# Patient Record
Sex: FEMALE | Race: WHITE | URBAN - METROPOLITAN AREA
[De-identification: names, ages, dates, MRNs, and addresses within clinical notes are randomized per-mention and may not be internally consistent; named-entity substitution may affect disease eponyms.]

---

## 2017-01-01 ENCOUNTER — MEDICAL CORRESPONDENCE (OUTPATIENT)
Dept: HEALTH INFORMATION MANAGEMENT | Facility: CLINIC | Age: 77
End: 2017-01-01

## 2017-01-01 ENCOUNTER — TRANSFERRED RECORDS (OUTPATIENT)
Dept: HEALTH INFORMATION MANAGEMENT | Facility: CLINIC | Age: 77
End: 2017-01-01

## 2017-03-08 ENCOUNTER — TRANSFERRED RECORDS (OUTPATIENT)
Dept: HEALTH INFORMATION MANAGEMENT | Facility: CLINIC | Age: 77
End: 2017-03-08

## 2017-03-09 ENCOUNTER — TRANSFERRED RECORDS (OUTPATIENT)
Dept: HEALTH INFORMATION MANAGEMENT | Facility: CLINIC | Age: 77
End: 2017-03-09

## 2017-03-10 ENCOUNTER — TRANSFERRED RECORDS (OUTPATIENT)
Dept: HEALTH INFORMATION MANAGEMENT | Facility: CLINIC | Age: 77
End: 2017-03-10

## 2017-03-16 ENCOUNTER — TRANSFERRED RECORDS (OUTPATIENT)
Dept: HEALTH INFORMATION MANAGEMENT | Facility: CLINIC | Age: 77
End: 2017-03-16

## 2017-03-17 ENCOUNTER — TRANSFERRED RECORDS (OUTPATIENT)
Dept: HEALTH INFORMATION MANAGEMENT | Facility: CLINIC | Age: 77
End: 2017-03-17

## 2017-03-20 ENCOUNTER — HOSPITAL LABORATORY (OUTPATIENT)
Facility: OTHER | Age: 77
End: 2017-03-20

## 2017-03-20 LAB — INR PPP: 2.34 (ref 0.86–1.14)

## 2017-03-21 ENCOUNTER — NURSING HOME VISIT (OUTPATIENT)
Dept: GERIATRICS | Facility: CLINIC | Age: 77
End: 2017-03-21
Payer: MEDICARE

## 2017-03-21 VITALS
DIASTOLIC BLOOD PRESSURE: 92 MMHG | HEART RATE: 58 BPM | RESPIRATION RATE: 20 BRPM | SYSTOLIC BLOOD PRESSURE: 174 MMHG | TEMPERATURE: 98.2 F | WEIGHT: 250 LBS

## 2017-03-21 DIAGNOSIS — I63.9 CEREBROVASCULAR ACCIDENT (CVA), UNSPECIFIED MECHANISM (H): Primary | ICD-10-CM

## 2017-03-21 DIAGNOSIS — I10 BENIGN ESSENTIAL HYPERTENSION: ICD-10-CM

## 2017-03-21 DIAGNOSIS — N39.0 URINARY TRACT INFECTION WITHOUT HEMATURIA, SITE UNSPECIFIED: ICD-10-CM

## 2017-03-21 DIAGNOSIS — I50.33 ACUTE ON CHRONIC DIASTOLIC CONGESTIVE HEART FAILURE (H): ICD-10-CM

## 2017-03-21 DIAGNOSIS — E11.69 TYPE 2 DIABETES MELLITUS WITH OTHER SPECIFIED COMPLICATION (H): ICD-10-CM

## 2017-03-21 DIAGNOSIS — J15.211 PNEUMONIA DUE TO METHICILLIN SUSCEPTIBLE STAPHYLOCOCCUS AUREUS, UNSPECIFIED LATERALITY, UNSPECIFIED PART OF LUNG: ICD-10-CM

## 2017-03-21 PROBLEM — I50.9 CHF (CONGESTIVE HEART FAILURE) (H): Status: ACTIVE | Noted: 2017-03-21

## 2017-03-21 PROCEDURE — 99207 ZZC CDG-CORRECTLY CODED, REVIEWED AND AGREE: CPT | Performed by: FAMILY MEDICINE

## 2017-03-21 PROCEDURE — 99306 1ST NF CARE HIGH MDM 50: CPT | Performed by: FAMILY MEDICINE

## 2017-03-21 RX ORDER — POTASSIUM CHLORIDE 1.5 G/1.58G
20 POWDER, FOR SOLUTION ORAL 2 TIMES DAILY
COMMUNITY
End: 2017-05-08

## 2017-03-21 RX ORDER — CETIRIZINE HYDROCHLORIDE 10 MG/1
10 TABLET ORAL EVERY EVENING
COMMUNITY

## 2017-03-21 RX ORDER — MAGNESIUM CARB/ALUMINUM HYDROX 105-160MG
296 TABLET,CHEWABLE ORAL ONCE
COMMUNITY
End: 2018-01-01

## 2017-03-21 RX ORDER — AMOXICILLIN 250 MG
2 CAPSULE ORAL DAILY
COMMUNITY
End: 2017-04-07

## 2017-03-21 RX ORDER — CEFTRIAXONE 2 G/1
2 INJECTION, POWDER, FOR SOLUTION INTRAMUSCULAR; INTRAVENOUS DAILY
COMMUNITY
Start: 2017-03-18 | End: 2017-04-07

## 2017-03-21 RX ORDER — L. ACIDOPHILUS/PECTIN, CITRUS 25MM-100MG
1 TABLET ORAL 2 TIMES DAILY
COMMUNITY
Start: 2017-03-21 | End: 2017-04-04

## 2017-03-21 RX ORDER — OMEGA-3 FATTY ACIDS/FISH OIL 300-1000MG
1000 CAPSULE ORAL 2 TIMES DAILY
COMMUNITY
End: 2017-04-13

## 2017-03-21 RX ORDER — MULTIPLE VITAMINS W/ MINERALS TAB 9MG-400MCG
1 TAB ORAL DAILY
COMMUNITY

## 2017-03-21 RX ORDER — FERROUS SULFATE 325(65) MG
TABLET ORAL
COMMUNITY
End: 2017-05-08

## 2017-03-21 RX ORDER — MUPIROCIN 20 MG/G
OINTMENT TOPICAL 2 TIMES DAILY
COMMUNITY
End: 2017-04-07

## 2017-03-21 NOTE — PROGRESS NOTES
[facility]  PRIMARY CARE PROVIDER AND CLINIC:  Pradeep Zuniga None  Chief Complaint   Patient presents with     Hospital F/U     Nursing Home Acute       HPI:    Sunitha Jacques is a 76 year old  (1940),admitted to the [facility]  from [facility] in [place], [place].  Hospital stay 3/8/17 through 3/17/17.  Admitted to this facility for  rehab, medical management and nursing care. Current issues are:      1. Cerebrovascular accident (CVA), unspecified mechanism (H)    2. Acute on chronic diastolic congestive heart failure (H)    3. Urinary tract infection without hematuria, site unspecified    4. Pneumonia due to methicillin susceptible Staphylococcus aureus, unspecified laterality, unspecified part of lung (H)    5. Type 2 diabetes mellitus with other specified complication (H)    6. Benign essential hypertension -- BP have been quite high 180-220/80-90s in the  TCU.         Patient is a 75 yo female with a PMH of Afib on xarelto, DM2, HTN, hypothyroidism. Patient was found down with altered mental status.  Patient has amnesia of days before hospitalization.  She was in her usual state of health on Monday 3/6/17 although family states she had been to the ED because she was not feeling well.  Her recent brain imaging showed a subacute right parietal infarct.  It is unclear if her stroke led to her condition or her condition led to xarelto noncompliance and stroke.  It is reasonable to consider this is a xarelto faliure  And transition to another anticoagulant. Eliquis was ordered.    Patient was aos found to have MSSA bacteremia. followed by ID. Was seen by cardiology.  Recommend may need a SALLY but orders not clear in the chart if indicated or not. Will have her see ID to determine that continue IV antibiotic as indicated to treat bacteremia.    CODE STATUS/ADVANCE DIRECTIVES DISCUSSION:   CPR/Full code   Patient's living condition: lives  alone    ALLERGIES:Metaproterenol; Penicillins; Prednisone; and Sulfa drugs  PAST MEDICAL HISTORY:  has no past medical history on file.  PAST SURGICAL HISTORY:  has no past surgical history on file.  FAMILY HISTORY: family history is not on file.  SOCIAL HISTORY:      Post Discharge Medication Reconciliation Status: discharge medications reconciled and changed, per note/orders (see AVS).  Current Outpatient Prescriptions   Medication Sig Dispense Refill     Lactobacillus Acid-Pectin (LACTOBACILLUS ACIDOPHILUS) TABS Take 1 tablet by mouth 2 times daily       ASPIRIN PO Take 81 mg by mouth       BISACODYL RE Place 10 mg rectally daily       cefTRIAXone sodium 2 G SOLR Inject 2 g into the vein daily       Warfarin Sodium (COUMADIN PO) Take 2.5 mg by mouth daily       Docusate Sodium (COLACE PO) Take 100 mg by mouth as needed for constipation       ferrous sulfate (IRON) 325 (65 FE) MG tablet Take by mouth daily (with breakfast)       omega 3 1000 MG CAPS Take 1,000 mg by mouth 2 times daily       LEVOTHYROXINE SODIUM PO Take 150 mcg by mouth daily       Escitalopram Oxalate (LEXAPRO PO) Take 10 mg by mouth daily       Atorvastatin Calcium (LIPITOR PO) Take 80 mg by mouth daily       magnesium citrate 1.745 GM/30ML solution Take 296 mLs by mouth once       MAGNESIUM OXIDE PO Take 400 mg by mouth daily       METOPROLOL TARTRATE PO Take 50 mg by mouth 2 times daily       magnesium hydroxide (MILK OF MAGNESIA) 400 MG/5ML suspension Take 30 mLs by mouth daily as needed for constipation or heartburn       multivitamin, therapeutic with minerals (MULTI-VITAMIN) TABS tablet Take 1 tablet by mouth daily       mupirocin (BACTROBAN) 2 % ointment Apply topically 2 times daily       Omeprazole (PRILOSEC PO) Take 20 mg by mouth 2 times daily (before meals)       senna-docusate (SENOKOT-S;PERICOLACE) 8.6-50 MG per tablet Take 2 tablets by mouth daily       Montelukast Sodium (SINGULAIR PO) Take 10 mg by mouth At Bedtime        Acetaminophen (TYLENOL PO) Take 1,000 mg by mouth 4 times daily       VITAMIN D, CHOLECALCIFEROL, PO Take 1,000 Units by mouth daily       cetirizine (ZYRTEC) 10 MG tablet Take 10 mg by mouth daily         ROS: inc swelling in both legs- new edema.   10 point ROS of systems including Constitutional, Eyes, Respiratory, Cardiovascular, Gastroenterology, Genitourinary, Integumentary, Muscularskeletal, Psychiatric were all negative except for pertinent positives noted in my HPI.    Exam:  BP (!) 174/92  Pulse 58  Temp 98.2  F (36.8  C)  Resp 20  Wt 250 lb (113.4 kg)  Gen: sitting in chair, alert, cooperative and in no acute distress  HEENT: normocephalic; oropharynx clear  Card: RRR, S1, S2, no murmurs  Resp: lungs clear to auscultation bilaterally, no wheezes or crackles  GI: abdomen soft, not-tender  MSK: normal muscle tone, 3 plus edema b/l   Neuro: CX II-XII grossly in tact; ROM in all four extremities grossly in tact  Psych: alert and oriented x3; normal affect  Skin: warm, no suspicious rashes or lesions noted    Lab/Diagnostic data:     Results for orders placed or performed in visit on 03/20/17   INR   Result Value Ref Range    INR 2.34 (H) 0.86 - 1.14         ASSESSMENT/PLAN:  1. Cerebrovascular accident (CVA), unspecified mechanism (H)    2. Acute on chronic diastolic congestive heart failure (H)    3. Urinary tract infection without hematuria, site unspecified    4. Pneumonia due to methicillin susceptible Staphylococcus aureus, unspecified laterality, unspecified part of lung (H)    5. Type 2 diabetes mellitus with other specified complication (H)    6. Benign essential hypertension          Orders:  1. Restart quinapril 20 mg po qam Dx HTN-- patient had been takne off many of her blood pressure meds.  Will slowly add back in to get her SBP <140/90.  2. Update MD if SBP > 180 x 2 readings  3. MSSA  On rocepin 2 g IV x 21 days  4. Check CBC, BMP, ESR, Cr q fridays and fax labs to ID and f/u with ID in 3  weeks  5. F/U with neurology Dr Lane or [facility].  6. F/U with Cardiology unclear if SALLY is still indicated Dx MSSA bacteremia  7. BS QID  8. Diabetic diet  9. Glipizide 5 mg po qd Dx DM2- was on at home.  Will restart again.  10 Lasix 20 mg po qam Dx edema  11. KCL 20 meq po qam Dx edema  12. Tenso shape on am and off qhs Dx edema  13. Make appt with cardiology Dx Afib with RVR  14. Cdiff toxin and cx Dx diarrhea  15 Hold all laxatives Dx diahhrea    Information reviewed:  Medications, vital signs, orders, nursing notes, problem list, hospital information. Total time spent with patient visit was 45 min including patient visit and review of past records. Greater than 50% of total time spent with counseling and coordinating care.    Electronically signed by:  Patricia Loaiza MD, MD

## 2017-03-21 NOTE — MR AVS SNAPSHOT
"              After Visit Summary   3/21/2017    Sunitha Jacques    MRN: [id number]           Patient Information     Date Of Birth          1940        Visit Information        Provider Department      3/21/2017 2:00 PM Patricia Loaiza MD [facility] Trans[facility]        Today's Diagnoses     Cerebrovascular accident (CVA), unspecified mechanism (H)    -  1    Acute on chronic diastolic congestive heart failure (H)        Urinary tract infection without hematuria, site unspecified        Pneumonia due to methicillin susceptible Staphylococcus aureus, unspecified laterality, unspecified part of lung (H)        Type 2 diabetes mellitus with other specified complication (H)        Benign essential hypertension           Follow-ups after your visit        Who to contact     If you have questions or need follow up information about today's clinic visit or your schedule please contact GERIATR[facility] TR[facility]ITIONAL [facility] directly at [phone].  Normal or non-critical lab and imaging results will be communicated to you by Metabolihart, letter or phone within 4 business days after the clinic has received the results. If you do not hear from us within 7 days, please contact the clinic through Metabolihart or phone. If you have a critical or abnormal lab result, we will notify you by phone as soon as possible.  Submit refill requests through [employer] or call your pharmacy and they will forward the refill request to us. Please allow 3 business days for your refill to be completed.          Additional Information About Your Visit        MyChart Information     [employer]Film lets you send messages to your doctor, view your test results, renew your prescriptions, schedule appointments and more. To sign up, go to [email or web address] . Click on \"Log in\" on the left side of the screen, which will take you to the Welcome page. Then click on \"Sign up Now\" on the right side of the page.     You will be asked to enter the access code " listed below, as well as some personal information. Please follow the directions to create your username and password.     Your access code is: [email or web address]  Expires: 2017  5:23 PM     Your access code will  in 90 days. If you need help or a new code, please call your [facility] or [phone].        Care EveryWhere ID     This is your Care EveryWhere ID. This could be used by other organizations to access your [facility] medical records  [id number]        Your Vitals Were     Pulse Temperature Respirations             58 98.2  F (36.8  C) 20          Blood Pressure from Last 3 Encounters:   17 (!) 174/92    Weight from Last 3 Encounters:   17 250 lb (113.4 kg)              Today, you had the following     No orders found for display       Primary Care Provider    Pradeep Zuniga       No address on file        Thank you!     Thank you for choosing [facility]  for your care. Our goal is always to provide you with excellent care. Hearing back from our patients is one way we can continue to improve our services. Please take a few minutes to complete the written survey that you may receive in the mail after your visit with us. Thank you!             Your Updated Medication List - Protect others around you: Learn how to safely use, store and throw away your medicines at www.disposemymeds.org.          This list is accurate as of: 3/21/17 11:59 PM.  Always use your most recent med list.                   Brand Name Dispense Instructions for use    ASPIRIN PO      Take 81 mg by mouth       BISACODYL RE      Place 10 mg rectally daily       cefTRIAXone sodium 2 G Solr      Inject 2 g into the vein daily       cetirizine 10 MG tablet    zyrTEC     Take 10 mg by mouth daily       COLACE PO      Take 100 mg by mouth as needed for constipation       COUMADIN PO      Take 2.5 mg by mouth daily       ferrous sulfate 325 (65 FE) MG tablet    IRON     Take by mouth daily (with  breakfast)       GLIPIZIDE PO      Take 5 mg by mouth every morning (before breakfast)       lactobacillus acidophilus Tabs      Take 1 tablet by mouth 2 times daily       LASIX PO      Take 40 mg by mouth daily       LEVOTHYROXINE SODIUM PO      Take 150 mcg by mouth daily       LEXAPRO PO      Take 10 mg by mouth daily       LIPITOR PO      Take 80 mg by mouth daily       magnesium citrate 1.745 GM/30ML solution      Take 296 mLs by mouth once       magnesium hydroxide 400 MG/5ML suspension    MILK OF MAGNESIA     Take 30 mLs by mouth daily as needed for constipation or heartburn       MAGNESIUM OXIDE PO      Take 400 mg by mouth daily       METOPROLOL TARTRATE PO      Take 50 mg by mouth 2 times daily       Multi-vitamin Tabs tablet      Take 1 tablet by mouth daily       mupirocin 2 % ointment    BACTROBAN     Apply topically 2 times daily       omega 3 1000 MG Caps      Take 1,000 mg by mouth 2 times daily       potassium chloride 20 MEQ Packet    KLOR-CON     Take 20 mEq by mouth daily       PRILOSEC PO      Take 20 mg by mouth 2 times daily (before meals)       senna-docusate 8.6-50 MG per tablet    SENOKOT-S;PERICOLACE     Take 2 tablets by mouth daily       SINGULAIR PO      Take 10 mg by mouth At Bedtime       TYLENOL PO      Take 1,000 mg by mouth 4 times daily       VITAMIN D (CHOLECALCIFEROL) PO      Take 1,000 Units by mouth daily

## 2017-03-23 ENCOUNTER — HOSPITAL LABORATORY (OUTPATIENT)
Facility: OTHER | Age: 77
End: 2017-03-23

## 2017-03-23 ENCOUNTER — NURSING HOME VISIT (OUTPATIENT)
Dept: GERIATRICS | Facility: CLINIC | Age: 77
End: 2017-03-23

## 2017-03-23 DIAGNOSIS — Z79.01 LONG TERM (CURRENT) USE OF ANTICOAGULANTS: ICD-10-CM

## 2017-03-23 DIAGNOSIS — Z51.81 ENCOUNTER FOR THERAPEUTIC DRUG MONITORING: ICD-10-CM

## 2017-03-23 DIAGNOSIS — I63.9 CEREBROVASCULAR ACCIDENT (CVA), UNSPECIFIED MECHANISM (H): Primary | ICD-10-CM

## 2017-03-23 DIAGNOSIS — I50.23 ACUTE ON CHRONIC SYSTOLIC CONGESTIVE HEART FAILURE (H): ICD-10-CM

## 2017-03-23 DIAGNOSIS — E46 PROTEIN-CALORIE MALNUTRITION (H): ICD-10-CM

## 2017-03-23 LAB
C DIFF STL QL CULT: ABNORMAL
SPECIMEN SOURCE: ABNORMAL

## 2017-03-23 NOTE — MR AVS SNAPSHOT
"              After Visit Summary   3/23/2017    Sunitha Jacques    MRN: [id number]           Patient Information     Date Of Birth          1940        Visit Information        Provider Department      3/23/2017 10:00 AM Patricia Loaiza MD Geriatrics Transitional Care        Today's Diagnoses     Cerebrovascular accident (CVA), unspecified mechanism (H)    -  1    Long term (current) use of anticoagulants        Encounter for therapeutic drug monitoring        Acute on chronic systolic congestive heart failure (H)        Protein-calorie malnutrition (H)           Follow-ups after your visit        Who to contact     If you have questions or need follow up information about today's clinic visit or your schedule please contact GERIAT[facility]ICS TRANSITIONAL [facility] directly at [phone].  Normal or non-critical lab and imaging results will be communicated to you by Actionsofthart, letter or phone within 4 business days after the clinic has received the results. If you do not hear from us within 7 days, please contact the clinic through Actionsofthart or phone. If you have a critical or abnormal lab result, we will notify you by phone as soon as possible.  Submit refill requests through [employer] or call your pharmacy and they will forward the refill request to us. Please allow 3 business days for your refill to be completed.          Additional Information About Your Visit        Actionsofthart Information     [employer] lets you send messages to your doctor, view your test results, renew your prescriptions, schedule appointments and more. To sign up, go to [email or web address] . Click on \"Log in\" on the left side of the screen, which will take you to the Welcome page. Then click on \"Sign up Now\" on the right side of the page.     You will be asked to enter the access code listed below, as well as some personal information. Please follow the directions to create your username and password.     Your access code is: " [email or web address]  Expires: 2017  5:23 PM     Your access code will  in 90 days. If you need help or a new code, please call your [place] [facility] clinic or [phone].        Care EveryWhere ID     This is your Care EveryWhere ID. This could be used by other organizations to access your [facility] medical records  [id number]         Blood Pressure from Last 3 Encounters:   17 (!) 174/92    Weight from Last 3 Encounters:   17 250 lb (113.4 kg)              Today, you had the following     No orders found for display       Primary Care Provider    Pradeep Zuniga       No address on file        Thank you!     Thank you for choosing [facility]  for your care. Our goal is always to provide you with excellent care. Hearing back from our patients is one way we can continue to improve our services. Please take a few minutes to complete the written survey that you may receive in the mail after your visit with us. Thank you!             Your Updated Medication List - Protect others around you: Learn how to safely use, store and throw away your medicines at www.disposemymeds.org.          This list is accurate as of: 3/23/17  5:23 PM.  Always use your most recent med list.                   Brand Name Dispense Instructions for use    ASPIRIN PO      Take 81 mg by mouth       BISACODYL RE      Place 10 mg rectally daily       cefTRIAXone sodium 2 G Solr      Inject 2 g into the vein daily       cetirizine 10 MG tablet    zyrTEC     Take 10 mg by mouth daily       COLACE PO      Take 100 mg by mouth as needed for constipation       COUMADIN PO      Take 2.5 mg by mouth daily       ferrous sulfate 325 (65 FE) MG tablet    IRON     Take by mouth daily (with breakfast)       GLIPIZIDE PO      Take 5 mg by mouth every morning (before breakfast)       lactobacillus acidophilus Tabs      Take 1 tablet by mouth 2 times daily       LASIX PO      Take 40 mg by mouth daily       LEVOTHYROXINE SODIUM PO       Take 150 mcg by mouth daily       LEXAPRO PO      Take 10 mg by mouth daily       LIPITOR PO      Take 80 mg by mouth daily       magnesium citrate 1.745 GM/30ML solution      Take 296 mLs by mouth once       magnesium hydroxide 400 MG/5ML suspension    MILK OF MAGNESIA     Take 30 mLs by mouth daily as needed for constipation or heartburn       MAGNESIUM OXIDE PO      Take 400 mg by mouth daily       METOPROLOL TARTRATE PO      Take 50 mg by mouth 2 times daily       Multi-vitamin Tabs tablet      Take 1 tablet by mouth daily       mupirocin 2 % ointment    BACTROBAN     Apply topically 2 times daily       omega 3 1000 MG Caps      Take 1,000 mg by mouth 2 times daily       potassium chloride 20 MEQ Packet    KLOR-CON     Take 20 mEq by mouth daily       PRILOSEC PO      Take 20 mg by mouth 2 times daily (before meals)       senna-docusate 8.6-50 MG per tablet    SENOKOT-S;PERICOLACE     Take 2 tablets by mouth daily       SINGULAIR PO      Take 10 mg by mouth At Bedtime       TYLENOL PO      Take 1,000 mg by mouth 4 times daily       VITAMIN D (CHOLECALCIFEROL) PO      Take 1,000 Units by mouth daily

## 2017-03-23 NOTE — PROGRESS NOTES
[facility]    HPI:    Sunitha Jacques is a 76 year old  (1940), who is being seen today for an episodic care visit at [facility]. Today's concern is INR/Coumadin management for CVA    Bleeding Signs/Symptoms:  None  Thromboembolic Signs/Symptoms:  None    Medication Changes:  No  Dietary Changes:  No  Activity Changes: No  Bacterial/Viral Infection:  No    Missed Coumadin Doses:  None    On ASA: Yes - 81 mg po q day    Other Concerns:  No    OBJECTIVE:    INR Today:  1.9  Current Dose:  2.5mg    ASSESSMENT:    Subtherapeutic INR for goal of 2-3    PLAN:    New Dose: 3 mg daily      Next INR: 3/27/17    1. Cerebrovascular accident (CVA), unspecified mechanism (H)    2. Long term (current) use of anticoagulants    3. Encounter for therapeutic drug monitoring    4. Acute on chronic systolic congestive heart failure (H)    5. Protein-calorie malnutrition (H)          Patricia Loaiza MD, MD

## 2017-03-24 ENCOUNTER — HOSPITAL LABORATORY (OUTPATIENT)
Facility: OTHER | Age: 77
End: 2017-03-24

## 2017-03-24 LAB
ANION GAP SERPL CALCULATED.3IONS-SCNC: 4 MMOL/L (ref 3–14)
BUN SERPL-MCNC: 13 MG/DL (ref 7–30)
C DIFF TOX B STL QL: NORMAL
CALCIUM SERPL-MCNC: 8.3 MG/DL (ref 8.5–10.1)
CHLORIDE SERPL-SCNC: 106 MMOL/L (ref 94–109)
CO2 SERPL-SCNC: 35 MMOL/L (ref 20–32)
CREAT SERPL-MCNC: 0.79 MG/DL (ref 0.52–1.04)
CRP SERPL-MCNC: 6 MG/L (ref 0–8)
ERYTHROCYTE [DISTWIDTH] IN BLOOD BY AUTOMATED COUNT: 13.9 % (ref 10–15)
ERYTHROCYTE [SEDIMENTATION RATE] IN BLOOD BY WESTERGREN METHOD: 15 MM/H (ref 0–30)
GFR SERPL CREATININE-BSD FRML MDRD: 71 ML/MIN/1.7M2
GLUCOSE SERPL-MCNC: 96 MG/DL (ref 70–99)
HCT VFR BLD AUTO: 35 % (ref 35–47)
HGB BLD-MCNC: 10.8 G/DL (ref 11.7–15.7)
MCH RBC QN AUTO: 28 PG (ref 26.5–33)
MCHC RBC AUTO-ENTMCNC: 30.9 G/DL (ref 31.5–36.5)
MCV RBC AUTO: 91 FL (ref 78–100)
PLATELET # BLD AUTO: 235 10E9/L (ref 150–450)
POTASSIUM SERPL-SCNC: 4.2 MMOL/L (ref 3.4–5.3)
RBC # BLD AUTO: 3.86 10E12/L (ref 3.8–5.2)
SODIUM SERPL-SCNC: 145 MMOL/L (ref 133–144)
SPECIMEN SOURCE: NORMAL
WBC # BLD AUTO: 6.3 10E9/L (ref 4–11)

## 2017-03-26 ENCOUNTER — TELEPHONE (OUTPATIENT)
Dept: GERIATRICS | Facility: CLINIC | Age: 77
End: 2017-03-26

## 2017-03-26 NOTE — TELEPHONE ENCOUNTER
Pt with hx of CVA and recent elevated B/P started by on call MD amlodipine 2.5 mg daily. Received 2.5 mg yesterday evening and AM dose this AM. B/P still 200/67 pulse 62. Order given to increase amlodipine to 5 mg daily. Nursing instructed if pt's symptoms change or worsen they are to notify NP or MD.  Note signed electronically by Emanuel Gan CNP

## 2017-03-27 ENCOUNTER — HOSPITAL LABORATORY (OUTPATIENT)
Facility: OTHER | Age: 77
End: 2017-03-27

## 2017-03-27 LAB — INR PPP: 1.89 (ref 0.86–1.14)

## 2017-03-28 ENCOUNTER — NURSING HOME VISIT (OUTPATIENT)
Dept: GERIATRICS | Facility: CLINIC | Age: 77
End: 2017-03-28
Payer: MEDICARE

## 2017-03-28 VITALS
DIASTOLIC BLOOD PRESSURE: 80 MMHG | SYSTOLIC BLOOD PRESSURE: 200 MMHG | WEIGHT: 254 LBS | TEMPERATURE: 97.8 F | HEART RATE: 60 BPM | RESPIRATION RATE: 18 BRPM

## 2017-03-28 DIAGNOSIS — I63.9 CEREBROVASCULAR ACCIDENT (CVA), UNSPECIFIED MECHANISM (H): Primary | ICD-10-CM

## 2017-03-28 DIAGNOSIS — I10 BENIGN ESSENTIAL HYPERTENSION: ICD-10-CM

## 2017-03-28 DIAGNOSIS — I50.23 ACUTE ON CHRONIC SYSTOLIC CONGESTIVE HEART FAILURE (H): ICD-10-CM

## 2017-03-28 DIAGNOSIS — E46 PROTEIN-CALORIE MALNUTRITION (H): ICD-10-CM

## 2017-03-28 DIAGNOSIS — Z79.01 LONG TERM (CURRENT) USE OF ANTICOAGULANTS: ICD-10-CM

## 2017-03-28 PROCEDURE — 99207 ZZC CDG-CORRECTLY CODED, REVIEWED AND AGREE: CPT | Performed by: FAMILY MEDICINE

## 2017-03-28 PROCEDURE — 99309 SBSQ NF CARE MODERATE MDM 30: CPT | Performed by: FAMILY MEDICINE

## 2017-03-28 NOTE — MR AVS SNAPSHOT
After Visit Summary   3/28/2017    Sunitha Jacques    MRN: [id number]           Patient Information     Date Of Birth          1940        Visit Information        Provider Department      3/28/2017 1:00 PM Patricia Loaiza MD Geriatrics Transitional Care        Today's Diagnoses     Cerebrovascular accident (CVA), unspecified mechanism (H)    -  1    Acute on chronic systolic congestive heart failure (H)        Long term (current) use of anticoagulants        Protein-calorie malnutrition (H)        Benign essential hypertension           Follow-ups after your visit        Your next 10 appointments already scheduled     Apr 06, 2017 10:00 AM CDT   Lymphedema Evaluation with Donna Vogel PT   [facility] Lymphedema ([facility])    [address] [address] [phone]            Apr 19, 2017  2:30 PM CDT   (Arrive by 2:15 PM)   New Patient Visit with Chadwick Carpenter MD   [facility] and Infectious Diseases ([facility] [place])    [address] [place] [address] [phone]              Who to contact     If you have questions or need follow up information about today's clinic visit or your schedule please contact GERIATRICS [facility]ITIONAL [facility] directly at [phone].  Normal or non-critical lab and imaging results will be communicated to you by MyChart, letter or phone within 4 business days after the clinic has received the results. If you do not hear from us within 7 days, please contact the clinic through MyChart or phone. If you have a critical or abnormal lab result, we will notify you by phone as soon as possible.  Submit refill requests through [employer] or call your pharmacy and they will forward the refill request to us. Please allow 3 business days for your refill to be completed.          Additional Information About Your Visit        MyChart Information     [employer] lets you  "send messages to your doctor, view your test results, renew your prescriptions, schedule appointments and more. To sign up, go to [email or web address] . Click on \"Log in\" on the left side of the screen, which will take you to the Welcome page. Then click on \"Sign up Now\" on the right side of the page.     You will be asked to enter the access code listed below, as well as some personal information. Please follow the directions to create your username and password.     Your access code is: [email or web address]  Expires: 2017  5:23 PM     Your access code will  in 90 days. If you need help or a new code, please call your [facility] clinic or [phone].        Care EveryWhere ID     This is your Care EveryWhere ID. This could be used by other organizations to access your [facility] medical records  [id number]        Your Vitals Were     Pulse Temperature Respirations             60 97.8  F (36.6  C) 18          Blood Pressure from Last 3 Encounters:   17 200/80   17 (!) 174/92    Weight from Last 3 Encounters:   17 254 lb (115.2 kg)   17 250 lb (113.4 kg)              Today, you had the following     No orders found for display       Primary Care Provider    Pradeep Zuniga       No address on file        Thank you!     Thank you for choosing G[facility]IATR[facility]ITIONAL [facility]ARE  for your care. Our goal is always to provide you with excellent care. Hearing back from our patients is one way we can continue to improve our services. Please take a few minutes to complete the written survey that you may receive in the mail after your visit with us. Thank you!             Your Updated Medication List - Protect others around you: Learn how to safely use, store and throw away your medicines at www.disposemymeds.org.          This list is accurate as of: 3/28/17 11:59 PM.  Always use your most recent med list.                   Brand Name Dispense Instructions for use    ASPIRIN PO      Take 81 mg by mouth "       BISACODYL RE      Place 10 mg rectally daily       cefTRIAXone sodium 2 G Solr      Inject 2 g into the vein daily       cetirizine 10 MG tablet    zyrTEC     Take 10 mg by mouth daily       COLACE PO      Take 100 mg by mouth as needed for constipation       COUMADIN PO      Take 2.5 mg by mouth daily       ferrous sulfate 325 (65 FE) MG tablet    IRON     Take by mouth daily (with breakfast)       GLIPIZIDE PO      Take 5 mg by mouth every morning (before breakfast)       lactobacillus acidophilus Tabs      Take 1 tablet by mouth 2 times daily       LASIX PO      Take 40 mg by mouth 2 times daily       LEVOTHYROXINE SODIUM PO      Take 150 mcg by mouth daily       LEXAPRO PO      Take 10 mg by mouth daily       LIPITOR PO      Take 80 mg by mouth daily       magnesium citrate 1.745 GM/30ML solution      Take 296 mLs by mouth once       magnesium hydroxide 400 MG/5ML suspension    MILK OF MAGNESIA     Take 30 mLs by mouth daily as needed for constipation or heartburn       MAGNESIUM OXIDE PO      Take 400 mg by mouth daily       METOPROLOL TARTRATE PO      Take 50 mg by mouth 2 times daily       Multi-vitamin Tabs tablet      Take 1 tablet by mouth daily       mupirocin 2 % ointment    BACTROBAN     Apply topically 2 times daily       NORVASC PO      Take 5 mg by mouth daily       omega 3 1000 MG Caps      Take 1,000 mg by mouth 2 times daily       potassium chloride 20 MEQ Packet    KLOR-CON     Take 20 mEq by mouth 2 times daily       PRILOSEC PO      Take 20 mg by mouth 2 times daily (before meals)       senna-docusate 8.6-50 MG per tablet    SENOKOT-S;PERICOLACE     Take 2 tablets by mouth daily       SINGULAIR PO      Take 10 mg by mouth At Bedtime       TYLENOL PO      Take 1,000 mg by mouth 4 times daily       VITAMIN D (CHOLECALCIFEROL) PO      Take 1,000 Units by mouth daily

## 2017-03-28 NOTE — PROGRESS NOTES
[facility]    Chief Complaint   Patient presents with     Nursing Home Acute       HPI:    Sunitha Jacques is a 76 year old  (1940), who is being seen today for an episodic care visit at [facility] . Today's concern is:     Cerebrovascular accident (CVA), unspecified mechanism (H)- [place]  Acute on chronic systolic congestive heart failure (H)  Long term (current) use of anticoagulants- on coumadin  Protein-calorie malnutrition (H)  Benign essential hypertension---blood pressure have been running high even with the addition of 40mg ACEI and norvasc 5 mg . The patient denies any symptoms of neurological impairment or TIA's; no amaurosis, diplopia, dysphasia, or unilateral disturbance of motor or sensory function. No loss of balance or vertigo.      ALLERGIES: Metaproterenol; Penicillins; Prednisone; and Sulfa drugs  Past Medical, Surgical, Family and Social History reviewed and updated in [facility].    Current Outpatient Prescriptions   Medication Sig Dispense Refill     AmLODIPine Besylate (NORVASC PO) Take 5 mg by mouth daily       Lactobacillus Acid-Pectin (LACTOBACILLUS ACIDOPHILUS) TABS Take 1 tablet by mouth 2 times daily       ASPIRIN PO Take 81 mg by mouth       BISACODYL RE Place 10 mg rectally daily       cefTRIAXone sodium 2 G SOLR Inject 2 g into the vein daily       Warfarin Sodium (COUMADIN PO) Take 2.5 mg by mouth daily       Docusate Sodium (COLACE PO) Take 100 mg by mouth as needed for constipation       ferrous sulfate (IRON) 325 (65 FE) MG tablet Take by mouth daily (with breakfast)       omega 3 1000 MG CAPS Take 1,000 mg by mouth 2 times daily       LEVOTHYROXINE SODIUM PO Take 150 mcg by mouth daily       Escitalopram Oxalate (LEXAPRO PO) Take 10 mg by mouth daily       Atorvastatin Calcium (LIPITOR PO) Take 80 mg by mouth daily       magnesium citrate 1.745 GM/30ML solution Take 296 mLs by mouth once       MAGNESIUM OXIDE PO Take 400 mg by mouth  daily       METOPROLOL TARTRATE PO Take 50 mg by mouth 2 times daily       magnesium hydroxide (MILK OF MAGNESIA) 400 MG/5ML suspension Take 30 mLs by mouth daily as needed for constipation or heartburn       multivitamin, therapeutic with minerals (MULTI-VITAMIN) TABS tablet Take 1 tablet by mouth daily       mupirocin (BACTROBAN) 2 % ointment Apply topically 2 times daily       Omeprazole (PRILOSEC PO) Take 20 mg by mouth 2 times daily (before meals)       senna-docusate (SENOKOT-S;PERICOLACE) 8.6-50 MG per tablet Take 2 tablets by mouth daily       Montelukast Sodium (SINGULAIR PO) Take 10 mg by mouth At Bedtime       Acetaminophen (TYLENOL PO) Take 1,000 mg by mouth 4 times daily       VITAMIN D, CHOLECALCIFEROL, PO Take 1,000 Units by mouth daily       cetirizine (ZYRTEC) 10 MG tablet Take 10 mg by mouth daily       GLIPIZIDE PO Take 5 mg by mouth every morning (before breakfast)       Furosemide (LASIX PO) Take 40 mg by mouth 2 times daily        potassium chloride (KLOR-CON) 20 MEQ Packet Take 20 mEq by mouth 2 times daily        Medications reviewed:  Medications reconciled to facility chart and changes were made to reflect current medications as identified as above med list. Below are the changes that were made:   Medications stopped since last EPIC medication reconciliation:   There are no discontinued medications.    Medications started since last [facility] medication reconciliation:  No orders of the defined types were placed in this encounter.        REVIEW OF SYSTEMS:  10 point ROS of systems including Constitutional, Eyes, Respiratory, Cardiovascular, Gastroenterology, Genitourinary, Integumentary, Muscularskeletal, Psychiatric were all negative except for pertinent positives noted in my HPI.    Physical Exam:  /80  Pulse 60  Temp 97.8  F (36.6  C)  Resp 18  Wt 254 lb (115.2 kg)  Gen: sitting in chair, alert, cooperative and in no acute distress  HEENT: normocephalic; oropharynx clear  Card: RRR,  S1, S2, no murmurs  Resp: lungs clear to auscultation bilaterally, no wheezes or crackles  GI: abdomen soft, not-tender  MSK: normal muscle tone, 1 plus  edema  Neuro: CX II-XII grossly in tact; ROM in all four extremities grossly in tact  Psych: alert and oriented x3; normal affect  Skin: warm, no suspicious rashes or lesions noted      Recent Labs:    Results for orders placed or performed in visit on 17   INR   Result Value Ref Range    INR 1.89 (H) 0.86 - 1.14         Assessment/Plan:     Cerebrovascular accident (CVA), unspecified mechanism (H)  Acute on chronic systolic congestive heart failure (H)  Long term (current) use of anticoagulants  Protein-calorie malnutrition (H)  Benign essential hypertension    Orders:  1. Norvasc 5 mg po qam Dx HTN  2. Lymphedema eval and treat Dx edema  3. DC ace wraps  4. Increase lasix to 40 mg po BID Dx edema  5. Increase potassium to 20 meq po bid edema  6. Hydralizine 10 mg po QID mod-severe HTN, CVA  7. F/U with Josse on 3/31/17  8. BMP on 3/31/17 Dx HTN      Electronically signed by  Patricia Loaiza MD, MD      MEDICAL NECESSITY STATEMENT FOR DME    Demographic Information on Sunitha Jacques:    Sunitha Jacques  Gender: female  : 1940  [address] [place] [id number]  [phone] (home)     Medical Record: [id number]  Social Security Number: [id number]  Primary Care Provider: Pradeep Zuniga  Insurance: Payor: MEDICARE / Plan: MEDICARE / Product Type: Medicare /       Cerebrovascular accident (CVA), unspecified mechanism (H) [I63.9]    DME:  WHEELCHAIR: no (TYPE, CUSHION)   Standard foot rest    Elevating leg rest    Dose patient use oxygen    Able to propel w/c  (if no why? And is there someone who can?)   Mobility related ADL that are affected in the home    The wheelchair is suitable and necessary for use in the patient's home and the  Home/rooms can accommodate the wheelchair.     Reason why a cane or walker will not meet the  patient's needs. (ie: balance,   Tolerance, level of assistance)    The patient has expressed willingness to use the wheelchair in the home and    Does have the physical and cognitive ability to maneuver the equipment or has a    Caregiver who is available, willing, and able to provide assistance in the home    With the wheelchair.   HOSPITAL BED:  no   With or without railings  WALKER: $ wheeled walker due to diagnosis of I63.9 for community distances  OXYGEN: no  NEBULIZER: no (MEDICATION TO BE ADMINISTERED)    VITAL SIGNS:  Vitals: /80  Pulse 60  Temp 97.8  F (36.6  C)  Resp 18  Wt 254 lb (115.2 kg)  BMI= There is no height or weight on file to calculate BMI.       (for Oxygen need Liter flow, Portable tank, O2 sats at rest on room air)     MEDICAL NECESSITY STATEMENT (describe reason to support DME here including length of need)  Cerebrovascular accident (CVA), unspecified mechanism (H) [I63.9]    ELECTRONICALLY SIGNED BY NUBIA CERTIFIED PROVIDER:  Patricia Loaiza MD, MD   NPI:NPI:[id number]    [facility]  [address] [place] [address], [address]  [place] [address]

## 2017-03-29 ENCOUNTER — HOSPITAL LABORATORY (OUTPATIENT)
Facility: OTHER | Age: 77
End: 2017-03-29

## 2017-03-29 LAB — INR PPP: 2.27 (ref 0.86–1.14)

## 2017-03-31 ENCOUNTER — HOSPITAL LABORATORY (OUTPATIENT)
Facility: OTHER | Age: 77
End: 2017-03-31

## 2017-03-31 ENCOUNTER — NURSING HOME VISIT (OUTPATIENT)
Dept: GERIATRICS | Facility: CLINIC | Age: 77
End: 2017-03-31
Payer: MEDICARE

## 2017-03-31 VITALS
TEMPERATURE: 98 F | SYSTOLIC BLOOD PRESSURE: 118 MMHG | DIASTOLIC BLOOD PRESSURE: 62 MMHG | WEIGHT: 210 LBS | RESPIRATION RATE: 14 BRPM | OXYGEN SATURATION: 96 % | HEART RATE: 78 BPM

## 2017-03-31 DIAGNOSIS — I50.23 ACUTE ON CHRONIC SYSTOLIC CONGESTIVE HEART FAILURE (H): ICD-10-CM

## 2017-03-31 DIAGNOSIS — J15.211 PNEUMONIA DUE TO METHICILLIN SUSCEPTIBLE STAPHYLOCOCCUS AUREUS, UNSPECIFIED LATERALITY, UNSPECIFIED PART OF LUNG: ICD-10-CM

## 2017-03-31 DIAGNOSIS — I63.9 CEREBROVASCULAR ACCIDENT (CVA), UNSPECIFIED MECHANISM (H): Primary | ICD-10-CM

## 2017-03-31 DIAGNOSIS — M79.672 LEFT FOOT PAIN: ICD-10-CM

## 2017-03-31 DIAGNOSIS — E46 PROTEIN-CALORIE MALNUTRITION (H): ICD-10-CM

## 2017-03-31 DIAGNOSIS — I10 BENIGN ESSENTIAL HYPERTENSION: ICD-10-CM

## 2017-03-31 LAB
ANION GAP SERPL CALCULATED.3IONS-SCNC: 4 MMOL/L (ref 3–14)
BUN SERPL-MCNC: 16 MG/DL (ref 7–30)
CALCIUM SERPL-MCNC: 9.1 MG/DL (ref 8.5–10.1)
CHLORIDE SERPL-SCNC: 102 MMOL/L (ref 94–109)
CO2 SERPL-SCNC: 37 MMOL/L (ref 20–32)
CREAT SERPL-MCNC: 0.86 MG/DL (ref 0.52–1.04)
CRP SERPL-MCNC: 5.2 MG/L (ref 0–8)
ERYTHROCYTE [DISTWIDTH] IN BLOOD BY AUTOMATED COUNT: 14 % (ref 10–15)
ERYTHROCYTE [SEDIMENTATION RATE] IN BLOOD BY WESTERGREN METHOD: 15 MM/H (ref 0–30)
GFR SERPL CREATININE-BSD FRML MDRD: 64 ML/MIN/1.7M2
GLUCOSE SERPL-MCNC: 101 MG/DL (ref 70–99)
HCT VFR BLD AUTO: 38.5 % (ref 35–47)
HGB BLD-MCNC: 11.8 G/DL (ref 11.7–15.7)
INR PPP: 1.8 (ref 0.86–1.14)
MCH RBC QN AUTO: 27.8 PG (ref 26.5–33)
MCHC RBC AUTO-ENTMCNC: 30.6 G/DL (ref 31.5–36.5)
MCV RBC AUTO: 91 FL (ref 78–100)
PLATELET # BLD AUTO: 211 10E9/L (ref 150–450)
POTASSIUM SERPL-SCNC: 4.7 MMOL/L (ref 3.4–5.3)
RBC # BLD AUTO: 4.24 10E12/L (ref 3.8–5.2)
SODIUM SERPL-SCNC: 143 MMOL/L (ref 133–144)
WBC # BLD AUTO: 5.5 10E9/L (ref 4–11)

## 2017-03-31 PROCEDURE — 99207 ZZC CDG-CORRECTLY CODED, REVIEWED AND AGREE: CPT | Performed by: FAMILY MEDICINE

## 2017-03-31 PROCEDURE — 99309 SBSQ NF CARE MODERATE MDM 30: CPT | Performed by: FAMILY MEDICINE

## 2017-03-31 NOTE — MR AVS SNAPSHOT
After Visit Summary   3/31/2017    Sunitha Jacques    MRN: [id number]           Patient Information     Date Of Birth          1940        Visit Information        Provider Department      3/31/2017 9:00 AM Patricia Loaiza MD Geriatrics Transitional Care        Today's Diagnoses     Cerebrovascular accident (CVA), unspecified mechanism (H)    -  1    Acute on chronic systolic congestive heart failure (H)        Protein-calorie malnutrition (H)        Benign essential hypertension        Pneumonia due to methicillin susceptible Staphylococcus aureus, unspecified laterality, unspecified part of lung (H)        Left foot pain           Follow-ups after your visit        Your next 10 appointments already scheduled     Apr 06, 2017 10:00 AM CDT   Lymphedema Evaluation with Donna Vogel, PT   [facility] Lymphedema ([facility])    [address] [facility] [address] [phone]            Apr 19, 2017  2:30 PM CDT   (Arrive by 2:15 PM)   New Patient Visit with Chadwick Carpenter MD   [facility] and Infectious Diseases ([facility] [place])    [address] [place] [address] [phone]              Who to contact     If you have questions or need follow up information about today's clinic visit or your schedule please contact GERIATRICS TRANSITIONAL CARE directly at [phone].  Normal or non-critical lab and imaging results will be communicated to you by MyChart, letter or phone within 4 business days after the clinic has received the results. If you do not hear from us within 7 days, please contact the clinic through MyChart or phone. If you have a critical or abnormal lab result, we will notify you by phone as soon as possible.  Submit refill requests through [employer] or call your pharmacy and they will forward the refill request to us. Please allow 3 business days for your refill to be completed.  "         Additional Information About Your Visit        MyChart Information     [employer] lets you send messages to your doctor, view your test results, renew your prescriptions, schedule appointments and more. To sign up, go to [email or web address] . Click on \"Log in\" on the left side of the screen, which will take you to the Welcome page. Then click on \"Sign up Now\" on the right side of the page.     You will be asked to enter the access code listed below, as well as some personal information. Please follow the directions to create your username and password.     Your access code is: [email or web address]  Expires: 2017  5:23 PM     Your access code will  in 90 days. If you need help or a new code, please call your [facility] clinic or [phone].        Care EveryWhere ID     This is your Care EveryWhere ID. This could be used by other organizations to access your [facility] medical records  [id number]        Your Vitals Were     Pulse Temperature Respirations Pulse Oximetry          78 98  F (36.7  C) 14 96%         Blood Pressure from Last 3 Encounters:   17 118/62   17 200/80   17 (!) 174/92    Weight from Last 3 Encounters:   17 210 lb (95.3 kg)   17 254 lb (115.2 kg)   17 250 lb (113.4 kg)              Today, you had the following     No orders found for display       Primary Care Provider    Pradeep Zuniga       No address on file        Thank you!     Thank you for choosing G[facility]  for your care. Our goal is always to provide you with excellent care. Hearing back from our patients is one way we can continue to improve our services. Please take a few minutes to complete the written survey that you may receive in the mail after your visit with us. Thank you!             Your Updated Medication List - Protect others around you: Learn how to safely use, store and throw away your medicines at www.disposemymeds.org.          This list is accurate as of: " 3/31/17 11:59 PM.  Always use your most recent med list.                   Brand Name Dispense Instructions for use    ASPIRIN PO      Take 81 mg by mouth       BISACODYL RE      Place 10 mg rectally daily       cefTRIAXone sodium 2 G Solr      Inject 2 g into the vein daily       cetirizine 10 MG tablet    zyrTEC     Take 10 mg by mouth daily       COLACE PO      Take 100 mg by mouth as needed for constipation       COUMADIN PO      Take 3.5 mg by mouth daily       ferrous sulfate 325 (65 FE) MG tablet    IRON     Take by mouth daily (with breakfast)       GLIPIZIDE PO      Take 5 mg by mouth every morning (before breakfast)       lactobacillus acidophilus Tabs      Take 1 tablet by mouth 2 times daily       LASIX PO      Take 40 mg by mouth 2 times daily       LEVOTHYROXINE SODIUM PO      Take 150 mcg by mouth daily       LEXAPRO PO      Take 10 mg by mouth daily       LIPITOR PO      Take 80 mg by mouth daily       magnesium citrate 1.745 GM/30ML solution      Take 296 mLs by mouth once       magnesium hydroxide 400 MG/5ML suspension    MILK OF MAGNESIA     Take 30 mLs by mouth daily as needed for constipation or heartburn       MAGNESIUM OXIDE PO      Take 400 mg by mouth daily       METOPROLOL TARTRATE PO      Take 50 mg by mouth 2 times daily       Multi-vitamin Tabs tablet      Take 1 tablet by mouth daily       mupirocin 2 % ointment    BACTROBAN     Apply topically 2 times daily       NORVASC PO      Take 10 mg by mouth daily       omega 3 1000 MG Caps      Take 1,000 mg by mouth 2 times daily       potassium chloride 20 MEQ Packet    KLOR-CON     Take 20 mEq by mouth 2 times daily       PRILOSEC PO      Take 20 mg by mouth 2 times daily (before meals)       senna-docusate 8.6-50 MG per tablet    SENOKOT-S;PERICOLACE     Take 2 tablets by mouth daily       SINGULAIR PO      Take 10 mg by mouth At Bedtime       TYLENOL PO      Take 1,000 mg by mouth 4 times daily       VITAMIN D (CHOLECALCIFEROL) PO       Take 1,000 Units by mouth daily

## 2017-03-31 NOTE — PROGRESS NOTES
[place] [facility]    Chief Complaint   Patient presents with     Nursing Home Acute       HPI:    Sunitha Jacques is a 76 year old  (1940), who is being seen today for an episodic care visit at [facility] . Today's concern is:  1. Cerebrovascular accident (CVA), unspecified mechanism (H) -- therapy going well - looking at d/c sometime e next week. contineu om coumadin   2. Acute on chronic systolic congestive heart failure (H) -compensated. Lasix was added with improvement of edema anf fluid overlaoad   3. Protein-calorie malnutrition (H) - on supplements   4. Benign essential hypertension -- with rhe addition of norvasc 5 mg po qd ans hydralzine 10 mg po qid blood pressure coming down nicley but would tlike to see BPs< 150/90.   5. MSSA (methicillin susceptible Staphylococcus aureus) pneumonia (H) -- on IV antibiotic-no clinical symptoms. Labs supportive of treatment. Has f/u with ID on 4/19        Weights-  3/11 - 221  3/22 - 213  3/29 - 210    Blood pressures-  3/29 - 162/82  3/30 - 152/82  3/31 - 184/90        ALLERGIES: Metaproterenol; Penicillins; Prednisone; and Sulfa drugs  Past Medical, Surgical, Family and Social History reviewed and updated in [facility].    Current Outpatient Prescriptions   Medication Sig Dispense Refill     AmLODIPine Besylate (NORVASC PO) Take 5 mg by mouth daily       Lactobacillus Acid-Pectin (LACTOBACILLUS ACIDOPHILUS) TABS Take 1 tablet by mouth 2 times daily       ASPIRIN PO Take 81 mg by mouth       BISACODYL RE Place 10 mg rectally daily       cefTRIAXone sodium 2 G SOLR Inject 2 g into the vein daily       Warfarin Sodium (COUMADIN PO) Take 2.5 mg by mouth daily       Docusate Sodium (COLACE PO) Take 100 mg by mouth as needed for constipation       ferrous sulfate (IRON) 325 (65 FE) MG tablet Take by mouth daily (with breakfast)       omega 3 1000 MG CAPS Take 1,000 mg by mouth 2 times daily       LEVOTHYROXINE SODIUM PO Take 150 mcg by mouth daily        Escitalopram Oxalate (LEXAPRO PO) Take 10 mg by mouth daily       Atorvastatin Calcium (LIPITOR PO) Take 80 mg by mouth daily       magnesium citrate 1.745 GM/30ML solution Take 296 mLs by mouth once       MAGNESIUM OXIDE PO Take 400 mg by mouth daily       METOPROLOL TARTRATE PO Take 50 mg by mouth 2 times daily       magnesium hydroxide (MILK OF MAGNESIA) 400 MG/5ML suspension Take 30 mLs by mouth daily as needed for constipation or heartburn       multivitamin, therapeutic with minerals (MULTI-VITAMIN) TABS tablet Take 1 tablet by mouth daily       mupirocin (BACTROBAN) 2 % ointment Apply topically 2 times daily       Omeprazole (PRILOSEC PO) Take 20 mg by mouth 2 times daily (before meals)       senna-docusate (SENOKOT-S;PERICOLACE) 8.6-50 MG per tablet Take 2 tablets by mouth daily       Montelukast Sodium (SINGULAIR PO) Take 10 mg by mouth At Bedtime       Acetaminophen (TYLENOL PO) Take 1,000 mg by mouth 4 times daily       VITAMIN D, CHOLECALCIFEROL, PO Take 1,000 Units by mouth daily       cetirizine (ZYRTEC) 10 MG tablet Take 10 mg by mouth daily       GLIPIZIDE PO Take 5 mg by mouth every morning (before breakfast)       Furosemide (LASIX PO) Take 40 mg by mouth 2 times daily        potassium chloride (KLOR-CON) 20 MEQ Packet Take 20 mEq by mouth 2 times daily        Medications reviewed:  Medications reconciled to facility chart and changes were made to reflect current medications as identified as above med list. Below are the changes that were made:   Medications stopped since last EPIC medication reconciliation:   There are no discontinued medications.    Medications started since last [facility] medication reconciliation:  No orders of the defined types were placed in this encounter.        REVIEW OF SYSTEMS:  10 point ROS of systems including Constitutional, Eyes, Respiratory, Cardiovascular, Gastroenterology, Genitourinary, Integumentary, Muscularskeletal, Psychiatric were all negative except for  pertinent positives noted in my HPI.    Physical Exam:  /62  Pulse 78  Temp 98  F (36.7  C)  Resp 14  Wt 210 lb (95.3 kg)  SpO2 96%    Gen: sitting in chair, alert, cooperative and in no acute distress  HEENT: normocephalic; oropharynx clear  Card: RRR, S1, S2, no murmurs  Resp: lungs clear to auscultation bilaterally, no wheezes or crackles  GI: abdomen soft, not-tender  MSK: normal muscle tone, lymphedema wraps. Left foot 2nd metatarsal pain . Hard for he to wiggle 2nd toe. No known injury  Neuro: CX II-XII grossly in tact; ROM in all four extremities grossly in tact  Psych: alert and oriented x3; normal affect  Skin: warm, no suspicious rashes or lesions noted    Recent Labs:    Results for orders placed or performed in visit on 03/29/17   INR   Result Value Ref Range    INR 2.27 (H) 0.86 - 1.14         Assessment/Plan:     Cerebrovascular accident (CVA), unspecified mechanism (H)  Acute on chronic systolic congestive heart failure (H)  Protein-calorie malnutrition (H)  Benign essential hypertension  MSSA (methicillin susceptible Staphylococcus aureus) pneumonia (H)  Left foot pain    Orders:  1. Increase Norvasc to 10 mg po qd Dx HTN  2. F/U with Dr Raman next week for BP check  3. DC norvasc 5 mg po qd  4. Left foot xray- 2nd mt pain and unable to flex left 1st toe  5. Lymphedema- improved with wraps  6. Make copy of labs for ID appt on 4/19/17 Dx MSSA staph bacteremia  7. Increase coumadin to 3.5 mg po qd and check INR on 4/3/17 Dx CVA    Electronically signed by  Patricia Loaiza MD, MD

## 2017-04-03 ENCOUNTER — HOSPITAL LABORATORY (OUTPATIENT)
Facility: OTHER | Age: 77
End: 2017-04-03

## 2017-04-03 LAB — INR PPP: 1.81 (ref 0.86–1.14)

## 2017-04-04 ENCOUNTER — NURSING HOME VISIT (OUTPATIENT)
Dept: GERIATRICS | Facility: CLINIC | Age: 77
End: 2017-04-04
Payer: MEDICARE

## 2017-04-04 VITALS
SYSTOLIC BLOOD PRESSURE: 170 MMHG | TEMPERATURE: 97.6 F | DIASTOLIC BLOOD PRESSURE: 78 MMHG | RESPIRATION RATE: 17 BRPM | HEART RATE: 66 BPM | WEIGHT: 237 LBS

## 2017-04-04 DIAGNOSIS — I10 BENIGN ESSENTIAL HYPERTENSION: Primary | ICD-10-CM

## 2017-04-04 DIAGNOSIS — I48.91 ATRIAL FIBRILLATION WITH RAPID VENTRICULAR RESPONSE (H): ICD-10-CM

## 2017-04-04 DIAGNOSIS — I50.32 CHRONIC DIASTOLIC CONGESTIVE HEART FAILURE (H): ICD-10-CM

## 2017-04-04 DIAGNOSIS — I63.9 CEREBROVASCULAR ACCIDENT (CVA), UNSPECIFIED MECHANISM (H): ICD-10-CM

## 2017-04-04 PROCEDURE — 99309 SBSQ NF CARE MODERATE MDM 30: CPT | Performed by: FAMILY MEDICINE

## 2017-04-04 PROCEDURE — 99207 ZZC CDG-CORRECTLY CODED, REVIEWED AND AGREE: CPT | Performed by: FAMILY MEDICINE

## 2017-04-04 NOTE — Clinical Note
Will you check med list again with [facility], I didn't take [facility] med list with me and I know patient had a couple of BP meds added not on list so I added Quinapril and hydralazine today to med list from memory but should be checked

## 2017-04-04 NOTE — PROGRESS NOTES
[place] [facility]    Chief Complaint   Patient presents with     Nursing Home Acute       HPI:    Sunitha Jacques is a 76 year old  (1940), who is being seen today for an episodic care visit at [facility] . Today's concern is:  1. Cerebrovascular accident (CVA), unspecified mechanism (H)- progressing in therapy well, no new symptoms    2. Benign essential hypertension - difficult to control, slight improvement with increase in Norvasc, will continue all 5 Bp meds and review again Friday, discussed with patient   3. Atrial fibrillation with rapid ventricular response (H) - rate controlled, on coumadin,    4. Chronic diastolic congestive heart failure (H) - compensated, monitor       ALLERGIES: Metaproterenol; Penicillins; Prednisone; and Sulfa drugs  Past Medical, Surgical, Family and Social History reviewed and updated in [facility].    Current Outpatient Prescriptions   Medication Sig Dispense Refill     quinapril (ACCUPRIL) 40 MG Tab Take 1 tablet (40 mg) by mouth daily 90 tablet 3     hydrALAZINE (APRESOLINE) 10 MG tablet Take 1 tablet (10 mg) by mouth 4 times daily 90 tablet 1     AmLODIPine Besylate (NORVASC PO) Take 10 mg by mouth daily        ASPIRIN PO Take 81 mg by mouth       BISACODYL RE Place 10 mg rectally daily       cefTRIAXone sodium 2 G SOLR Inject 2 g into the vein daily       Warfarin Sodium (COUMADIN PO) Take 3.5 mg by mouth daily        Docusate Sodium (COLACE PO) Take 100 mg by mouth as needed for constipation       ferrous sulfate (IRON) 325 (65 FE) MG tablet Take by mouth daily (with breakfast)       omega 3 1000 MG CAPS Take 1,000 mg by mouth 2 times daily       LEVOTHYROXINE SODIUM PO Take 150 mcg by mouth daily       Escitalopram Oxalate (LEXAPRO PO) Take 10 mg by mouth daily       Atorvastatin Calcium (LIPITOR PO) Take 80 mg by mouth daily       magnesium citrate 1.745 GM/30ML solution Take 296 mLs by mouth once       MAGNESIUM OXIDE PO Take 400 mg by mouth  daily       METOPROLOL TARTRATE PO Take 50 mg by mouth 2 times daily       magnesium hydroxide (MILK OF MAGNESIA) 400 MG/5ML suspension Take 30 mLs by mouth daily as needed for constipation or heartburn       multivitamin, therapeutic with minerals (MULTI-VITAMIN) TABS tablet Take 1 tablet by mouth daily       mupirocin (BACTROBAN) 2 % ointment Apply topically 2 times daily       Omeprazole (PRILOSEC PO) Take 20 mg by mouth 2 times daily (before meals)       senna-docusate (SENOKOT-S;PERICOLACE) 8.6-50 MG per tablet Take 2 tablets by mouth daily       Montelukast Sodium (SINGULAIR PO) Take 10 mg by mouth At Bedtime       Acetaminophen (TYLENOL PO) Take 1,000 mg by mouth 4 times daily       VITAMIN D, CHOLECALCIFEROL, PO Take 1,000 Units by mouth daily       cetirizine (ZYRTEC) 10 MG tablet Take 10 mg by mouth daily       GLIPIZIDE PO Take 5 mg by mouth every morning (before breakfast)       Furosemide (LASIX PO) Take 40 mg by mouth 2 times daily        potassium chloride (KLOR-CON) 20 MEQ Packet Take 20 mEq by mouth 2 times daily        Medications reviewed:  Medications reconciled to facility chart and changes were made to reflect current medications as identified as above med list. Below are the changes that were made:   Medications stopped since last EPIC medication reconciliation:   There are no discontinued medications.    Medications started since last [facility] medication reconciliation:  No orders of the defined types were placed in this encounter.        REVIEW OF SYSTEMS:  4 point ROS including Respiratory, CV, GI and , other than that noted in the HPI,  is negative    Physical Exam:  /78  Pulse 66  Temp 97.6  F (36.4  C)  Resp 17  Wt 237 lb (107.5 kg)  GENERAL APPEARANCE:  Alert, in no distress, oriented, cooperative  RESP:  respiratory effort and palpation of chest normal, lungs clear to auscultation , no respiratory distress  CV:  Palpation and auscultation of heart done , no edema, irregular  rhythm afib, rate-normal  NEURO:   no new focal neuro deficits    Recent Labs:    Results for orders placed or performed in visit on 04/03/17   INR   Result Value Ref Range    INR 1.81 (H) 0.86 - 1.14     Date 3/31/17  Left foot xray - unremarkable      Assessment/Plan:  1. Cerebrovascular accident (CVA), unspecified mechanism (H)- progressing in therapy well, no new symptoms , cont rehab   2. Benign essential hypertension - difficult to control, slight improvement with increase in Norvasc, will continue all 5 Bp meds and review again Friday, discussed with patient   3. Atrial fibrillation with rapid ventricular response (H) - rate controlled, on coumadin,    4. Chronic diastolic congestive heart failure (H) - compensated, monitor       Orders:  1. Continue all current BP meds will review again Friday  2. Pull PICC when antibiotics done.    Time: 25 minutes    Electronically signed by  Van Raman MD

## 2017-04-05 PROBLEM — Z79.01 LONG TERM CURRENT USE OF ANTICOAGULANT: Status: ACTIVE | Noted: 2017-02-12

## 2017-04-05 PROBLEM — I48.91 ATRIAL FIBRILLATION WITH RAPID VENTRICULAR RESPONSE (H): Status: ACTIVE | Noted: 2017-02-11

## 2017-04-05 RX ORDER — HYDRALAZINE HYDROCHLORIDE 10 MG/1
20 TABLET, FILM COATED ORAL 2 TIMES DAILY
Qty: 90 TABLET | Refills: 1 | COMMUNITY
Start: 2017-04-05

## 2017-04-05 RX ORDER — QUINAPRIL 40 MG/1
40 TABLET ORAL DAILY
Qty: 90 TABLET | Refills: 3 | COMMUNITY
Start: 2017-04-05 | End: 2017-05-08

## 2017-04-07 ENCOUNTER — HOSPITAL LABORATORY (OUTPATIENT)
Facility: OTHER | Age: 77
End: 2017-04-07

## 2017-04-07 ENCOUNTER — NURSING HOME VISIT (OUTPATIENT)
Dept: GERIATRICS | Facility: CLINIC | Age: 77
End: 2017-04-07
Payer: MEDICARE

## 2017-04-07 VITALS
DIASTOLIC BLOOD PRESSURE: 70 MMHG | HEART RATE: 60 BPM | SYSTOLIC BLOOD PRESSURE: 150 MMHG | TEMPERATURE: 97.6 F | WEIGHT: 235 LBS | RESPIRATION RATE: 20 BRPM

## 2017-04-07 DIAGNOSIS — I63.9 CEREBROVASCULAR ACCIDENT (CVA), UNSPECIFIED MECHANISM (H): Primary | ICD-10-CM

## 2017-04-07 DIAGNOSIS — I50.32 CHRONIC DIASTOLIC CONGESTIVE HEART FAILURE (H): ICD-10-CM

## 2017-04-07 DIAGNOSIS — Z79.01 LONG TERM CURRENT USE OF ANTICOAGULANT: ICD-10-CM

## 2017-04-07 DIAGNOSIS — A41.01 SEPSIS DUE TO METHICILLIN SUSCEPTIBLE STAPHYLOCOCCUS AUREUS (H): ICD-10-CM

## 2017-04-07 DIAGNOSIS — I48.91 ATRIAL FIBRILLATION WITH RAPID VENTRICULAR RESPONSE (H): ICD-10-CM

## 2017-04-07 DIAGNOSIS — I10 ESSENTIAL HYPERTENSION: ICD-10-CM

## 2017-04-07 DIAGNOSIS — E11.49 TYPE 2 DIABETES MELLITUS WITH OTHER NEUROLOGIC COMPLICATION: ICD-10-CM

## 2017-04-07 DIAGNOSIS — Z71.89 ADVANCED DIRECTIVES, COUNSELING/DISCUSSION: ICD-10-CM

## 2017-04-07 LAB
ANION GAP SERPL CALCULATED.3IONS-SCNC: 6 MMOL/L (ref 3–14)
BUN SERPL-MCNC: 21 MG/DL (ref 7–30)
CALCIUM SERPL-MCNC: 8.8 MG/DL (ref 8.5–10.1)
CHLORIDE SERPL-SCNC: 104 MMOL/L (ref 94–109)
CO2 SERPL-SCNC: 34 MMOL/L (ref 20–32)
CREAT SERPL-MCNC: 0.85 MG/DL (ref 0.52–1.04)
CRP SERPL-MCNC: <2.9 MG/L (ref 0–8)
ERYTHROCYTE [DISTWIDTH] IN BLOOD BY AUTOMATED COUNT: 14 % (ref 10–15)
ERYTHROCYTE [SEDIMENTATION RATE] IN BLOOD BY WESTERGREN METHOD: 10 MM/H (ref 0–30)
GFR SERPL CREATININE-BSD FRML MDRD: 66 ML/MIN/1.7M2
GLUCOSE SERPL-MCNC: 93 MG/DL (ref 70–99)
HCT VFR BLD AUTO: 40.2 % (ref 35–47)
HGB BLD-MCNC: 12.3 G/DL (ref 11.7–15.7)
MCH RBC QN AUTO: 27.5 PG (ref 26.5–33)
MCHC RBC AUTO-ENTMCNC: 30.6 G/DL (ref 31.5–36.5)
MCV RBC AUTO: 90 FL (ref 78–100)
PLATELET # BLD AUTO: 262 10E9/L (ref 150–450)
POTASSIUM SERPL-SCNC: 4.2 MMOL/L (ref 3.4–5.3)
RBC # BLD AUTO: 4.47 10E12/L (ref 3.8–5.2)
SODIUM SERPL-SCNC: 144 MMOL/L (ref 133–144)
WBC # BLD AUTO: 5.2 10E9/L (ref 4–11)

## 2017-04-07 PROCEDURE — 99316 NF DSCHRG MGMT 30 MIN+: CPT | Performed by: FAMILY MEDICINE

## 2017-04-07 PROCEDURE — 99207 ZZC CDG-CORRECTLY CODED, REVIEWED AND AGREE: CPT | Performed by: FAMILY MEDICINE

## 2017-04-07 RX ORDER — L. ACIDOPHILUS/PECTIN, CITRUS 25MM-100MG
1 TABLET ORAL 2 TIMES DAILY
COMMUNITY
Start: 2017-03-30 | End: 2017-04-22

## 2017-04-07 NOTE — PROGRESS NOTES
[place] [facility] DISCHARGE SUMMARY    PATIENT'S NAME: Sunitha Jacques  YOB: 1940  MEDICAL RECORD NUMBER:  [id number]    PRIMARY CARE PROVIDER AND CLINIC RESPONSIBLE AFTER TRANSFER: Pradeep Zuniga None     CODE STATUS/ADVANCE DIRECTIVES DISCUSSION:   CPR/Full code        Allergies   Allergen Reactions     Metaproterenol      Penicillins      Prednisone      Sulfa Drugs        TRANSFERRING PROVIDERS: Van Raman MD,  MD  DATE OF SNF ADMISSION:  March / 17 / 2017  DATE OF SNF (anticipated) DISCHARGE: April / 09 / 2017  DISCHARGE DISPOSITION: Non-FM Provider   Nursing Facility: [facility] in [place], [place] stay 3/8/17 to 3/17/17.     Condition on Discharge:  Stable.  Function:  good  Cognitive Scores: CPT 5.0- per OT patient has met all goals    Equipment: walker    DISCHARGE DIAGNOSIS:   1. Cerebrovascular accident (CVA), unspecified mechanism (H)    2. Chronic diastolic congestive heart failure (H)    3. 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 Essential hypertension    5. Atrial fibrillation with rapid ventricular response (H)    6. Long term current use of anticoagulant    7. Sepsis due to Methicillin susceptible Staphylococcus aureus (H) - finished 4 week tx IV,    8. Advanced directives, counseling/discussion        [facility] Course:    1. Cerebrovascular accident (CVA), unspecified mechanism (H) - had 2 small embolic strokes Rt parietal region- did well in therapy, minimal residual effects. -   2. Chronic diastolic congestive heart failure (H) - stable during NH stay, BP control and lasix. No current symptoms SOB or CP   3. 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 Essential hypertension - difficult to control. bP meds added with improvement. ? NH accuracy however. Large arm, usuallt BP taken  lower arm with machine. Suggest large cuff if available   5. Atrial fibrillation with rapid ventricular response (H)- rate controlled and stable on coumadin    6. Long term current use of anticoagulant - INR due Monday 10th - clinic appoint scheduled   7. Sepsis due to Methicillin susceptible Staphylococcus aureus (H) - finished 4 weeks tx Friday, 7th. CRP and WBC nl now.    8. Advanced directives, counseling/discussion - full code at present, POLST in chart. SonGalen who she will be staying with after d/c is preferred proxy       PAST MEDICAL HISTORY:  has no past medical history on file.    DISCHARGE MEDICATIONS:  Current Outpatient Prescriptions   Medication Sig Dispense Refill     Lactobacillus Acid-Pectin (LACTOBACILLUS ACIDOPHILUS) TABS Take 1 tablet by mouth 2 times daily       Oseltamivir Phosphate (TAMIFLU PO) Take 30 mg by mouth daily       quinapril (ACCUPRIL) 40 MG Tab Take 1 tablet (40 mg) by mouth daily 90 tablet 3     hydrALAZINE (APRESOLINE) 10 MG tablet Take 1 tablet (10 mg) by mouth 4 times daily 90 tablet 1     AmLODIPine Besylate (NORVASC PO) Take 10 mg by mouth daily        ASPIRIN PO Take 81 mg by mouth       Warfarin Sodium (COUMADIN PO) Take 3.5 mg by mouth daily        ferrous sulfate (IRON) 325 (65 FE) MG tablet Take by mouth daily (with breakfast)       omega 3 1000 MG CAPS Take 1,000 mg by mouth 2 times daily       LEVOTHYROXINE SODIUM PO Take 150 mcg by mouth daily       Escitalopram Oxalate (LEXAPRO PO) Take 10 mg by mouth daily       Atorvastatin Calcium (LIPITOR PO) Take 80 mg by mouth daily       magnesium citrate 1.745 GM/30ML solution Take 296 mLs by mouth once       MAGNESIUM OXIDE PO Take 400 mg by mouth daily       METOPROLOL TARTRATE PO Take 50 mg by mouth 2 times daily       magnesium hydroxide (MILK OF MAGNESIA) 400 MG/5ML suspension Take 30 mLs by mouth daily as needed for constipation or heartburn       multivitamin, therapeutic with minerals (MULTI-VITAMIN) TABS tablet  Take 1 tablet by mouth daily       Omeprazole (PRILOSEC PO) Take 20 mg by mouth 2 times daily (before meals)       Montelukast Sodium (SINGULAIR PO) Take 10 mg by mouth At Bedtime       Acetaminophen (TYLENOL PO) Take 1,000 mg by mouth 4 times daily       VITAMIN D, CHOLECALCIFEROL, PO Take 1,000 Units by mouth daily       cetirizine (ZYRTEC) 10 MG tablet Take 10 mg by mouth daily       GLIPIZIDE PO Take 5 mg by mouth every morning (before breakfast)       Furosemide (LASIX PO) Take 40 mg by mouth 2 times daily        potassium chloride (KLOR-CON) 20 MEQ Packet Take 20 mEq by mouth 2 times daily          MEDICATION CHANGES/RATIONALE:   Hydralazine and Norvasc added during TCU stay due to high BP  Controlled medications sent with patient: not applicable/none     ROS:    10 point ROS of systems including Constitutional, Eyes, Respiratory, Cardiovascular, Gastroenterology, Genitourinary, Integumentary, Muscularskeletal, Psychiatric were all negative except for pertinent positives noted in my HPI.    Physical Exam:   Vitals: /70  Pulse 60  Temp 97.6  F (36.4  C)  Resp 20  Wt 235 lb (106.6 kg)  BMI= There is no height or weight on file to calculate BMI.    GENERAL APPEARANCE:  Alert, oriented, cooperative  RESP:  respiratory effort and palpation of chest normal, lungs clear to auscultation , no respiratory distress  CV:  Palpation and auscultation of heart done , peripheral edema 2+ in le, irregular rhythm afib, rate-normal, grade 2/6 murmur  M/S:   Gait and station normal  NEURO:   patient has no sig motor deficits from recent CVA  PSYCH:  oriented X 3, normal insight, judgement and memory    DISCHARGE PLAN:  No services at this time  Patient instructed to follow-up with: [facility] Monday April 10th to establish care and have f/u labs and BP check    Current [place] scheduled appointments:  Future Appointments  Date Time Provider Department Center   4/7/2017 12:30 PM Van Raman MD [facility]   4/11/2017 8:00 AM Donna Vogel, PT [facility]   4/19/2017 2:30 PM Chadwick Carpenter MD [facility]       MT referral needed and placed by this provider: No    Pending labs: none  SNF labs   Results for orders placed or performed in visit on 04/07/17   Basic metabolic panel   Result Value Ref Range    Sodium 144 133 - 144 mmol/L    Potassium 4.2 3.4 - 5.3 mmol/L    Chloride 104 94 - 109 mmol/L    Carbon Dioxide 34 (H) 20 - 32 mmol/L    Anion Gap 6 3 - 14 mmol/L    Glucose 93 70 - 99 mg/dL    Urea Nitrogen 21 7 - 30 mg/dL    Creatinine 0.85 0.52 - 1.04 mg/dL    GFR Estimate 66 >60 mL/min/1.7m2    GFR Estimate If Black 79 >60 mL/min/1.7m2    Calcium 8.8 8.5 - 10.1 mg/dL   CBC with platelets   Result Value Ref Range    WBC 5.2 4.0 - 11.0 10e9/L    RBC Count 4.47 3.8 - 5.2 10e12/L    Hemoglobin 12.3 11.7 - 15.7 g/dL    Hematocrit 40.2 35.0 - 47.0 %    MCV 90 78 - 100 fl    MCH 27.5 26.5 - 33.0 pg    MCHC 30.6 (L) 31.5 - 36.5 g/dL    RDW 14.0 10.0 - 15.0 %    Platelet Count 262 150 - 450 10e9/L   CRP inflammation   Result Value Ref Range    CRP Inflammation <2.9 0.0 - 8.0 mg/L   Erythrocyte sedimentation rate auto   Result Value Ref Range    Sed Rate 10 0 - 30 mm/h       Discharge Treatments:    1. Okay for discharge home 4/9/17  2. Patient going to son's home and [place] no meds[place]) will need 30 day supply of all meds called in- will be using [facility].  3. Needs f/u appt with clinician ans labs on 4/10/17 at [facility]- will need INR, BMP and BP check and establish care.  4. Okay for referral to outpatient lymphedema clinic.      TOTAL DISCHARGE TIME:   Greater than 30 minutes  Electronically signed by:  Van Raman MD

## 2017-04-09 PROBLEM — Z71.89 ADVANCED DIRECTIVES, COUNSELING/DISCUSSION: Status: ACTIVE | Noted: 2017-04-09

## 2017-04-09 PROBLEM — A41.01 SEPSIS DUE TO METHICILLIN SUSCEPTIBLE STAPHYLOCOCCUS AUREUS (H): Status: ACTIVE | Noted: 2017-04-09

## 2017-04-09 PROBLEM — I10 ESSENTIAL HYPERTENSION: Status: ACTIVE | Noted: 2017-04-09

## 2017-04-10 ENCOUNTER — ANTICOAGULATION THERAPY VISIT (OUTPATIENT)
Dept: ANTICOAGULATION | Facility: CLINIC | Age: 77
End: 2017-04-10
Payer: MEDICARE

## 2017-04-10 DIAGNOSIS — I63.9 CEREBROVASCULAR ACCIDENT (CVA), UNSPECIFIED MECHANISM (H): Primary | ICD-10-CM

## 2017-04-10 DIAGNOSIS — I48.91 ATRIAL FIBRILLATION WITH RAPID VENTRICULAR RESPONSE (H): ICD-10-CM

## 2017-04-10 DIAGNOSIS — Z79.01 LONG-TERM (CURRENT) USE OF ANTICOAGULANTS: ICD-10-CM

## 2017-04-10 DIAGNOSIS — Z51.81 ANTICOAGULATION GOAL OF INR 2 TO 3: Primary | ICD-10-CM

## 2017-04-10 DIAGNOSIS — Z79.01 ANTICOAGULATION GOAL OF INR 2 TO 3: Primary | ICD-10-CM

## 2017-04-10 DIAGNOSIS — I63.9 CEREBROVASCULAR ACCIDENT (CVA), UNSPECIFIED MECHANISM (H): ICD-10-CM

## 2017-04-10 DIAGNOSIS — Z79.01 LONG TERM CURRENT USE OF ANTICOAGULANT: ICD-10-CM

## 2017-04-10 LAB — INR POINT OF CARE: 1.7 (ref 0.86–1.14)

## 2017-04-10 PROCEDURE — 36416 COLLJ CAPILLARY BLOOD SPEC: CPT

## 2017-04-10 PROCEDURE — 85610 PROTHROMBIN TIME: CPT | Mod: QW

## 2017-04-10 PROCEDURE — 99207 ZZC NO CHARGE NURSE ONLY: CPT

## 2017-04-10 NOTE — PROGRESS NOTES
ANTICOAGULATION FOLLOW-UP CLINIC VISIT    Patient Name:  Sunitha Jacques  Date:  4/10/2017  Contact Type:  Face to Face    SUBJECTIVE:     Patient Findings     Positives Other complaints (Pt  D/C from [facility] yesterday and family told she needed an INR check today and to call [facility].)    Comments Pt is from [place], staying here with her family while she recovers from an A Fib CVA.  Pt had her CVA while on xeralto. Basic beginning teaching done with pt, daughter and son in law.  They took the printed information to read ahead of the New consult appt on Thurs.  Scheduling did not allow for a new consult today.           OBJECTIVE    INR Protime   Date Value Ref Range Status   04/10/2017 1.7 (A) 0.86 - 1.14 Final       ASSESSMENT / PLAN  INR assessment SUB    Recheck INR In: 3 DAYS    INR Location Clinic      Anticoagulation Summary as of 4/10/2017     INR goal 2.0-3.0   Today's INR 1.7!   Maintenance plan No maintenance plan   Full instructions 4/10: 5 mg; 4/11: 2.5 mg; 4/12: 5 mg   Plan last modified Mattie Persaud RN (4/10/2017)   Next INR check 4/13/2017   Priority INR   Target end date     Indications   Atrial fibrillation with rapid ventricular response (H) [I48.91]  Cerebrovascular accident (H) [I63.9]  Long-term (current) use of anticoagulants [Z79.01] [Z79.01]         Anticoagulation Episode Summary     INR check location     Preferred lab     Send INR reminders to [facility]    Comments  +       Anticoagulation Care Providers     Provider Role Specialty Phone number    Bhargavi Marcum APRN CNP Responsible Nurse Practitioner - Gerontology [phone]            See the Encounter Report to view Anticoagulation Flowsheet and Dosing Calendar (Go to Encounters tab in chart review, and find the Anticoagulation Therapy Visit)        Mattie Persaud, RN

## 2017-04-10 NOTE — MR AVS SNAPSHOT
Sunitha Jacques   4/10/2017 3:45 PM   Anticoagulation Therapy Visit    Description:  76 year old female   Provider:  [facility]   Department:  [facility]           INR as of 4/10/2017     Today's INR 1.7!      Anticoagulation Summary as of 4/10/2017     INR goal 2.0-3.0   Today's INR 1.7!   Full instructions 4/10: 5 mg; 4/11: 2.5 mg; 4/12: 5 mg   Next INR check 4/13/2017    Indications   Atrial fibrillation with rapid ventricular response (H) [I48.91]  Cerebrovascular accident (H) [I63.9]  Long-term (current) use of anticoagulants [Z79.01] [Z79.01]         Description     Recheck INR Thurs, 4/13/17  Take warfarin 5 mg Mon, Wed, 2.5 mg Tues      Contact Numbers     Please call [phone] to cancel and/or reschedule your appointment.  Please call [phone] with any problems or questions regarding your therapy          April 2017 Details    Sun Mon Tue Wed Thu Fri Sat           1                 2               3               4               5               6               7               8                 9               10      5 mg   See details      11      2.5 mg         12      5 mg         13            14               15                 16               17               18               19               20               21               22                 23               24               25               26               27               28               29                 30                      Date Details   04/10 This INR check       Date of next INR:  4/13/2017         How to take your warfarin dose     To take:  2.5 mg Take 1 of the 2.5 mg tablets.    To take:  5 mg Take 2 of the 2.5 mg tablets.

## 2017-04-11 ENCOUNTER — HOSPITAL ENCOUNTER (OUTPATIENT)
Dept: PHYSICAL THERAPY | Facility: CLINIC | Age: 77
Setting detail: THERAPIES SERIES
End: 2017-04-11
Attending: FAMILY MEDICINE
Payer: MEDICARE

## 2017-04-11 PROCEDURE — 40000099 ZZH STATISTIC LYMPHEDEMA VISIT: Performed by: PHYSICAL THERAPIST

## 2017-04-11 PROCEDURE — 97161 PT EVAL LOW COMPLEX 20 MIN: CPT | Mod: GP | Performed by: PHYSICAL THERAPIST

## 2017-04-11 PROCEDURE — G8988 SELF CARE GOAL STATUS: HCPCS | Mod: GP,CI | Performed by: PHYSICAL THERAPIST

## 2017-04-11 PROCEDURE — 97140 MANUAL THERAPY 1/> REGIONS: CPT | Mod: GP | Performed by: PHYSICAL THERAPIST

## 2017-04-11 PROCEDURE — G8987 SELF CARE CURRENT STATUS: HCPCS | Mod: GP,CJ | Performed by: PHYSICAL THERAPIST

## 2017-04-11 NOTE — PROGRESS NOTES
[facility]        OUTPATIENT PHYSICAL THERAPY EDEMA EVALUATION  PLAN OF TREATMENT FOR OUTPATIENT REHABILITATION  (COMPLETE FOR INITIAL CLAIMS ONLY)  Patient's Last Name, First Name, RAPHAELByronTIFFANIEByron  SawyerSunitha                           Provider s Name:   [facility] Medical Record No.  [id number]     Start of Care Date:  04/11/17   Onset Date:  March 8th 2017   Type:  PT   Medical Diagnosis:      Therapy Diagnosis:  BLE secondary lymphedema Visits from SOC:  1                                     __________________________________________________________________________________   Plan of Treatment/Functional Goals:    Gradient compression bandaging, Fit for compression garment, Precautions to prevent infection / exacerbation, Exercises, Education, Manual therapy, Skin care / precautions, Home management program development        GOALS  1. Goal description: pt to be independent in donning, doffing and care of compression garments for longterm LE edema management       Target date: 05/26/17  2. Goal description: pt to be independent with longterm edema management of BLE edema via HEP, elevation and comprsesion garment use/wear       Target date: 05/26/17  3. Goal description: pt to have at least 3-5 point improvement on LLIS due to edema reductions in BLEs       Target date: 05/26/17    Treatment frequency: 1 time / week   Treatment duration: 6 weeks (4/11/17 - 5/26/17)    Donna Vogel, PT, DPT, CLT                                    I CERTIFY THE NEED FOR THESE SERVICES FURNISHED UNDER        THIS PLAN OF TREATMENT AND WHILE UNDER MY CARE .      Physician Signature               Date    X_____________________________________________________                    Referring physician: Dr. Patricia Loaiza MD   Initial Assessment  See Epic Evaluation- Start of care:  04/11/17

## 2017-04-11 NOTE — PROGRESS NOTES
LYMPHEDEMA / EDEMA REHAB EVALUATION  [facility]     Patient: Sunitha Jacques  : 1940  Referring Provider: Dr. Patricia Loaiza MD    Visit Type  Type of visit: Initial Edema Evaluation    Discipline  Discipline: PT       present: No     General Information   Start of care: 17   Orders: Evaluate and treat as indicated    Order date: 17   Medical diagnosis: BLE secondary lymphedema       Edema onset:  (2017)   Affected body parts: RLE, LLE (L>R per pt report)         Edema etiology comments: PMH: CVA, CHF, afib; recently discharged from NH on 17, also history of B-TKA (LLE 10-15years ago and RLE 2 years ago in August), pt also reports CKD   Pertinent history of current problem (PT: include personal factors and/or comorbidities that impact the POC; OT: include additional occupational profile info): pt comes into clinic with GCB on her legs which she has been doing for ~1.5 weeks at NH and then daughter-in-law has been wrapping at home, pt reports wraps have helped significantly; pt does lives alone back in [place] and will need to be fit for something she can don/doff independently    Surgical / medical history reviewed: Yes   Edema special tests:  (denies history of DVTs)   Prior level of functional mobility: 4WW that is new since hospitalization and NH stay   Prior treatment: Elevation, Diuretics, Gradient compression bandaging       Patient role / employment history: Retired       [facility]   Living environment comments: lives alone in [place], but currently lives with son and daughter-in-law   Current assistive devices: 4 wheeled walker      Fall Screening  Fall screen completed by: PT  Per patient, fall 2 or more times in past year?: No  Per patient, fall with injury in past year?: No   Is patient a fall risk?: No    Precautions  Precautions: Cardiac Edema/CHF  Precautions comments: no MLD    Patient /  "Family Goals  Patient / family goals statement: \"to lessen the swelling\"     Pain   3-4/10 in toes    Vital Signs  Vital signs: Pulse, SPO2  Pulse: 61bpm   SPO2: 97%     Cognitive Status  Orientation: Orientation to person, place and time  Level of consciousness: Alert  Follows commands and answers questions: 100% of the time  Personal safety and judgement: Intact  Memory: Intact       Edema Exam / Assessment  Skin condition: Non-pitting, Intact  Skin condition comments: skin all intact, little to no dryness, mild hemosiderian staining present no B shins, trace edema at B dorsum of feet, and 1+ to trace edema ankle to knee     Scar: No       Ulceration: No      Capillary refill: Symmetrical      Dorsal pedal pulse: Symmetrical     Stemmer sign: Negative       Girth Measurements  Girth Measurements: Refer to separate girth measurement flowsheet    Volume LE  Right LE (mL): 2766.1  Left LE (mL): 2693.0  LE volume comparison: RLE volume greater than LLE volume  % difference: 2.6%      Range of Motion  ROM: No deficits were identified  ROM comments: B hip flex, knee flex/ext, DF/PF WFL    Strength  Strength: No deficits were identified   Strength comments: B hip flex, knee flex/ext, DF/PF WFL    Posture  Posture: Normal       Palpation  Palpation: slightly sensitivity on L-dorsum    Activities of Daily Living       Bed Mobility  Bed mobility: Independent    Transfers  Transfers: Independent    Gait / Locomotion  Gait / Locomotion: Mod-I ambulation into clinic using 4WW    Sensory  Sensory perception: Light touch  Light touch: Intact     Coordination  Coordination: Gross motor coordination appropriate       Muscle Tone  Muscle tone: No deficits were identified       Planned Edema Interventions  Planned edema interventions: Gradient compression bandaging, Fit for compression garment, Precautions to prevent infection / exacerbation, Exercises, Education, Manual therapy, Skin care / precautions, Home management program " development       Clinical Impression  Criteria for skilled therapeutic intervention met: Yes  Therapy diagnosis: BLE secondary lymphedema     Influenced by the following impairments / conditions: Stage 2  Functional limitations due to impairments / conditions: currently having to wear a shoe that's 1.5-2sizes bigger than normal        Treatment frequency: 1 time / week  Treatment duration: 6 weeks  Patient / family and/or staff in agreement with plan of care: Yes  Risks and benefits of therapy have been explained: Yes       Education Assessment  Preferred learning style: Listening  Barriers to learning: No barriers    Edema Goals  Goal 1  Goal identifier: stg  Goal description: pt to be independent in donning, doffing and care of compression garments for longterm LE edema management  Target date: 05/26/17       Goal 2  Goal identifier: ltg  Goal description: pt to be independent with longterm edema management of BLE edema via HEP, elevation and comprsesion garment use/wear  Target date: 05/26/17       Goal 3  Goal identifier: ltg  Goal description: pt to have at least 3-5 point improvement on LLIS due to edema reductions in BLEs  Target date: 05/26/17     Total Evaluation Time  Total evaluation time: 10

## 2017-04-13 ENCOUNTER — OFFICE VISIT (OUTPATIENT)
Dept: FAMILY MEDICINE | Facility: CLINIC | Age: 77
End: 2017-04-13
Payer: MEDICARE

## 2017-04-13 ENCOUNTER — ANTICOAGULATION THERAPY VISIT (OUTPATIENT)
Dept: ANTICOAGULATION | Facility: CLINIC | Age: 77
End: 2017-04-13
Payer: MEDICARE

## 2017-04-13 VITALS
BODY MASS INDEX: 41.66 KG/M2 | HEIGHT: 62 IN | DIASTOLIC BLOOD PRESSURE: 62 MMHG | HEART RATE: 59 BPM | TEMPERATURE: 98 F | SYSTOLIC BLOOD PRESSURE: 178 MMHG | WEIGHT: 226.4 LBS

## 2017-04-13 DIAGNOSIS — Z79.01 LONG-TERM (CURRENT) USE OF ANTICOAGULANTS: ICD-10-CM

## 2017-04-13 DIAGNOSIS — Z86.19 HISTORY OF SEPSIS: Primary | ICD-10-CM

## 2017-04-13 DIAGNOSIS — E11.42 DIABETIC POLYNEUROPATHY ASSOCIATED WITH TYPE 2 DIABETES MELLITUS (H): ICD-10-CM

## 2017-04-13 DIAGNOSIS — I48.91 ATRIAL FIBRILLATION WITH RAPID VENTRICULAR RESPONSE (H): ICD-10-CM

## 2017-04-13 DIAGNOSIS — I63.9 CEREBROVASCULAR ACCIDENT (CVA), UNSPECIFIED MECHANISM (H): ICD-10-CM

## 2017-04-13 DIAGNOSIS — I10 ESSENTIAL HYPERTENSION: ICD-10-CM

## 2017-04-13 DIAGNOSIS — E03.9 HYPOTHYROIDISM, UNSPECIFIED TYPE: ICD-10-CM

## 2017-04-13 DIAGNOSIS — E11.49 TYPE 2 DIABETES MELLITUS WITH OTHER NEUROLOGIC COMPLICATION: ICD-10-CM

## 2017-04-13 PROBLEM — E66.01 MORBID OBESITY (H): Status: ACTIVE | Noted: 2017-04-13

## 2017-04-13 LAB
BASOPHILS # BLD AUTO: 0.1 10E9/L (ref 0–0.2)
BASOPHILS NFR BLD AUTO: 1 %
CREAT SERPL-MCNC: 0.99 MG/DL (ref 0.52–1.04)
CRP SERPL-MCNC: 3.7 MG/L (ref 0–8)
DIFFERENTIAL METHOD BLD: NORMAL
EOSINOPHIL # BLD AUTO: 0.2 10E9/L (ref 0–0.7)
EOSINOPHIL NFR BLD AUTO: 3.1 %
ERYTHROCYTE [DISTWIDTH] IN BLOOD BY AUTOMATED COUNT: 13.8 % (ref 10–15)
GFR SERPL CREATININE-BSD FRML MDRD: 55 ML/MIN/1.7M2
HBA1C MFR BLD: 5.8 % (ref 4.3–6)
HCT VFR BLD AUTO: 44.7 % (ref 35–47)
HGB BLD-MCNC: 14.7 G/DL (ref 11.7–15.7)
INR POINT OF CARE: 1.8 (ref 0.86–1.14)
LYMPHOCYTES # BLD AUTO: 1.7 10E9/L (ref 0.8–5.3)
LYMPHOCYTES NFR BLD AUTO: 24.5 %
MCH RBC QN AUTO: 29.6 PG (ref 26.5–33)
MCHC RBC AUTO-ENTMCNC: 32.9 G/DL (ref 31.5–36.5)
MCV RBC AUTO: 90 FL (ref 78–100)
MONOCYTES # BLD AUTO: 0.7 10E9/L (ref 0–1.3)
MONOCYTES NFR BLD AUTO: 9.9 %
NEUTROPHILS # BLD AUTO: 4.1 10E9/L (ref 1.6–8.3)
NEUTROPHILS NFR BLD AUTO: 61.5 %
PLATELET # BLD AUTO: 292 10E9/L (ref 150–450)
RBC # BLD AUTO: 4.96 10E12/L (ref 3.8–5.2)
TSH SERPL DL<=0.005 MIU/L-ACNC: 1.88 MU/L (ref 0.4–4)
WBC # BLD AUTO: 6.7 10E9/L (ref 4–11)

## 2017-04-13 PROCEDURE — 85025 COMPLETE CBC W/AUTO DIFF WBC: CPT | Performed by: NURSE PRACTITIONER

## 2017-04-13 PROCEDURE — 83036 HEMOGLOBIN GLYCOSYLATED A1C: CPT | Performed by: NURSE PRACTITIONER

## 2017-04-13 PROCEDURE — 36416 COLLJ CAPILLARY BLOOD SPEC: CPT

## 2017-04-13 PROCEDURE — 99207 ZZC NO CHARGE NURSE ONLY: CPT

## 2017-04-13 PROCEDURE — 86140 C-REACTIVE PROTEIN: CPT | Performed by: NURSE PRACTITIONER

## 2017-04-13 PROCEDURE — 87040 BLOOD CULTURE FOR BACTERIA: CPT | Performed by: NURSE PRACTITIONER

## 2017-04-13 PROCEDURE — 84443 ASSAY THYROID STIM HORMONE: CPT | Performed by: NURSE PRACTITIONER

## 2017-04-13 PROCEDURE — 82565 ASSAY OF CREATININE: CPT | Performed by: NURSE PRACTITIONER

## 2017-04-13 PROCEDURE — 85610 PROTHROMBIN TIME: CPT | Mod: QW

## 2017-04-13 PROCEDURE — 99203 OFFICE O/P NEW LOW 30 MIN: CPT | Performed by: NURSE PRACTITIONER

## 2017-04-13 NOTE — PATIENT INSTRUCTIONS
Norvasc 10 mg daily  Metoprolol 25 mg twice daily (reduce from 50 mg twice daily to 25 mg twice daily), continue to monitor BP and Heart rate  Hydralazine 10 mg 3 times daily, hold if systolic BP less than 140 (or top number)    Labs today: Creatinine Thyroid, blood culture and CBC, CRP    Will check renal function and based on renal function results will order Gabapentin which can help with neuropathy, pain

## 2017-04-13 NOTE — NURSING NOTE
"Chief Complaint   Patient presents with     Clinic Care Coordination - Follow-up     Needs staff culture per the DR Ayala she saw in [place]. Establish care.        Initial /62  Pulse 59  Temp 98  F (36.7  C) (Tympanic)  Ht 5' 1.75\" (1.568 m)  Wt 226 lb 6.4 oz (102.7 kg)  BMI 41.75 kg/m2 Estimated body mass index is 41.75 kg/(m^2) as calculated from the following:    Height as of this encounter: 5' 1.75\" (1.568 m).    Weight as of this encounter: 226 lb 6.4 oz (102.7 kg).  Medication Reconciliation: complete  "

## 2017-04-13 NOTE — MR AVS SNAPSHOT
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)        Essential hypertension          Care Instructions    Norvasc 10 mg daily  Metoprolol 25 mg twice daily (reduce from 50 mg twice daily to 25 mg twice daily), continue to monitor BP and Heart rate  Hydralazine 10 mg 3 times daily, hold if systolic BP less than 140 (or top number)    Labs today: Creatinine Thyroid, blood culture and CBC, CRP    Will check renal function and based on renal function results will order Gabapentin which can help with neuropathy, pain             Follow-ups after your visit        Your next 10 appointments already scheduled     Apr 17, 2017  3:45 PM CDT   Anticoagulation Visit with [facility]   [facility]    [address] [address] [phone]              Who to contact     If you have questions or need follow up information about today's clinic visit or your schedule please contact [facility] directly at [phone].  Normal or non-critical lab and imaging results will be communicated to you by MyChart, letter or phone within 4 business days after the clinic has received the results. If you do not hear from us within 7 days, please contact the clinic through MyChart or phone. If you have a critical or abnormal lab result, we will notify you by phone as soon as possible.  Submit refill requests through [employer] or call your pharmacy and they will forward the refill request to us. Please allow 3 business days for your refill to be completed.          Additional  "Information About Your Visit        MyChart Information     [employer] lets you send messages to your doctor, view your test results, renew your prescriptions, schedule appointments and more. To sign up, go to [email or web address] . Click on \"Log in\" on the left side of the screen, which will take you to the Welcome page. Then click on \"Sign up Now\" on the right side of the page.     You will be asked to enter the access code listed below, as well as some personal information. Please follow the directions to create your username and password.     Your access code is: [email or web address]  Expires: 2017  5:23 PM     Your access code will  in 90 days. If you need help or a new code, please call your [place] clinic or [phone].        Care EveryWhere ID     This is your Care EveryWhere ID. This could be used by other organizations to access your [place] medical records  [id number]        Your Vitals Were     Pulse Temperature Height BMI (Body Mass Index)          59 98  F (36.7  C) (Tympanic) 5' 1.75\" (1.568 m) 41.75 kg/m2         Blood Pressure from Last 3 Encounters:   17 178/62   17 150/70   17 170/78    Weight from Last 3 Encounters:   17 226 lb 6.4 oz (102.7 kg)   17 235 lb (106.6 kg)   17 237 lb (107.5 kg)              We Performed the Following     Blood culture     CBC with platelets differential     Creatinine     CRP inflammation     Hemoglobin A1c     TSH with free T4 reflex        Primary Care Provider    Pradeep Zuniga       No address on file        Thank you!     Thank you for choosing [facility]  for your care. Our goal is always to provide you with excellent care. Hearing back from our patients is one way we can continue to improve our services. Please take a few minutes to complete the written survey that you may receive in the mail after your visit with us. Thank you!             Your Updated Medication List - Protect others around you: " Learn how to safely use, store and throw away your medicines at www.disposemymeds.org.          This list is accurate as of: 4/13/17  3:40 PM.  Always use your most recent med list.                   Brand Name Dispense Instructions for use    ASPIRIN PO      Take 81 mg by mouth       cetirizine 10 MG tablet    zyrTEC     Take 10 mg by mouth daily       COUMADIN PO      Take 3.5 mg by mouth daily       ferrous sulfate 325 (65 FE) MG tablet    IRON     Take by mouth daily (with breakfast)       GLIPIZIDE PO      Take 5 mg by mouth every morning (before breakfast)       hydrALAZINE 10 MG tablet    APRESOLINE    90 tablet    Take 1 tablet (10 mg) by mouth 4 times daily       lactobacillus acidophilus Tabs      Take 1 tablet by mouth 2 times daily       LASIX PO      Take 40 mg by mouth 2 times daily       LEVOTHYROXINE SODIUM PO      Take 150 mcg by mouth daily       LEXAPRO PO      Take 10 mg by mouth daily       LIPITOR PO      Take 80 mg by mouth daily       magnesium citrate 1.745 GM/30ML solution      Take 296 mLs by mouth once       magnesium hydroxide 400 MG/5ML suspension    MILK OF MAGNESIA     Take 30 mLs by mouth daily as needed for constipation or heartburn       MAGNESIUM OXIDE PO      Take 400 mg by mouth daily       METOPROLOL TARTRATE PO      Take 50 mg by mouth 2 times daily       Multi-vitamin Tabs tablet      Take 1 tablet by mouth daily       NORVASC PO      Take 10 mg by mouth daily       omega 3 1000 MG Caps      Take 1,000 mg by mouth 2 times daily       potassium chloride 20 MEQ Packet    KLOR-CON     Take 20 mEq by mouth 2 times daily       PRILOSEC PO      Take 20 mg by mouth 2 times daily (before meals)       quinapril 40 MG Tab    ACCUPRIL    90 tablet    Take 1 tablet (40 mg) by mouth daily       SINGULAIR PO      Take 10 mg by mouth At Bedtime       TYLENOL PO      Take 1,000 mg by mouth 4 times daily       VITAMIN D (CHOLECALCIFEROL) PO      Take 1,000 Units by mouth daily

## 2017-04-13 NOTE — LETTER
[facility]  [address] [address]  Phone: [phone]    April 18, 2017    Sunithaorlin Jacques  [address] [place] [id number]          Dear Ms. Jacques,    The results of your recent lab tests were :Blood cultures so far no bacterial growth (normal)  .Renal function is stable, for diabetic neuropathy recommend to start Gabapentin 100 mg 3 times daily (this was discussed during visit).    1. Thyroid function is normal, continue Synthroid 150 mcg daily   2. CRP (inflamatory marker) and WBC count are normal.   3. CBC normal   4.  A1C improved, 5.8% continue Glipizide 5 mg daily and follow diabetic diet, recheck diabetes in 3 months     Enclosed is a copy of these results.  If you have any further questions or problems, please contact our office.    Sincerely,      Bhargavi Marcum NP  / cb

## 2017-04-13 NOTE — MR AVS SNAPSHOT
Sunitha Jacques   4/13/2017 2:15 PM   Anticoagulation Therapy Visit    Description:  76 year old female   Provider:  [facility]   Department:  [facility]           INR as of 4/13/2017     Today's INR 1.8!      Anticoagulation Summary as of 4/13/2017     INR goal 2.0-3.0   Today's INR 1.8!   Full instructions 4/13: 5 mg; 4/14: 5 mg; 4/15: 2.5 mg; 4/16: 5 mg   Next INR check 4/17/2017    Indications   Atrial fibrillation with rapid ventricular response (H) [I48.91]  Cerebrovascular accident (H) [I63.9]  Long-term (current) use of anticoagulants [Z79.01] [Z79.01]         Your next Anticoagulation Clinic appointment(s)     Apr 17, 2017  3:45 PM CDT   Anticoagulation Visit with [facility] [facility]   [facility]    [address] [id number] [phone]              Contact Numbers     Please call [phone] to cancel and/or reschedule your appointment.  Please call [phone] with any problems or questions regarding your therapy          April 2017 Details    Sun Mon Tue Wed Thu Fri Sat           1                 2               3               4               5               6               7               8                 9               10               11               12               13      5 mg   See details      14      5 mg         15      2.5 mg           16      5 mg         17            18               19               20               21               22                 23               24               25               26               27               28               29                 30                      Date Details   04/13 This INR check       Date of next INR:  4/17/2017         How to take your warfarin dose     To take:  2.5 mg Take 1 of the 2.5 mg tablets.    To take:  5 mg Take 2 of the 2.5 mg tablets.

## 2017-04-13 NOTE — PROGRESS NOTES
SUBJECTIVE:                                                    Sunitha Jacques is a 76 year old female who presents to clinic today for the following health issues:here to establish care, the patient recently discharged from nursing home and will be temporarily living with her children. The patient has history of paroxysmal afib and recent CVA . She was in her usual state of health on 3/6/17 although family states she had been to the ED because she was not feeling well. Her recent brain imaging showed a subacute right parietal infarct. After hospital stay she was admitted to nursing home for rehabilitation and was discharged on 4/07.   The patient has history of sepsis due to MRSA, she finished 4 weeks of abx and her discharge CRP and WBC were normal. She was following infectious disease, she needs follow up blood cultures that will be ordered today.  Her afib is stabe, rate controlled and she is on Coumadin managed by our INR clinic.   Has hypertension, not controlled likely because her daughter did not understand interactions on Hydralazine, was advised to hold if BP less than 140/90, she was holding Hydralazine even when her DBP was low, but SBP high, she thought that she needs to hold it when both numbers are low. The patient also stopped Metoprolol abruptly, she was taking 50 mg twice daily, but was advised to hold it if HR less than 55. I think abrupt discontinuation of beta-blocker, plus non taking Hydralazine can explain why pts BP is still elevated.   The patient denies shortness of breath , chest pain, dizziness, palpitations.  She reports bilateral leg swelling and following lymphedema clinic which is helpful.    The patient complains of left side leg pain, numbness and tingling, it started after stroke, she also reports diabetic neuropathy and pain in her legs.   She has history of CKD stage 3, follows nephrologist.     Problem list and histories reviewed & adjusted, as indicated.  Additional  "history: as documented    Labs reviewed in EPIC    Reviewed and updated as needed this visit by clinical staff  Allergies       Reviewed and updated as needed this visit by Provider         ROS:  Constitutional, HEENT, cardiovascular, pulmonary, gi and gu systems are negative, except as otherwise noted.    OBJECTIVE:                                                    /62  Pulse 59  Temp 98  F (36.7  C) (Tympanic)  Ht 5' 1.75\" (1.568 m)  Wt 226 lb 6.4 oz (102.7 kg)  BMI 41.75 kg/m2  Body mass index is 41.75 kg/(m^2).  GENERAL: healthy, alert and no distress  RESP: lungs clear to auscultation - no rales, rhonchi or wheezes  CV: regular rates and rhythm, grade 3/6 systolic murmur, peripheral pulses strong, mild non-pitting bilateral LE edema   ABDOMEN: soft, nontender, no hepatosplenomegaly, no masses and bowel sounds normal  NEURO: Normal strength and tone, mentation intact and speech normal  PSYCH: mentation appears normal, affect normal/bright    Diagnostic Test Results:  Labs pending     ASSESSMENT/PLAN:                                                      BMI:   Estimated body mass index is 41.75 kg/(m^2) as calculated from the following:    Height as of this encounter: 5' 1.75\" (1.568 m).    Weight as of this encounter: 226 lb 6.4 oz (102.7 kg).   Weight management plan: Discussed healthy diet and exercise guidelines and patient will follow up in 3 months in clinic to re-evaluate.      1. History of sepsis  - Blood culture  - CBC with platelets differential  - CRP inflammation    2. Hypothyroidism, unspecified type  -clinically euthyroid, due for thyroid labs, will adjust Synthroid if needed, she does not need refill today   - TSH with free T4 reflex    3. 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 Essential hypertension  -not controlled, she stopped Metoprolol, also not taking Hydralazine daily  -reduce Metoprolol to 25 mg twice daily due to bradycardia  -continue Norvasc 10 mg daily  -continue Hydralazine 10 mg 3 times daily, hold Hydralazine if systolic BP less than 140  -follow up if BP still high   - Creatinine    5. Diabetic polyneuropathy associated with type 2 diabetes mellitus (H)  -if renal function stable will consider starting Gabapentin 100 mg 3 times daily and than gradually increase if pain still not controlled     6. History Atrial fibrillation with RVR    -continue Coumadin, will need life long treatment    -Coumadin dosing per INR clinic   -stop fish oil and all NSAID's    See Patient Instructions    MIKE Garduno [facility]

## 2017-04-13 NOTE — PROGRESS NOTES
ANTICOAGULATION INITIAL CLINIC VISIT    Patient Name:  Sunitha Jacques  Date:  4/13/2017  Referred by: Dr Marcum  Contact Type:  Face to Face    SUBJECTIVE:  Coumadin education was completed today.  Topics covered include:  -Introduction to coumadin  -Proper Administration  -INR Testing  -Sign/Symptoms of Bleeding  -Signs/Symptoms of Clot Formation or Stroke  -Dietary Intake of Vitamin K  -Drug Interactions  -Anticoagulation Identification (bracelet, necklace or wallet card)  -Future Surgery  -Effects of Alcohol, Tobacco, and Exercise on Coumadin    Coumadin Education Booklet and Coumadin Identification Wallet Card were given to the patient.       Patient Findings     Positives Initiation of therapy, No Problem Findings    Comments Pt here with son. New to warfarin. Last visit no time for teaching so this was done today. Pt will be living here with son while she recuperates from stroke but then back to [place]. She is seeing Dr Marcum today to establish new PCP.   She is on fish oil and I have asked her to mention to PCP if she should cont on this while on warfarin.   She is also on baby asa and was discharged on this from [facility].             OBJECTIVE    INR Protime   Date Value Ref Range Status   04/13/2017 1.8 (A) 0.86 - 1.14 Final       ASSESSMENT / PLAN  INR assessment SUB    Recheck INR In: 4 DAYS    INR Location Clinic      Anticoagulation Summary as of 4/13/2017     INR goal 2.0-3.0   Today's INR 1.8!   Maintenance plan No maintenance plan   Full instructions 4/13: 5 mg; 4/14: 5 mg; 4/15: 2.5 mg; 4/16: 5 mg   Plan last modified Mattie Persaud RN (4/10/2017)   Next INR check 4/17/2017   Priority INR   Target end date     Indications   Atrial fibrillation with rapid ventricular response (H) [I48.91]  Cerebrovascular accident (H) [I63.9]  Long-term (current) use of anticoagulants [Z79.01] [Z79.01]         Anticoagulation Episode Summary     INR check location     Preferred lab     Send INR  reminders to [facility] POOL    Comments  +       Anticoagulation Care Providers     Provider Role Specialty Phone number    Bhargavi Marcum APRN CNP Responsible Nurse Practitioner - Gerontology [phone]            See the Encounter Report to view Anticoagulation Flowsheet and Dosing Calendar (Go to Encounters tab in chart review, and find the Anticoagulation Therapy Visit)    Dosage adjustment made based on physician directed care plan.    Dorita Da Silva RN

## 2017-04-14 RX ORDER — GABAPENTIN 100 MG/1
100 CAPSULE ORAL 3 TIMES DAILY
Qty: 90 CAPSULE | Refills: 1 | Status: SHIPPED | OUTPATIENT
Start: 2017-04-14 | End: 2017-04-26 | Stop reason: SINTOL

## 2017-04-17 ENCOUNTER — ANTICOAGULATION THERAPY VISIT (OUTPATIENT)
Dept: ANTICOAGULATION | Facility: CLINIC | Age: 77
End: 2017-04-17
Payer: MEDICARE

## 2017-04-17 DIAGNOSIS — I63.9 CEREBROVASCULAR ACCIDENT (CVA), UNSPECIFIED MECHANISM (H): ICD-10-CM

## 2017-04-17 DIAGNOSIS — I48.91 ATRIAL FIBRILLATION WITH RAPID VENTRICULAR RESPONSE (H): ICD-10-CM

## 2017-04-17 DIAGNOSIS — Z79.01 LONG-TERM (CURRENT) USE OF ANTICOAGULANTS: Primary | ICD-10-CM

## 2017-04-17 DIAGNOSIS — Z79.01 LONG-TERM (CURRENT) USE OF ANTICOAGULANTS: ICD-10-CM

## 2017-04-17 LAB — INR POINT OF CARE: 3.1 (ref 0.86–1.14)

## 2017-04-17 PROCEDURE — 36416 COLLJ CAPILLARY BLOOD SPEC: CPT

## 2017-04-17 PROCEDURE — 99207 ZZC NO CHARGE NURSE ONLY: CPT

## 2017-04-17 PROCEDURE — 85610 PROTHROMBIN TIME: CPT | Mod: QW

## 2017-04-17 RX ORDER — WARFARIN SODIUM 2.5 MG/1
TABLET ORAL
Qty: 144 TABLET | Refills: 0 | Status: SHIPPED | OUTPATIENT
Start: 2017-04-17 | End: 2017-05-08

## 2017-04-17 NOTE — PROGRESS NOTES
ANTICOAGULATION FOLLOW-UP CLINIC VISIT    Patient Name:  Sunitha Jacques  Date:  4/17/2017  Contact Type:  Face to Face    SUBJECTIVE:     Patient Findings     Positives Change in medications (stopped ASA and Fish oil), Change in diet/appetite (encouraged Vit K foods 1-2 times a week )    Comments Pt would like to have a home meter after 3 months.  She would like to keep her medical care here in [place].  But wants to move back to [place].           OBJECTIVE    INR Protime   Date Value Ref Range Status   04/17/2017 3.1 (A) 0.86 - 1.14 Final       ASSESSMENT / PLAN  INR assessment THER    Recheck INR In: 3 DAYS    INR Location Clinic      Anticoagulation Summary as of 4/17/2017     INR goal 2.0-3.0   Today's INR 3.1!   Maintenance plan No maintenance plan   Full instructions 4/17: 2.5 mg; 4/18: 5 mg; 4/19: 5 mg; 4/20: 5 mg   Plan last modified Mattie Persaud RN (4/10/2017)   Next INR check 4/21/2017   Priority INR   Target end date     Indications   Atrial fibrillation with rapid ventricular response (H) [I48.91]  Cerebrovascular accident (H) [I63.9]  Long-term (current) use of anticoagulants [Z79.01] [Z79.01]         Anticoagulation Episode Summary     INR check location     Preferred lab     Send INR reminders to [facility]    Comments  +       Anticoagulation Care Providers     Provider Role Specialty Phone number    Bhargavi Marcum APRN CNP Responsible Nurse Practitioner - Gerontology [phone]            See the Encounter Report to view Anticoagulation Flowsheet and Dosing Calendar (Go to Encounters tab in chart review, and find the Anticoagulation Therapy Visit)        Mattie Persaud RN

## 2017-04-17 NOTE — MR AVS SNAPSHOT
Sunitha Jacques   4/17/2017 3:45 PM   Anticoagulation Therapy Visit    Description:  76 year old female   Provider:  [facility]   Department:  [facility]           INR as of 4/17/2017     Today's INR 3.1!      Anticoagulation Summary as of 4/17/2017     INR goal 2.0-3.0   Today's INR 3.1!   Full instructions 4/17: 2.5 mg; 4/18: 5 mg; 4/19: 5 mg; 4/20: 5 mg   Next INR check 4/21/2017    Indications   Atrial fibrillation with rapid ventricular response (H) [I48.91]  Cerebrovascular accident (H) [I63.9]  Long-term (current) use of anticoagulants [Z79.01] [Z79.01]         Description     Recheck INR Friday, 4/21/17  Take warfarin 2.5 mg today, 5 mg Tues,Wed, Thurs      Your next Anticoagulation Clinic appointment(s)     Apr 21, 2017  2:15 PM CDT   Anticoagulation Visit with [facility]   [facility]    [address] [place] [address] [phone]              Contact Numbers     Please call [phone] to cancel and/or reschedule your appointment.  Please call [phone] with any problems or questions regarding your therapy          April 2017 Details    Sun Mon Tue Wed Thu Fri Sat           1                 2               3               4               5               6               7               8                 9               10               11               12               13               14               15                 16               17      2.5 mg   See details      18      5 mg         19      5 mg         20      5 mg         21            22                 23               24               25               26               27               28               29                 30                      Date Details   04/17 This INR check       Date of next INR:  4/21/2017         How to take your warfarin dose     To take:  2.5 mg Take 1 of the 2.5 mg tablets.    To take:  5 mg Take 2 of the 2.5 mg tablets.

## 2017-04-19 LAB
BACTERIA SPEC CULT: NORMAL
MICRO REPORT STATUS: NORMAL
SPECIMEN SOURCE: NORMAL

## 2017-04-21 ENCOUNTER — HOSPITAL ENCOUNTER (OUTPATIENT)
Dept: PHYSICAL THERAPY | Facility: CLINIC | Age: 77
Setting detail: THERAPIES SERIES
End: 2017-04-21
Attending: FAMILY MEDICINE
Payer: MEDICARE

## 2017-04-21 ENCOUNTER — ANTICOAGULATION THERAPY VISIT (OUTPATIENT)
Dept: ANTICOAGULATION | Facility: CLINIC | Age: 77
End: 2017-04-21
Payer: MEDICARE

## 2017-04-21 DIAGNOSIS — I48.91 ATRIAL FIBRILLATION WITH RAPID VENTRICULAR RESPONSE (H): ICD-10-CM

## 2017-04-21 DIAGNOSIS — Z79.01 LONG-TERM (CURRENT) USE OF ANTICOAGULANTS: ICD-10-CM

## 2017-04-21 DIAGNOSIS — I63.9 CEREBROVASCULAR ACCIDENT (H): ICD-10-CM

## 2017-04-21 LAB — INR POINT OF CARE: 2.6 (ref 0.86–1.14)

## 2017-04-21 PROCEDURE — 40000099 ZZH STATISTIC LYMPHEDEMA VISIT: Performed by: REHABILITATION PRACTITIONER

## 2017-04-21 PROCEDURE — 99207 ZZC NO CHARGE NURSE ONLY: CPT

## 2017-04-21 PROCEDURE — 97140 MANUAL THERAPY 1/> REGIONS: CPT | Mod: GP | Performed by: REHABILITATION PRACTITIONER

## 2017-04-21 PROCEDURE — 36416 COLLJ CAPILLARY BLOOD SPEC: CPT

## 2017-04-21 PROCEDURE — 85610 PROTHROMBIN TIME: CPT | Mod: QW

## 2017-04-21 NOTE — PROGRESS NOTES
ANTICOAGULATION FOLLOW-UP CLINIC VISIT    Patient Name:  Sunitha Jacques  Date:  4/21/2017  Contact Type:  Face to Face    SUBJECTIVE:     Patient Findings     Positives Change in medications (stopped gabapentin d/t diarrhea), Other complaints (pt reports diarrhea for the past few days), Initiation of therapy           OBJECTIVE    INR Protime   Date Value Ref Range Status   04/21/2017 2.6 (A) 0.86 - 1.14 Final       ASSESSMENT / PLAN  INR assessment THER    Recheck INR In: 5 DAYS    INR Location Clinic      Anticoagulation Summary as of 4/21/2017     INR goal 2.0-3.0   Today's INR 2.6   Maintenance plan 2.5 mg (2.5 mg x 1) on Tue, Fri; 5 mg (2.5 mg x 2) all other days   Full instructions 2.5 mg on Tue, Fri; 5 mg all other days   Weekly total 30 mg   Plan last modified Zhanna Alicea RN (4/21/2017)   Next INR check 4/26/2017   Priority INR   Target end date     Indications   Atrial fibrillation with rapid ventricular response (H) [I48.91]  Cerebrovascular accident (H) [I63.9]  Long-term (current) use of anticoagulants [Z79.01] [Z79.01]         Anticoagulation Episode Summary     INR check location     Preferred lab     Send INR reminders to [facility]    Comments  +       Anticoagulation Care Providers     Provider Role Specialty Phone number    Bhargavi Marcum APRN CNP Responsible Nurse Practitioner - Gerontology [phone]            See the Encounter Report to view Anticoagulation Flowsheet and Dosing Calendar (Go to Encounters tab in chart review, and find the Anticoagulation Therapy Visit)    Portillo Hall, student pharmacist    Zhanna Alicea RN

## 2017-04-21 NOTE — MR AVS SNAPSHOT
Sunitha Jacques   4/21/2017 11:00 AM   Anticoagulation Therapy Visit    Description:  76 year old female   Provider:  [facility]   Department:  [facility]           INR as of 4/21/2017     Today's INR 2.6      Anticoagulation Summary as of 4/21/2017     INR goal 2.0-3.0   Today's INR 2.6   Full instructions 2.5 mg on Tue, Fri; 5 mg all other days   Next INR check 4/26/2017    Indications   Atrial fibrillation with rapid ventricular response (H) [I48.91]  Cerebrovascular accident (H) [I63.9]  Long-term (current) use of anticoagulants [Z79.01] [Z79.01]         Description     Take warfarin 2.5mg tue,fri; 5mg all other days.      Contact Numbers     Please call [phone] to cancel and/or reschedule your appointment.  Please call [phone] with any problems or questions regarding your therapy          April 2017 Details    Sun Mon Tue Wed Thu Fri Sat           1                 2               3               4               5               6               7               8                 9               10               11               12               13               14               15                 16               17               18               19               20               21      2.5 mg   See details      22      5 mg           23      5 mg         24      5 mg         25      2.5 mg         26            27               28               29                 30                      Date Details   04/21 This INR check       Date of next INR:  4/26/2017         How to take your warfarin dose     To take:  2.5 mg Take 1 of the 2.5 mg tablets.    To take:  5 mg Take 2 of the 2.5 mg tablets.

## 2017-04-26 ENCOUNTER — HOSPITAL ENCOUNTER (OUTPATIENT)
Dept: PHYSICAL THERAPY | Facility: CLINIC | Age: 77
Setting detail: THERAPIES SERIES
End: 2017-04-26
Attending: FAMILY MEDICINE
Payer: MEDICARE

## 2017-04-26 ENCOUNTER — OFFICE VISIT (OUTPATIENT)
Dept: FAMILY MEDICINE | Facility: CLINIC | Age: 77
End: 2017-04-26
Payer: MEDICARE

## 2017-04-26 ENCOUNTER — ANTICOAGULATION THERAPY VISIT (OUTPATIENT)
Dept: ANTICOAGULATION | Facility: CLINIC | Age: 77
End: 2017-04-26
Payer: MEDICARE

## 2017-04-26 VITALS
DIASTOLIC BLOOD PRESSURE: 66 MMHG | WEIGHT: 224.5 LBS | SYSTOLIC BLOOD PRESSURE: 149 MMHG | TEMPERATURE: 96.9 F | BODY MASS INDEX: 41.31 KG/M2 | HEIGHT: 62 IN | HEART RATE: 59 BPM

## 2017-04-26 DIAGNOSIS — Z79.01 LONG-TERM (CURRENT) USE OF ANTICOAGULANTS: ICD-10-CM

## 2017-04-26 DIAGNOSIS — I48.91 ATRIAL FIBRILLATION WITH RAPID VENTRICULAR RESPONSE (H): ICD-10-CM

## 2017-04-26 DIAGNOSIS — Z12.11 SPECIAL SCREENING FOR MALIGNANT NEOPLASMS, COLON: ICD-10-CM

## 2017-04-26 DIAGNOSIS — R19.7 DIARRHEA, UNSPECIFIED TYPE: Primary | ICD-10-CM

## 2017-04-26 DIAGNOSIS — I63.9 CEREBROVASCULAR ACCIDENT (H): ICD-10-CM

## 2017-04-26 DIAGNOSIS — A04.72 COLITIS DUE TO CLOSTRIDIUM DIFFICILE: ICD-10-CM

## 2017-04-26 DIAGNOSIS — E11.42 DIABETIC POLYNEUROPATHY ASSOCIATED WITH TYPE 2 DIABETES MELLITUS (H): ICD-10-CM

## 2017-04-26 LAB — INR POINT OF CARE: 5.7 (ref 0.86–1.14)

## 2017-04-26 PROCEDURE — 85610 PROTHROMBIN TIME: CPT | Mod: QW

## 2017-04-26 PROCEDURE — 36416 COLLJ CAPILLARY BLOOD SPEC: CPT

## 2017-04-26 PROCEDURE — 40000099 ZZH STATISTIC LYMPHEDEMA VISIT: Performed by: PHYSICAL THERAPIST

## 2017-04-26 PROCEDURE — 99207 ZZC NO CHARGE NURSE ONLY: CPT

## 2017-04-26 PROCEDURE — 99214 OFFICE O/P EST MOD 30 MIN: CPT | Performed by: NURSE PRACTITIONER

## 2017-04-26 PROCEDURE — 97140 MANUAL THERAPY 1/> REGIONS: CPT | Mod: GP | Performed by: PHYSICAL THERAPIST

## 2017-04-26 RX ORDER — PREGABALIN 50 MG/1
50 CAPSULE ORAL 2 TIMES DAILY
Qty: 60 CAPSULE | Refills: 0 | Status: SHIPPED | OUTPATIENT
Start: 2017-04-26

## 2017-04-26 NOTE — PROGRESS NOTES
"  SUBJECTIVE:                                                    Sunitha Jacques is a 76 year old female who presents to clinic today for the following health issues: diarrhea since 4/18. Started Gabapentin on 4/18 and after develop diarrhea, she stopped taking Gabapentin, but still having 1-2 loose stools per day. Reports that Gabapentin did not help pain in her legs.   States that sometimes she does not feel she is going to have BM and sometimes gets incontinence. Denies abdominal pain, states that her stools looks dark, describes it as \"coffee grounds\". Denies bloody stools. Denies epigastric pain, nausea, vomiting. Denies fever, chills. Took Imodium, which did not help.    Reports that she had colonoscopy about 10 years ago, states it was normal.     Problem list and histories reviewed & adjusted, as indicated.  Additional history: as documented    Labs reviewed in EPIC    Reviewed and updated as needed this visit by clinical staff  Allergies       Reviewed and updated as needed this visit by Provider         ROS:  Constitutional, HEENT, cardiovascular, pulmonary, gi and gu systems are negative, except as otherwise noted.    OBJECTIVE:                                                    /66  Pulse 59  Temp 96.9  F (36.1  C) (Tympanic)  Ht 5' 1.75\" (1.568 m)  Wt 224 lb 8 oz (101.8 kg)  BMI 41.39 kg/m2  Body mass index is 41.39 kg/(m^2).  GENERAL: healthy, alert and no distress  ABDOMEN: soft, nontender, without hepatosplenomegaly or masses, hyperactive bowel sounds   PSYCH: mentation appears normal, affect normal/bright    Diagnostic Test Results:  Stool panel to rule out infectious causes of diarrhea   She was recently treated with antibiotics for sepsis, will check for c-diff  IFOBT screening   All labs are pending      ASSESSMENT/PLAN:                                                      1. Diarrhea, unspecified type  -will rule out infectious causes, Because she was recently treated with " multiple antibiotics recommended screening for clostridium difficile infection.    - Clostridium difficile toxin B PCR; Future  - Enteric Bacteria and Virus Panel by [facility] Stool; Future  -if all labs normal can be functional diarrhea, recommended to start Metamucil 1 tea spoon 3 times daily before meals to help to regulate stools, may also try probiotics     Addendum:   Positive for cdiff, cannot take Flagyl because of interactions with Coumadin, will start Vancomycin 125 mg 4 times daily for 10 days     2. Special screening for malignant neoplasms, colon  - Immunos occult blood; Future    3. Diabetic polyneuropathy   -Gabapentin was not effective for pain, but she started very low dose  -told patient that there is about 6 % of patients who develop diarrhea from Gabapentin   -it is unclear if her diarrhea due to Gabapentin, but the fact that she still continue to have diarrhea after she stopped taking Gabapentin makes it unlikely. The patient is concern that diarrhea started right after the first dose of Gabapentin and reactant to try increased dose of Gabapentin. Recommended to start Lyrica. Discussed possible side effects from Lyrica, will start at lower dose 50 mg twice daily, will consider changing to 75 mg twice daily if not effective.   -d/c Gabapentin due to possible side effects   - start pregabalin (LYRICA) 50 MG capsule; Take 1 capsule (50 mg) by mouth 2 times daily  Dispense: 60 capsule; Refill: 0    See Patient Instructions    MIKE Garduno [facility]

## 2017-04-26 NOTE — NURSING NOTE
"Chief Complaint   Patient presents with     other     Took Gabapentin for 2 days on 4/18 and 4/19 and had diarrhea. Is still having diarrhea.        Initial /66  Pulse 59  Temp 96.9  F (36.1  C) (Tympanic)  Ht 5' 1.75\" (1.568 m)  Wt 224 lb 8 oz (101.8 kg)  BMI 41.39 kg/m2 Estimated body mass index is 41.39 kg/(m^2) as calculated from the following:    Height as of this encounter: 5' 1.75\" (1.568 m).    Weight as of this encounter: 224 lb 8 oz (101.8 kg).  Medication Reconciliation: complete  "

## 2017-04-26 NOTE — PATIENT INSTRUCTIONS
Start Lyrica 50 mg twice daily     Stool panel, cdiff to rule out infectious causes of diarrhea  IFOBT test to check for blood in stools    If all tests are negative for infection  Start Metamucil 1 tea spoon 3 times daily before meals, it will help to regulate stools

## 2017-04-26 NOTE — MR AVS SNAPSHOT
After Visit Summary   4/26/2017    Sunitha Jacques    MRN: [id number]           Patient Information     Date Of Birth          1940        Visit Information        Provider Department      4/26/2017 3:00 PM Bhargavi Marcum APRN CNP [facility]        Today's Diagnoses     Diarrhea, unspecified type    -  1    Melena        Special screening for malignant neoplasms, colon        Neuropathy (H)          Care Instructions    Start Lyrica 50 mg twice daily     Stool panel, cdiff to rule out infectious cause of diarrhea  IFOBT test to check for blood in stools    If all tests are negative for infection  Start Metamucil 1 tea spoon 3 times daily before meals, it will help to regulate stools             Follow-ups after your visit        Your next 10 appointments already scheduled     Apr 26, 2017  3:45 PM CDT   Anticoagulation Visit with [facility] [facility]   [facility]    [address] [address]   [phone]            May 08, 2017  8:30 AM CDT   Treatment with Donna Vogel PT   [facility] ([facility])    [address] [address]   [phone]              Future tests that were ordered for you today     Open Future Orders        Priority Expected Expires Ordered    Immunos occult blood Routine  4/26/2018 4/26/2017    Clostridium difficile toxin B PCR Routine 4/26/2017 5/3/2017 4/26/2017    Enteric Bacteria and Virus Panel by [facility] Stool Routine  4/26/2018 4/26/2017            Who to contact     If you have questions or need follow up information about today's clinic visit or your schedule please contact [facility] directly at [phone].  Normal or non-critical lab and imaging results will be communicated to you by MyChart, letter or phone within 4 business days after the clinic has received the results. If you do not hear from us within 7 days,  "please contact the clinic through [employer] or phone. If you have a critical or abnormal lab result, we will notify you by phone as soon as possible.  Submit refill requests through [employer] or call your pharmacy and they will forward the refill request to us. Please allow 3 business days for your refill to be completed.          Additional Information About Your Visit        [employer] Assist Information     [employer] lets you send messages to your doctor, view your test results, renew your prescriptions, schedule appointments and more. To sign up, go to [email or web address] . Click on \"Log in\" on the left side of the screen, which will take you to the Welcome page. Then click on \"Sign up Now\" on the right side of the page.     You will be asked to enter the access code listed below, as well as some personal information. Please follow the directions to create your username and password.     Your access code is: [email or web address]  Expires: 2017  5:23 PM     Your access code will  in 90 days. If you need help or a new code, please call your [place] clinic or [phone].        Care EveryWhere ID     This is your Care EveryWhere ID. This could be used by other organizations to access your [place] medical records  [id number]        Your Vitals Were     Pulse Temperature Height BMI (Body Mass Index)          59 96.9  F (36.1  C) (Tympanic) 5' 1.75\" (1.568 m) 41.39 kg/m2         Blood Pressure from Last 3 Encounters:   17 149/66   17 178/62   17 150/70    Weight from Last 3 Encounters:   17 224 lb 8 oz (101.8 kg)   17 226 lb 6.4 oz (102.7 kg)   17 235 lb (106.6 kg)                 Today's Medication Changes          These changes are accurate as of: 17  3:25 PM.  If you have any questions, ask your nurse or doctor.               Start taking these medicines.        Dose/Directions    pregabalin 50 MG capsule   Commonly known as:  LYRICA   Used for:  Neuropathy (H)   Started by:  " Bhargavi Marcum APRN CNP        Dose:  50 mg   Take 1 capsule (50 mg) by mouth 2 times daily   Quantity:  60 capsule   Refills:  0         Stop taking these medicines if you haven't already. Please contact your care team if you have questions.     gabapentin 100 MG capsule   Commonly known as:  NEURONTIN   Stopped by:  Bhargavi Marcum APRN CNP                Where to get your medicines      Some of these will need a paper prescription and others can be bought over the counter.  Ask your nurse if you have questions.     Bring a paper prescription for each of these medications     pregabalin 50 MG capsule                Primary Care Provider    Pradeep Zuniga       No address on file        Thank you!     Thank you for choosing [facility]  for your care. Our goal is always to provide you with excellent care. Hearing back from our patients is one way we can continue to improve our services. Please take a few minutes to complete the written survey that you may receive in the mail after your visit with us. Thank you!             Your Updated Medication List - Protect others around you: Learn how to safely use, store and throw away your medicines at www.disposemymeds.org.          This list is accurate as of: 4/26/17  3:25 PM.  Always use your most recent med list.                   Brand Name Dispense Instructions for use    cetirizine 10 MG tablet    zyrTEC     Take 10 mg by mouth daily       ferrous sulfate 325 (65 FE) MG tablet    IRON     Take by mouth daily (with breakfast)       GLIPIZIDE PO      Take 5 mg by mouth every morning (before breakfast)       hydrALAZINE 10 MG tablet    APRESOLINE    90 tablet    Take 1 tablet (10 mg) by mouth 4 times daily       LASIX PO      Take 40 mg by mouth 2 times daily       LEVOTHYROXINE SODIUM PO      Take 150 mcg by mouth daily       LEXAPRO PO      Take 10 mg by mouth daily       LIPITOR PO      Take 80 mg by mouth daily       magnesium citrate  1.745 GM/30ML solution      Take 296 mLs by mouth once       magnesium hydroxide 400 MG/5ML suspension    MILK OF MAGNESIA     Take 30 mLs by mouth daily as needed for constipation or heartburn       MAGNESIUM OXIDE PO      Take 400 mg by mouth daily       METOPROLOL TARTRATE PO      Take 25 mg by mouth 2 times daily       Multi-vitamin Tabs tablet      Take 1 tablet by mouth daily       NORVASC PO      Take 10 mg by mouth daily       potassium chloride 20 MEQ Packet    KLOR-CON     Take 20 mEq by mouth 2 times daily       pregabalin 50 MG capsule    LYRICA    60 capsule    Take 1 capsule (50 mg) by mouth 2 times daily       PRILOSEC PO      Take 20 mg by mouth 2 times daily (before meals)       quinapril 40 MG Tab    ACCUPRIL    90 tablet    Take 1 tablet (40 mg) by mouth daily       SINGULAIR PO      Take 10 mg by mouth At Bedtime       TYLENOL PO      Take 1,000 mg by mouth 4 times daily Reported on 4/26/2017       VITAMIN D (CHOLECALCIFEROL) PO      Take 1,000 Units by mouth daily       warfarin 2.5 MG tablet    COUMADIN    144 tablet    As directed by Anticoagulation Clinic, take 2.5 mg Mon, 5 mg Tues, Wed, Thurs, recheck INR 4/21/17

## 2017-04-26 NOTE — PROGRESS NOTES
"  ANTICOAGULATION FOLLOW-UP CLINIC VISIT    Patient Name:  Sunitha Jacques  Date:  4/26/2017  Contact Type:  Face to Face    SUBJECTIVE:     Patient Findings     Positives Change in medications, Other complaints, Missed doses    Comments  INR high today at 5.7 despite forgetting a dose 3 days ago    About 3 diarrhea stools daily since started gabapentin after 2 days of dosing. She has since stopped gabapentin and now on Lyrica. Has had diarrhea for about 10 days. No obvious blood in the stool and no signs of bleeding. INR high today at 5.7 despite forgetting a dose 3 days ago.    Stools were \"dark\" , mucousy and  \"coffee grounds mixed in with diarrhea.\" Her PCP is aware of this as she just came from appt with her.   She will be doing stool cultures and occult blood studies   Also she has a rash under a fold of her skin in abd and uses a antifungal powder she has at home - states she has had this in the past this has cleared it up. She is not concerned about this.     Rash may be contributing to high INR but more likely diarrhea. Pt also relatively new on warfarin so her baseline dosing not yet established.    She will hold warfarin 2 days and eat some extra greens. Recheck in 2 days.                OBJECTIVE    INR Protime   Date Value Ref Range Status   04/26/2017 5.7 (A) 0.86 - 1.14 Final       ASSESSMENT / PLAN  INR assessment SUPRA    Recheck INR In: 2 DAYS    INR Location Clinic      Anticoagulation Summary as of 4/26/2017     INR goal 2.0-3.0   Today's INR 5.7!   Maintenance plan 2.5 mg (2.5 mg x 1) on Tue, Fri; 5 mg (2.5 mg x 2) all other days   Full instructions 4/26: Hold; 4/27: Hold; Otherwise 2.5 mg on Tue, Fri; 5 mg all other days   Weekly total 30 mg   Plan last modified Zhanna Alicea RN (4/21/2017)   Next INR check 4/28/2017   Priority INR   Target end date     Indications   Atrial fibrillation with rapid ventricular response (H) [I48.91]  Cerebrovascular accident (H) [I63.9]  Long-term " (current) use of anticoagulants [Z79.01] [Z79.01]         Anticoagulation Episode Summary     INR check location     Preferred lab     Send INR reminders to [facility]    Comments  +       Anticoagulation Care Providers     Provider Role Specialty Phone number    Bhargavi Marcum APRN CNP Responsible Nurse Practitioner - Gerontology [phone]            See the Encounter Report to view Anticoagulation Flowsheet and Dosing Calendar (Go to Encounters tab in chart review, and find the Anticoagulation Therapy Visit)        Dorita Da Silva RN

## 2017-04-27 ASSESSMENT — PATIENT HEALTH QUESTIONNAIRE - PHQ9: SUM OF ALL RESPONSES TO PHQ QUESTIONS 1-9: 3

## 2017-04-28 ENCOUNTER — ANTICOAGULATION THERAPY VISIT (OUTPATIENT)
Dept: ANTICOAGULATION | Facility: CLINIC | Age: 77
End: 2017-04-28
Payer: MEDICARE

## 2017-04-28 DIAGNOSIS — R19.7 DIARRHEA, UNSPECIFIED TYPE: ICD-10-CM

## 2017-04-28 DIAGNOSIS — Z79.01 LONG TERM CURRENT USE OF ANTICOAGULANT: Primary | ICD-10-CM

## 2017-04-28 DIAGNOSIS — I63.9 CEREBROVASCULAR ACCIDENT (H): ICD-10-CM

## 2017-04-28 DIAGNOSIS — I48.91 ATRIAL FIBRILLATION WITH RAPID VENTRICULAR RESPONSE (H): ICD-10-CM

## 2017-04-28 DIAGNOSIS — Z79.01 LONG-TERM (CURRENT) USE OF ANTICOAGULANTS: ICD-10-CM

## 2017-04-28 LAB
C DIFF TOX B STL QL: ABNORMAL
INR POINT OF CARE: 4.6 (ref 0.86–1.14)
SPECIMEN SOURCE: ABNORMAL

## 2017-04-28 PROCEDURE — 85610 PROTHROMBIN TIME: CPT | Mod: QW

## 2017-04-28 PROCEDURE — 36416 COLLJ CAPILLARY BLOOD SPEC: CPT

## 2017-04-28 PROCEDURE — 87493 C DIFF AMPLIFIED PROBE: CPT | Performed by: NURSE PRACTITIONER

## 2017-04-28 PROCEDURE — 87506 IADNA-DNA/RNA PROBE TQ 6-11: CPT | Performed by: NURSE PRACTITIONER

## 2017-04-28 PROCEDURE — 99207 ZZC NO CHARGE NURSE ONLY: CPT

## 2017-04-28 NOTE — MR AVS SNAPSHOT
Sunitha Jacques   4/28/2017 2:45 PM   Anticoagulation Therapy Visit    Description:  76 year old female   Provider:  [facility]   Department:  [facility]           INR as of 4/28/2017     Today's INR 4.6!      Anticoagulation Summary as of 4/28/2017     INR goal 2.0-3.0   Today's INR 4.6!   Full instructions 4/28: Hold; 4/29: 2.5 mg; 4/30: 2.5 mg; 5/1: 2.5 mg; Otherwise 2.5 mg on Tue, Fri; 5 mg all other days   Next INR check 5/3/2017    Indications   Atrial fibrillation with rapid ventricular response (H) [I48.91]  Cerebrovascular accident (H) [I63.9]  Long-term (current) use of anticoagulants [Z79.01] [Z79.01]         Your next Anticoagulation Clinic appointment(s)     May 03, 2017  3:45 PM CDT   Anticoagulation Visit with [facility] [facility]   [facility]    [address] [address] [phone]              Contact Numbers     Please call [phone] to cancel and/or reschedule your appointment.  Please call [phone] with any problems or questions regarding your therapy          April 2017 Details    Sun Mon Tue Wed Thu Fri Sat           1                 2               3               4               5               6               7               8                 9               10               11               12               13               14               15                 16               17               18               19               20               21               22                 23               24               25               26               27               28      Hold   See details      29      2.5 mg           30      2.5 mg                Date Details   04/28 This INR check               How to take your warfarin dose     To take:  2.5 mg Take 1 of the 2.5 mg tablets.    Hold Do not take your warfarin dose. See the Details table to the right for additional instructions.                May 2017 Details     Sun Mon Tue Wed Thu Fri Sat      1      2.5 mg         2      2.5 mg         3            4               5               6                 7               8               9               10               11               12               13                 14               15               16               17               18               19               20                 21               22               23               24               25               26               27                 28               29               30               31                   Date Details   No additional details    Date of next INR:  5/3/2017         How to take your warfarin dose     To take:  2.5 mg Take 1 of the 2.5 mg tablets.    To take:  5 mg Take 2 of the 2.5 mg tablets.

## 2017-04-29 LAB
CAMPYLOBACTER GROUP BY NAT: NOT DETECTED
ENTERIC PATHOGEN COMMENT: NORMAL
NOROVIRUS I AND II BY NAT: NOT DETECTED
ROTAVIRUS A BY NAT: NOT DETECTED
SALMONELLA SPECIES BY NAT: NOT DETECTED
SHIGA TOXIN 1 GENE BY NAT: NOT DETECTED
SHIGA TOXIN 2 GENE BY NAT: NOT DETECTED
SHIGELLA SP+EIEC IPAH STL QL NAA+PROBE: NOT DETECTED
VIBRIO GROUP BY NAT: NOT DETECTED
YERSINIA ENTEROCOLITICA BY NAT: NOT DETECTED

## 2017-04-29 RX ORDER — VANCOMYCIN HYDROCHLORIDE 125 MG/1
125 CAPSULE ORAL 4 TIMES DAILY
Qty: 40 CAPSULE | Refills: 0 | Status: SHIPPED | OUTPATIENT
Start: 2017-04-29 | End: 2017-05-09

## 2017-05-02 ENCOUNTER — TELEPHONE (OUTPATIENT)
Dept: FAMILY MEDICINE | Facility: CLINIC | Age: 77
End: 2017-05-02

## 2017-05-02 NOTE — TELEPHONE ENCOUNTER
Reason for Call:  Other looses stools     Detailed comments: pt daughter is calling because the pt is going thru a hole pack of depends a day   With large black stools, It this part of the pt C-diff   Taking vancomycin (VANCOCIN) 125 MG capsule since Monday     Phone Number Patient can be reached at: Other phone number:  [phone] [place]     Best Time: any     Can we leave a detailed message on this number? YES    Call taken on 5/2/2017 at 4:09 PM by Narda Ignacio

## 2017-05-02 NOTE — TELEPHONE ENCOUNTER
"S-(situation): diarrhea    B-(background): ongoing care for diarrhea - started yesterday on antibiotics    A-(assessment): went through a whole pack of depends because she has no control on the diarrhea. No visible blood. No pain in abdomen. No visible blood when wiping. No epigastric pain. Stools have been dark in color but these are really dark and she has no control. This is a little new but they want to make sure this is \"par for the course of treatment.\"     R-(recommendations): advised this sounds like it is related to the c-diff and antibiotic treatment. If develop any blood in the stools, abdominal pain, epigastric pain, go to the ED. She states she understands and agrees with plan. Will route message to provider to make sure provider doesn't have other recommendation     "

## 2017-05-03 ENCOUNTER — ANTICOAGULATION THERAPY VISIT (OUTPATIENT)
Dept: ANTICOAGULATION | Facility: CLINIC | Age: 77
End: 2017-05-03
Payer: MEDICARE

## 2017-05-03 DIAGNOSIS — I48.91 ATRIAL FIBRILLATION WITH RAPID VENTRICULAR RESPONSE (H): ICD-10-CM

## 2017-05-03 DIAGNOSIS — I63.9 CEREBROVASCULAR ACCIDENT (H): ICD-10-CM

## 2017-05-03 DIAGNOSIS — Z79.01 LONG-TERM (CURRENT) USE OF ANTICOAGULANTS: ICD-10-CM

## 2017-05-03 LAB — INR POINT OF CARE: 1.8 (ref 0.86–1.14)

## 2017-05-03 PROCEDURE — 99207 ZZC NO CHARGE NURSE ONLY: CPT

## 2017-05-03 PROCEDURE — 36416 COLLJ CAPILLARY BLOOD SPEC: CPT

## 2017-05-03 PROCEDURE — 85610 PROTHROMBIN TIME: CPT | Mod: QW

## 2017-05-03 NOTE — TELEPHONE ENCOUNTER
Spoke to [place].  Patient is hard to limit due to noncompliance.  They will work to have patient limit her diet.    Mireya Ashley RN

## 2017-05-03 NOTE — TELEPHONE ENCOUNTER
The patient has c-diff colitis, this is why she is having diarrhea, Vancomycin is one of the treatments for c-diff colitis. To help with diarrhea I recommend to add probiotics such as Culturelle take 1 capsule twice daily with meals and follow BRAT diet (bananas, rice, toasts), drink more fluids. Dark color stools maybe due to upper GI bleeding, there is order for IFOBT test that was placed earlier, to check for occult blood. If she is feeling weak, dizzy, having more abdominal pain recommend ER evaluation, possible admission for IV fluids.     MKIE Garduno CNP

## 2017-05-03 NOTE — MR AVS SNAPSHOT
Sunitha Jacques   5/3/2017 3:45 PM   Anticoagulation Therapy Visit    Description:  76 year old female   Provider:  [facility]   Department:  [facility]           INR as of 5/3/2017     Today's INR 1.8!      Anticoagulation Summary as of 5/3/2017     INR goal 2.0-3.0   Today's INR 1.8!   Full instructions 5/4: 2.5 mg; 5/7: 2.5 mg; Otherwise 2.5 mg on Tue, Fri; 5 mg all other days   Next INR check 5/8/2017    Indications   Atrial fibrillation with rapid ventricular response (H) [I48.91]  Cerebrovascular accident (H) [I63.9]  Long-term (current) use of anticoagulants [Z79.01] [Z79.01]         Your next Anticoagulation Clinic appointment(s)     May 08, 2017  8:30 AM CDT   Anticoagulation Visit with [facility]   [facility]    [address] [address] [phone]              Contact Numbers     Please call [phone] to cancel and/or reschedule your appointment.  Please call [phone] with any problems or questions regarding your therapy          May 2017 Details    Sun Mon Tue Wed Thu Fri Sat      1               2               3      5 mg   See details      4      2.5 mg         5      2.5 mg         6      5 mg           7      2.5 mg         8            9               10               11               12               13                 14               15               16               17               18               19               20                 21               22               23               24               25               26               27                 28               29               30               31                   Date Details   05/03 This INR check       Date of next INR:  5/8/2017         How to take your warfarin dose     To take:  2.5 mg Take 1 of the 2.5 mg tablets.    To take:  5 mg Take 2 of the 2.5 mg tablets.

## 2017-05-03 NOTE — PROGRESS NOTES
ANTICOAGULATION FOLLOW-UP CLINIC VISIT    Patient Name:  Sunitha Jacques  Date:  5/3/2017  Contact Type:  Face to Face    SUBJECTIVE:     Patient Findings     Positives Antibiotic use or infection (on 10 day course of vancomycin for c-diff that was started on 5-1-17)    Comments Diarrhea is getting better.           OBJECTIVE    INR Protime   Date Value Ref Range Status   05/03/2017 1.8 (A) 0.86 - 1.14 Final       ASSESSMENT / PLAN  INR assessment SUB    Recheck INR In: 5 DAYS    INR Location Clinic      Anticoagulation Summary as of 5/3/2017     INR goal 2.0-3.0   Today's INR 1.8!   Maintenance plan 2.5 mg (2.5 mg x 1) on Tue, Fri; 5 mg (2.5 mg x 2) all other days   Full instructions 5/4: 2.5 mg; 5/7: 2.5 mg; Otherwise 2.5 mg on Tue, Fri; 5 mg all other days   Weekly total 30 mg   Plan last modified Zhanna Alicea RN (4/21/2017)   Next INR check 5/8/2017   Priority INR   Target end date     Indications   Atrial fibrillation with rapid ventricular response (H) [I48.91]  Cerebrovascular accident (H) [I63.9]  Long-term (current) use of anticoagulants [Z79.01] [Z79.01]         Anticoagulation Episode Summary     INR check location     Preferred lab     Send INR reminders to [facility]    Comments  *      Anticoagulation Care Providers     Provider Role Specialty Phone number    ByroncotyBhargavi syed APRN CNP Responsible Nurse Practitioner - Gerontology [phone]            See the Encounter Report to view Anticoagulation Flowsheet and Dosing Calendar (Go to Encounters tab in chart review, and find the Anticoagulation Therapy Visit)        Zhanna Alicea RN

## 2017-05-08 ENCOUNTER — HOSPITAL ENCOUNTER (OUTPATIENT)
Dept: PHYSICAL THERAPY | Facility: CLINIC | Age: 77
Setting detail: THERAPIES SERIES
End: 2017-05-08
Attending: FAMILY MEDICINE
Payer: MEDICARE

## 2017-05-08 ENCOUNTER — ANTICOAGULATION THERAPY VISIT (OUTPATIENT)
Dept: ANTICOAGULATION | Facility: CLINIC | Age: 77
End: 2017-05-08
Payer: MEDICARE

## 2017-05-08 ENCOUNTER — ALLIED HEALTH/NURSE VISIT (OUTPATIENT)
Dept: FAMILY MEDICINE | Facility: CLINIC | Age: 77
End: 2017-05-08
Payer: MEDICARE

## 2017-05-08 ENCOUNTER — TELEPHONE (OUTPATIENT)
Dept: FAMILY MEDICINE | Facility: CLINIC | Age: 77
End: 2017-05-08

## 2017-05-08 DIAGNOSIS — E11.49 TYPE 2 DIABETES MELLITUS WITH OTHER NEUROLOGIC COMPLICATION: ICD-10-CM

## 2017-05-08 DIAGNOSIS — Z79.01 LONG-TERM (CURRENT) USE OF ANTICOAGULANTS: ICD-10-CM

## 2017-05-08 DIAGNOSIS — I10 ESSENTIAL HYPERTENSION: Primary | ICD-10-CM

## 2017-05-08 DIAGNOSIS — I63.9 CEREBROVASCULAR ACCIDENT (CVA), UNSPECIFIED MECHANISM (H): ICD-10-CM

## 2017-05-08 DIAGNOSIS — F43.21 ADJUSTMENT DISORDER WITH DEPRESSED MOOD: ICD-10-CM

## 2017-05-08 DIAGNOSIS — I10 BENIGN ESSENTIAL HYPERTENSION: ICD-10-CM

## 2017-05-08 DIAGNOSIS — E03.9 ACQUIRED HYPOTHYROIDISM: ICD-10-CM

## 2017-05-08 DIAGNOSIS — I48.91 ATRIAL FIBRILLATION WITH RAPID VENTRICULAR RESPONSE (H): ICD-10-CM

## 2017-05-08 DIAGNOSIS — I50.32 CHRONIC DIASTOLIC CONGESTIVE HEART FAILURE (H): ICD-10-CM

## 2017-05-08 DIAGNOSIS — I63.9 CEREBROVASCULAR ACCIDENT (H): ICD-10-CM

## 2017-05-08 DIAGNOSIS — Z76.0 ENCOUNTER FOR MEDICATION REFILL: Primary | ICD-10-CM

## 2017-05-08 LAB — INR POINT OF CARE: 2.4 (ref 0.86–1.14)

## 2017-05-08 PROCEDURE — G8989 SELF CARE D/C STATUS: HCPCS | Mod: GP,CI | Performed by: PHYSICAL THERAPIST

## 2017-05-08 PROCEDURE — 40000099 ZZH STATISTIC LYMPHEDEMA VISIT: Performed by: PHYSICAL THERAPIST

## 2017-05-08 PROCEDURE — 36416 COLLJ CAPILLARY BLOOD SPEC: CPT

## 2017-05-08 PROCEDURE — 82274 ASSAY TEST FOR BLOOD FECAL: CPT | Performed by: NURSE PRACTITIONER

## 2017-05-08 PROCEDURE — G8988 SELF CARE GOAL STATUS: HCPCS | Mod: GP,CI | Performed by: PHYSICAL THERAPIST

## 2017-05-08 PROCEDURE — 99207 ZZC NO CHARGE NURSE ONLY: CPT

## 2017-05-08 PROCEDURE — 85610 PROTHROMBIN TIME: CPT | Mod: QW

## 2017-05-08 PROCEDURE — 97140 MANUAL THERAPY 1/> REGIONS: CPT | Mod: GP | Performed by: PHYSICAL THERAPIST

## 2017-05-08 RX ORDER — FUROSEMIDE 40 MG
40 TABLET ORAL 2 TIMES DAILY
Qty: 180 TABLET | Refills: 0 | Status: SHIPPED | OUTPATIENT
Start: 2017-05-08

## 2017-05-08 RX ORDER — ATORVASTATIN CALCIUM 80 MG/1
80 TABLET, FILM COATED ORAL DAILY
Qty: 90 TABLET | Refills: 1 | Status: ON HOLD | OUTPATIENT
Start: 2017-05-08 | End: 2018-01-01

## 2017-05-08 RX ORDER — POTASSIUM CHLORIDE 1500 MG/1
20 TABLET, EXTENDED RELEASE ORAL DAILY
Qty: 90 TABLET | Refills: 1 | Status: SHIPPED | OUTPATIENT
Start: 2017-05-08 | End: 2018-01-01

## 2017-05-08 RX ORDER — ESCITALOPRAM OXALATE 10 MG/1
10 TABLET ORAL DAILY
Qty: 90 TABLET | Refills: 1 | Status: SHIPPED | OUTPATIENT
Start: 2017-05-08

## 2017-05-08 RX ORDER — QUINAPRIL 40 MG/1
40 TABLET ORAL DAILY
Qty: 90 TABLET | Refills: 1 | Status: SHIPPED | OUTPATIENT
Start: 2017-05-08

## 2017-05-08 RX ORDER — WARFARIN SODIUM 2.5 MG/1
TABLET ORAL
Qty: 144 TABLET | Refills: 0 | COMMUNITY
Start: 2017-05-08 | End: 2018-01-01

## 2017-05-08 RX ORDER — LEVOTHYROXINE SODIUM 150 UG/1
150 TABLET ORAL DAILY
Qty: 90 TABLET | Refills: 1 | Status: SHIPPED | OUTPATIENT
Start: 2017-05-08 | End: 2018-01-01

## 2017-05-08 RX ORDER — AMLODIPINE BESYLATE 10 MG/1
10 TABLET ORAL DAILY
Qty: 90 TABLET | Refills: 1 | Status: SHIPPED | OUTPATIENT
Start: 2017-05-08

## 2017-05-08 RX ORDER — GLIPIZIDE 5 MG/1
5 TABLET ORAL
Qty: 90 TABLET | Refills: 1 | Status: SHIPPED | OUTPATIENT
Start: 2017-05-08 | End: 2018-01-01

## 2017-05-08 NOTE — NURSING NOTE
Patient is a newer patient of [facility]  Previously resided in [place]  TCU at [facility] 5/2/17  Patient is not sure if she is to remain on all medications that were prescribed at [facility], wants providers advice  Please advise  Meds cued  Pharm ready

## 2017-05-08 NOTE — TELEPHONE ENCOUNTER
Patient is a newer patient of [facility]  Previously resided in [place], CVA in 3/2017  TCU at [facility]   LOV 5/2/17  Patient is not sure if she is to remain on all medications that were prescribed at [facility], wants providers advice  Please advise  Amlodipine  Ferrous Sulfate  Glipizide  Montelukast  Multi Vitamin  Furosemide  Levothyroxine  Omeprazole  Lexapro  Atorvastatin  Potassium    Routing refill request to provider for review/approval because:  Medication is reported/historical  Pharm ready    Routing to provider.    [place] SANDOVAL Rn

## 2017-05-08 NOTE — MR AVS SNAPSHOT
"              After Visit Summary   5/8/2017    Sunitha Jacques    MRN: [id number]           Patient Information     Date Of Birth          1940        Visit Information        Provider Department      5/8/2017 9:15 AM TORREY HOGAN/ROSARIO SLATER [facility]        Today's Diagnoses     Encounter for medication refill    -  1       Follow-ups after your visit        Your next 10 appointments already scheduled     May 12, 2017  1:30 PM CDT   Anticoagulation Visit with [facility]   [facility]    [address] [facility] [place] [address] [phone]              Who to contact     If you have questions or need follow up information about today's clinic visit or your schedule please contact [facility] directly at [phone].  Normal or non-critical lab and imaging results will be communicated to you by MyChart, letter or phone within 4 business days after the clinic has received the results. If you do not hear from us within 7 days, please contact the clinic through MyChart or phone. If you have a critical or abnormal lab result, we will notify you by phone as soon as possible.  Submit refill requests through [employer] or call your pharmacy and they will forward the refill request to us. Please allow 3 business days for your refill to be completed.          Additional Information About Your Visit        MyChart Information     [employer] lets you send messages to your doctor, view your test results, renew your prescriptions, schedule appointments and more. To sign up, go to [email or web address] . Click on \"Log in\" on the left side of the screen, which will take you to the Welcome page. Then click on \"Sign up Now\" on the right side of the page.     You will be asked to enter the access code listed below, as well as some personal information. Please follow the directions to create your username and password.     Your access code is: " [email or web address]  Expires: 2017  5:23 PM     Your access code will  in 90 days. If you need help or a new code, please call your [facility] or [phone].        Care EveryWhere ID     This is your Care EveryWhere ID. This could be used by other organizations to access your [facility] medical records  [id number]         Blood Pressure from Last 3 Encounters:   17 149/66   17 178/62   17 150/70    Weight from Last 3 Encounters:   17 224 lb 8 oz (101.8 kg)   17 226 lb 6.4 oz (102.7 kg)   17 235 lb (106.6 kg)              Today, you had the following     No orders found for display         Today's Medication Changes          These changes are accurate as of: 17  9:31 AM.  If you have any questions, ask your nurse or doctor.               These medicines have changed or have updated prescriptions.        Dose/Directions    warfarin 2.5 MG tablet   Commonly known as:  COUMADIN   This may have changed:  additional instructions   Used for:  Long-term (current) use of anticoagulants, Cerebrovascular accident (CVA), unspecified mechanism (H), Atrial fibrillation with rapid ventricular response (H), Long-term (current) use of anticoagulants        As directed by Anticoagulation Clinic,current dose 5 mg Mon, Fri, 2.5 mg rest of week   Quantity:  144 tablet   Refills:  0                Primary Care Provider    Pradeep Zuniga       No address on file        Thank you!     Thank you for choosing [facility]  for your care. Our goal is always to provide you with excellent care. Hearing back from our patients is one way we can continue to improve our services. Please take a few minutes to complete the written survey that you may receive in the mail after your visit with us. Thank you!             Your Updated Medication List - Protect others around you: Learn how to safely use, store and throw away your medicines at www.disposemymeds.org.          This list is  accurate as of: 5/8/17  9:31 AM.  Always use your most recent med list.                   Brand Name Dispense Instructions for use    cetirizine 10 MG tablet    zyrTEC     Take 10 mg by mouth daily       ferrous sulfate 325 (65 FE) MG tablet    IRON     Take by mouth daily (with breakfast)       GLIPIZIDE PO      Take 5 mg by mouth every morning (before breakfast)       hydrALAZINE 10 MG tablet    APRESOLINE    90 tablet    Take 1 tablet (10 mg) by mouth 4 times daily       LASIX PO      Take 40 mg by mouth 2 times daily       LEVOTHYROXINE SODIUM PO      Take 150 mcg by mouth daily       LEXAPRO PO      Take 10 mg by mouth daily       LIPITOR PO      Take 80 mg by mouth daily       magnesium citrate 1.745 GM/30ML solution      Take 296 mLs by mouth once       magnesium hydroxide 400 MG/5ML suspension    MILK OF MAGNESIA     Take 30 mLs by mouth daily as needed for constipation or heartburn       MAGNESIUM OXIDE PO      Take 400 mg by mouth daily       METOPROLOL TARTRATE PO      Take 25 mg by mouth 2 times daily       Multi-vitamin Tabs tablet      Take 1 tablet by mouth daily       NORVASC PO      Take 10 mg by mouth daily       potassium chloride 20 MEQ Packet    KLOR-CON     Take 20 mEq by mouth 2 times daily       pregabalin 50 MG capsule    LYRICA    60 capsule    Take 1 capsule (50 mg) by mouth 2 times daily       PRILOSEC PO      Take 20 mg by mouth 2 times daily (before meals)       quinapril 40 MG Tab    ACCUPRIL    90 tablet    Take 1 tablet (40 mg) by mouth daily       SINGULAIR PO      Take 10 mg by mouth At Bedtime       TYLENOL PO      Take 1,000 mg by mouth 4 times daily Reported on 4/26/2017       vancomycin 125 MG capsule    VANCOCIN    40 capsule    Take 1 capsule (125 mg) by mouth 4 times daily for 10 days       VITAMIN D (CHOLECALCIFEROL) PO      Take 1,000 Units by mouth daily       warfarin 2.5 MG tablet    COUMADIN    144 tablet    As directed by Anticoagulation Clinic,current dose 5 mg Mon,  Fri, 2.5 mg rest of week

## 2017-05-08 NOTE — MR AVS SNAPSHOT
Sunitha Jacques   5/8/2017 8:30 AM   Anticoagulation Therapy Visit    Description:  76 year old female   Provider:  [facility]   [facility]  [facility]           INR as of 5/8/2017     Today's INR 2.4      Anticoagulation Summary as of 5/8/2017     INR goal 2.0-3.0   Today's INR 2.4   Full instructions 5 mg on Mon, Fri; 2.5 mg all other days   Next INR check 5/12/2017    Indications   Atrial fibrillation with rapid ventricular response (H) [I48.91]  Cerebrovascular accident (H) [I63.9]  Long-term (current) use of anticoagulants [Z79.01] [Z79.01]         Description     Recheck INR Fri, 5/12/17  Take warfarin 5 mg Mon, 2.5 mg Tues, Wed, Thurs      Your next Anticoagulation Clinic appointment(s)     May 08, 2017  8:30 AM CDT   [facility]agulation [facility] with [facility]   [facility]    [address] [address]   [phone]            May 12, 2017  1:30 PM CDT   Anticoagulation Visit with [facility]   [facility]    [address] [address]   [phone]              Contact Numbers     Please call [phone] to cancel and/or reschedule your appointment.  Please call [phone] with any problems or questions regarding your therapy          May 2017 Details    Sun Mon Tue Wed Thu Fri Sat      1               2               3               4               5               6                 7               8      5 mg   See details      9      2.5 mg         10      2.5 mg         11      2.5 mg         12            13                 14               15               16               17               18               19               20                 21               22               23               24               25               26               27                 28               29               30               31                   Date Details   05/08 This INR check        Date of next INR:  5/12/2017         How to take your warfarin dose     To take:  2.5 mg Take 1 of the 2.5 mg tablets.    To take:  5 mg Take 2 of the 2.5 mg tablets.

## 2017-05-08 NOTE — TELEPHONE ENCOUNTER
Do you want patient to continue taking Quinapril?  Patient's son reports:  Patient is taking Lexapro for anxiety due to death of her son  Patient does not need Singulair  Patient does not need to take Omeprazole, no stomach or reflux problems  Medications pended  Pharm ready    Routing to provider.    [place] SANDOVAL Rn

## 2017-05-08 NOTE — TELEPHONE ENCOUNTER
Continue Glipizide   Can stop ferrous sulfate recent Hb level was normal.   Continue Synthroid, Lasix and Potassium   Continue Amlodipine   Not sure why she is taking Lexapro, anxiety, depression? Not sure why she is on Singulair? Omeprazole can interact with Coumadin, so I have questions why it was prescribed and if the patient can possible avoid it.  Continue Lipitor    Does she needs refills if yes please pend medications that she needs refills on.     Thank you!    MIKE Garduno CNP

## 2017-05-08 NOTE — PROGRESS NOTES
ANTICOAGULATION FOLLOW-UP CLINIC VISIT    Patient Name:  Sunitha Jacques  Date:  5/8/2017  Contact Type:  Face to Face    SUBJECTIVE:     Patient Findings     Positives Inflammation (diarrhea is now gone), Antibiotic use or infection (Vancimycin thru 5/11/17)    Comments Pt states she is back to baseline with her activity and eating.             OBJECTIVE    INR Protime   Date Value Ref Range Status   05/08/2017 2.4 (A) 0.86 - 1.14 Final       ASSESSMENT / PLAN  INR assessment THER    Recheck INR In: 4 DAYS    INR Location Clinic      Anticoagulation Summary as of 5/8/2017     INR goal 2.0-3.0   Today's INR 2.4   Maintenance plan 5 mg (2.5 mg x 2) on Mon, Fri; 2.5 mg (2.5 mg x 1) all other days   Full instructions 5 mg on Mon, Fri; 2.5 mg all other days   Weekly total 22.5 mg   Plan last modified Mattie Persaud RN (5/8/2017)   Next INR check 5/12/2017   Priority INR   Target end date     Indications   Atrial fibrillation with rapid ventricular response (H) [I48.91]  Cerebrovascular accident (H) [I63.9]  Long-term (current) use of anticoagulants [Z79.01] [Z79.01]         Anticoagulation Episode Summary     INR check location     Preferred lab     Send INR reminders to [facility]    Comments  *      Anticoagulation Care Providers     Provider Role Specialty Phone number    Bhargavi Marcum, MIKE CNP Responsible Nurse Practitioner - Gerontology [phone]            See the Encounter Report to view Anticoagulation Flowsheet and Dosing Calendar (Go to Encounters tab in chart review, and find the Anticoagulation Therapy Visit)        Mattie Persaud RN

## 2017-05-08 NOTE — PROGRESS NOTES
Outpatient Physical Therapy Discharge Note     Patient: Sunitha Jacques  : 1940    Beginning/End Dates of Reporting Period:  2017 to 2017    Referring Provider: Dr. Patricia Loaiza MD    Therapy Diagnosis: BLE secondary lymphedema      Client Self Report: I think the legs are doing fine, I know I need to do this longterm    Objective Measurements:  Objective Measure: girth  Details: RLE -2.2% and LLE -2.8%     Outcome Measures (most recent score):  Lymphedema Life Impact Scale (score range 0-72). A higher score indicates greater impairment.: 0    Goals:  Goal Identifier stg   Goal Description pt to be independent in donning, doffing and care of compression garments for longterm LE edema management   Target Date 17   Date Met  17   Progress:     Goal Identifier ltg   Goal Description pt to be independent with longterm edema management of BLE edema via HEP, elevation and comprsesion garment use/wear   Target Date 17   Date Met  17   Progress:     Goal Identifier ltg   Goal Description pt to have at least 3-5 point improvement on LLIS due to edema reductions in BLEs   Target Date 17   Date Met  17   Progress:       Progress Toward Goals:   Goals met      Plan:  Discharge from therapy.    Discharge:    Reason for Discharge: Patient has met all goals.    Equipment Issued: knee hi 30-40mmHg [employer] velcro wraps (Large)    Discharge Plan: Patient to continue home program.

## 2017-05-09 DIAGNOSIS — Z12.11 SPECIAL SCREENING FOR MALIGNANT NEOPLASMS, COLON: ICD-10-CM

## 2017-05-09 LAB — HEMOCCULT STL QL IA: NEGATIVE

## 2017-05-12 ENCOUNTER — ANTICOAGULATION THERAPY VISIT (OUTPATIENT)
Dept: ANTICOAGULATION | Facility: CLINIC | Age: 77
End: 2017-05-12
Payer: MEDICARE

## 2017-05-12 DIAGNOSIS — Z79.01 LONG-TERM (CURRENT) USE OF ANTICOAGULANTS: ICD-10-CM

## 2017-05-12 DIAGNOSIS — I48.91 ATRIAL FIBRILLATION WITH RAPID VENTRICULAR RESPONSE (H): ICD-10-CM

## 2017-05-12 LAB — INR POINT OF CARE: 2.1 (ref 0.86–1.14)

## 2017-05-12 PROCEDURE — 99207 ZZC NO CHARGE NURSE ONLY: CPT

## 2017-05-12 PROCEDURE — 36416 COLLJ CAPILLARY BLOOD SPEC: CPT

## 2017-05-12 PROCEDURE — 85610 PROTHROMBIN TIME: CPT | Mod: QW

## 2017-05-12 NOTE — MR AVS SNAPSHOT
Sunitha Jacques   5/12/2017 1:30 PM   Anticoagulation Therapy Visit    Description:  76 year old female   Provider:  [facility]   Department:  [facility]           INR as of 5/12/2017     Today's INR 2.1      Anticoagulation Summary as of 5/12/2017     INR goal 2.0-3.0   Today's INR 2.1   Full instructions 5 mg on Mon, Fri; 2.5 mg all other days   Next INR check 5/17/2017    Indications   Atrial fibrillation with rapid ventricular response (H) [I48.91]  Cerebrovascular accident (H) [I63.9]  Long-term (current) use of anticoagulants [Z79.01] [Z79.01]         Description     Take 5mg Monday and Friday and 2.5mg all other days.  Recheck INR Wednesday 5/17/17, in when you return to [place] on Wednesday 5/17/17.      Your next Anticoagulation Clinic appointment(s)     May 12, 2017  1:30 PM CDT   Anticoagulation Visit with [facility]   [facility]    [address] [address] [phone]              Contact Numbers     Please call [phone] to cancel and/or reschedule your appointment.  Please call [phone] with any problems or questions regarding your therapy          May 2017 Details    Sun Mon Tue Wed Thu Fri Sat      1               2               3               4               5               6                 7               8               9               10               11               12      5 mg   See details      13      2.5 mg           14      2.5 mg         15      5 mg         16      2.5 mg         17            18               19               20                 21               22               23               24               25               26               27                 28               29               30               31                   Date Details   05/12 This INR check       Date of next INR:  5/17/2017         How to take your warfarin dose     To take:  2.5 mg Take 1 of the 2.5 mg tablets.     To take:  5 mg Take 2 of the 2.5 mg tablets.

## 2017-05-12 NOTE — PROGRESS NOTES
ANTICOAGULATION FOLLOW-UP CLINIC VISIT    Patient Name:  Sunitha Jacques  Date:  5/12/2017  Contact Type:  Face to Face, accompanied by son    SUBJECTIVE:     Patient Findings     Positives No Problem Findings    Comments Diarrhea resolved. Going back to [place] where she is from.            OBJECTIVE    INR Protime   Date Value Ref Range Status   05/12/2017 2.1 (A) 0.86 - 1.14 Final       ASSESSMENT / PLAN  INR assessment THER continue 22.5mg/7 days   Recheck INR In: 5 DAYS in [place]   INR Location Clinic      Anticoagulation Summary as of 5/12/2017     INR goal 2.0-3.0   Today's INR 2.1   Maintenance plan 5 mg (2.5 mg x 2) on Mon, Fri; 2.5 mg (2.5 mg x 1) all other days   Full instructions 5 mg on Mon, Fri; 2.5 mg all other days   Weekly total 22.5 mg   Plan last modified Mattie Persaud RN (5/8/2017)   Next INR check 5/17/2017   Priority INR   Target end date     Indications   Atrial fibrillation with rapid ventricular response (H) [I48.91]  Cerebrovascular accident (H) [I63.9]  Long-term (current) use of anticoagulants [Z79.01] [Z79.01]         Anticoagulation Episode Summary     INR check location     Preferred lab     Send INR reminders to [facility]    Comments  * warfarin 2.5mg tabs      Anticoagulation Care Providers     Provider Role Specialty Phone number    Byroncathy MIKE Montes CNP Responsible Nurse Practitioner - Gerontology [phone]            See the Encounter Report to view Anticoagulation Flowsheet and Dosing Calendar (Go to Encounters tab in chart review, and find the Anticoagulation Therapy Visit)    Angeline Ackerman RN

## 2017-06-26 ENCOUNTER — DOCUMENTATION ONLY (OUTPATIENT)
Dept: OTHER | Facility: CLINIC | Age: 77
End: 2017-06-26

## 2017-06-26 PROBLEM — Z71.89 ACP (ADVANCE CARE PLANNING): Chronic | Status: ACTIVE | Noted: 2017-04-09

## 2017-08-10 ENCOUNTER — TRANSFERRED RECORDS (OUTPATIENT)
Dept: HEALTH INFORMATION MANAGEMENT | Facility: CLINIC | Age: 77
End: 2017-08-10

## 2017-08-11 ENCOUNTER — TRANSFERRED RECORDS (OUTPATIENT)
Dept: HEALTH INFORMATION MANAGEMENT | Facility: CLINIC | Age: 77
End: 2017-08-11

## 2017-08-15 ENCOUNTER — TRANSFERRED RECORDS (OUTPATIENT)
Dept: HEALTH INFORMATION MANAGEMENT | Facility: CLINIC | Age: 77
End: 2017-08-15

## 2017-12-14 PROBLEM — E03.9 HYPOTHYROIDISM, UNSPECIFIED TYPE: Status: ACTIVE | Noted: 2017-01-01

## 2018-01-01 ENCOUNTER — ANESTHESIA EVENT (OUTPATIENT)
Dept: SURGERY | Facility: CLINIC | Age: 78
DRG: 653 | End: 2018-01-01
Payer: MEDICARE

## 2018-01-01 ENCOUNTER — ONCOLOGY VISIT (OUTPATIENT)
Dept: ONCOLOGY | Facility: CLINIC | Age: 78
End: 2018-01-01
Attending: INTERNAL MEDICINE
Payer: MEDICARE

## 2018-01-01 ENCOUNTER — APPOINTMENT (OUTPATIENT)
Dept: CT IMAGING | Facility: CLINIC | Age: 78
DRG: 653 | End: 2018-01-01
Attending: PHYSICIAN ASSISTANT
Payer: MEDICARE

## 2018-01-01 ENCOUNTER — APPOINTMENT (OUTPATIENT)
Dept: PHYSICAL THERAPY | Facility: CLINIC | Age: 78
DRG: 653 | End: 2018-01-01
Attending: UROLOGY
Payer: MEDICARE

## 2018-01-01 ENCOUNTER — APPOINTMENT (OUTPATIENT)
Dept: GENERAL RADIOLOGY | Facility: CLINIC | Age: 78
DRG: 653 | End: 2018-01-01
Attending: PEDIATRICS
Payer: MEDICARE

## 2018-01-01 ENCOUNTER — TRANSFERRED RECORDS (OUTPATIENT)
Dept: HEALTH INFORMATION MANAGEMENT | Facility: CLINIC | Age: 78
End: 2018-01-01

## 2018-01-01 ENCOUNTER — APPOINTMENT (OUTPATIENT)
Dept: GENERAL RADIOLOGY | Facility: CLINIC | Age: 78
DRG: 653 | End: 2018-01-01
Attending: NURSE PRACTITIONER
Payer: MEDICARE

## 2018-01-01 ENCOUNTER — APPOINTMENT (OUTPATIENT)
Dept: GENERAL RADIOLOGY | Facility: CLINIC | Age: 78
DRG: 653 | End: 2018-01-01
Attending: UROLOGY
Payer: MEDICARE

## 2018-01-01 ENCOUNTER — APPOINTMENT (OUTPATIENT)
Dept: CARDIOLOGY | Facility: CLINIC | Age: 78
DRG: 653 | End: 2018-01-01
Attending: INTERNAL MEDICINE
Payer: MEDICARE

## 2018-01-01 ENCOUNTER — HOSPITAL ENCOUNTER (OUTPATIENT)
Dept: NUCLEAR MEDICINE | Facility: CLINIC | Age: 78
Setting detail: NUCLEAR MEDICINE
Discharge: HOME | End: 2018-07-06
Attending: STUDENT IN AN ORGANIZED HEALTH CARE EDUCATION/TRAINING PROGRAM | Admitting: STUDENT IN AN ORGANIZED HEALTH CARE EDUCATION/TRAINING PROGRAM
Payer: MEDICARE

## 2018-01-01 ENCOUNTER — PRE VISIT (OUTPATIENT)
Dept: CARDIOLOGY | Facility: CLINIC | Age: 78
End: 2018-01-01

## 2018-01-01 ENCOUNTER — APPOINTMENT (OUTPATIENT)
Dept: CT IMAGING | Facility: CLINIC | Age: 78
DRG: 653 | End: 2018-01-01
Attending: UROLOGY
Payer: MEDICARE

## 2018-01-01 ENCOUNTER — TELEPHONE (OUTPATIENT)
Dept: UROLOGY | Facility: CLINIC | Age: 78
End: 2018-01-01

## 2018-01-01 ENCOUNTER — ALLIED HEALTH/NURSE VISIT (OUTPATIENT)
Dept: UROLOGY | Facility: CLINIC | Age: 78
End: 2018-01-01
Payer: MEDICARE

## 2018-01-01 ENCOUNTER — APPOINTMENT (OUTPATIENT)
Dept: GENERAL RADIOLOGY | Facility: CLINIC | Age: 78
DRG: 653 | End: 2018-01-01
Attending: ANESTHESIOLOGY
Payer: MEDICARE

## 2018-01-01 ENCOUNTER — ONCOLOGY VISIT (OUTPATIENT)
Dept: ONCOLOGY | Facility: CLINIC | Age: 78
End: 2018-01-01
Attending: UROLOGY
Payer: MEDICARE

## 2018-01-01 ENCOUNTER — APPOINTMENT (OUTPATIENT)
Dept: CARDIOLOGY | Facility: CLINIC | Age: 78
DRG: 653 | End: 2018-01-01
Attending: UROLOGY
Payer: MEDICARE

## 2018-01-01 ENCOUNTER — TELEPHONE (OUTPATIENT)
Dept: ONCOLOGY | Facility: CLINIC | Age: 78
End: 2018-01-01

## 2018-01-01 ENCOUNTER — HOSPITAL ENCOUNTER (OUTPATIENT)
Dept: CARDIOLOGY | Facility: CLINIC | Age: 78
Setting detail: NUCLEAR MEDICINE
End: 2018-07-06
Attending: STUDENT IN AN ORGANIZED HEALTH CARE EDUCATION/TRAINING PROGRAM
Payer: MEDICARE

## 2018-01-01 ENCOUNTER — TELEPHONE (OUTPATIENT)
Dept: SURGERY | Facility: CLINIC | Age: 78
End: 2018-01-01

## 2018-01-01 ENCOUNTER — ANESTHESIA (OUTPATIENT)
Dept: SURGERY | Facility: CLINIC | Age: 78
DRG: 653 | End: 2018-01-01
Payer: MEDICARE

## 2018-01-01 ENCOUNTER — HOSPITAL ENCOUNTER (OUTPATIENT)
Dept: NUCLEAR MEDICINE | Facility: CLINIC | Age: 78
Setting detail: NUCLEAR MEDICINE
End: 2018-07-06
Attending: STUDENT IN AN ORGANIZED HEALTH CARE EDUCATION/TRAINING PROGRAM
Payer: MEDICARE

## 2018-01-01 ENCOUNTER — APPOINTMENT (OUTPATIENT)
Dept: OCCUPATIONAL THERAPY | Facility: CLINIC | Age: 78
DRG: 653 | End: 2018-01-01
Attending: UROLOGY
Payer: MEDICARE

## 2018-01-01 ENCOUNTER — ALLIED HEALTH/NURSE VISIT (OUTPATIENT)
Dept: SURGERY | Facility: CLINIC | Age: 78
End: 2018-01-01
Payer: MEDICARE

## 2018-01-01 ENCOUNTER — HOSPITAL ENCOUNTER (INPATIENT)
Facility: CLINIC | Age: 78
LOS: 21 days | DRG: 653 | End: 2018-08-29
Attending: UROLOGY | Admitting: UROLOGY
Payer: MEDICARE

## 2018-01-01 ENCOUNTER — APPOINTMENT (OUTPATIENT)
Dept: GENERAL RADIOLOGY | Facility: CLINIC | Age: 78
DRG: 653 | End: 2018-01-01
Attending: PHYSICIAN ASSISTANT
Payer: MEDICARE

## 2018-01-01 ENCOUNTER — RADIANT APPOINTMENT (OUTPATIENT)
Dept: ULTRASOUND IMAGING | Facility: CLINIC | Age: 78
End: 2018-01-01
Attending: PLASTIC SURGERY
Payer: MEDICARE

## 2018-01-01 ENCOUNTER — OFFICE VISIT (OUTPATIENT)
Dept: PLASTIC SURGERY | Facility: CLINIC | Age: 78
End: 2018-01-01
Payer: MEDICARE

## 2018-01-01 ENCOUNTER — APPOINTMENT (OUTPATIENT)
Dept: GENERAL RADIOLOGY | Facility: CLINIC | Age: 78
DRG: 653 | End: 2018-01-01
Attending: STUDENT IN AN ORGANIZED HEALTH CARE EDUCATION/TRAINING PROGRAM
Payer: MEDICARE

## 2018-01-01 ENCOUNTER — OFFICE VISIT (OUTPATIENT)
Dept: WOUND CARE | Facility: CLINIC | Age: 78
End: 2018-01-01
Payer: MEDICARE

## 2018-01-01 ENCOUNTER — OFFICE VISIT (OUTPATIENT)
Dept: SURGERY | Facility: CLINIC | Age: 78
End: 2018-01-01
Payer: MEDICARE

## 2018-01-01 ENCOUNTER — CARE COORDINATION (OUTPATIENT)
Dept: PLASTIC SURGERY | Facility: CLINIC | Age: 78
End: 2018-01-01

## 2018-01-01 ENCOUNTER — ANESTHESIA (OUTPATIENT)
Dept: INTENSIVE CARE | Facility: CLINIC | Age: 78
DRG: 653 | End: 2018-01-01
Payer: MEDICARE

## 2018-01-01 ENCOUNTER — APPOINTMENT (OUTPATIENT)
Dept: PHYSICAL THERAPY | Facility: CLINIC | Age: 78
DRG: 653 | End: 2018-01-01
Attending: PHYSICIAN ASSISTANT
Payer: MEDICARE

## 2018-01-01 ENCOUNTER — APPOINTMENT (OUTPATIENT)
Dept: OCCUPATIONAL THERAPY | Facility: CLINIC | Age: 78
DRG: 653 | End: 2018-01-01
Attending: PHYSICIAN ASSISTANT
Payer: MEDICARE

## 2018-01-01 ENCOUNTER — APPOINTMENT (OUTPATIENT)
Dept: ULTRASOUND IMAGING | Facility: CLINIC | Age: 78
DRG: 653 | End: 2018-01-01
Attending: PHYSICIAN ASSISTANT
Payer: MEDICARE

## 2018-01-01 ENCOUNTER — APPOINTMENT (OUTPATIENT)
Dept: SURGERY | Facility: CLINIC | Age: 78
End: 2018-01-01
Payer: MEDICARE

## 2018-01-01 ENCOUNTER — OFFICE VISIT (OUTPATIENT)
Dept: CARDIOLOGY | Facility: CLINIC | Age: 78
End: 2018-01-01
Attending: INTERNAL MEDICINE
Payer: MEDICARE

## 2018-01-01 ENCOUNTER — RADIANT APPOINTMENT (OUTPATIENT)
Dept: PET IMAGING | Facility: CLINIC | Age: 78
End: 2018-01-01
Attending: INTERNAL MEDICINE
Payer: MEDICARE

## 2018-01-01 ENCOUNTER — RADIANT APPOINTMENT (OUTPATIENT)
Dept: ULTRASOUND IMAGING | Facility: CLINIC | Age: 78
End: 2018-01-01
Attending: UROLOGY
Payer: MEDICARE

## 2018-01-01 ENCOUNTER — CARE COORDINATION (OUTPATIENT)
Dept: UROLOGY | Facility: CLINIC | Age: 78
End: 2018-01-01

## 2018-01-01 ENCOUNTER — ANESTHESIA EVENT (OUTPATIENT)
Dept: INTENSIVE CARE | Facility: CLINIC | Age: 78
DRG: 653 | End: 2018-01-01
Payer: MEDICARE

## 2018-01-01 VITALS
BODY MASS INDEX: 42.75 KG/M2 | WEIGHT: 232.3 LBS | RESPIRATION RATE: 18 BRPM | HEIGHT: 62 IN | TEMPERATURE: 98.9 F | HEART RATE: 56 BPM | DIASTOLIC BLOOD PRESSURE: 63 MMHG | OXYGEN SATURATION: 93 % | SYSTOLIC BLOOD PRESSURE: 175 MMHG

## 2018-01-01 VITALS
WEIGHT: 227.96 LBS | RESPIRATION RATE: 16 BRPM | HEIGHT: 62 IN | TEMPERATURE: 98 F | OXYGEN SATURATION: 94 % | BODY MASS INDEX: 41.95 KG/M2 | DIASTOLIC BLOOD PRESSURE: 60 MMHG | SYSTOLIC BLOOD PRESSURE: 145 MMHG | HEART RATE: 83 BPM

## 2018-01-01 VITALS
DIASTOLIC BLOOD PRESSURE: 69 MMHG | SYSTOLIC BLOOD PRESSURE: 151 MMHG | HEIGHT: 62 IN | WEIGHT: 229 LBS | HEART RATE: 69 BPM | BODY MASS INDEX: 42.14 KG/M2 | TEMPERATURE: 98 F | OXYGEN SATURATION: 95 %

## 2018-01-01 VITALS
HEIGHT: 62 IN | SYSTOLIC BLOOD PRESSURE: 145 MMHG | TEMPERATURE: 98 F | DIASTOLIC BLOOD PRESSURE: 60 MMHG | RESPIRATION RATE: 16 BRPM | WEIGHT: 228 LBS | HEART RATE: 83 BPM | BODY MASS INDEX: 41.96 KG/M2 | OXYGEN SATURATION: 94 %

## 2018-01-01 VITALS
HEART RATE: 44 BPM | OXYGEN SATURATION: 95 % | WEIGHT: 231 LBS | DIASTOLIC BLOOD PRESSURE: 78 MMHG | SYSTOLIC BLOOD PRESSURE: 173 MMHG | HEIGHT: 62 IN | BODY MASS INDEX: 42.51 KG/M2

## 2018-01-01 VITALS
DIASTOLIC BLOOD PRESSURE: 67 MMHG | RESPIRATION RATE: 16 BRPM | TEMPERATURE: 98.1 F | WEIGHT: 227 LBS | HEART RATE: 66 BPM | HEIGHT: 62 IN | BODY MASS INDEX: 41.77 KG/M2 | OXYGEN SATURATION: 95 % | SYSTOLIC BLOOD PRESSURE: 187 MMHG

## 2018-01-01 VITALS
DIASTOLIC BLOOD PRESSURE: 51 MMHG | WEIGHT: 248.02 LBS | OXYGEN SATURATION: 94 % | RESPIRATION RATE: 20 BRPM | HEART RATE: 100 BPM | SYSTOLIC BLOOD PRESSURE: 122 MMHG | HEIGHT: 61 IN | TEMPERATURE: 97.2 F | BODY MASS INDEX: 46.83 KG/M2

## 2018-01-01 DIAGNOSIS — C68.9 UROTHELIAL CANCER (H): Primary | ICD-10-CM

## 2018-01-01 DIAGNOSIS — C68.9 UROTHELIAL CANCER (H): ICD-10-CM

## 2018-01-01 DIAGNOSIS — Z01.818 PRE-OP EXAM: Primary | ICD-10-CM

## 2018-01-01 DIAGNOSIS — C67.9 BLADDER CANCER (H): Primary | ICD-10-CM

## 2018-01-01 DIAGNOSIS — C68.9 UROTHELIAL CARCINOMA (H): ICD-10-CM

## 2018-01-01 DIAGNOSIS — I25.10 CORONARY ARTERY DISEASE INVOLVING NATIVE CORONARY ARTERY OF NATIVE HEART WITHOUT ANGINA PECTORIS: ICD-10-CM

## 2018-01-01 DIAGNOSIS — Z01.810 PRE-OPERATIVE CARDIOVASCULAR EXAMINATION: Primary | ICD-10-CM

## 2018-01-01 DIAGNOSIS — C67.3 MALIGNANT NEOPLASM OF ANTERIOR WALL OF BLADDER (H): ICD-10-CM

## 2018-01-01 DIAGNOSIS — I25.10 CORONARY ARTERY DISEASE INVOLVING NATIVE CORONARY ARTERY OF NATIVE HEART WITHOUT ANGINA PECTORIS: Primary | ICD-10-CM

## 2018-01-01 DIAGNOSIS — C67.8 MALIGNANT NEOPLASM OF OVERLAPPING SITES OF BLADDER (H): Primary | ICD-10-CM

## 2018-01-01 DIAGNOSIS — R06.09 DYSPNEA ON EFFORT: ICD-10-CM

## 2018-01-01 DIAGNOSIS — Z01.810 PRE-OPERATIVE CARDIOVASCULAR EXAMINATION: ICD-10-CM

## 2018-01-01 DIAGNOSIS — D22.9 CHANGE IN MOLE: Primary | ICD-10-CM

## 2018-01-01 DIAGNOSIS — C67.8 MALIGNANT NEOPLASM OF OVERLAPPING SITES OF BLADDER (H): ICD-10-CM

## 2018-01-01 DIAGNOSIS — H04.123 DRY EYE SYNDROME, BILATERAL: ICD-10-CM

## 2018-01-01 DIAGNOSIS — I10 ESSENTIAL (PRIMARY) HYPERTENSION: ICD-10-CM

## 2018-01-01 DIAGNOSIS — Z71.89 OSTOMY NURSE CONSULTATION: Primary | ICD-10-CM

## 2018-01-01 DIAGNOSIS — Z08 ENCOUNTER FOR FOLLOW-UP EXAMINATION AFTER COMPLETED TREATMENT FOR MALIGNANT NEOPLASM: ICD-10-CM

## 2018-01-01 DIAGNOSIS — Z01.818 PRE-OP EXAM: ICD-10-CM

## 2018-01-01 DIAGNOSIS — C67.9 BLADDER CANCER (H): ICD-10-CM

## 2018-01-01 DIAGNOSIS — Z01.818 PREOP EXAMINATION: Primary | ICD-10-CM

## 2018-01-01 DIAGNOSIS — N39.0 URINARY TRACT INFECTION: Primary | ICD-10-CM

## 2018-01-01 DIAGNOSIS — Z71.89 ENCOUNTER FOR COUNSELING FOR UROSTOMY MANAGEMENT: ICD-10-CM

## 2018-01-01 LAB
ABO + RH BLD: NORMAL
ALBUMIN SERPL-MCNC: 1.4 G/DL (ref 3.4–5)
ALBUMIN SERPL-MCNC: 1.5 G/DL (ref 3.4–5)
ALBUMIN SERPL-MCNC: 1.6 G/DL (ref 3.4–5)
ALBUMIN SERPL-MCNC: 1.8 G/DL (ref 3.4–5)
ALBUMIN SERPL-MCNC: 1.8 G/DL (ref 3.4–5)
ALBUMIN SERPL-MCNC: 2.1 G/DL (ref 3.4–5)
ALBUMIN SERPL-MCNC: 2.6 G/DL (ref 3.4–5)
ALBUMIN SERPL-MCNC: 3.6 G/DL (ref 3.4–5)
ALBUMIN UR-MCNC: 100 MG/DL
ALBUMIN UR-MCNC: 30 MG/DL
ALBUMIN UR-MCNC: 300 MG/DL
ALBUMIN UR-MCNC: NEGATIVE MG/DL
ALBUMIN UR-MCNC: NEGATIVE MG/DL
ALBUMIN UR-MCNC: NORMAL MG/DL
ALP SERPL-CCNC: 159 U/L (ref 40–150)
ALP SERPL-CCNC: 165 U/L (ref 40–150)
ALP SERPL-CCNC: 56 U/L (ref 40–150)
ALP SERPL-CCNC: 70 U/L (ref 40–150)
ALP SERPL-CCNC: 70 U/L (ref 40–150)
ALP SERPL-CCNC: 72 U/L (ref 40–150)
ALP SERPL-CCNC: 81 U/L (ref 40–150)
ALP SERPL-CCNC: 82 U/L (ref 40–150)
ALT SERPL W P-5'-P-CCNC: 117 U/L (ref 0–50)
ALT SERPL W P-5'-P-CCNC: 167 U/L (ref 0–50)
ALT SERPL W P-5'-P-CCNC: 22 U/L (ref 0–50)
ALT SERPL W P-5'-P-CCNC: 26 U/L (ref 0–50)
ALT SERPL W P-5'-P-CCNC: 37 U/L (ref 0–50)
ALT SERPL W P-5'-P-CCNC: 50 U/L (ref 0–50)
ALT SERPL W P-5'-P-CCNC: 79 U/L (ref 0–50)
ALT SERPL W P-5'-P-CCNC: 87 U/L (ref 0–50)
AMMONIA PLAS-SCNC: 16 UMOL/L (ref 10–50)
ANION GAP SERPL CALCULATED.3IONS-SCNC: 10 MMOL/L (ref 3–14)
ANION GAP SERPL CALCULATED.3IONS-SCNC: 11 MMOL/L (ref 3–14)
ANION GAP SERPL CALCULATED.3IONS-SCNC: 12 MMOL/L (ref 3–14)
ANION GAP SERPL CALCULATED.3IONS-SCNC: 13 MMOL/L (ref 3–14)
ANION GAP SERPL CALCULATED.3IONS-SCNC: 4 MMOL/L (ref 3–14)
ANION GAP SERPL CALCULATED.3IONS-SCNC: 5 MMOL/L (ref 3–14)
ANION GAP SERPL CALCULATED.3IONS-SCNC: 6 MMOL/L (ref 3–14)
ANION GAP SERPL CALCULATED.3IONS-SCNC: 7 MMOL/L (ref 3–14)
ANION GAP SERPL CALCULATED.3IONS-SCNC: 8 MMOL/L (ref 3–14)
ANION GAP SERPL CALCULATED.3IONS-SCNC: 9 MMOL/L (ref 3–14)
APPEARANCE UR: ABNORMAL
APPEARANCE UR: CLEAR
APPEARANCE UR: NORMAL
AST SERPL W P-5'-P-CCNC: 21 U/L (ref 0–45)
AST SERPL W P-5'-P-CCNC: 21 U/L (ref 0–45)
AST SERPL W P-5'-P-CCNC: 31 U/L (ref 0–45)
AST SERPL W P-5'-P-CCNC: 36 U/L (ref 0–45)
AST SERPL W P-5'-P-CCNC: 40 U/L (ref 0–45)
AST SERPL W P-5'-P-CCNC: 52 U/L (ref 0–45)
AST SERPL W P-5'-P-CCNC: 53 U/L (ref 0–45)
AST SERPL W P-5'-P-CCNC: 59 U/L (ref 0–45)
BACTERIA #/AREA URNS HPF: ABNORMAL /HPF
BACTERIA SPEC CULT: ABNORMAL
BACTERIA SPEC CULT: NO GROWTH
BASE DEFICIT BLDA-SCNC: 0.1 MMOL/L
BASE DEFICIT BLDA-SCNC: 5.8 MMOL/L
BASE DEFICIT BLDA-SCNC: 7.5 MMOL/L
BASE DEFICIT BLDA-SCNC: 8.4 MMOL/L
BASE DEFICIT BLDA-SCNC: 8.7 MMOL/L
BASE DEFICIT BLDA-SCNC: 9.2 MMOL/L
BASE DEFICIT BLDV-SCNC: 0.3 MMOL/L
BASE DEFICIT BLDV-SCNC: 1.9 MMOL/L
BASE DEFICIT BLDV-SCNC: 4.1 MMOL/L
BASE DEFICIT BLDV-SCNC: 8.7 MMOL/L
BASE EXCESS BLDA CALC-SCNC: 0.7 MMOL/L
BASE EXCESS BLDA CALC-SCNC: 0.9 MMOL/L
BASE EXCESS BLDA CALC-SCNC: 1.9 MMOL/L
BASE EXCESS BLDA CALC-SCNC: 4 MMOL/L
BASE EXCESS BLDA CALC-SCNC: 4.3 MMOL/L
BASE EXCESS BLDA CALC-SCNC: 4.6 MMOL/L
BASE EXCESS BLDA CALC-SCNC: 4.7 MMOL/L
BASE EXCESS BLDA CALC-SCNC: 5 MMOL/L
BASE EXCESS BLDA CALC-SCNC: 5.4 MMOL/L
BASE EXCESS BLDV CALC-SCNC: 0.2 MMOL/L
BASE EXCESS BLDV CALC-SCNC: 0.5 MMOL/L
BASE EXCESS BLDV CALC-SCNC: 0.7 MMOL/L
BASE EXCESS BLDV CALC-SCNC: 0.9 MMOL/L
BASE EXCESS BLDV CALC-SCNC: 1.3 MMOL/L
BASE EXCESS BLDV CALC-SCNC: 1.4 MMOL/L
BASE EXCESS BLDV CALC-SCNC: 1.7 MMOL/L
BASE EXCESS BLDV CALC-SCNC: 1.8 MMOL/L
BASE EXCESS BLDV CALC-SCNC: 1.9 MMOL/L
BASOPHILS # BLD AUTO: 0.1 10E9/L (ref 0–0.2)
BASOPHILS # BLD AUTO: 0.1 10E9/L (ref 0–0.2)
BASOPHILS NFR BLD AUTO: 0.3 %
BASOPHILS NFR BLD AUTO: 1 %
BILIRUB DIRECT SERPL-MCNC: <0.1 MG/DL (ref 0–0.2)
BILIRUB SERPL-MCNC: 0.2 MG/DL (ref 0.2–1.3)
BILIRUB SERPL-MCNC: 0.2 MG/DL (ref 0.2–1.3)
BILIRUB SERPL-MCNC: 0.3 MG/DL (ref 0.2–1.3)
BILIRUB SERPL-MCNC: 0.3 MG/DL (ref 0.2–1.3)
BILIRUB SERPL-MCNC: 0.4 MG/DL (ref 0.2–1.3)
BILIRUB SERPL-MCNC: 0.4 MG/DL (ref 0.2–1.3)
BILIRUB SERPL-MCNC: 0.5 MG/DL (ref 0.2–1.3)
BILIRUB SERPL-MCNC: 0.5 MG/DL (ref 0.2–1.3)
BILIRUB UR QL STRIP: NEGATIVE
BILIRUB UR QL STRIP: NORMAL
BLD GP AB SCN SERPL QL: NORMAL
BLD PROD TYP BPU: NORMAL
BLD UNIT ID BPU: 0
BLOOD BANK CMNT PATIENT-IMP: NORMAL
BLOOD PRODUCT CODE: NORMAL
BPU ID: NORMAL
BUN SERPL-MCNC: 121 MG/DL (ref 7–30)
BUN SERPL-MCNC: 128 MG/DL (ref 7–30)
BUN SERPL-MCNC: 135 MG/DL (ref 7–30)
BUN SERPL-MCNC: 149 MG/DL (ref 7–30)
BUN SERPL-MCNC: 149 MG/DL (ref 7–30)
BUN SERPL-MCNC: 34 MG/DL (ref 7–30)
BUN SERPL-MCNC: 38 MG/DL (ref 7–30)
BUN SERPL-MCNC: 41 MG/DL (ref 7–30)
BUN SERPL-MCNC: 41 MG/DL (ref 7–30)
BUN SERPL-MCNC: 44 MG/DL (ref 7–30)
BUN SERPL-MCNC: 47 MG/DL (ref 7–30)
BUN SERPL-MCNC: 49 MG/DL (ref 7–30)
BUN SERPL-MCNC: 50 MG/DL (ref 7–30)
BUN SERPL-MCNC: 51 MG/DL (ref 7–30)
BUN SERPL-MCNC: 52 MG/DL (ref 7–30)
BUN SERPL-MCNC: 52 MG/DL (ref 7–30)
BUN SERPL-MCNC: 53 MG/DL (ref 7–30)
BUN SERPL-MCNC: 55 MG/DL (ref 7–30)
BUN SERPL-MCNC: 57 MG/DL (ref 7–30)
BUN SERPL-MCNC: 63 MG/DL (ref 7–30)
BUN SERPL-MCNC: 66 MG/DL (ref 7–30)
BUN SERPL-MCNC: 67 MG/DL (ref 7–30)
BUN SERPL-MCNC: 69 MG/DL (ref 7–30)
BUN SERPL-MCNC: 76 MG/DL (ref 7–30)
BUN SERPL-MCNC: 92 MG/DL (ref 7–30)
C DIFF TOX B STL QL: NEGATIVE
C DIFF TOX B STL QL: NEGATIVE
CA-I BLD-MCNC: 4.5 MG/DL (ref 4.4–5.2)
CA-I BLD-MCNC: 4.7 MG/DL (ref 4.4–5.2)
CA-I BLD-MCNC: 4.8 MG/DL (ref 4.4–5.2)
CALCIUM SERPL-MCNC: 10.3 MG/DL (ref 8.5–10.1)
CALCIUM SERPL-MCNC: 7.8 MG/DL (ref 8.5–10.1)
CALCIUM SERPL-MCNC: 8.1 MG/DL (ref 8.5–10.1)
CALCIUM SERPL-MCNC: 8.1 MG/DL (ref 8.5–10.1)
CALCIUM SERPL-MCNC: 8.2 MG/DL (ref 8.5–10.1)
CALCIUM SERPL-MCNC: 8.3 MG/DL (ref 8.5–10.1)
CALCIUM SERPL-MCNC: 8.3 MG/DL (ref 8.5–10.1)
CALCIUM SERPL-MCNC: 8.4 MG/DL (ref 8.5–10.1)
CALCIUM SERPL-MCNC: 8.6 MG/DL (ref 8.5–10.1)
CALCIUM SERPL-MCNC: 8.7 MG/DL (ref 8.5–10.1)
CALCIUM SERPL-MCNC: 9.1 MG/DL (ref 8.5–10.1)
CALCIUM SERPL-MCNC: 9.6 MG/DL (ref 8.5–10.1)
CHLORIDE SERPL-SCNC: 100 MMOL/L (ref 94–109)
CHLORIDE SERPL-SCNC: 100 MMOL/L (ref 94–109)
CHLORIDE SERPL-SCNC: 101 MMOL/L (ref 94–109)
CHLORIDE SERPL-SCNC: 102 MMOL/L (ref 94–109)
CHLORIDE SERPL-SCNC: 103 MMOL/L (ref 94–109)
CHLORIDE SERPL-SCNC: 104 MMOL/L (ref 94–109)
CHLORIDE SERPL-SCNC: 105 MMOL/L (ref 94–109)
CHLORIDE SERPL-SCNC: 106 MMOL/L (ref 94–109)
CHLORIDE SERPL-SCNC: 108 MMOL/L (ref 94–109)
CHLORIDE SERPL-SCNC: 110 MMOL/L (ref 94–109)
CHLORIDE SERPL-SCNC: 111 MMOL/L (ref 94–109)
CHLORIDE SERPL-SCNC: 112 MMOL/L (ref 94–109)
CHLORIDE SERPL-SCNC: 96 MMOL/L (ref 94–109)
CHLORIDE SERPL-SCNC: 97 MMOL/L (ref 94–109)
CHLORIDE SERPL-SCNC: 97 MMOL/L (ref 94–109)
CO2 SERPL-SCNC: 19 MMOL/L (ref 20–32)
CO2 SERPL-SCNC: 20 MMOL/L (ref 20–32)
CO2 SERPL-SCNC: 21 MMOL/L (ref 20–32)
CO2 SERPL-SCNC: 21 MMOL/L (ref 20–32)
CO2 SERPL-SCNC: 23 MMOL/L (ref 20–32)
CO2 SERPL-SCNC: 24 MMOL/L (ref 20–32)
CO2 SERPL-SCNC: 26 MMOL/L (ref 20–32)
CO2 SERPL-SCNC: 27 MMOL/L (ref 20–32)
CO2 SERPL-SCNC: 28 MMOL/L (ref 20–32)
CO2 SERPL-SCNC: 29 MMOL/L (ref 20–32)
CO2 SERPL-SCNC: 29 MMOL/L (ref 20–32)
CO2 SERPL-SCNC: 30 MMOL/L (ref 20–32)
CO2 SERPL-SCNC: 31 MMOL/L (ref 20–32)
CO2 SERPL-SCNC: 32 MMOL/L (ref 20–32)
COLLECT DURATION TIME UR: NORMAL H
COLOR UR AUTO: ABNORMAL
COLOR UR AUTO: NORMAL
COLOR UR AUTO: YELLOW
COLOR UR AUTO: YELLOW
COPATH REPORT: NORMAL
COPATH REPORT: NORMAL
CORTIS SERPL-MCNC: 31.7 UG/DL (ref 4–22)
CREAT BLD-MCNC: 2.2 MG/DL (ref 0.5–1.2)
CREAT FLD-MCNC: 1.2 MG/DL
CREAT FLD-MCNC: 1.5 MG/DL
CREAT FLD-MCNC: 2.1 MG/DL
CREAT FLD-MCNC: 2.1 MG/DL
CREAT SERPL-MCNC: 1.02 MG/DL (ref 0.52–1.04)
CREAT SERPL-MCNC: 1.03 MG/DL (ref 0.52–1.04)
CREAT SERPL-MCNC: 1.06 MG/DL (ref 0.52–1.04)
CREAT SERPL-MCNC: 1.06 MG/DL (ref 0.52–1.04)
CREAT SERPL-MCNC: 1.15 MG/DL (ref 0.52–1.04)
CREAT SERPL-MCNC: 1.17 MG/DL (ref 0.52–1.04)
CREAT SERPL-MCNC: 1.19 MG/DL (ref 0.52–1.04)
CREAT SERPL-MCNC: 1.22 MG/DL (ref 0.52–1.04)
CREAT SERPL-MCNC: 1.25 MG/DL (ref 0.52–1.04)
CREAT SERPL-MCNC: 1.26 MG/DL (ref 0.52–1.04)
CREAT SERPL-MCNC: 1.3 MG/DL (ref 0.52–1.04)
CREAT SERPL-MCNC: 1.33 MG/DL (ref 0.52–1.04)
CREAT SERPL-MCNC: 1.36 MG/DL (ref 0.52–1.04)
CREAT SERPL-MCNC: 1.42 MG/DL (ref 0.52–1.04)
CREAT SERPL-MCNC: 1.48 MG/DL (ref 0.52–1.04)
CREAT SERPL-MCNC: 1.66 MG/DL (ref 0.52–1.04)
CREAT SERPL-MCNC: 1.72 MG/DL (ref 0.52–1.04)
CREAT SERPL-MCNC: 1.81 MG/DL (ref 0.52–1.04)
CREAT SERPL-MCNC: 1.96 MG/DL (ref 0.52–1.04)
CREAT SERPL-MCNC: 2.04 MG/DL (ref 0.52–1.04)
CREAT SERPL-MCNC: 2.04 MG/DL (ref 0.52–1.04)
CREAT SERPL-MCNC: 2.17 MG/DL (ref 0.52–1.04)
CREAT SERPL-MCNC: 2.33 MG/DL (ref 0.52–1.04)
CREAT SERPL-MCNC: 2.34 MG/DL (ref 0.52–1.04)
CREAT SERPL-MCNC: 2.56 MG/DL (ref 0.52–1.04)
CREAT SERPL-MCNC: 2.63 MG/DL (ref 0.52–1.04)
CREAT SERPL-MCNC: 2.72 MG/DL (ref 0.52–1.04)
CREAT SERPL-MCNC: 2.81 MG/DL (ref 0.52–1.04)
CREAT SERPL-MCNC: 3.11 MG/DL (ref 0.52–1.04)
CREAT SERPL-MCNC: 3.13 MG/DL (ref 0.52–1.04)
CREAT UR-MCNC: 148 MG/DL
CREAT UR-MCNC: 163 MG/DL
CREAT UR-SCNC: NORMAL MMOL/L
CREATINE 24H UR-MRATE: NORMAL G/(24.H)
CREATINE/CREAT UR-SRTO: NORMAL
CRP SERPL-MCNC: 300 MG/L (ref 0–8)
DIFFERENTIAL METHOD BLD: ABNORMAL
DIFFERENTIAL METHOD BLD: ABNORMAL
EOSINOPHIL # BLD AUTO: 0.1 10E9/L (ref 0–0.7)
EOSINOPHIL # BLD AUTO: 0.3 10E9/L (ref 0–0.7)
EOSINOPHIL NFR BLD AUTO: 0.8 %
EOSINOPHIL NFR BLD AUTO: 5.9 %
ERYTHROCYTE [DISTWIDTH] IN BLOOD BY AUTOMATED COUNT: 13.9 % (ref 10–15)
ERYTHROCYTE [DISTWIDTH] IN BLOOD BY AUTOMATED COUNT: 14.3 % (ref 10–15)
ERYTHROCYTE [DISTWIDTH] IN BLOOD BY AUTOMATED COUNT: 14.5 % (ref 10–15)
ERYTHROCYTE [DISTWIDTH] IN BLOOD BY AUTOMATED COUNT: 14.7 % (ref 10–15)
ERYTHROCYTE [DISTWIDTH] IN BLOOD BY AUTOMATED COUNT: 14.7 % (ref 10–15)
ERYTHROCYTE [DISTWIDTH] IN BLOOD BY AUTOMATED COUNT: 15 % (ref 10–15)
ERYTHROCYTE [DISTWIDTH] IN BLOOD BY AUTOMATED COUNT: 15.1 % (ref 10–15)
ERYTHROCYTE [DISTWIDTH] IN BLOOD BY AUTOMATED COUNT: 15.3 % (ref 10–15)
ERYTHROCYTE [DISTWIDTH] IN BLOOD BY AUTOMATED COUNT: 15.4 % (ref 10–15)
ERYTHROCYTE [DISTWIDTH] IN BLOOD BY AUTOMATED COUNT: 15.5 % (ref 10–15)
ERYTHROCYTE [DISTWIDTH] IN BLOOD BY AUTOMATED COUNT: 15.6 % (ref 10–15)
ERYTHROCYTE [DISTWIDTH] IN BLOOD BY AUTOMATED COUNT: 15.7 % (ref 10–15)
ERYTHROCYTE [DISTWIDTH] IN BLOOD BY AUTOMATED COUNT: 15.7 % (ref 10–15)
ERYTHROCYTE [DISTWIDTH] IN BLOOD BY AUTOMATED COUNT: 15.8 % (ref 10–15)
ERYTHROCYTE [DISTWIDTH] IN BLOOD BY AUTOMATED COUNT: 15.8 % (ref 10–15)
ERYTHROCYTE [DISTWIDTH] IN BLOOD BY AUTOMATED COUNT: 15.9 % (ref 10–15)
ERYTHROCYTE [DISTWIDTH] IN BLOOD BY AUTOMATED COUNT: 16 % (ref 10–15)
ERYTHROCYTE [DISTWIDTH] IN BLOOD BY AUTOMATED COUNT: 16 % (ref 10–15)
ERYTHROCYTE [DISTWIDTH] IN BLOOD BY AUTOMATED COUNT: 16.1 % (ref 10–15)
ERYTHROCYTE [DISTWIDTH] IN BLOOD BY AUTOMATED COUNT: 16.6 % (ref 10–15)
ERYTHROCYTE [DISTWIDTH] IN BLOOD BY AUTOMATED COUNT: 17.1 % (ref 10–15)
ERYTHROCYTE [DISTWIDTH] IN BLOOD BY AUTOMATED COUNT: 17.4 % (ref 10–15)
ERYTHROCYTE [DISTWIDTH] IN BLOOD BY AUTOMATED COUNT: 17.5 % (ref 10–15)
FRACT EXCRET NA UR+SERPL-RTO: 0.2 %
FRACT EXCRET NA UR+SERPL-RTO: 1.1 %
GFR SERPL CREATININE-BSD FRML MDRD: 14 ML/MIN/1.7M2
GFR SERPL CREATININE-BSD FRML MDRD: 15 ML/MIN/1.7M2
GFR SERPL CREATININE-BSD FRML MDRD: 16 ML/MIN/1.7M2
GFR SERPL CREATININE-BSD FRML MDRD: 17 ML/MIN/1.7M2
GFR SERPL CREATININE-BSD FRML MDRD: 18 ML/MIN/1.7M2
GFR SERPL CREATININE-BSD FRML MDRD: 18 ML/MIN/1.7M2
GFR SERPL CREATININE-BSD FRML MDRD: 20 ML/MIN/1.7M2
GFR SERPL CREATININE-BSD FRML MDRD: 21 ML/MIN/{1.73_M2}
GFR SERPL CREATININE-BSD FRML MDRD: 22 ML/MIN/1.7M2
GFR SERPL CREATININE-BSD FRML MDRD: 24 ML/MIN/1.7M2
GFR SERPL CREATININE-BSD FRML MDRD: 24 ML/MIN/1.7M2
GFR SERPL CREATININE-BSD FRML MDRD: 25 ML/MIN/1.7M2
GFR SERPL CREATININE-BSD FRML MDRD: 27 ML/MIN/1.7M2
GFR SERPL CREATININE-BSD FRML MDRD: 29 ML/MIN/1.7M2
GFR SERPL CREATININE-BSD FRML MDRD: 30 ML/MIN/1.7M2
GFR SERPL CREATININE-BSD FRML MDRD: 34 ML/MIN/1.7M2
GFR SERPL CREATININE-BSD FRML MDRD: 36 ML/MIN/1.7M2
GFR SERPL CREATININE-BSD FRML MDRD: 38 ML/MIN/1.7M2
GFR SERPL CREATININE-BSD FRML MDRD: 39 ML/MIN/1.7M2
GFR SERPL CREATININE-BSD FRML MDRD: 40 ML/MIN/1.7M2
GFR SERPL CREATININE-BSD FRML MDRD: 41 ML/MIN/1.7M2
GFR SERPL CREATININE-BSD FRML MDRD: 42 ML/MIN/1.7M2
GFR SERPL CREATININE-BSD FRML MDRD: 43 ML/MIN/1.7M2
GFR SERPL CREATININE-BSD FRML MDRD: 44 ML/MIN/1.7M2
GFR SERPL CREATININE-BSD FRML MDRD: 45 ML/MIN/1.7M2
GFR SERPL CREATININE-BSD FRML MDRD: 46 ML/MIN/1.7M2
GFR SERPL CREATININE-BSD FRML MDRD: 50 ML/MIN/1.7M2
GFR SERPL CREATININE-BSD FRML MDRD: 50 ML/MIN/1.7M2
GFR SERPL CREATININE-BSD FRML MDRD: 52 ML/MIN/1.7M2
GFR SERPL CREATININE-BSD FRML MDRD: 53 ML/MIN/1.7M2
GFRB: ABNORMAL
GLUCOSE BLD-MCNC: 114 MG/DL (ref 70–99)
GLUCOSE BLD-MCNC: 124 MG/DL (ref 70–99)
GLUCOSE BLD-MCNC: 131 MG/DL (ref 70–99)
GLUCOSE BLD-MCNC: 145 MG/DL (ref 70–99)
GLUCOSE BLD-MCNC: 161 MG/DL (ref 70–99)
GLUCOSE BLD-MCNC: 194 MG/DL (ref 70–99)
GLUCOSE BLDC GLUCOMTR-MCNC: 100 MG/DL (ref 70–99)
GLUCOSE BLDC GLUCOMTR-MCNC: 107 MG/DL (ref 70–99)
GLUCOSE BLDC GLUCOMTR-MCNC: 111 MG/DL (ref 70–99)
GLUCOSE BLDC GLUCOMTR-MCNC: 112 MG/DL (ref 70–99)
GLUCOSE BLDC GLUCOMTR-MCNC: 115 MG/DL (ref 70–99)
GLUCOSE BLDC GLUCOMTR-MCNC: 115 MG/DL (ref 70–99)
GLUCOSE BLDC GLUCOMTR-MCNC: 116 MG/DL (ref 70–99)
GLUCOSE BLDC GLUCOMTR-MCNC: 117 MG/DL (ref 70–99)
GLUCOSE BLDC GLUCOMTR-MCNC: 118 MG/DL (ref 70–99)
GLUCOSE BLDC GLUCOMTR-MCNC: 118 MG/DL (ref 70–99)
GLUCOSE BLDC GLUCOMTR-MCNC: 119 MG/DL (ref 70–99)
GLUCOSE BLDC GLUCOMTR-MCNC: 119 MG/DL (ref 70–99)
GLUCOSE BLDC GLUCOMTR-MCNC: 121 MG/DL (ref 70–99)
GLUCOSE BLDC GLUCOMTR-MCNC: 122 MG/DL (ref 70–99)
GLUCOSE BLDC GLUCOMTR-MCNC: 123 MG/DL (ref 70–99)
GLUCOSE BLDC GLUCOMTR-MCNC: 124 MG/DL (ref 70–99)
GLUCOSE BLDC GLUCOMTR-MCNC: 125 MG/DL (ref 70–99)
GLUCOSE BLDC GLUCOMTR-MCNC: 125 MG/DL (ref 70–99)
GLUCOSE BLDC GLUCOMTR-MCNC: 126 MG/DL (ref 70–99)
GLUCOSE BLDC GLUCOMTR-MCNC: 129 MG/DL (ref 70–99)
GLUCOSE BLDC GLUCOMTR-MCNC: 129 MG/DL (ref 70–99)
GLUCOSE BLDC GLUCOMTR-MCNC: 130 MG/DL (ref 70–99)
GLUCOSE BLDC GLUCOMTR-MCNC: 131 MG/DL (ref 70–99)
GLUCOSE BLDC GLUCOMTR-MCNC: 132 MG/DL (ref 70–99)
GLUCOSE BLDC GLUCOMTR-MCNC: 133 MG/DL (ref 70–99)
GLUCOSE BLDC GLUCOMTR-MCNC: 133 MG/DL (ref 70–99)
GLUCOSE BLDC GLUCOMTR-MCNC: 134 MG/DL (ref 70–99)
GLUCOSE BLDC GLUCOMTR-MCNC: 134 MG/DL (ref 70–99)
GLUCOSE BLDC GLUCOMTR-MCNC: 135 MG/DL (ref 70–99)
GLUCOSE BLDC GLUCOMTR-MCNC: 136 MG/DL (ref 70–99)
GLUCOSE BLDC GLUCOMTR-MCNC: 136 MG/DL (ref 70–99)
GLUCOSE BLDC GLUCOMTR-MCNC: 137 MG/DL (ref 70–99)
GLUCOSE BLDC GLUCOMTR-MCNC: 138 MG/DL (ref 70–99)
GLUCOSE BLDC GLUCOMTR-MCNC: 139 MG/DL (ref 70–99)
GLUCOSE BLDC GLUCOMTR-MCNC: 139 MG/DL (ref 70–99)
GLUCOSE BLDC GLUCOMTR-MCNC: 140 MG/DL (ref 70–99)
GLUCOSE BLDC GLUCOMTR-MCNC: 140 MG/DL (ref 70–99)
GLUCOSE BLDC GLUCOMTR-MCNC: 142 MG/DL (ref 70–99)
GLUCOSE BLDC GLUCOMTR-MCNC: 143 MG/DL (ref 70–99)
GLUCOSE BLDC GLUCOMTR-MCNC: 144 MG/DL (ref 70–99)
GLUCOSE BLDC GLUCOMTR-MCNC: 145 MG/DL (ref 70–99)
GLUCOSE BLDC GLUCOMTR-MCNC: 146 MG/DL (ref 70–99)
GLUCOSE BLDC GLUCOMTR-MCNC: 146 MG/DL (ref 70–99)
GLUCOSE BLDC GLUCOMTR-MCNC: 148 MG/DL (ref 70–99)
GLUCOSE BLDC GLUCOMTR-MCNC: 149 MG/DL (ref 70–99)
GLUCOSE BLDC GLUCOMTR-MCNC: 150 MG/DL (ref 70–99)
GLUCOSE BLDC GLUCOMTR-MCNC: 150 MG/DL (ref 70–99)
GLUCOSE BLDC GLUCOMTR-MCNC: 151 MG/DL (ref 70–99)
GLUCOSE BLDC GLUCOMTR-MCNC: 152 MG/DL (ref 70–99)
GLUCOSE BLDC GLUCOMTR-MCNC: 152 MG/DL (ref 70–99)
GLUCOSE BLDC GLUCOMTR-MCNC: 153 MG/DL (ref 70–99)
GLUCOSE BLDC GLUCOMTR-MCNC: 155 MG/DL (ref 70–99)
GLUCOSE BLDC GLUCOMTR-MCNC: 156 MG/DL (ref 70–99)
GLUCOSE BLDC GLUCOMTR-MCNC: 156 MG/DL (ref 70–99)
GLUCOSE BLDC GLUCOMTR-MCNC: 160 MG/DL (ref 70–99)
GLUCOSE BLDC GLUCOMTR-MCNC: 161 MG/DL (ref 70–99)
GLUCOSE BLDC GLUCOMTR-MCNC: 162 MG/DL (ref 70–99)
GLUCOSE BLDC GLUCOMTR-MCNC: 165 MG/DL (ref 70–99)
GLUCOSE BLDC GLUCOMTR-MCNC: 166 MG/DL (ref 70–99)
GLUCOSE BLDC GLUCOMTR-MCNC: 166 MG/DL (ref 70–99)
GLUCOSE BLDC GLUCOMTR-MCNC: 167 MG/DL (ref 70–99)
GLUCOSE BLDC GLUCOMTR-MCNC: 167 MG/DL (ref 70–99)
GLUCOSE BLDC GLUCOMTR-MCNC: 168 MG/DL (ref 70–99)
GLUCOSE BLDC GLUCOMTR-MCNC: 168 MG/DL (ref 70–99)
GLUCOSE BLDC GLUCOMTR-MCNC: 169 MG/DL (ref 70–99)
GLUCOSE BLDC GLUCOMTR-MCNC: 169 MG/DL (ref 70–99)
GLUCOSE BLDC GLUCOMTR-MCNC: 170 MG/DL (ref 70–99)
GLUCOSE BLDC GLUCOMTR-MCNC: 172 MG/DL (ref 70–99)
GLUCOSE BLDC GLUCOMTR-MCNC: 172 MG/DL (ref 70–99)
GLUCOSE BLDC GLUCOMTR-MCNC: 173 MG/DL (ref 70–99)
GLUCOSE BLDC GLUCOMTR-MCNC: 174 MG/DL (ref 70–99)
GLUCOSE BLDC GLUCOMTR-MCNC: 174 MG/DL (ref 70–99)
GLUCOSE BLDC GLUCOMTR-MCNC: 175 MG/DL (ref 70–99)
GLUCOSE BLDC GLUCOMTR-MCNC: 175 MG/DL (ref 70–99)
GLUCOSE BLDC GLUCOMTR-MCNC: 176 MG/DL (ref 70–99)
GLUCOSE BLDC GLUCOMTR-MCNC: 179 MG/DL (ref 70–99)
GLUCOSE BLDC GLUCOMTR-MCNC: 179 MG/DL (ref 70–99)
GLUCOSE BLDC GLUCOMTR-MCNC: 180 MG/DL (ref 70–99)
GLUCOSE BLDC GLUCOMTR-MCNC: 182 MG/DL (ref 70–99)
GLUCOSE BLDC GLUCOMTR-MCNC: 182 MG/DL (ref 70–99)
GLUCOSE BLDC GLUCOMTR-MCNC: 184 MG/DL (ref 70–99)
GLUCOSE BLDC GLUCOMTR-MCNC: 186 MG/DL (ref 70–99)
GLUCOSE BLDC GLUCOMTR-MCNC: 188 MG/DL (ref 70–99)
GLUCOSE BLDC GLUCOMTR-MCNC: 190 MG/DL (ref 70–99)
GLUCOSE BLDC GLUCOMTR-MCNC: 190 MG/DL (ref 70–99)
GLUCOSE BLDC GLUCOMTR-MCNC: 191 MG/DL (ref 70–99)
GLUCOSE BLDC GLUCOMTR-MCNC: 192 MG/DL (ref 70–99)
GLUCOSE BLDC GLUCOMTR-MCNC: 192 MG/DL (ref 70–99)
GLUCOSE BLDC GLUCOMTR-MCNC: 193 MG/DL (ref 70–99)
GLUCOSE BLDC GLUCOMTR-MCNC: 193 MG/DL (ref 70–99)
GLUCOSE BLDC GLUCOMTR-MCNC: 195 MG/DL (ref 70–99)
GLUCOSE BLDC GLUCOMTR-MCNC: 195 MG/DL (ref 70–99)
GLUCOSE BLDC GLUCOMTR-MCNC: 196 MG/DL (ref 70–99)
GLUCOSE BLDC GLUCOMTR-MCNC: 201 MG/DL (ref 70–99)
GLUCOSE BLDC GLUCOMTR-MCNC: 201 MG/DL (ref 70–99)
GLUCOSE BLDC GLUCOMTR-MCNC: 202 MG/DL (ref 70–99)
GLUCOSE BLDC GLUCOMTR-MCNC: 203 MG/DL (ref 70–99)
GLUCOSE BLDC GLUCOMTR-MCNC: 205 MG/DL (ref 70–99)
GLUCOSE BLDC GLUCOMTR-MCNC: 206 MG/DL (ref 70–99)
GLUCOSE BLDC GLUCOMTR-MCNC: 207 MG/DL (ref 70–99)
GLUCOSE BLDC GLUCOMTR-MCNC: 209 MG/DL (ref 70–99)
GLUCOSE BLDC GLUCOMTR-MCNC: 212 MG/DL (ref 70–99)
GLUCOSE BLDC GLUCOMTR-MCNC: 216 MG/DL (ref 70–99)
GLUCOSE BLDC GLUCOMTR-MCNC: 217 MG/DL (ref 70–99)
GLUCOSE BLDC GLUCOMTR-MCNC: 220 MG/DL (ref 70–99)
GLUCOSE BLDC GLUCOMTR-MCNC: 221 MG/DL (ref 70–99)
GLUCOSE BLDC GLUCOMTR-MCNC: 222 MG/DL (ref 70–99)
GLUCOSE BLDC GLUCOMTR-MCNC: 223 MG/DL (ref 70–99)
GLUCOSE BLDC GLUCOMTR-MCNC: 230 MG/DL (ref 70–99)
GLUCOSE BLDC GLUCOMTR-MCNC: 235 MG/DL (ref 70–99)
GLUCOSE BLDC GLUCOMTR-MCNC: 243 MG/DL (ref 70–99)
GLUCOSE BLDC GLUCOMTR-MCNC: 247 MG/DL (ref 70–99)
GLUCOSE BLDC GLUCOMTR-MCNC: 260 MG/DL (ref 70–99)
GLUCOSE BLDC GLUCOMTR-MCNC: 261 MG/DL (ref 70–99)
GLUCOSE BLDC GLUCOMTR-MCNC: 268 MG/DL (ref 70–99)
GLUCOSE BLDC GLUCOMTR-MCNC: 275 MG/DL (ref 70–99)
GLUCOSE BLDC GLUCOMTR-MCNC: 277 MG/DL (ref 70–99)
GLUCOSE BLDC GLUCOMTR-MCNC: 279 MG/DL (ref 70–99)
GLUCOSE BLDC GLUCOMTR-MCNC: 281 MG/DL (ref 70–99)
GLUCOSE BLDC GLUCOMTR-MCNC: 283 MG/DL (ref 70–99)
GLUCOSE BLDC GLUCOMTR-MCNC: 297 MG/DL (ref 70–99)
GLUCOSE BLDC GLUCOMTR-MCNC: 306 MG/DL (ref 70–99)
GLUCOSE BLDC GLUCOMTR-MCNC: 311 MG/DL (ref 70–99)
GLUCOSE BLDC GLUCOMTR-MCNC: 342 MG/DL (ref 70–99)
GLUCOSE BLDC GLUCOMTR-MCNC: 386 MG/DL (ref 70–99)
GLUCOSE BLDC GLUCOMTR-MCNC: 65 MG/DL (ref 70–99)
GLUCOSE BLDC GLUCOMTR-MCNC: 99 MG/DL (ref 70–99)
GLUCOSE SERPL-MCNC: 109 MG/DL (ref 70–99)
GLUCOSE SERPL-MCNC: 115 MG/DL (ref 70–99)
GLUCOSE SERPL-MCNC: 117 MG/DL (ref 70–99)
GLUCOSE SERPL-MCNC: 118 MG/DL (ref 70–99)
GLUCOSE SERPL-MCNC: 126 MG/DL (ref 70–99)
GLUCOSE SERPL-MCNC: 127 MG/DL (ref 70–99)
GLUCOSE SERPL-MCNC: 130 MG/DL (ref 70–99)
GLUCOSE SERPL-MCNC: 130 MG/DL (ref 70–99)
GLUCOSE SERPL-MCNC: 132 MG/DL (ref 70–99)
GLUCOSE SERPL-MCNC: 135 MG/DL (ref 70–99)
GLUCOSE SERPL-MCNC: 137 MG/DL (ref 70–99)
GLUCOSE SERPL-MCNC: 140 MG/DL (ref 70–99)
GLUCOSE SERPL-MCNC: 144 MG/DL (ref 70–99)
GLUCOSE SERPL-MCNC: 147 MG/DL (ref 70–99)
GLUCOSE SERPL-MCNC: 148 MG/DL (ref 70–99)
GLUCOSE SERPL-MCNC: 154 MG/DL (ref 70–99)
GLUCOSE SERPL-MCNC: 161 MG/DL (ref 70–99)
GLUCOSE SERPL-MCNC: 166 MG/DL (ref 70–99)
GLUCOSE SERPL-MCNC: 169 MG/DL (ref 70–99)
GLUCOSE SERPL-MCNC: 172 MG/DL (ref 70–99)
GLUCOSE SERPL-MCNC: 187 MG/DL (ref 70–99)
GLUCOSE SERPL-MCNC: 188 MG/DL (ref 70–99)
GLUCOSE SERPL-MCNC: 190 MG/DL (ref 70–99)
GLUCOSE SERPL-MCNC: 192 MG/DL (ref 70–99)
GLUCOSE SERPL-MCNC: 202 MG/DL (ref 70–99)
GLUCOSE SERPL-MCNC: 228 MG/DL (ref 70–99)
GLUCOSE SERPL-MCNC: 252 MG/DL (ref 70–99)
GLUCOSE SERPL-MCNC: 253 MG/DL (ref 70–99)
GLUCOSE UR STRIP-MCNC: NEGATIVE MG/DL
GLUCOSE UR STRIP-MCNC: NORMAL MG/DL
GRAM STN SPEC: ABNORMAL
GRAM STN SPEC: ABNORMAL
GRAM STN SPEC: NORMAL
HBA1C MFR BLD: 6.2 % (ref 0–5.6)
HBA1C MFR BLD: 6.2 % (ref 0–5.6)
HCO3 BLD-SCNC: 19 MMOL/L (ref 21–28)
HCO3 BLD-SCNC: 20 MMOL/L (ref 21–28)
HCO3 BLD-SCNC: 20 MMOL/L (ref 21–28)
HCO3 BLD-SCNC: 26 MMOL/L (ref 21–28)
HCO3 BLD-SCNC: 27 MMOL/L (ref 21–28)
HCO3 BLD-SCNC: 28 MMOL/L (ref 21–28)
HCO3 BLD-SCNC: 29 MMOL/L (ref 21–28)
HCO3 BLD-SCNC: 30 MMOL/L (ref 21–28)
HCO3 BLDV-SCNC: 18 MMOL/L (ref 21–28)
HCO3 BLDV-SCNC: 22 MMOL/L (ref 21–28)
HCO3 BLDV-SCNC: 24 MMOL/L (ref 21–28)
HCO3 BLDV-SCNC: 25 MMOL/L (ref 21–28)
HCO3 BLDV-SCNC: 26 MMOL/L (ref 21–28)
HCO3 BLDV-SCNC: 27 MMOL/L (ref 21–28)
HCO3 BLDV-SCNC: 28 MMOL/L (ref 21–28)
HCT VFR BLD AUTO: 22 % (ref 35–47)
HCT VFR BLD AUTO: 22.1 % (ref 35–47)
HCT VFR BLD AUTO: 22.2 % (ref 35–47)
HCT VFR BLD AUTO: 23 % (ref 35–47)
HCT VFR BLD AUTO: 23.3 % (ref 35–47)
HCT VFR BLD AUTO: 24.1 % (ref 35–47)
HCT VFR BLD AUTO: 24.2 % (ref 35–47)
HCT VFR BLD AUTO: 24.7 % (ref 35–47)
HCT VFR BLD AUTO: 24.8 % (ref 35–47)
HCT VFR BLD AUTO: 24.8 % (ref 35–47)
HCT VFR BLD AUTO: 25.1 % (ref 35–47)
HCT VFR BLD AUTO: 25.1 % (ref 35–47)
HCT VFR BLD AUTO: 25.8 % (ref 35–47)
HCT VFR BLD AUTO: 25.9 % (ref 35–47)
HCT VFR BLD AUTO: 26.5 % (ref 35–47)
HCT VFR BLD AUTO: 26.8 % (ref 35–47)
HCT VFR BLD AUTO: 27 % (ref 35–47)
HCT VFR BLD AUTO: 27.2 % (ref 35–47)
HCT VFR BLD AUTO: 28.3 % (ref 35–47)
HCT VFR BLD AUTO: 28.3 % (ref 35–47)
HCT VFR BLD AUTO: 28.4 % (ref 35–47)
HCT VFR BLD AUTO: 28.5 % (ref 35–47)
HCT VFR BLD AUTO: 29.1 % (ref 35–47)
HCT VFR BLD AUTO: 29.3 % (ref 35–47)
HCT VFR BLD AUTO: 29.5 % (ref 35–47)
HCT VFR BLD AUTO: 29.5 % (ref 35–47)
HCT VFR BLD AUTO: 30.1 % (ref 35–47)
HCT VFR BLD AUTO: 36 % (ref 35–47)
HCT VFR BLD AUTO: 37.1 % (ref 35–47)
HGB BLD-MCNC: 11.6 G/DL (ref 11.7–15.7)
HGB BLD-MCNC: 11.6 G/DL (ref 11.7–15.7)
HGB BLD-MCNC: 7 G/DL (ref 11.7–15.7)
HGB BLD-MCNC: 7 G/DL (ref 11.7–15.7)
HGB BLD-MCNC: 7.1 G/DL (ref 11.7–15.7)
HGB BLD-MCNC: 7.3 G/DL (ref 11.7–15.7)
HGB BLD-MCNC: 7.3 G/DL (ref 11.7–15.7)
HGB BLD-MCNC: 7.6 G/DL (ref 11.7–15.7)
HGB BLD-MCNC: 7.7 G/DL (ref 11.7–15.7)
HGB BLD-MCNC: 7.7 G/DL (ref 11.7–15.7)
HGB BLD-MCNC: 7.9 G/DL (ref 11.7–15.7)
HGB BLD-MCNC: 8 G/DL (ref 11.7–15.7)
HGB BLD-MCNC: 8.3 G/DL (ref 11.7–15.7)
HGB BLD-MCNC: 8.4 G/DL (ref 11.7–15.7)
HGB BLD-MCNC: 8.5 G/DL (ref 11.7–15.7)
HGB BLD-MCNC: 8.6 G/DL (ref 11.7–15.7)
HGB BLD-MCNC: 8.7 G/DL (ref 11.7–15.7)
HGB BLD-MCNC: 8.8 G/DL (ref 11.7–15.7)
HGB BLD-MCNC: 8.8 G/DL (ref 11.7–15.7)
HGB BLD-MCNC: 8.9 G/DL (ref 11.7–15.7)
HGB BLD-MCNC: 9.1 G/DL (ref 11.7–15.7)
HGB BLD-MCNC: 9.2 G/DL (ref 11.7–15.7)
HGB BLD-MCNC: 9.3 G/DL (ref 11.7–15.7)
HGB BLD-MCNC: 9.3 G/DL (ref 11.7–15.7)
HGB BLD-MCNC: 9.5 G/DL (ref 11.7–15.7)
HGB UR QL STRIP: ABNORMAL
HGB UR QL STRIP: NORMAL
HYALINE CASTS #/AREA URNS LPF: 11 /LPF (ref 0–2)
HYALINE CASTS #/AREA URNS LPF: 4 /LPF (ref 0–2)
HYALINE CASTS #/AREA URNS LPF: 5 /LPF (ref 0–2)
IMM GRANULOCYTES # BLD: 0 10E9/L (ref 0–0.4)
IMM GRANULOCYTES # BLD: 0.3 10E9/L (ref 0–0.4)
IMM GRANULOCYTES NFR BLD: 0.4 %
IMM GRANULOCYTES NFR BLD: 1.7 %
INR PPP: 1.09 (ref 0.86–1.14)
INR PPP: 1.32 (ref 0.86–1.14)
INR PPP: 1.35 (ref 0.86–1.14)
INR PPP: 1.5 (ref 0.86–1.14)
INTERPRETATION ECG - MUSE: NORMAL
KETONES UR STRIP-MCNC: 10 MG/DL
KETONES UR STRIP-MCNC: 5 MG/DL
KETONES UR STRIP-MCNC: NEGATIVE MG/DL
KETONES UR STRIP-MCNC: NORMAL MG/DL
LACTATE BLD-SCNC: 0.5 MMOL/L (ref 0.7–2)
LACTATE BLD-SCNC: 0.6 MMOL/L (ref 0.7–2)
LACTATE BLD-SCNC: 0.6 MMOL/L (ref 0.7–2)
LACTATE BLD-SCNC: 0.7 MMOL/L (ref 0.7–2)
LACTATE BLD-SCNC: 0.7 MMOL/L (ref 0.7–2)
LACTATE BLD-SCNC: 0.8 MMOL/L (ref 0.7–2)
LACTATE BLD-SCNC: 0.9 MMOL/L (ref 0.7–2)
LACTATE BLD-SCNC: 1 MMOL/L (ref 0.7–2)
LACTATE BLD-SCNC: 1.3 MMOL/L (ref 0.7–2)
LACTATE BLD-SCNC: 1.4 MMOL/L (ref 0.7–2)
LDH SERPL L TO P-CCNC: 170 U/L (ref 81–234)
LEUKOCYTE ESTERASE UR QL STRIP: ABNORMAL
LEUKOCYTE ESTERASE UR QL STRIP: NORMAL
LYMPHOCYTES # BLD AUTO: 0.8 10E9/L (ref 0.8–5.3)
LYMPHOCYTES # BLD AUTO: 1.4 10E9/L (ref 0.8–5.3)
LYMPHOCYTES NFR BLD AUTO: 25.8 %
LYMPHOCYTES NFR BLD AUTO: 5.4 %
Lab: ABNORMAL
Lab: NORMAL
Lab: NORMAL
MAGNESIUM SERPL-MCNC: 1.5 MG/DL (ref 1.6–2.3)
MAGNESIUM SERPL-MCNC: 1.9 MG/DL (ref 1.6–2.3)
MAGNESIUM SERPL-MCNC: 2 MG/DL (ref 1.6–2.3)
MAGNESIUM SERPL-MCNC: 2.1 MG/DL (ref 1.6–2.3)
MAGNESIUM SERPL-MCNC: 2.1 MG/DL (ref 1.6–2.3)
MAGNESIUM SERPL-MCNC: 2.2 MG/DL (ref 1.6–2.3)
MAGNESIUM SERPL-MCNC: 2.2 MG/DL (ref 1.6–2.3)
MAGNESIUM SERPL-MCNC: 2.3 MG/DL (ref 1.6–2.3)
MAGNESIUM SERPL-MCNC: 2.4 MG/DL (ref 1.6–2.3)
MAGNESIUM SERPL-MCNC: 2.5 MG/DL (ref 1.6–2.3)
MAGNESIUM SERPL-MCNC: 2.6 MG/DL (ref 1.6–2.3)
MAGNESIUM SERPL-MCNC: 2.9 MG/DL (ref 1.6–2.3)
MCH RBC QN AUTO: 26.6 PG (ref 26.5–33)
MCH RBC QN AUTO: 26.8 PG (ref 26.5–33)
MCH RBC QN AUTO: 27.2 PG (ref 26.5–33)
MCH RBC QN AUTO: 27.3 PG (ref 26.5–33)
MCH RBC QN AUTO: 27.3 PG (ref 26.5–33)
MCH RBC QN AUTO: 27.4 PG (ref 26.5–33)
MCH RBC QN AUTO: 27.5 PG (ref 26.5–33)
MCH RBC QN AUTO: 27.6 PG (ref 26.5–33)
MCH RBC QN AUTO: 27.7 PG (ref 26.5–33)
MCH RBC QN AUTO: 27.8 PG (ref 26.5–33)
MCH RBC QN AUTO: 27.9 PG (ref 26.5–33)
MCH RBC QN AUTO: 28.1 PG (ref 26.5–33)
MCH RBC QN AUTO: 28.2 PG (ref 26.5–33)
MCH RBC QN AUTO: 28.3 PG (ref 26.5–33)
MCH RBC QN AUTO: 28.5 PG (ref 26.5–33)
MCH RBC QN AUTO: 28.5 PG (ref 26.5–33)
MCH RBC QN AUTO: 28.9 PG (ref 26.5–33)
MCHC RBC AUTO-ENTMCNC: 31 G/DL (ref 31.5–36.5)
MCHC RBC AUTO-ENTMCNC: 31 G/DL (ref 31.5–36.5)
MCHC RBC AUTO-ENTMCNC: 31.1 G/DL (ref 31.5–36.5)
MCHC RBC AUTO-ENTMCNC: 31.1 G/DL (ref 31.5–36.5)
MCHC RBC AUTO-ENTMCNC: 31.2 G/DL (ref 31.5–36.5)
MCHC RBC AUTO-ENTMCNC: 31.2 G/DL (ref 31.5–36.5)
MCHC RBC AUTO-ENTMCNC: 31.3 G/DL (ref 31.5–36.5)
MCHC RBC AUTO-ENTMCNC: 31.3 G/DL (ref 31.5–36.5)
MCHC RBC AUTO-ENTMCNC: 31.5 G/DL (ref 31.5–36.5)
MCHC RBC AUTO-ENTMCNC: 31.6 G/DL (ref 31.5–36.5)
MCHC RBC AUTO-ENTMCNC: 31.6 G/DL (ref 31.5–36.5)
MCHC RBC AUTO-ENTMCNC: 31.7 G/DL (ref 31.5–36.5)
MCHC RBC AUTO-ENTMCNC: 31.8 G/DL (ref 31.5–36.5)
MCHC RBC AUTO-ENTMCNC: 31.8 G/DL (ref 31.5–36.5)
MCHC RBC AUTO-ENTMCNC: 31.9 G/DL (ref 31.5–36.5)
MCHC RBC AUTO-ENTMCNC: 32 G/DL (ref 31.5–36.5)
MCHC RBC AUTO-ENTMCNC: 32.2 G/DL (ref 31.5–36.5)
MCHC RBC AUTO-ENTMCNC: 32.2 G/DL (ref 31.5–36.5)
MCHC RBC AUTO-ENTMCNC: 32.3 G/DL (ref 31.5–36.5)
MCHC RBC AUTO-ENTMCNC: 32.5 G/DL (ref 31.5–36.5)
MCHC RBC AUTO-ENTMCNC: 32.9 G/DL (ref 31.5–36.5)
MCV RBC AUTO: 85 FL (ref 78–100)
MCV RBC AUTO: 86 FL (ref 78–100)
MCV RBC AUTO: 87 FL (ref 78–100)
MCV RBC AUTO: 88 FL (ref 78–100)
MCV RBC AUTO: 89 FL (ref 78–100)
MCV RBC AUTO: 90 FL (ref 78–100)
MONOCYTES # BLD AUTO: 0.5 10E9/L (ref 0–1.3)
MONOCYTES # BLD AUTO: 0.8 10E9/L (ref 0–1.3)
MONOCYTES NFR BLD AUTO: 10.1 %
MONOCYTES NFR BLD AUTO: 5.1 %
MRSA DNA SPEC QL NAA+PROBE: NEGATIVE
MUCOUS THREADS #/AREA URNS LPF: PRESENT /LPF
NEUTROPHILS # BLD AUTO: 13 10E9/L (ref 1.6–8.3)
NEUTROPHILS # BLD AUTO: 3 10E9/L (ref 1.6–8.3)
NEUTROPHILS NFR BLD AUTO: 56.8 %
NEUTROPHILS NFR BLD AUTO: 86.7 %
NITRATE UR QL: NEGATIVE
NITRATE UR QL: NORMAL
NITRATE UR QL: POSITIVE
NRBC # BLD AUTO: 0 10*3/UL
NRBC # BLD AUTO: 0 10*3/UL
NRBC BLD AUTO-RTO: 0 /100
NRBC BLD AUTO-RTO: 0 /100
NT-PROBNP SERPL-MCNC: 3064 PG/ML (ref 0–1800)
NT-PROBNP SERPL-MCNC: ABNORMAL PG/ML (ref 0–1800)
NUM BPU REQUESTED: 1
NUM BPU REQUESTED: 2
O2/TOTAL GAS SETTING VFR VENT: 100 %
O2/TOTAL GAS SETTING VFR VENT: 100 %
O2/TOTAL GAS SETTING VFR VENT: 40 %
O2/TOTAL GAS SETTING VFR VENT: 50 %
O2/TOTAL GAS SETTING VFR VENT: 56 %
O2/TOTAL GAS SETTING VFR VENT: 60 %
O2/TOTAL GAS SETTING VFR VENT: 70 %
O2/TOTAL GAS SETTING VFR VENT: 90 %
O2/TOTAL GAS SETTING VFR VENT: ABNORMAL %
O2/TOTAL GAS SETTING VFR VENT: NORMAL %
OSMOLALITY SERPL: 289 MMOL/KG (ref 280–301)
OSMOLALITY UR: 409 MMOL/KG (ref 100–1200)
OXYHGB MFR BLD: 66 % (ref 92–100)
OXYHGB MFR BLD: 92 % (ref 92–100)
OXYHGB MFR BLD: 92 % (ref 92–100)
OXYHGB MFR BLDV: 70 %
OXYHGB MFR BLDV: 72 %
OXYHGB MFR BLDV: 74 %
OXYHGB MFR BLDV: 75 %
PCO2 BLD: 37 MM HG (ref 35–45)
PCO2 BLD: 38 MM HG (ref 35–45)
PCO2 BLD: 41 MM HG (ref 35–45)
PCO2 BLD: 41 MM HG (ref 35–45)
PCO2 BLD: 42 MM HG (ref 35–45)
PCO2 BLD: 42 MM HG (ref 35–45)
PCO2 BLD: 43 MM HG (ref 35–45)
PCO2 BLD: 45 MM HG (ref 35–45)
PCO2 BLD: 46 MM HG (ref 35–45)
PCO2 BLD: 47 MM HG (ref 35–45)
PCO2 BLD: 48 MM HG (ref 35–45)
PCO2 BLD: 48 MM HG (ref 35–45)
PCO2 BLD: 50 MM HG (ref 35–45)
PCO2 BLD: 50 MM HG (ref 35–45)
PCO2 BLD: 53 MM HG (ref 35–45)
PCO2 BLD: 54 MM HG (ref 35–45)
PCO2 BLDV: 35 MM HG (ref 40–50)
PCO2 BLDV: 42 MM HG (ref 40–50)
PCO2 BLDV: 43 MM HG (ref 40–50)
PCO2 BLDV: 43 MM HG (ref 40–50)
PCO2 BLDV: 44 MM HG (ref 40–50)
PCO2 BLDV: 44 MM HG (ref 40–50)
PCO2 BLDV: 45 MM HG (ref 40–50)
PCO2 BLDV: 49 MM HG (ref 40–50)
PCO2 BLDV: 50 MM HG (ref 40–50)
PCO2 BLDV: 50 MM HG (ref 40–50)
PCO2 BLDV: 52 MM HG (ref 40–50)
PCO2 BLDV: 52 MM HG (ref 40–50)
PCO2 BLDV: 53 MM HG (ref 40–50)
PCO2 BLDV: 53 MM HG (ref 40–50)
PCO2 BLDV: 55 MM HG (ref 40–50)
PH BLD: 7.17 PH (ref 7.35–7.45)
PH BLD: 7.18 PH (ref 7.35–7.45)
PH BLD: 7.19 PH (ref 7.35–7.45)
PH BLD: 7.2 PH (ref 7.35–7.45)
PH BLD: 7.26 PH (ref 7.35–7.45)
PH BLD: 7.28 PH (ref 7.35–7.45)
PH BLD: 7.32 PH (ref 7.35–7.45)
PH BLD: 7.34 PH (ref 7.35–7.45)
PH BLD: 7.36 PH (ref 7.35–7.45)
PH BLD: 7.38 PH (ref 7.35–7.45)
PH BLD: 7.43 PH (ref 7.35–7.45)
PH BLD: 7.45 PH (ref 7.35–7.45)
PH BLD: 7.45 PH (ref 7.35–7.45)
PH BLD: 7.46 PH (ref 7.35–7.45)
PH BLD: 7.47 PH (ref 7.35–7.45)
PH BLD: 7.5 PH (ref 7.35–7.45)
PH BLDV: 7.23 PH (ref 7.32–7.43)
PH BLDV: 7.32 PH (ref 7.32–7.43)
PH BLDV: 7.33 PH (ref 7.32–7.43)
PH BLDV: 7.34 PH (ref 7.32–7.43)
PH BLDV: 7.34 PH (ref 7.32–7.43)
PH BLDV: 7.35 PH (ref 7.32–7.43)
PH BLDV: 7.35 PH (ref 7.32–7.43)
PH BLDV: 7.38 PH (ref 7.32–7.43)
PH BLDV: 7.39 PH (ref 7.32–7.43)
PH BLDV: 7.4 PH (ref 7.32–7.43)
PH BLDV: 7.47 PH (ref 7.32–7.43)
PH UR STRIP: 5 PH (ref 5–7)
PH UR STRIP: 5.5 PH (ref 5–7)
PH UR STRIP: 6.5 PH (ref 5–7)
PH UR STRIP: NORMAL PH (ref 5–7)
PHOSPHATE SERPL-MCNC: 1.9 MG/DL (ref 2.5–4.5)
PHOSPHATE SERPL-MCNC: 2 MG/DL (ref 2.5–4.5)
PHOSPHATE SERPL-MCNC: 2.2 MG/DL (ref 2.5–4.5)
PHOSPHATE SERPL-MCNC: 2.3 MG/DL (ref 2.5–4.5)
PHOSPHATE SERPL-MCNC: 2.5 MG/DL (ref 2.5–4.5)
PHOSPHATE SERPL-MCNC: 2.7 MG/DL (ref 2.5–4.5)
PHOSPHATE SERPL-MCNC: 2.9 MG/DL (ref 2.5–4.5)
PHOSPHATE SERPL-MCNC: 2.9 MG/DL (ref 2.5–4.5)
PHOSPHATE SERPL-MCNC: 3 MG/DL (ref 2.5–4.5)
PHOSPHATE SERPL-MCNC: 3.3 MG/DL (ref 2.5–4.5)
PHOSPHATE SERPL-MCNC: 3.9 MG/DL (ref 2.5–4.5)
PHOSPHATE SERPL-MCNC: 4 MG/DL (ref 2.5–4.5)
PHOSPHATE SERPL-MCNC: 4.1 MG/DL (ref 2.5–4.5)
PHOSPHATE SERPL-MCNC: 4.1 MG/DL (ref 2.5–4.5)
PHOSPHATE SERPL-MCNC: 4.3 MG/DL (ref 2.5–4.5)
PHOSPHATE SERPL-MCNC: 4.5 MG/DL (ref 2.5–4.5)
PHOSPHATE SERPL-MCNC: 4.9 MG/DL (ref 2.5–4.5)
PHOSPHATE SERPL-MCNC: 5.1 MG/DL (ref 2.5–4.5)
PHOSPHATE SERPL-MCNC: 5.2 MG/DL (ref 2.5–4.5)
PHOSPHATE SERPL-MCNC: 5.3 MG/DL (ref 2.5–4.5)
PLATELET # BLD AUTO: 144 10E9/L (ref 150–450)
PLATELET # BLD AUTO: 152 10E9/L (ref 150–450)
PLATELET # BLD AUTO: 157 10E9/L (ref 150–450)
PLATELET # BLD AUTO: 158 10E9/L (ref 150–450)
PLATELET # BLD AUTO: 160 10E9/L (ref 150–450)
PLATELET # BLD AUTO: 160 10E9/L (ref 150–450)
PLATELET # BLD AUTO: 161 10E9/L (ref 150–450)
PLATELET # BLD AUTO: 164 10E9/L (ref 150–450)
PLATELET # BLD AUTO: 165 10E9/L (ref 150–450)
PLATELET # BLD AUTO: 173 10E9/L (ref 150–450)
PLATELET # BLD AUTO: 190 10E9/L (ref 150–450)
PLATELET # BLD AUTO: 192 10E9/L (ref 150–450)
PLATELET # BLD AUTO: 194 10E9/L (ref 150–450)
PLATELET # BLD AUTO: 195 10E9/L (ref 150–450)
PLATELET # BLD AUTO: 203 10E9/L (ref 150–450)
PLATELET # BLD AUTO: 205 10E9/L (ref 150–450)
PLATELET # BLD AUTO: 205 10E9/L (ref 150–450)
PLATELET # BLD AUTO: 206 10E9/L (ref 150–450)
PLATELET # BLD AUTO: 207 10E9/L (ref 150–450)
PLATELET # BLD AUTO: 213 10E9/L (ref 150–450)
PLATELET # BLD AUTO: 218 10E9/L (ref 150–450)
PLATELET # BLD AUTO: 224 10E9/L (ref 150–450)
PLATELET # BLD AUTO: 226 10E9/L (ref 150–450)
PLATELET # BLD AUTO: 230 10E9/L (ref 150–450)
PLATELET # BLD AUTO: 232 10E9/L (ref 150–450)
PLATELET # BLD AUTO: 255 10E9/L (ref 150–450)
PLATELET # BLD AUTO: 272 10E9/L (ref 150–450)
PLATELET # BLD AUTO: 292 10E9/L (ref 150–450)
PLATELET # BLD AUTO: 294 10E9/L (ref 150–450)
PO2 BLD: 115 MM HG (ref 80–105)
PO2 BLD: 137 MM HG (ref 80–105)
PO2 BLD: 145 MM HG (ref 80–105)
PO2 BLD: 150 MM HG (ref 80–105)
PO2 BLD: 157 MM HG (ref 80–105)
PO2 BLD: 176 MM HG (ref 80–105)
PO2 BLD: 37 MM HG (ref 80–105)
PO2 BLD: 49 MM HG (ref 80–105)
PO2 BLD: 58 MM HG (ref 80–105)
PO2 BLD: 59 MM HG (ref 80–105)
PO2 BLD: 69 MM HG (ref 80–105)
PO2 BLD: 69 MM HG (ref 80–105)
PO2 BLD: 72 MM HG (ref 80–105)
PO2 BLD: 74 MM HG (ref 80–105)
PO2 BLD: 76 MM HG (ref 80–105)
PO2 BLD: 86 MM HG (ref 80–105)
PO2 BLDV: 33 MM HG (ref 25–47)
PO2 BLDV: 34 MM HG (ref 25–47)
PO2 BLDV: 35 MM HG (ref 25–47)
PO2 BLDV: 36 MM HG (ref 25–47)
PO2 BLDV: 36 MM HG (ref 25–47)
PO2 BLDV: 38 MM HG (ref 25–47)
PO2 BLDV: 41 MM HG (ref 25–47)
PO2 BLDV: 42 MM HG (ref 25–47)
PO2 BLDV: 42 MM HG (ref 25–47)
PO2 BLDV: 43 MM HG (ref 25–47)
PO2 BLDV: 44 MM HG (ref 25–47)
PO2 BLDV: 48 MM HG (ref 25–47)
PO2 BLDV: 51 MM HG (ref 25–47)
PO2 BLDV: 55 MM HG (ref 25–47)
PO2 BLDV: 62 MM HG (ref 25–47)
POTASSIUM BLD-SCNC: 3.4 MMOL/L (ref 3.4–5.3)
POTASSIUM BLD-SCNC: 3.5 MMOL/L (ref 3.4–5.3)
POTASSIUM BLD-SCNC: 3.5 MMOL/L (ref 3.4–5.3)
POTASSIUM BLD-SCNC: 3.8 MMOL/L (ref 3.4–5.3)
POTASSIUM SERPL-SCNC: 3.2 MMOL/L (ref 3.4–5.3)
POTASSIUM SERPL-SCNC: 3.4 MMOL/L (ref 3.4–5.3)
POTASSIUM SERPL-SCNC: 3.5 MMOL/L (ref 3.4–5.3)
POTASSIUM SERPL-SCNC: 3.6 MMOL/L (ref 3.4–5.3)
POTASSIUM SERPL-SCNC: 3.6 MMOL/L (ref 3.4–5.3)
POTASSIUM SERPL-SCNC: 3.7 MMOL/L (ref 3.4–5.3)
POTASSIUM SERPL-SCNC: 3.7 MMOL/L (ref 3.4–5.3)
POTASSIUM SERPL-SCNC: 3.8 MMOL/L (ref 3.4–5.3)
POTASSIUM SERPL-SCNC: 3.9 MMOL/L (ref 3.4–5.3)
POTASSIUM SERPL-SCNC: 3.9 MMOL/L (ref 3.4–5.3)
POTASSIUM SERPL-SCNC: 4 MMOL/L (ref 3.4–5.3)
POTASSIUM SERPL-SCNC: 4.1 MMOL/L (ref 3.4–5.3)
POTASSIUM SERPL-SCNC: 4.2 MMOL/L (ref 3.4–5.3)
POTASSIUM SERPL-SCNC: 4.3 MMOL/L (ref 3.4–5.3)
POTASSIUM SERPL-SCNC: 4.4 MMOL/L (ref 3.4–5.3)
POTASSIUM SERPL-SCNC: 4.5 MMOL/L (ref 3.4–5.3)
POTASSIUM SERPL-SCNC: 4.6 MMOL/L (ref 3.4–5.3)
POTASSIUM SERPL-SCNC: 5.2 MMOL/L (ref 3.4–5.3)
POTASSIUM SERPL-SCNC: 5.2 MMOL/L (ref 3.4–5.3)
PREALB SERPL IA-MCNC: 10 MG/DL (ref 15–45)
PREALB SERPL IA-MCNC: 16 MG/DL (ref 15–45)
PREALB SERPL IA-MCNC: 7 MG/DL (ref 15–45)
PROCALCITONIN SERPL-MCNC: 0.86 NG/ML
PROCALCITONIN SERPL-MCNC: 1.45 NG/ML
PROT SERPL-MCNC: 5.2 G/DL (ref 6.8–8.8)
PROT SERPL-MCNC: 5.5 G/DL (ref 6.8–8.8)
PROT SERPL-MCNC: 5.7 G/DL (ref 6.8–8.8)
PROT SERPL-MCNC: 5.8 G/DL (ref 6.8–8.8)
PROT SERPL-MCNC: 5.8 G/DL (ref 6.8–8.8)
PROT SERPL-MCNC: 6.1 G/DL (ref 6.8–8.8)
PROT SERPL-MCNC: 6.3 G/DL (ref 6.8–8.8)
PROT SERPL-MCNC: 7.7 G/DL (ref 6.8–8.8)
RBC # BLD AUTO: 2.52 10E12/L (ref 3.8–5.2)
RBC # BLD AUTO: 2.55 10E12/L (ref 3.8–5.2)
RBC # BLD AUTO: 2.61 10E12/L (ref 3.8–5.2)
RBC # BLD AUTO: 2.68 10E12/L (ref 3.8–5.2)
RBC # BLD AUTO: 2.69 10E12/L (ref 3.8–5.2)
RBC # BLD AUTO: 2.74 10E12/L (ref 3.8–5.2)
RBC # BLD AUTO: 2.76 10E12/L (ref 3.8–5.2)
RBC # BLD AUTO: 2.81 10E12/L (ref 3.8–5.2)
RBC # BLD AUTO: 2.81 10E12/L (ref 3.8–5.2)
RBC # BLD AUTO: 2.83 10E12/L (ref 3.8–5.2)
RBC # BLD AUTO: 2.88 10E12/L (ref 3.8–5.2)
RBC # BLD AUTO: 2.91 10E12/L (ref 3.8–5.2)
RBC # BLD AUTO: 2.93 10E12/L (ref 3.8–5.2)
RBC # BLD AUTO: 2.98 10E12/L (ref 3.8–5.2)
RBC # BLD AUTO: 3.01 10E12/L (ref 3.8–5.2)
RBC # BLD AUTO: 3.12 10E12/L (ref 3.8–5.2)
RBC # BLD AUTO: 3.15 10E12/L (ref 3.8–5.2)
RBC # BLD AUTO: 3.16 10E12/L (ref 3.8–5.2)
RBC # BLD AUTO: 3.17 10E12/L (ref 3.8–5.2)
RBC # BLD AUTO: 3.19 10E12/L (ref 3.8–5.2)
RBC # BLD AUTO: 3.26 10E12/L (ref 3.8–5.2)
RBC # BLD AUTO: 3.26 10E12/L (ref 3.8–5.2)
RBC # BLD AUTO: 3.3 10E12/L (ref 3.8–5.2)
RBC # BLD AUTO: 3.31 10E12/L (ref 3.8–5.2)
RBC # BLD AUTO: 3.31 10E12/L (ref 3.8–5.2)
RBC # BLD AUTO: 3.32 10E12/L (ref 3.8–5.2)
RBC # BLD AUTO: 3.43 10E12/L (ref 3.8–5.2)
RBC # BLD AUTO: 4.01 10E12/L (ref 3.8–5.2)
RBC # BLD AUTO: 4.11 10E12/L (ref 3.8–5.2)
RBC #/AREA URNS AUTO: 153 /HPF (ref 0–2)
RBC #/AREA URNS AUTO: 22 /HPF (ref 0–2)
RBC #/AREA URNS AUTO: 55 /HPF (ref 0–2)
RBC #/AREA URNS AUTO: 62 /HPF (ref 0–2)
RBC #/AREA URNS AUTO: >182 /HPF (ref 0–2)
RBC #/AREA URNS AUTO: NORMAL /HPF (ref 0–2)
SODIUM BLD-SCNC: 138 MMOL/L (ref 133–144)
SODIUM BLD-SCNC: 139 MMOL/L (ref 133–144)
SODIUM SERPL-SCNC: 130 MMOL/L (ref 133–144)
SODIUM SERPL-SCNC: 131 MMOL/L (ref 133–144)
SODIUM SERPL-SCNC: 131 MMOL/L (ref 133–144)
SODIUM SERPL-SCNC: 132 MMOL/L (ref 133–144)
SODIUM SERPL-SCNC: 133 MMOL/L (ref 133–144)
SODIUM SERPL-SCNC: 133 MMOL/L (ref 133–144)
SODIUM SERPL-SCNC: 134 MMOL/L (ref 133–144)
SODIUM SERPL-SCNC: 134 MMOL/L (ref 133–144)
SODIUM SERPL-SCNC: 135 MMOL/L (ref 133–144)
SODIUM SERPL-SCNC: 136 MMOL/L (ref 133–144)
SODIUM SERPL-SCNC: 137 MMOL/L (ref 133–144)
SODIUM SERPL-SCNC: 137 MMOL/L (ref 133–144)
SODIUM SERPL-SCNC: 138 MMOL/L (ref 133–144)
SODIUM SERPL-SCNC: 139 MMOL/L (ref 133–144)
SODIUM SERPL-SCNC: 140 MMOL/L (ref 133–144)
SODIUM SERPL-SCNC: 140 MMOL/L (ref 133–144)
SODIUM SERPL-SCNC: 141 MMOL/L (ref 133–144)
SODIUM SERPL-SCNC: 142 MMOL/L (ref 133–144)
SODIUM SERPL-SCNC: 143 MMOL/L (ref 133–144)
SODIUM SERPL-SCNC: 143 MMOL/L (ref 133–144)
SODIUM SERPL-SCNC: 144 MMOL/L (ref 133–144)
SODIUM UR-SCNC: 20 MMOL/L
SODIUM UR-SCNC: 23 MMOL/L
SODIUM UR-SCNC: 82 MMOL/L
SOURCE: ABNORMAL
SOURCE: NORMAL
SP GR UR STRIP: 1.01 (ref 1–1.03)
SP GR UR STRIP: 1.02 (ref 1–1.03)
SP GR UR STRIP: NORMAL (ref 1–1.03)
SPECIMEN EXP DATE BLD: NORMAL
SPECIMEN SOURCE FLD: NORMAL
SPECIMEN SOURCE: ABNORMAL
SPECIMEN SOURCE: NORMAL
SPECIMEN VOL ?TM UR: NORMAL ML
SQUAMOUS #/AREA URNS AUTO: 1 /HPF (ref 0–1)
SQUAMOUS #/AREA URNS AUTO: 1 /HPF (ref 0–1)
T4 FREE SERPL-MCNC: 1.08 NG/DL (ref 0.76–1.46)
T4 FREE SERPL-MCNC: 1.14 NG/DL (ref 0.76–1.46)
TRANSFUSION STATUS PATIENT QL: NORMAL
TRIGL SERPL-MCNC: 101 MG/DL
TRIGL SERPL-MCNC: 95 MG/DL
TROPONIN I SERPL-MCNC: 0.09 UG/L (ref 0–0.04)
TROPONIN I SERPL-MCNC: 0.11 UG/L (ref 0–0.04)
TROPONIN I SERPL-MCNC: 0.12 UG/L (ref 0–0.04)
TROPONIN I SERPL-MCNC: 0.12 UG/L (ref 0–0.04)
TROPONIN I SERPL-MCNC: <0.015 UG/L (ref 0–0.04)
TSH SERPL DL<=0.005 MIU/L-ACNC: 0.12 MU/L (ref 0.4–4)
TSH SERPL DL<=0.005 MIU/L-ACNC: 0.33 MU/L (ref 0.4–4)
URATE 24H UR-MRATE: 0.13 G/G CR
URATE UR-MCNC: 7.7 MG/DL
UROBILINOGEN UR STRIP-MCNC: 0 MG/DL (ref 0–2)
UROBILINOGEN UR STRIP-MCNC: NORMAL MG/DL (ref 0–2)
UUN UR-MCNC: 267 MG/DL
UUN UR-MCNC: 734 MG/DL
UUN/CREAT 24H UR: 16 G/G CR
UUN/CREAT 24H UR: 5 G/G CR
VANCOMYCIN SERPL-MCNC: 10.6 MG/L
WBC # BLD AUTO: 10.1 10E9/L (ref 4–11)
WBC # BLD AUTO: 10.7 10E9/L (ref 4–11)
WBC # BLD AUTO: 10.8 10E9/L (ref 4–11)
WBC # BLD AUTO: 10.9 10E9/L (ref 4–11)
WBC # BLD AUTO: 11.6 10E9/L (ref 4–11)
WBC # BLD AUTO: 12.4 10E9/L (ref 4–11)
WBC # BLD AUTO: 12.6 10E9/L (ref 4–11)
WBC # BLD AUTO: 12.9 10E9/L (ref 4–11)
WBC # BLD AUTO: 13.3 10E9/L (ref 4–11)
WBC # BLD AUTO: 13.4 10E9/L (ref 4–11)
WBC # BLD AUTO: 13.4 10E9/L (ref 4–11)
WBC # BLD AUTO: 13.6 10E9/L (ref 4–11)
WBC # BLD AUTO: 14.5 10E9/L (ref 4–11)
WBC # BLD AUTO: 14.6 10E9/L (ref 4–11)
WBC # BLD AUTO: 14.8 10E9/L (ref 4–11)
WBC # BLD AUTO: 14.9 10E9/L (ref 4–11)
WBC # BLD AUTO: 15 10E9/L (ref 4–11)
WBC # BLD AUTO: 15.1 10E9/L (ref 4–11)
WBC # BLD AUTO: 15.9 10E9/L (ref 4–11)
WBC # BLD AUTO: 16 10E9/L (ref 4–11)
WBC # BLD AUTO: 16.2 10E9/L (ref 4–11)
WBC # BLD AUTO: 16.8 10E9/L (ref 4–11)
WBC # BLD AUTO: 16.9 10E9/L (ref 4–11)
WBC # BLD AUTO: 17.9 10E9/L (ref 4–11)
WBC # BLD AUTO: 22.1 10E9/L (ref 4–11)
WBC # BLD AUTO: 5.2 10E9/L (ref 4–11)
WBC # BLD AUTO: 6.8 10E9/L (ref 4–11)
WBC #/AREA URNS AUTO: 11 /HPF (ref 0–5)
WBC #/AREA URNS AUTO: 6 /HPF (ref 0–5)
WBC #/AREA URNS AUTO: 81 /HPF (ref 0–5)
WBC #/AREA URNS AUTO: >182 /HPF (ref 0–5)
WBC #/AREA URNS AUTO: >182 /HPF (ref 0–5)
WBC #/AREA URNS AUTO: NORMAL /HPF
WBC CLUMPS #/AREA URNS HPF: PRESENT /HPF
YEAST #/AREA URNS HPF: ABNORMAL /HPF
ZINC SERPL-MCNC: 40 UG/DL (ref 60–120)

## 2018-01-01 PROCEDURE — 25000128 H RX IP 250 OP 636: Performed by: STUDENT IN AN ORGANIZED HEALTH CARE EDUCATION/TRAINING PROGRAM

## 2018-01-01 PROCEDURE — 25000128 H RX IP 250 OP 636: Performed by: SURGERY

## 2018-01-01 PROCEDURE — A9270 NON-COVERED ITEM OR SERVICE: HCPCS | Mod: GY | Performed by: STUDENT IN AN ORGANIZED HEALTH CARE EDUCATION/TRAINING PROGRAM

## 2018-01-01 PROCEDURE — A9270 NON-COVERED ITEM OR SERVICE: HCPCS | Mod: GY | Performed by: UROLOGY

## 2018-01-01 PROCEDURE — 87040 BLOOD CULTURE FOR BACTERIA: CPT | Performed by: NURSE PRACTITIONER

## 2018-01-01 PROCEDURE — 71045 X-RAY EXAM CHEST 1 VIEW: CPT

## 2018-01-01 PROCEDURE — 27210197 ZZH KIT POWER PICC TRIPLE LUMEN

## 2018-01-01 PROCEDURE — 82947 ASSAY GLUCOSE BLOOD QUANT: CPT | Performed by: UROLOGY

## 2018-01-01 PROCEDURE — 25000125 ZZHC RX 250: Performed by: STUDENT IN AN ORGANIZED HEALTH CARE EDUCATION/TRAINING PROGRAM

## 2018-01-01 PROCEDURE — 87181 SC STD AGAR DILUTION PER AGT: CPT | Performed by: UROLOGY

## 2018-01-01 PROCEDURE — 86923 COMPATIBILITY TEST ELECTRIC: CPT | Performed by: UROLOGY

## 2018-01-01 PROCEDURE — 82803 BLOOD GASES ANY COMBINATION: CPT | Performed by: PHYSICIAN ASSISTANT

## 2018-01-01 PROCEDURE — 40000986 XR CHEST PORT 1 VW

## 2018-01-01 PROCEDURE — 94640 AIRWAY INHALATION TREATMENT: CPT

## 2018-01-01 PROCEDURE — 25000132 ZZH RX MED GY IP 250 OP 250 PS 637: Mod: GY | Performed by: STUDENT IN AN ORGANIZED HEALTH CARE EDUCATION/TRAINING PROGRAM

## 2018-01-01 PROCEDURE — 25000125 ZZHC RX 250: Performed by: PHYSICIAN ASSISTANT

## 2018-01-01 PROCEDURE — 25000125 ZZHC RX 250: Performed by: UROLOGY

## 2018-01-01 PROCEDURE — 93320 DOPPLER ECHO COMPLETE: CPT | Mod: 26 | Performed by: INTERNAL MEDICINE

## 2018-01-01 PROCEDURE — 87086 URINE CULTURE/COLONY COUNT: CPT | Performed by: UROLOGY

## 2018-01-01 PROCEDURE — 40000809 ZZH STATISTIC NO DOCUMENTATION TO SUPPORT CHARGE

## 2018-01-01 PROCEDURE — 83735 ASSAY OF MAGNESIUM: CPT | Performed by: STUDENT IN AN ORGANIZED HEALTH CARE EDUCATION/TRAINING PROGRAM

## 2018-01-01 PROCEDURE — 12000006 ZZH R&B IMCU INTERMEDIATE UMMC

## 2018-01-01 PROCEDURE — 25000132 ZZH RX MED GY IP 250 OP 250 PS 637: Mod: GY | Performed by: UROLOGY

## 2018-01-01 PROCEDURE — 25000128 H RX IP 250 OP 636: Performed by: NURSE PRACTITIONER

## 2018-01-01 PROCEDURE — 25000132 ZZH RX MED GY IP 250 OP 250 PS 637: Mod: GY | Performed by: PHYSICIAN ASSISTANT

## 2018-01-01 PROCEDURE — 85027 COMPLETE CBC AUTOMATED: CPT | Performed by: STUDENT IN AN ORGANIZED HEALTH CARE EDUCATION/TRAINING PROGRAM

## 2018-01-01 PROCEDURE — 00000146 ZZHCL STATISTIC GLUCOSE BY METER IP

## 2018-01-01 PROCEDURE — 87640 STAPH A DNA AMP PROBE: CPT | Performed by: NURSE PRACTITIONER

## 2018-01-01 PROCEDURE — 99233 SBSQ HOSP IP/OBS HIGH 50: CPT | Performed by: PEDIATRICS

## 2018-01-01 PROCEDURE — A9270 NON-COVERED ITEM OR SERVICE: HCPCS | Mod: GY | Performed by: PHYSICIAN ASSISTANT

## 2018-01-01 PROCEDURE — 85027 COMPLETE CBC AUTOMATED: CPT | Performed by: PHYSICIAN ASSISTANT

## 2018-01-01 PROCEDURE — 84132 ASSAY OF SERUM POTASSIUM: CPT | Performed by: INTERNAL MEDICINE

## 2018-01-01 PROCEDURE — 37000009 ZZH ANESTHESIA TECHNICAL FEE, EACH ADDTL 15 MIN: Performed by: UROLOGY

## 2018-01-01 PROCEDURE — 07TC0ZZ RESECTION OF PELVIS LYMPHATIC, OPEN APPROACH: ICD-10-PCS | Performed by: UROLOGY

## 2018-01-01 PROCEDURE — 0UTC0ZZ RESECTION OF CERVIX, OPEN APPROACH: ICD-10-PCS | Performed by: UROLOGY

## 2018-01-01 PROCEDURE — 97110 THERAPEUTIC EXERCISES: CPT | Mod: GP

## 2018-01-01 PROCEDURE — 82803 BLOOD GASES ANY COMBINATION: CPT | Performed by: INTERNAL MEDICINE

## 2018-01-01 PROCEDURE — 83735 ASSAY OF MAGNESIUM: CPT | Performed by: UROLOGY

## 2018-01-01 PROCEDURE — A9270 NON-COVERED ITEM OR SERVICE: HCPCS | Mod: GY | Performed by: NURSE PRACTITIONER

## 2018-01-01 PROCEDURE — 40000556 ZZH STATISTIC PERIPHERAL IV START W US GUIDANCE

## 2018-01-01 PROCEDURE — 93325 DOPPLER ECHO COLOR FLOW MAPG: CPT | Mod: 26 | Performed by: INTERNAL MEDICINE

## 2018-01-01 PROCEDURE — 82803 BLOOD GASES ANY COMBINATION: CPT | Performed by: STUDENT IN AN ORGANIZED HEALTH CARE EDUCATION/TRAINING PROGRAM

## 2018-01-01 PROCEDURE — 36600 WITHDRAWAL OF ARTERIAL BLOOD: CPT

## 2018-01-01 PROCEDURE — 94668 MNPJ CHEST WALL SBSQ: CPT

## 2018-01-01 PROCEDURE — 83605 ASSAY OF LACTIC ACID: CPT | Performed by: INTERNAL MEDICINE

## 2018-01-01 PROCEDURE — C9290 INJ, BUPIVACAINE LIPOSOME: HCPCS | Performed by: STUDENT IN AN ORGANIZED HEALTH CARE EDUCATION/TRAINING PROGRAM

## 2018-01-01 PROCEDURE — 84100 ASSAY OF PHOSPHORUS: CPT | Performed by: STUDENT IN AN ORGANIZED HEALTH CARE EDUCATION/TRAINING PROGRAM

## 2018-01-01 PROCEDURE — 82570 ASSAY OF URINE CREATININE: CPT | Performed by: SURGERY

## 2018-01-01 PROCEDURE — 93005 ELECTROCARDIOGRAM TRACING: CPT

## 2018-01-01 PROCEDURE — G0463 HOSPITAL OUTPT CLINIC VISIT: HCPCS

## 2018-01-01 PROCEDURE — 84100 ASSAY OF PHOSPHORUS: CPT | Performed by: UROLOGY

## 2018-01-01 PROCEDURE — 94640 AIRWAY INHALATION TREATMENT: CPT | Mod: 76

## 2018-01-01 PROCEDURE — 25000128 H RX IP 250 OP 636: Performed by: PHYSICIAN ASSISTANT

## 2018-01-01 PROCEDURE — 81001 URINALYSIS AUTO W/SCOPE: CPT | Performed by: PHYSICIAN ASSISTANT

## 2018-01-01 PROCEDURE — 87106 FUNGI IDENTIFICATION YEAST: CPT | Performed by: NURSE PRACTITIONER

## 2018-01-01 PROCEDURE — 36592 COLLECT BLOOD FROM PICC: CPT | Performed by: INTERNAL MEDICINE

## 2018-01-01 PROCEDURE — 87040 BLOOD CULTURE FOR BACTERIA: CPT | Performed by: STUDENT IN AN ORGANIZED HEALTH CARE EDUCATION/TRAINING PROGRAM

## 2018-01-01 PROCEDURE — 87205 SMEAR GRAM STAIN: CPT | Performed by: PHYSICIAN ASSISTANT

## 2018-01-01 PROCEDURE — 97535 SELF CARE MNGMENT TRAINING: CPT | Mod: GO | Performed by: OCCUPATIONAL THERAPIST

## 2018-01-01 PROCEDURE — 36592 COLLECT BLOOD FROM PICC: CPT | Performed by: STUDENT IN AN ORGANIZED HEALTH CARE EDUCATION/TRAINING PROGRAM

## 2018-01-01 PROCEDURE — P9016 RBC LEUKOCYTES REDUCED: HCPCS | Performed by: UROLOGY

## 2018-01-01 PROCEDURE — 97535 SELF CARE MNGMENT TRAINING: CPT | Mod: GO

## 2018-01-01 PROCEDURE — 40000275 ZZH STATISTIC RCP TIME EA 10 MIN

## 2018-01-01 PROCEDURE — 25000128 H RX IP 250 OP 636: Performed by: UROLOGY

## 2018-01-01 PROCEDURE — 99207 ZZC APP CREDIT; MD BILLING SHARED VISIT: CPT | Performed by: PHYSICIAN ASSISTANT

## 2018-01-01 PROCEDURE — 80048 BASIC METABOLIC PNL TOTAL CA: CPT | Performed by: STUDENT IN AN ORGANIZED HEALTH CARE EDUCATION/TRAINING PROGRAM

## 2018-01-01 PROCEDURE — 83735 ASSAY OF MAGNESIUM: CPT | Performed by: PHYSICIAN ASSISTANT

## 2018-01-01 PROCEDURE — 12000001 ZZH R&B MED SURG/OB UMMC

## 2018-01-01 PROCEDURE — 25000132 ZZH RX MED GY IP 250 OP 250 PS 637: Mod: GY | Performed by: NURSE PRACTITIONER

## 2018-01-01 PROCEDURE — 93010 ELECTROCARDIOGRAM REPORT: CPT | Performed by: INTERNAL MEDICINE

## 2018-01-01 PROCEDURE — 85027 COMPLETE CBC AUTOMATED: CPT | Performed by: UROLOGY

## 2018-01-01 PROCEDURE — 99202 OFFICE O/P NEW SF 15 MIN: CPT | Performed by: UROLOGY

## 2018-01-01 PROCEDURE — 25000128 H RX IP 250 OP 636: Performed by: PEDIATRICS

## 2018-01-01 PROCEDURE — 82533 TOTAL CORTISOL: CPT | Performed by: PHYSICIAN ASSISTANT

## 2018-01-01 PROCEDURE — 25800025 ZZH RX 258

## 2018-01-01 PROCEDURE — 40000014 ZZH STATISTIC ARTERIAL MONITORING DAILY

## 2018-01-01 PROCEDURE — 36592 COLLECT BLOOD FROM PICC: CPT | Performed by: UROLOGY

## 2018-01-01 PROCEDURE — 27210437 ZZH NUTRITION PRODUCT SEMIELEM INTERMED LITER

## 2018-01-01 PROCEDURE — 20000004 ZZH R&B ICU UMMC

## 2018-01-01 PROCEDURE — 99233 SBSQ HOSP IP/OBS HIGH 50: CPT | Performed by: HOSPITALIST

## 2018-01-01 PROCEDURE — 97164 PT RE-EVAL EST PLAN CARE: CPT | Mod: GP | Performed by: PHYSICAL THERAPIST

## 2018-01-01 PROCEDURE — 82330 ASSAY OF CALCIUM: CPT | Performed by: UROLOGY

## 2018-01-01 PROCEDURE — 40000986 XR ABDOMEN PORT 1 VW

## 2018-01-01 PROCEDURE — 25000128 H RX IP 250 OP 636

## 2018-01-01 PROCEDURE — 86901 BLOOD TYPING SEROLOGIC RH(D): CPT | Performed by: UROLOGY

## 2018-01-01 PROCEDURE — 87070 CULTURE OTHR SPECIMN AEROBIC: CPT | Performed by: PHYSICIAN ASSISTANT

## 2018-01-01 PROCEDURE — A9502 TC99M TETROFOSMIN: HCPCS | Performed by: STUDENT IN AN ORGANIZED HEALTH CARE EDUCATION/TRAINING PROGRAM

## 2018-01-01 PROCEDURE — 36415 COLL VENOUS BLD VENIPUNCTURE: CPT | Performed by: INTERNAL MEDICINE

## 2018-01-01 PROCEDURE — 80048 BASIC METABOLIC PNL TOTAL CA: CPT | Performed by: UROLOGY

## 2018-01-01 PROCEDURE — 83605 ASSAY OF LACTIC ACID: CPT | Performed by: PHYSICIAN ASSISTANT

## 2018-01-01 PROCEDURE — 27210136 ZZH KIT CATH ARTERIAL EXT SUPPLY

## 2018-01-01 PROCEDURE — 74177 CT ABD & PELVIS W/CONTRAST: CPT

## 2018-01-01 PROCEDURE — 99207 ZZC NO CHARGE SIGN-OFF PS: CPT | Performed by: PHYSICIAN ASSISTANT

## 2018-01-01 PROCEDURE — 80076 HEPATIC FUNCTION PANEL: CPT | Performed by: STUDENT IN AN ORGANIZED HEALTH CARE EDUCATION/TRAINING PROGRAM

## 2018-01-01 PROCEDURE — 0TTB0ZZ RESECTION OF BLADDER, OPEN APPROACH: ICD-10-PCS | Performed by: UROLOGY

## 2018-01-01 PROCEDURE — 25800025 ZZH RX 258: Performed by: STUDENT IN AN ORGANIZED HEALTH CARE EDUCATION/TRAINING PROGRAM

## 2018-01-01 PROCEDURE — 82140 ASSAY OF AMMONIA: CPT | Performed by: STUDENT IN AN ORGANIZED HEALTH CARE EDUCATION/TRAINING PROGRAM

## 2018-01-01 PROCEDURE — 25000132 ZZH RX MED GY IP 250 OP 250 PS 637: Mod: GY | Performed by: OPTOMETRIST

## 2018-01-01 PROCEDURE — 87040 BLOOD CULTURE FOR BACTERIA: CPT | Performed by: INTERNAL MEDICINE

## 2018-01-01 PROCEDURE — 86900 BLOOD TYPING SEROLOGIC ABO: CPT | Performed by: UROLOGY

## 2018-01-01 PROCEDURE — 84478 ASSAY OF TRIGLYCERIDES: CPT | Performed by: STUDENT IN AN ORGANIZED HEALTH CARE EDUCATION/TRAINING PROGRAM

## 2018-01-01 PROCEDURE — P9047 ALBUMIN (HUMAN), 25%, 50ML: HCPCS | Performed by: STUDENT IN AN ORGANIZED HEALTH CARE EDUCATION/TRAINING PROGRAM

## 2018-01-01 PROCEDURE — 84100 ASSAY OF PHOSPHORUS: CPT | Performed by: PHYSICIAN ASSISTANT

## 2018-01-01 PROCEDURE — 25000125 ZZHC RX 250: Performed by: INTERNAL MEDICINE

## 2018-01-01 PROCEDURE — 82803 BLOOD GASES ANY COMBINATION: CPT | Performed by: ANESTHESIOLOGY

## 2018-01-01 PROCEDURE — 74018 RADEX ABDOMEN 1 VIEW: CPT

## 2018-01-01 PROCEDURE — 83036 HEMOGLOBIN GLYCOSYLATED A1C: CPT | Performed by: STUDENT IN AN ORGANIZED HEALTH CARE EDUCATION/TRAINING PROGRAM

## 2018-01-01 PROCEDURE — 88331 PATH CONSLTJ SURG 1 BLK 1SPC: CPT | Performed by: UROLOGY

## 2018-01-01 PROCEDURE — 84439 ASSAY OF FREE THYROXINE: CPT | Performed by: STUDENT IN AN ORGANIZED HEALTH CARE EDUCATION/TRAINING PROGRAM

## 2018-01-01 PROCEDURE — 36415 COLL VENOUS BLD VENIPUNCTURE: CPT | Performed by: STUDENT IN AN ORGANIZED HEALTH CARE EDUCATION/TRAINING PROGRAM

## 2018-01-01 PROCEDURE — 87181 SC STD AGAR DILUTION PER AGT: CPT | Performed by: NURSE PRACTITIONER

## 2018-01-01 PROCEDURE — 84300 ASSAY OF URINE SODIUM: CPT | Performed by: PHYSICIAN ASSISTANT

## 2018-01-01 PROCEDURE — 84300 ASSAY OF URINE SODIUM: CPT | Performed by: SURGERY

## 2018-01-01 PROCEDURE — 25000128 H RX IP 250 OP 636: Performed by: INTERNAL MEDICINE

## 2018-01-01 PROCEDURE — 84560 ASSAY OF URINE/URIC ACID: CPT | Performed by: NURSE PRACTITIONER

## 2018-01-01 PROCEDURE — 0T180ZC BYPASS BILATERAL URETERS TO ILEOCUTANEOUS, OPEN APPROACH: ICD-10-PCS | Performed by: UROLOGY

## 2018-01-01 PROCEDURE — 87077 CULTURE AEROBIC IDENTIFY: CPT | Performed by: PHYSICIAN ASSISTANT

## 2018-01-01 PROCEDURE — 36415 COLL VENOUS BLD VENIPUNCTURE: CPT | Performed by: UROLOGY

## 2018-01-01 PROCEDURE — 94003 VENT MGMT INPAT SUBQ DAY: CPT

## 2018-01-01 PROCEDURE — 87106 FUNGI IDENTIFICATION YEAST: CPT | Performed by: STUDENT IN AN ORGANIZED HEALTH CARE EDUCATION/TRAINING PROGRAM

## 2018-01-01 PROCEDURE — 87106 FUNGI IDENTIFICATION YEAST: CPT | Performed by: PHYSICIAN ASSISTANT

## 2018-01-01 PROCEDURE — 94660 CPAP INITIATION&MGMT: CPT

## 2018-01-01 PROCEDURE — 84484 ASSAY OF TROPONIN QUANT: CPT | Performed by: PHYSICIAN ASSISTANT

## 2018-01-01 PROCEDURE — 25000128 H RX IP 250 OP 636: Performed by: NURSE ANESTHETIST, CERTIFIED REGISTERED

## 2018-01-01 PROCEDURE — 87070 CULTURE OTHR SPECIMN AEROBIC: CPT | Performed by: STUDENT IN AN ORGANIZED HEALTH CARE EDUCATION/TRAINING PROGRAM

## 2018-01-01 PROCEDURE — 87641 MR-STAPH DNA AMP PROBE: CPT | Performed by: NURSE PRACTITIONER

## 2018-01-01 PROCEDURE — 82805 BLOOD GASES W/O2 SATURATION: CPT | Performed by: PHYSICIAN ASSISTANT

## 2018-01-01 PROCEDURE — 80053 COMPREHEN METABOLIC PANEL: CPT | Performed by: PHYSICIAN ASSISTANT

## 2018-01-01 PROCEDURE — 87205 SMEAR GRAM STAIN: CPT | Performed by: STUDENT IN AN ORGANIZED HEALTH CARE EDUCATION/TRAINING PROGRAM

## 2018-01-01 PROCEDURE — P9047 ALBUMIN (HUMAN), 25%, 50ML: HCPCS | Performed by: PHYSICIAN ASSISTANT

## 2018-01-01 PROCEDURE — 40000193 ZZH STATISTIC PT WARD VISIT

## 2018-01-01 PROCEDURE — 80053 COMPREHEN METABOLIC PANEL: CPT | Performed by: STUDENT IN AN ORGANIZED HEALTH CARE EDUCATION/TRAINING PROGRAM

## 2018-01-01 PROCEDURE — 85610 PROTHROMBIN TIME: CPT | Performed by: STUDENT IN AN ORGANIZED HEALTH CARE EDUCATION/TRAINING PROGRAM

## 2018-01-01 PROCEDURE — 83930 ASSAY OF BLOOD OSMOLALITY: CPT | Performed by: STUDENT IN AN ORGANIZED HEALTH CARE EDUCATION/TRAINING PROGRAM

## 2018-01-01 PROCEDURE — 84295 ASSAY OF SERUM SODIUM: CPT | Performed by: PHYSICIAN ASSISTANT

## 2018-01-01 PROCEDURE — 99291 CRITICAL CARE FIRST HOUR: CPT | Mod: GC | Performed by: SURGERY

## 2018-01-01 PROCEDURE — 99233 SBSQ HOSP IP/OBS HIGH 50: CPT | Performed by: INTERNAL MEDICINE

## 2018-01-01 PROCEDURE — 80048 BASIC METABOLIC PNL TOTAL CA: CPT | Performed by: PHYSICIAN ASSISTANT

## 2018-01-01 PROCEDURE — 82330 ASSAY OF CALCIUM: CPT | Performed by: SURGERY

## 2018-01-01 PROCEDURE — 25000125 ZZHC RX 250: Performed by: PEDIATRICS

## 2018-01-01 PROCEDURE — 80053 COMPREHEN METABOLIC PANEL: CPT | Performed by: UROLOGY

## 2018-01-01 PROCEDURE — 87186 SC STD MICRODIL/AGAR DIL: CPT | Performed by: STUDENT IN AN ORGANIZED HEALTH CARE EDUCATION/TRAINING PROGRAM

## 2018-01-01 PROCEDURE — 84478 ASSAY OF TRIGLYCERIDES: CPT | Performed by: INTERNAL MEDICINE

## 2018-01-01 PROCEDURE — G0463 HOSPITAL OUTPT CLINIC VISIT: HCPCS | Mod: ZF

## 2018-01-01 PROCEDURE — 99291 CRITICAL CARE FIRST HOUR: CPT | Mod: 25 | Performed by: SURGERY

## 2018-01-01 PROCEDURE — 25000132 ZZH RX MED GY IP 250 OP 250 PS 637: Mod: GY | Performed by: INTERNAL MEDICINE

## 2018-01-01 PROCEDURE — 87181 SC STD AGAR DILUTION PER AGT: CPT | Performed by: STUDENT IN AN ORGANIZED HEALTH CARE EDUCATION/TRAINING PROGRAM

## 2018-01-01 PROCEDURE — 40000133 ZZH STATISTIC OT WARD VISIT

## 2018-01-01 PROCEDURE — 93308 TTE F-UP OR LMTD: CPT | Mod: 26 | Performed by: INTERNAL MEDICINE

## 2018-01-01 PROCEDURE — 40000281 ZZH STATISTIC TRANSPORT TIME EA 15 MIN

## 2018-01-01 PROCEDURE — 86140 C-REACTIVE PROTEIN: CPT | Performed by: STUDENT IN AN ORGANIZED HEALTH CARE EDUCATION/TRAINING PROGRAM

## 2018-01-01 PROCEDURE — 82947 ASSAY GLUCOSE BLOOD QUANT: CPT | Performed by: ANESTHESIOLOGY

## 2018-01-01 PROCEDURE — 40000802 ZZH SITE CHECK

## 2018-01-01 PROCEDURE — 99205 OFFICE O/P NEW HI 60 MIN: CPT | Performed by: INTERNAL MEDICINE

## 2018-01-01 PROCEDURE — 97110 THERAPEUTIC EXERCISES: CPT | Mod: GP | Performed by: PHYSICAL THERAPIST

## 2018-01-01 PROCEDURE — 81001 URINALYSIS AUTO W/SCOPE: CPT | Performed by: STUDENT IN AN ORGANIZED HEALTH CARE EDUCATION/TRAINING PROGRAM

## 2018-01-01 PROCEDURE — 25000125 ZZHC RX 250: Performed by: SURGERY

## 2018-01-01 PROCEDURE — 40000671 ZZH STATISTIC ANESTHESIA CASE

## 2018-01-01 PROCEDURE — 34300033 ZZH RX 343: Performed by: STUDENT IN AN ORGANIZED HEALTH CARE EDUCATION/TRAINING PROGRAM

## 2018-01-01 PROCEDURE — 36415 COLL VENOUS BLD VENIPUNCTURE: CPT | Performed by: PHYSICIAN ASSISTANT

## 2018-01-01 PROCEDURE — 99233 SBSQ HOSP IP/OBS HIGH 50: CPT | Performed by: NURSE PRACTITIONER

## 2018-01-01 PROCEDURE — 36569 INSJ PICC 5 YR+ W/O IMAGING: CPT

## 2018-01-01 PROCEDURE — 84100 ASSAY OF PHOSPHORUS: CPT | Performed by: GENERAL PRACTICE

## 2018-01-01 PROCEDURE — 25000131 ZZH RX MED GY IP 250 OP 636 PS 637: Mod: GY | Performed by: STUDENT IN AN ORGANIZED HEALTH CARE EDUCATION/TRAINING PROGRAM

## 2018-01-01 PROCEDURE — 80048 BASIC METABOLIC PNL TOTAL CA: CPT | Performed by: NURSE PRACTITIONER

## 2018-01-01 PROCEDURE — 0UT70ZZ RESECTION OF BILATERAL FALLOPIAN TUBES, OPEN APPROACH: ICD-10-PCS | Performed by: UROLOGY

## 2018-01-01 PROCEDURE — 93321 DOPPLER ECHO F-UP/LMTD STD: CPT

## 2018-01-01 PROCEDURE — 84295 ASSAY OF SERUM SODIUM: CPT | Performed by: UROLOGY

## 2018-01-01 PROCEDURE — 71046 X-RAY EXAM CHEST 2 VIEWS: CPT

## 2018-01-01 PROCEDURE — 97530 THERAPEUTIC ACTIVITIES: CPT | Mod: GO

## 2018-01-01 PROCEDURE — 82803 BLOOD GASES ANY COMBINATION: CPT | Performed by: UROLOGY

## 2018-01-01 PROCEDURE — 82330 ASSAY OF CALCIUM: CPT | Performed by: ANESTHESIOLOGY

## 2018-01-01 PROCEDURE — 36592 COLLECT BLOOD FROM PICC: CPT | Performed by: PHYSICIAN ASSISTANT

## 2018-01-01 PROCEDURE — 71000016 ZZH RECOVERY PHASE 1 LEVEL 3 FIRST HR: Performed by: UROLOGY

## 2018-01-01 PROCEDURE — 93970 EXTREMITY STUDY: CPT

## 2018-01-01 PROCEDURE — 40000561 ZZH STATISTIC CODE BLUE NO ACCESS REQUIRED

## 2018-01-01 PROCEDURE — 84134 ASSAY OF PREALBUMIN: CPT | Performed by: UROLOGY

## 2018-01-01 PROCEDURE — 86850 RBC ANTIBODY SCREEN: CPT | Performed by: UROLOGY

## 2018-01-01 PROCEDURE — 40000171 ZZH STATISTIC PRE-PROCEDURE ASSESSMENT III: Performed by: UROLOGY

## 2018-01-01 PROCEDURE — 25000565 ZZH ISOFLURANE, EA 15 MIN: Performed by: UROLOGY

## 2018-01-01 PROCEDURE — 25500064 ZZH RX 255 OP 636: Performed by: UROLOGY

## 2018-01-01 PROCEDURE — 87088 URINE BACTERIA CULTURE: CPT | Performed by: UROLOGY

## 2018-01-01 PROCEDURE — 83880 ASSAY OF NATRIURETIC PEPTIDE: CPT | Performed by: STUDENT IN AN ORGANIZED HEALTH CARE EDUCATION/TRAINING PROGRAM

## 2018-01-01 PROCEDURE — 87493 C DIFF AMPLIFIED PROBE: CPT | Performed by: STUDENT IN AN ORGANIZED HEALTH CARE EDUCATION/TRAINING PROGRAM

## 2018-01-01 PROCEDURE — 84478 ASSAY OF TRIGLYCERIDES: CPT | Performed by: UROLOGY

## 2018-01-01 PROCEDURE — 85027 COMPLETE CBC AUTOMATED: CPT | Performed by: NURSE PRACTITIONER

## 2018-01-01 PROCEDURE — 82947 ASSAY GLUCOSE BLOOD QUANT: CPT | Performed by: STUDENT IN AN ORGANIZED HEALTH CARE EDUCATION/TRAINING PROGRAM

## 2018-01-01 PROCEDURE — 83605 ASSAY OF LACTIC ACID: CPT | Performed by: STUDENT IN AN ORGANIZED HEALTH CARE EDUCATION/TRAINING PROGRAM

## 2018-01-01 PROCEDURE — 93017 CV STRESS TEST TRACING ONLY: CPT

## 2018-01-01 PROCEDURE — 85610 PROTHROMBIN TIME: CPT | Performed by: UROLOGY

## 2018-01-01 PROCEDURE — 84134 ASSAY OF PREALBUMIN: CPT | Performed by: STUDENT IN AN ORGANIZED HEALTH CARE EDUCATION/TRAINING PROGRAM

## 2018-01-01 PROCEDURE — 25000125 ZZHC RX 250: Performed by: NURSE ANESTHETIST, CERTIFIED REGISTERED

## 2018-01-01 PROCEDURE — 27210995 ZZH RX 272: Performed by: PHYSICIAN ASSISTANT

## 2018-01-01 PROCEDURE — 84484 ASSAY OF TROPONIN QUANT: CPT | Performed by: UROLOGY

## 2018-01-01 PROCEDURE — 97530 THERAPEUTIC ACTIVITIES: CPT | Mod: GP

## 2018-01-01 PROCEDURE — 40000141 ZZH STATISTIC PERIPHERAL IV START W/O US GUIDANCE

## 2018-01-01 PROCEDURE — 83935 ASSAY OF URINE OSMOLALITY: CPT | Performed by: PHYSICIAN ASSISTANT

## 2018-01-01 PROCEDURE — 85027 COMPLETE CBC AUTOMATED: CPT | Performed by: GENERAL PRACTICE

## 2018-01-01 PROCEDURE — 84443 ASSAY THYROID STIM HORMONE: CPT | Performed by: STUDENT IN AN ORGANIZED HEALTH CARE EDUCATION/TRAINING PROGRAM

## 2018-01-01 PROCEDURE — 82570 ASSAY OF URINE CREATININE: CPT | Performed by: UROLOGY

## 2018-01-01 PROCEDURE — 85610 PROTHROMBIN TIME: CPT | Performed by: INTERNAL MEDICINE

## 2018-01-01 PROCEDURE — 80048 BASIC METABOLIC PNL TOTAL CA: CPT | Performed by: INTERNAL MEDICINE

## 2018-01-01 PROCEDURE — 5A1945Z RESPIRATORY VENTILATION, 24-96 CONSECUTIVE HOURS: ICD-10-PCS | Performed by: HOSPITALIST

## 2018-01-01 PROCEDURE — 93306 TTE W/DOPPLER COMPLETE: CPT | Mod: 26 | Performed by: INTERNAL MEDICINE

## 2018-01-01 PROCEDURE — 94667 MNPJ CHEST WALL 1ST: CPT

## 2018-01-01 PROCEDURE — 99214 OFFICE O/P EST MOD 30 MIN: CPT | Mod: ZP | Performed by: INTERNAL MEDICINE

## 2018-01-01 PROCEDURE — 99204 OFFICE O/P NEW MOD 45 MIN: CPT | Mod: 25 | Performed by: INTERNAL MEDICINE

## 2018-01-01 PROCEDURE — 87186 SC STD MICRODIL/AGAR DIL: CPT | Performed by: PHYSICIAN ASSISTANT

## 2018-01-01 PROCEDURE — 40000193 ZZH STATISTIC PT WARD VISIT: Performed by: PHYSICAL THERAPIST

## 2018-01-01 PROCEDURE — 84145 PROCALCITONIN (PCT): CPT | Performed by: STUDENT IN AN ORGANIZED HEALTH CARE EDUCATION/TRAINING PROGRAM

## 2018-01-01 PROCEDURE — A9270 NON-COVERED ITEM OR SERVICE: HCPCS | Mod: GY | Performed by: INTERNAL MEDICINE

## 2018-01-01 PROCEDURE — 37000008 ZZH ANESTHESIA TECHNICAL FEE, 1ST 30 MIN: Performed by: UROLOGY

## 2018-01-01 PROCEDURE — 80048 BASIC METABOLIC PNL TOTAL CA: CPT | Performed by: SURGERY

## 2018-01-01 PROCEDURE — 82570 ASSAY OF URINE CREATININE: CPT | Performed by: PHYSICIAN ASSISTANT

## 2018-01-01 PROCEDURE — 87040 BLOOD CULTURE FOR BACTERIA: CPT | Performed by: PHYSICIAN ASSISTANT

## 2018-01-01 PROCEDURE — 82805 BLOOD GASES W/O2 SATURATION: CPT | Performed by: STUDENT IN AN ORGANIZED HEALTH CARE EDUCATION/TRAINING PROGRAM

## 2018-01-01 PROCEDURE — 78452 HT MUSCLE IMAGE SPECT MULT: CPT

## 2018-01-01 PROCEDURE — 82805 BLOOD GASES W/O2 SATURATION: CPT | Performed by: INTERNAL MEDICINE

## 2018-01-01 PROCEDURE — 87077 CULTURE AEROBIC IDENTIFY: CPT | Performed by: STUDENT IN AN ORGANIZED HEALTH CARE EDUCATION/TRAINING PROGRAM

## 2018-01-01 PROCEDURE — 84484 ASSAY OF TROPONIN QUANT: CPT | Performed by: INTERNAL MEDICINE

## 2018-01-01 PROCEDURE — 84540 ASSAY OF URINE/UREA-N: CPT | Performed by: NURSE PRACTITIONER

## 2018-01-01 PROCEDURE — 85049 AUTOMATED PLATELET COUNT: CPT | Performed by: STUDENT IN AN ORGANIZED HEALTH CARE EDUCATION/TRAINING PROGRAM

## 2018-01-01 PROCEDURE — 97530 THERAPEUTIC ACTIVITIES: CPT | Mod: GP | Performed by: PHYSICAL THERAPIST

## 2018-01-01 PROCEDURE — 99291 CRITICAL CARE FIRST HOUR: CPT | Performed by: NURSE PRACTITIONER

## 2018-01-01 PROCEDURE — 87086 URINE CULTURE/COLONY COUNT: CPT | Performed by: PHYSICIAN ASSISTANT

## 2018-01-01 PROCEDURE — 87106 FUNGI IDENTIFICATION YEAST: CPT | Performed by: UROLOGY

## 2018-01-01 PROCEDURE — 0KXL0Z6 TRANSFER LEFT ABDOMEN MUSCLE, TRANSVERSE RECTUS ABDOMINIS MYOCUTANEOUS FLAP, OPEN APPROACH: ICD-10-PCS | Performed by: PLASTIC SURGERY

## 2018-01-01 PROCEDURE — 36590 REMOVAL TUNNELED CV CATH: CPT | Performed by: SURGERY

## 2018-01-01 PROCEDURE — 25000132 ZZH RX MED GY IP 250 OP 250 PS 637: Mod: GY | Performed by: ANESTHESIOLOGY

## 2018-01-01 PROCEDURE — C1769 GUIDE WIRE: HCPCS

## 2018-01-01 PROCEDURE — 0UT20ZZ RESECTION OF BILATERAL OVARIES, OPEN APPROACH: ICD-10-PCS | Performed by: UROLOGY

## 2018-01-01 PROCEDURE — 97166 OT EVAL MOD COMPLEX 45 MIN: CPT | Mod: GO | Performed by: OCCUPATIONAL THERAPIST

## 2018-01-01 PROCEDURE — 81001 URINALYSIS AUTO W/SCOPE: CPT | Performed by: INTERNAL MEDICINE

## 2018-01-01 PROCEDURE — 84439 ASSAY OF FREE THYROXINE: CPT | Performed by: PHYSICIAN ASSISTANT

## 2018-01-01 PROCEDURE — 94645 CONT INHLJ TX EACH ADDL HOUR: CPT

## 2018-01-01 PROCEDURE — 70450 CT HEAD/BRAIN W/O DYE: CPT

## 2018-01-01 PROCEDURE — 99291 CRITICAL CARE FIRST HOUR: CPT | Mod: 24 | Performed by: SURGERY

## 2018-01-01 PROCEDURE — 87088 URINE BACTERIA CULTURE: CPT | Performed by: PHYSICIAN ASSISTANT

## 2018-01-01 PROCEDURE — 36415 COLL VENOUS BLD VENIPUNCTURE: CPT | Performed by: GENERAL PRACTICE

## 2018-01-01 PROCEDURE — 93320 DOPPLER ECHO COMPLETE: CPT

## 2018-01-01 PROCEDURE — C9399 UNCLASSIFIED DRUGS OR BIOLOG: HCPCS | Performed by: NURSE ANESTHETIST, CERTIFIED REGISTERED

## 2018-01-01 PROCEDURE — 87186 SC STD MICRODIL/AGAR DIL: CPT | Performed by: UROLOGY

## 2018-01-01 PROCEDURE — 00000103 ZZHCL STATISTIC CYTO-FISH QC 88120: Performed by: INTERNAL MEDICINE

## 2018-01-01 PROCEDURE — 40000196 ZZH STATISTIC RAPCV CVP MONITORING

## 2018-01-01 PROCEDURE — 84145 PROCALCITONIN (PCT): CPT | Performed by: NURSE PRACTITIONER

## 2018-01-01 PROCEDURE — 25000125 ZZHC RX 250

## 2018-01-01 PROCEDURE — 74018 RADEX ABDOMEN 1 VIEW: CPT | Mod: 76

## 2018-01-01 PROCEDURE — 88112 CYTOPATH CELL ENHANCE TECH: CPT | Performed by: INTERNAL MEDICINE

## 2018-01-01 PROCEDURE — 40000901 ZZH STATISTIC WOC PT EDUCATION, 0-15 MIN

## 2018-01-01 PROCEDURE — P9041 ALBUMIN (HUMAN),5%, 50ML: HCPCS | Performed by: STUDENT IN AN ORGANIZED HEALTH CARE EDUCATION/TRAINING PROGRAM

## 2018-01-01 PROCEDURE — 51597 REMOVAL OF PELVIC STRUCTURES: CPT | Performed by: UROLOGY

## 2018-01-01 PROCEDURE — 80048 BASIC METABOLIC PNL TOTAL CA: CPT | Performed by: ANESTHESIOLOGY

## 2018-01-01 PROCEDURE — 97161 PT EVAL LOW COMPLEX 20 MIN: CPT | Mod: GP

## 2018-01-01 PROCEDURE — 94002 VENT MGMT INPAT INIT DAY: CPT

## 2018-01-01 PROCEDURE — 82803 BLOOD GASES ANY COMBINATION: CPT | Performed by: NURSE PRACTITIONER

## 2018-01-01 PROCEDURE — 0UT90ZZ RESECTION OF UTERUS, OPEN APPROACH: ICD-10-PCS | Performed by: UROLOGY

## 2018-01-01 PROCEDURE — 88307 TISSUE EXAM BY PATHOLOGIST: CPT | Performed by: UROLOGY

## 2018-01-01 PROCEDURE — 50820 CONSTRUCT BOWEL BLADDER: CPT | Performed by: UROLOGY

## 2018-01-01 PROCEDURE — 88309 TISSUE EXAM BY PATHOLOGIST: CPT | Performed by: UROLOGY

## 2018-01-01 PROCEDURE — 93321 DOPPLER ECHO F-UP/LMTD STD: CPT | Mod: 26 | Performed by: INTERNAL MEDICINE

## 2018-01-01 PROCEDURE — 82803 BLOOD GASES ANY COMBINATION: CPT | Performed by: SURGERY

## 2018-01-01 PROCEDURE — 40000133 ZZH STATISTIC OT WARD VISIT: Performed by: OCCUPATIONAL THERAPIST

## 2018-01-01 PROCEDURE — 85027 COMPLETE CBC AUTOMATED: CPT | Performed by: SURGERY

## 2018-01-01 PROCEDURE — 85027 COMPLETE CBC AUTOMATED: CPT | Performed by: INTERNAL MEDICINE

## 2018-01-01 PROCEDURE — 36415 COLL VENOUS BLD VENIPUNCTURE: CPT | Performed by: SURGERY

## 2018-01-01 PROCEDURE — 84132 ASSAY OF SERUM POTASSIUM: CPT | Performed by: UROLOGY

## 2018-01-01 PROCEDURE — 80202 ASSAY OF VANCOMYCIN: CPT | Performed by: UROLOGY

## 2018-01-01 PROCEDURE — 36000070 ZZH SURGERY LEVEL 5 EA 15 ADDTL MIN - UMMC: Performed by: UROLOGY

## 2018-01-01 PROCEDURE — 25000131 ZZH RX MED GY IP 250 OP 636 PS 637: Mod: GY | Performed by: CLINICAL NURSE SPECIALIST

## 2018-01-01 PROCEDURE — A9270 NON-COVERED ITEM OR SERVICE: HCPCS | Mod: GY | Performed by: ANESTHESIOLOGY

## 2018-01-01 PROCEDURE — 93306 TTE W/DOPPLER COMPLETE: CPT

## 2018-01-01 PROCEDURE — 84100 ASSAY OF PHOSPHORUS: CPT | Performed by: NURSE PRACTITIONER

## 2018-01-01 PROCEDURE — 3E0436Z INTRODUCTION OF NUTRITIONAL SUBSTANCE INTO CENTRAL VEIN, PERCUTANEOUS APPROACH: ICD-10-PCS | Performed by: UROLOGY

## 2018-01-01 PROCEDURE — 88305 TISSUE EXAM BY PATHOLOGIST: CPT | Performed by: UROLOGY

## 2018-01-01 PROCEDURE — 84630 ASSAY OF ZINC: CPT | Performed by: UROLOGY

## 2018-01-01 PROCEDURE — 83605 ASSAY OF LACTIC ACID: CPT | Performed by: CLINICAL NURSE SPECIALIST

## 2018-01-01 PROCEDURE — P9041 ALBUMIN (HUMAN),5%, 50ML: HCPCS | Performed by: NURSE ANESTHETIST, CERTIFIED REGISTERED

## 2018-01-01 PROCEDURE — 84132 ASSAY OF SERUM POTASSIUM: CPT | Performed by: ANESTHESIOLOGY

## 2018-01-01 PROCEDURE — 87186 SC STD MICRODIL/AGAR DIL: CPT | Mod: XU | Performed by: UROLOGY

## 2018-01-01 PROCEDURE — 27210794 ZZH OR GENERAL SUPPLY STERILE: Performed by: UROLOGY

## 2018-01-01 PROCEDURE — 82540 ASSAY OF CREATINE: CPT | Performed by: NURSE PRACTITIONER

## 2018-01-01 PROCEDURE — 80048 BASIC METABOLIC PNL TOTAL CA: CPT | Performed by: GENERAL PRACTICE

## 2018-01-01 PROCEDURE — 84295 ASSAY OF SERUM SODIUM: CPT | Performed by: ANESTHESIOLOGY

## 2018-01-01 PROCEDURE — 71000017 ZZH RECOVERY PHASE 1 LEVEL 3 EA ADDTL HR: Performed by: UROLOGY

## 2018-01-01 PROCEDURE — 84540 ASSAY OF URINE/UREA-N: CPT | Performed by: PHYSICIAN ASSISTANT

## 2018-01-01 PROCEDURE — 83735 ASSAY OF MAGNESIUM: CPT | Performed by: GENERAL PRACTICE

## 2018-01-01 PROCEDURE — 94644 CONT INHLJ TX 1ST HOUR: CPT

## 2018-01-01 PROCEDURE — 0ULG7ZZ OCCLUSION OF VAGINA, VIA NATURAL OR ARTIFICIAL OPENING: ICD-10-PCS | Performed by: PLASTIC SURGERY

## 2018-01-01 PROCEDURE — C2617 STENT, NON-COR, TEM W/O DEL: HCPCS | Performed by: UROLOGY

## 2018-01-01 PROCEDURE — 84443 ASSAY THYROID STIM HORMONE: CPT | Performed by: PHYSICIAN ASSISTANT

## 2018-01-01 PROCEDURE — 83735 ASSAY OF MAGNESIUM: CPT | Performed by: NURSE PRACTITIONER

## 2018-01-01 PROCEDURE — 36000068 ZZH SURGERY LEVEL 5 1ST 30 MIN - UMMC: Performed by: UROLOGY

## 2018-01-01 PROCEDURE — 83880 ASSAY OF NATRIURETIC PEPTIDE: CPT | Performed by: NURSE PRACTITIONER

## 2018-01-01 PROCEDURE — 25000131 ZZH RX MED GY IP 250 OP 636 PS 637: Mod: GY | Performed by: SURGERY

## 2018-01-01 PROCEDURE — 97110 THERAPEUTIC EXERCISES: CPT | Mod: GO

## 2018-01-01 PROCEDURE — 36592 COLLECT BLOOD FROM PICC: CPT | Performed by: NURSE PRACTITIONER

## 2018-01-01 PROCEDURE — 93312 ECHO TRANSESOPHAGEAL: CPT | Mod: 26 | Performed by: INTERNAL MEDICINE

## 2018-01-01 PROCEDURE — 71260 CT THORAX DX C+: CPT

## 2018-01-01 PROCEDURE — 99222 1ST HOSP IP/OBS MODERATE 55: CPT | Performed by: INTERNAL MEDICINE

## 2018-01-01 PROCEDURE — 85025 COMPLETE CBC W/AUTO DIFF WBC: CPT | Performed by: NURSE PRACTITIONER

## 2018-01-01 PROCEDURE — 82570 ASSAY OF URINE CREATININE: CPT | Performed by: STUDENT IN AN ORGANIZED HEALTH CARE EDUCATION/TRAINING PROGRAM

## 2018-01-01 DEVICE — STENT URINARY DIVERSION PERCFLEX SET 7FRX80CM M0061602100: Type: IMPLANTABLE DEVICE | Site: URETER | Status: FUNCTIONAL

## 2018-01-01 RX ORDER — ATORVASTATIN CALCIUM 80 MG/1
80 TABLET, FILM COATED ORAL EVERY EVENING
Status: DISCONTINUED | OUTPATIENT
Start: 2018-01-01 | End: 2018-01-01

## 2018-01-01 RX ORDER — EPHEDRINE SULFATE 50 MG/ML
INJECTION, SOLUTION INTRAMUSCULAR; INTRAVENOUS; SUBCUTANEOUS PRN
Status: DISCONTINUED | OUTPATIENT
Start: 2018-01-01 | End: 2018-01-01

## 2018-01-01 RX ORDER — HYDRALAZINE HYDROCHLORIDE 10 MG/1
20 TABLET, FILM COATED ORAL 2 TIMES DAILY
Status: DISCONTINUED | OUTPATIENT
Start: 2018-01-01 | End: 2018-01-01

## 2018-01-01 RX ORDER — LIDOCAINE 4 G/G
2 PATCH TOPICAL
Status: DISCONTINUED | OUTPATIENT
Start: 2018-01-01 | End: 2018-01-01 | Stop reason: CLARIF

## 2018-01-01 RX ORDER — FUROSEMIDE 10 MG/ML
20 INJECTION INTRAMUSCULAR; INTRAVENOUS 2 TIMES DAILY
Status: DISCONTINUED | OUTPATIENT
Start: 2018-01-01 | End: 2018-01-01

## 2018-01-01 RX ORDER — METOPROLOL TARTRATE 25 MG/1
25 TABLET, FILM COATED ORAL 2 TIMES DAILY
Status: DISCONTINUED | OUTPATIENT
Start: 2018-01-01 | End: 2018-01-01

## 2018-01-01 RX ORDER — METOPROLOL TARTRATE 50 MG
50 TABLET ORAL 2 TIMES DAILY
Status: DISCONTINUED | OUTPATIENT
Start: 2018-01-01 | End: 2018-01-01

## 2018-01-01 RX ORDER — LIDOCAINE 4 G/G
1-2 PATCH TOPICAL
Status: DISCONTINUED | OUTPATIENT
Start: 2018-01-01 | End: 2018-01-01

## 2018-01-01 RX ORDER — DEXTROSE MONOHYDRATE 25 G/50ML
25-50 INJECTION, SOLUTION INTRAVENOUS
Status: DISCONTINUED | OUTPATIENT
Start: 2018-01-01 | End: 2018-01-01 | Stop reason: CLARIF

## 2018-01-01 RX ORDER — VECURONIUM BROMIDE 1 MG/ML
10 INJECTION, POWDER, LYOPHILIZED, FOR SOLUTION INTRAVENOUS ONCE
Status: COMPLETED | OUTPATIENT
Start: 2018-01-01 | End: 2018-01-01

## 2018-01-01 RX ORDER — NALOXONE HYDROCHLORIDE 0.4 MG/ML
.1-.4 INJECTION, SOLUTION INTRAMUSCULAR; INTRAVENOUS; SUBCUTANEOUS
Status: DISCONTINUED | OUTPATIENT
Start: 2018-01-01 | End: 2018-01-01

## 2018-01-01 RX ORDER — AMPICILLIN AND SULBACTAM 1; .5 G/1; G/1
1.5 INJECTION, POWDER, FOR SOLUTION INTRAMUSCULAR; INTRAVENOUS EVERY 12 HOURS
Status: DISCONTINUED | OUTPATIENT
Start: 2018-01-01 | End: 2018-01-01

## 2018-01-01 RX ORDER — BUPIVACAINE HYDROCHLORIDE 2.5 MG/ML
INJECTION, SOLUTION EPIDURAL; INFILTRATION; INTRACAUDAL PRN
Status: DISCONTINUED | OUTPATIENT
Start: 2018-01-01 | End: 2018-01-01

## 2018-01-01 RX ORDER — AMOXICILLIN 250 MG
2 CAPSULE ORAL
Status: DISCONTINUED | OUTPATIENT
Start: 2018-01-01 | End: 2018-01-01

## 2018-01-01 RX ORDER — IPRATROPIUM BROMIDE AND ALBUTEROL SULFATE 2.5; .5 MG/3ML; MG/3ML
3 SOLUTION RESPIRATORY (INHALATION)
Status: DISCONTINUED | OUTPATIENT
Start: 2018-01-01 | End: 2018-01-01

## 2018-01-01 RX ORDER — HEPARIN SODIUM,PORCINE 10 UNIT/ML
5-10 VIAL (ML) INTRAVENOUS
Status: DISCONTINUED | OUTPATIENT
Start: 2018-01-01 | End: 2018-01-01

## 2018-01-01 RX ORDER — CLINDAMYCIN PHOSPHATE 900 MG/50ML
900 INJECTION, SOLUTION INTRAVENOUS
Status: CANCELLED | OUTPATIENT
Start: 2018-01-01 | End: 2038-07-27

## 2018-01-01 RX ORDER — FLUMAZENIL 0.1 MG/ML
0.2 INJECTION, SOLUTION INTRAVENOUS
Status: CANCELLED | OUTPATIENT
Start: 2018-01-01

## 2018-01-01 RX ORDER — SODIUM CHLORIDE 9 MG/ML
INJECTION, SOLUTION INTRAVENOUS
Status: COMPLETED
Start: 2018-01-01 | End: 2018-01-01

## 2018-01-01 RX ORDER — POTASSIUM CHLORIDE 7.45 MG/ML
10 INJECTION INTRAVENOUS
Status: DISCONTINUED | OUTPATIENT
Start: 2018-01-01 | End: 2018-01-01 | Stop reason: CLARIF

## 2018-01-01 RX ORDER — POTASSIUM CHLORIDE 29.8 MG/ML
20 INJECTION INTRAVENOUS ONCE
Status: COMPLETED | OUTPATIENT
Start: 2018-01-01 | End: 2018-01-01

## 2018-01-01 RX ORDER — SODIUM CHLORIDE 9 MG/ML
INJECTION, SOLUTION INTRAVENOUS
Status: DISCONTINUED
Start: 2018-01-01 | End: 2018-01-01 | Stop reason: HOSPADM

## 2018-01-01 RX ORDER — ACETAMINOPHEN 650 MG/1
650 SUPPOSITORY RECTAL EVERY 4 HOURS PRN
Status: DISCONTINUED | OUTPATIENT
Start: 2018-01-01 | End: 2018-01-01 | Stop reason: CLARIF

## 2018-01-01 RX ORDER — POLYETHYLENE GLYCOL 3350 17 G/17G
17 POWDER, FOR SOLUTION ORAL DAILY PRN
Status: DISCONTINUED | OUTPATIENT
Start: 2018-01-01 | End: 2018-01-01 | Stop reason: CLARIF

## 2018-01-01 RX ORDER — FUROSEMIDE 40 MG
40 TABLET ORAL
Status: DISCONTINUED | OUTPATIENT
Start: 2018-01-01 | End: 2018-01-01

## 2018-01-01 RX ORDER — GABAPENTIN 300 MG/1
300 CAPSULE ORAL ONCE
Status: CANCELLED | OUTPATIENT
Start: 2018-01-01 | End: 2018-01-01

## 2018-01-01 RX ORDER — FUROSEMIDE 10 MG/ML
20 INJECTION INTRAMUSCULAR; INTRAVENOUS ONCE
Status: COMPLETED | OUTPATIENT
Start: 2018-01-01 | End: 2018-01-01

## 2018-01-01 RX ORDER — CLINDAMYCIN PHOSPHATE 900 MG/50ML
900 INJECTION, SOLUTION INTRAVENOUS SEE ADMIN INSTRUCTIONS
Status: DISCONTINUED | OUTPATIENT
Start: 2018-01-01 | End: 2018-01-01

## 2018-01-01 RX ORDER — METOPROLOL TARTRATE 1 MG/ML
5 INJECTION, SOLUTION INTRAVENOUS EVERY 5 MIN PRN
Status: COMPLETED | OUTPATIENT
Start: 2018-01-01 | End: 2018-01-01

## 2018-01-01 RX ORDER — ACETAMINOPHEN 325 MG/1
650 TABLET ORAL EVERY 4 HOURS PRN
Status: DISCONTINUED | OUTPATIENT
Start: 2018-01-01 | End: 2018-01-01 | Stop reason: CLARIF

## 2018-01-01 RX ORDER — LEVOTHYROXINE SODIUM ANHYDROUS 100 UG/5ML
62.5 INJECTION, POWDER, LYOPHILIZED, FOR SOLUTION INTRAVENOUS DAILY
Status: DISCONTINUED | OUTPATIENT
Start: 2018-01-01 | End: 2018-01-01

## 2018-01-01 RX ORDER — MONTELUKAST SODIUM 5 MG/1
10 TABLET, CHEWABLE ORAL AT BEDTIME
Status: DISCONTINUED | OUTPATIENT
Start: 2018-01-01 | End: 2018-01-01 | Stop reason: CLARIF

## 2018-01-01 RX ORDER — PROCHLORPERAZINE MALEATE 5 MG
5 TABLET ORAL EVERY 6 HOURS PRN
Status: DISCONTINUED | OUTPATIENT
Start: 2018-01-01 | End: 2018-01-01 | Stop reason: CLARIF

## 2018-01-01 RX ORDER — ALBUMIN (HUMAN) 12.5 G/50ML
12.5 SOLUTION INTRAVENOUS EVERY 8 HOURS
Status: DISCONTINUED | OUTPATIENT
Start: 2018-01-01 | End: 2018-01-01

## 2018-01-01 RX ORDER — ACETAMINOPHEN 325 MG/1
650 TABLET ORAL EVERY 4 HOURS PRN
Status: DISCONTINUED | OUTPATIENT
Start: 2018-01-01 | End: 2018-01-01

## 2018-01-01 RX ORDER — ACYCLOVIR 200 MG/1
0-1 CAPSULE ORAL
Status: DISCONTINUED | OUTPATIENT
Start: 2018-01-01 | End: 2018-01-01 | Stop reason: HOSPADM

## 2018-01-01 RX ORDER — SODIUM CHLORIDE, SODIUM LACTATE, POTASSIUM CHLORIDE, CALCIUM CHLORIDE 600; 310; 30; 20 MG/100ML; MG/100ML; MG/100ML; MG/100ML
INJECTION, SOLUTION INTRAVENOUS CONTINUOUS
Status: DISCONTINUED | OUTPATIENT
Start: 2018-01-01 | End: 2018-01-01

## 2018-01-01 RX ORDER — ONDANSETRON 4 MG/1
4 TABLET, ORALLY DISINTEGRATING ORAL EVERY 6 HOURS PRN
Status: DISCONTINUED | OUTPATIENT
Start: 2018-01-01 | End: 2018-01-01

## 2018-01-01 RX ORDER — FENTANYL CITRATE 50 UG/ML
INJECTION, SOLUTION INTRAMUSCULAR; INTRAVENOUS
Status: COMPLETED
Start: 2018-01-01 | End: 2018-01-01

## 2018-01-01 RX ORDER — ACETAMINOPHEN 325 MG/1
975 TABLET ORAL EVERY 4 HOURS PRN
Status: DISCONTINUED | OUTPATIENT
Start: 2018-01-01 | End: 2018-01-01

## 2018-01-01 RX ORDER — HEPARIN SODIUM,PORCINE 10 UNIT/ML
5-10 VIAL (ML) INTRAVENOUS EVERY 24 HOURS
Status: DISCONTINUED | OUTPATIENT
Start: 2018-01-01 | End: 2018-01-01 | Stop reason: CLARIF

## 2018-01-01 RX ORDER — MAGNESIUM SULFATE HEPTAHYDRATE 40 MG/ML
4 INJECTION, SOLUTION INTRAVENOUS EVERY 4 HOURS PRN
Status: DISCONTINUED | OUTPATIENT
Start: 2018-01-01 | End: 2018-01-01 | Stop reason: CLARIF

## 2018-01-01 RX ORDER — CLINDAMYCIN PHOSPHATE 900 MG/50ML
900 INJECTION, SOLUTION INTRAVENOUS SEE ADMIN INSTRUCTIONS
Status: CANCELLED | OUTPATIENT
Start: 2018-01-01 | End: 2019-07-26

## 2018-01-01 RX ORDER — GABAPENTIN 300 MG/1
300 CAPSULE ORAL ONCE
Status: COMPLETED | OUTPATIENT
Start: 2018-01-01 | End: 2018-01-01

## 2018-01-01 RX ORDER — FUROSEMIDE 10 MG/ML
40 INJECTION INTRAMUSCULAR; INTRAVENOUS ONCE
Status: COMPLETED | OUTPATIENT
Start: 2018-01-01 | End: 2018-01-01

## 2018-01-01 RX ORDER — NALOXONE HYDROCHLORIDE 0.4 MG/ML
.1-.4 INJECTION, SOLUTION INTRAMUSCULAR; INTRAVENOUS; SUBCUTANEOUS
Status: DISCONTINUED | OUTPATIENT
Start: 2018-01-01 | End: 2018-01-01 | Stop reason: HOSPADM

## 2018-01-01 RX ORDER — ESMOLOL HYDROCHLORIDE 20 MG/ML
50-300 INJECTION, SOLUTION INTRAVENOUS CONTINUOUS
Status: DISCONTINUED | OUTPATIENT
Start: 2018-01-01 | End: 2018-01-01

## 2018-01-01 RX ORDER — MAGNESIUM SULFATE HEPTAHYDRATE 40 MG/ML
2 INJECTION, SOLUTION INTRAVENOUS DAILY PRN
Status: DISCONTINUED | OUTPATIENT
Start: 2018-01-01 | End: 2018-01-01 | Stop reason: CLARIF

## 2018-01-01 RX ORDER — ACETAMINOPHEN 325 MG/1
975 TABLET ORAL ONCE
Status: CANCELLED | OUTPATIENT
Start: 2018-01-01 | End: 2018-01-01

## 2018-01-01 RX ORDER — HYDRALAZINE HYDROCHLORIDE 10 MG/1
20 TABLET, FILM COATED ORAL
Status: DISCONTINUED | OUTPATIENT
Start: 2018-01-01 | End: 2018-01-01

## 2018-01-01 RX ORDER — ESCITALOPRAM OXALATE 10 MG/1
10 TABLET ORAL
Status: DISCONTINUED | OUTPATIENT
Start: 2018-01-01 | End: 2018-01-01

## 2018-01-01 RX ORDER — ONDANSETRON 4 MG/1
4-8 TABLET, ORALLY DISINTEGRATING ORAL EVERY 6 HOURS PRN
Status: DISCONTINUED | OUTPATIENT
Start: 2018-01-01 | End: 2018-01-01 | Stop reason: CLARIF

## 2018-01-01 RX ORDER — DEXTROSE MONOHYDRATE 25 G/50ML
25-50 INJECTION, SOLUTION INTRAVENOUS
Status: DISCONTINUED | OUTPATIENT
Start: 2018-01-01 | End: 2018-01-01

## 2018-01-01 RX ORDER — MONTELUKAST SODIUM 10 MG/1
10 TABLET ORAL EVERY MORNING
Status: DISCONTINUED | OUTPATIENT
Start: 2018-01-01 | End: 2018-01-01

## 2018-01-01 RX ORDER — PROPOFOL 10 MG/ML
5-75 INJECTION, EMULSION INTRAVENOUS CONTINUOUS
Status: DISCONTINUED | OUTPATIENT
Start: 2018-01-01 | End: 2018-01-01

## 2018-01-01 RX ORDER — FENTANYL CITRATE 50 UG/ML
25-50 INJECTION, SOLUTION INTRAMUSCULAR; INTRAVENOUS
Status: DISCONTINUED | OUTPATIENT
Start: 2018-01-01 | End: 2018-01-01 | Stop reason: HOSPADM

## 2018-01-01 RX ORDER — CEPHALEXIN 500 MG/1
CAPSULE ORAL
COMMUNITY
Start: 2018-01-01 | End: 2018-01-01

## 2018-01-01 RX ORDER — MONTELUKAST SODIUM 5 MG/1
10 TABLET, CHEWABLE ORAL AT BEDTIME
Status: DISCONTINUED | OUTPATIENT
Start: 2018-01-01 | End: 2018-01-01

## 2018-01-01 RX ORDER — HYDRALAZINE HYDROCHLORIDE 10 MG/1
10 TABLET, FILM COATED ORAL EVERY 8 HOURS SCHEDULED
Status: DISCONTINUED | OUTPATIENT
Start: 2018-01-01 | End: 2018-01-01

## 2018-01-01 RX ORDER — ALBUMIN (HUMAN) 12.5 G/50ML
50 SOLUTION INTRAVENOUS ONCE
Status: COMPLETED | OUTPATIENT
Start: 2018-01-01 | End: 2018-01-01

## 2018-01-01 RX ORDER — ONDANSETRON 2 MG/ML
4 INJECTION INTRAMUSCULAR; INTRAVENOUS EVERY 30 MIN PRN
Status: DISCONTINUED | OUTPATIENT
Start: 2018-01-01 | End: 2018-01-01

## 2018-01-01 RX ORDER — HYDROMORPHONE HYDROCHLORIDE 1 MG/ML
.3-.5 INJECTION, SOLUTION INTRAMUSCULAR; INTRAVENOUS; SUBCUTANEOUS EVERY 5 MIN PRN
Status: DISCONTINUED | OUTPATIENT
Start: 2018-01-01 | End: 2018-01-01

## 2018-01-01 RX ORDER — DILTIAZEM HYDROCHLORIDE 5 MG/ML
5-20 INJECTION INTRAVENOUS ONCE
Status: COMPLETED | OUTPATIENT
Start: 2018-01-01 | End: 2018-01-01

## 2018-01-01 RX ORDER — REGADENOSON 0.08 MG/ML
0.4 INJECTION, SOLUTION INTRAVENOUS ONCE
Status: COMPLETED | OUTPATIENT
Start: 2018-01-01 | End: 2018-01-01

## 2018-01-01 RX ORDER — HYDRALAZINE HYDROCHLORIDE 20 MG/ML
INJECTION INTRAMUSCULAR; INTRAVENOUS
Status: DISCONTINUED
Start: 2018-01-01 | End: 2018-01-01 | Stop reason: HOSPADM

## 2018-01-01 RX ORDER — LEVALBUTEROL INHALATION SOLUTION 0.63 MG/3ML
0.63 SOLUTION RESPIRATORY (INHALATION) EVERY 6 HOURS
Status: DISCONTINUED | OUTPATIENT
Start: 2018-01-01 | End: 2018-01-01 | Stop reason: CLARIF

## 2018-01-01 RX ORDER — CEFTRIAXONE 2 G/1
2 INJECTION, POWDER, FOR SOLUTION INTRAMUSCULAR; INTRAVENOUS EVERY 24 HOURS
Status: DISCONTINUED | OUTPATIENT
Start: 2018-01-01 | End: 2018-01-01 | Stop reason: CLARIF

## 2018-01-01 RX ORDER — ACETAMINOPHEN 325 MG/1
975 TABLET ORAL EVERY 8 HOURS PRN
Status: DISCONTINUED | OUTPATIENT
Start: 2018-01-01 | End: 2018-01-01

## 2018-01-01 RX ORDER — LIDOCAINE HYDROCHLORIDE 20 MG/ML
INJECTION, SOLUTION INFILTRATION; PERINEURAL PRN
Status: DISCONTINUED | OUTPATIENT
Start: 2018-01-01 | End: 2018-01-01

## 2018-01-01 RX ORDER — ALBUMIN (HUMAN) 12.5 G/50ML
25 SOLUTION INTRAVENOUS ONCE
Status: COMPLETED | OUTPATIENT
Start: 2018-01-01 | End: 2018-01-01

## 2018-01-01 RX ORDER — CIPROFLOXACIN 2 MG/ML
400 INJECTION, SOLUTION INTRAVENOUS SEE ADMIN INSTRUCTIONS
Status: DISCONTINUED | OUTPATIENT
Start: 2018-01-01 | End: 2018-01-01 | Stop reason: HOSPADM

## 2018-01-01 RX ORDER — LIDOCAINE 40 MG/G
CREAM TOPICAL
Status: DISCONTINUED | OUTPATIENT
Start: 2018-01-01 | End: 2018-01-01 | Stop reason: CLARIF

## 2018-01-01 RX ORDER — CLINDAMYCIN PHOSPHATE 900 MG/50ML
900 INJECTION, SOLUTION INTRAVENOUS
Status: CANCELLED | OUTPATIENT
Start: 2018-01-01 | End: 2038-08-02

## 2018-01-01 RX ORDER — SODIUM CHLORIDE 9 MG/ML
INJECTION, SOLUTION INTRAVENOUS CONTINUOUS
Status: DISCONTINUED | OUTPATIENT
Start: 2018-01-01 | End: 2018-01-01 | Stop reason: HOSPADM

## 2018-01-01 RX ORDER — PROPOFOL 10 MG/ML
INJECTION, EMULSION INTRAVENOUS PRN
Status: DISCONTINUED | OUTPATIENT
Start: 2018-01-01 | End: 2018-01-01

## 2018-01-01 RX ORDER — FLUMAZENIL 0.1 MG/ML
0.2 INJECTION, SOLUTION INTRAVENOUS
Status: DISCONTINUED | OUTPATIENT
Start: 2018-01-01 | End: 2018-01-01 | Stop reason: HOSPADM

## 2018-01-01 RX ORDER — METOPROLOL TARTRATE 50 MG
100 TABLET ORAL 2 TIMES DAILY
Status: DISCONTINUED | OUTPATIENT
Start: 2018-01-01 | End: 2018-01-01

## 2018-01-01 RX ORDER — HYDRALAZINE HYDROCHLORIDE 20 MG/ML
10 INJECTION INTRAMUSCULAR; INTRAVENOUS EVERY 8 HOURS PRN
Status: DISCONTINUED | OUTPATIENT
Start: 2018-01-01 | End: 2018-01-01

## 2018-01-01 RX ORDER — CLINDAMYCIN PHOSPHATE 900 MG/50ML
900 INJECTION, SOLUTION INTRAVENOUS
Status: DISCONTINUED | OUTPATIENT
Start: 2018-01-01 | End: 2018-01-01

## 2018-01-01 RX ORDER — SODIUM CHLORIDE 9 MG/ML
INJECTION, SOLUTION INTRAVENOUS CONTINUOUS
Status: DISCONTINUED | OUTPATIENT
Start: 2018-01-01 | End: 2018-01-01

## 2018-01-01 RX ORDER — AMOXICILLIN 250 MG
2 CAPSULE ORAL 2 TIMES DAILY
Status: DISCONTINUED | OUTPATIENT
Start: 2018-01-01 | End: 2018-01-01

## 2018-01-01 RX ORDER — POLYETHYLENE GLYCOL 3350 17 G/17G
17 POWDER, FOR SOLUTION ORAL DAILY
Status: DISCONTINUED | OUTPATIENT
Start: 2018-01-01 | End: 2018-01-01

## 2018-01-01 RX ORDER — METOPROLOL TARTRATE 25 MG/1
25 TABLET, FILM COATED ORAL ONCE
Status: COMPLETED | OUTPATIENT
Start: 2018-01-01 | End: 2018-01-01

## 2018-01-01 RX ORDER — FUROSEMIDE 10 MG/ML
40 INJECTION INTRAMUSCULAR; INTRAVENOUS 2 TIMES DAILY
Status: DISCONTINUED | OUTPATIENT
Start: 2018-01-01 | End: 2018-01-01

## 2018-01-01 RX ORDER — NICOTINE POLACRILEX 4 MG
15-30 LOZENGE BUCCAL
Status: DISCONTINUED | OUTPATIENT
Start: 2018-01-01 | End: 2018-01-01 | Stop reason: CLARIF

## 2018-01-01 RX ORDER — FUROSEMIDE 10 MG/ML
40 INJECTION INTRAMUSCULAR; INTRAVENOUS EVERY 8 HOURS
Status: DISCONTINUED | OUTPATIENT
Start: 2018-01-01 | End: 2018-01-01

## 2018-01-01 RX ORDER — PROPOFOL 10 MG/ML
INJECTION, EMULSION INTRAVENOUS
Status: DISPENSED
Start: 2018-01-01 | End: 2018-01-01

## 2018-01-01 RX ORDER — SODIUM CHLORIDE, SODIUM LACTATE, POTASSIUM CHLORIDE, CALCIUM CHLORIDE 600; 310; 30; 20 MG/100ML; MG/100ML; MG/100ML; MG/100ML
INJECTION, SOLUTION INTRAVENOUS
Status: COMPLETED
Start: 2018-01-01 | End: 2018-01-01

## 2018-01-01 RX ORDER — KETAMINE HYDROCHLORIDE 10 MG/ML
INJECTION, SOLUTION INTRAMUSCULAR; INTRAVENOUS PRN
Status: DISCONTINUED | OUTPATIENT
Start: 2018-01-01 | End: 2018-01-01

## 2018-01-01 RX ORDER — CIPROFLOXACIN 250 MG/1
TABLET, FILM COATED ORAL
COMMUNITY
Start: 2018-01-01 | End: 2018-01-01

## 2018-01-01 RX ORDER — FENTANYL CITRATE 50 UG/ML
25-50 INJECTION, SOLUTION INTRAMUSCULAR; INTRAVENOUS
Status: CANCELLED | OUTPATIENT
Start: 2018-01-01

## 2018-01-01 RX ORDER — ESCITALOPRAM OXALATE 10 MG/1
10 TABLET ORAL DAILY
Status: DISCONTINUED | OUTPATIENT
Start: 2018-01-01 | End: 2018-01-01

## 2018-01-01 RX ORDER — ESCITALOPRAM OXALATE 10 MG/1
10 TABLET ORAL DAILY
Status: DISCONTINUED | OUTPATIENT
Start: 2018-01-01 | End: 2018-01-01 | Stop reason: CLARIF

## 2018-01-01 RX ORDER — FUROSEMIDE 10 MG/ML
60 INJECTION INTRAMUSCULAR; INTRAVENOUS EVERY 8 HOURS
Status: DISCONTINUED | OUTPATIENT
Start: 2018-01-01 | End: 2018-01-01

## 2018-01-01 RX ORDER — PANTOPRAZOLE SODIUM 40 MG/1
40 TABLET, DELAYED RELEASE ORAL
Status: DISCONTINUED | OUTPATIENT
Start: 2018-01-01 | End: 2018-01-01 | Stop reason: ALTCHOICE

## 2018-01-01 RX ORDER — PANTOPRAZOLE SODIUM 40 MG/1
40 TABLET, DELAYED RELEASE ORAL
Status: DISCONTINUED | OUTPATIENT
Start: 2018-01-01 | End: 2018-01-01

## 2018-01-01 RX ORDER — FENTANYL CITRATE 50 UG/ML
25-50 INJECTION, SOLUTION INTRAMUSCULAR; INTRAVENOUS
Status: DISCONTINUED | OUTPATIENT
Start: 2018-01-01 | End: 2018-01-01

## 2018-01-01 RX ORDER — HYDRALAZINE HYDROCHLORIDE 20 MG/ML
10 INJECTION INTRAMUSCULAR; INTRAVENOUS ONCE
Status: COMPLETED | OUTPATIENT
Start: 2018-01-01 | End: 2018-01-01

## 2018-01-01 RX ORDER — METOPROLOL TARTRATE 1 MG/ML
5 INJECTION, SOLUTION INTRAVENOUS EVERY 6 HOURS
Status: DISCONTINUED | OUTPATIENT
Start: 2018-01-01 | End: 2018-01-01

## 2018-01-01 RX ORDER — HYDRALAZINE HYDROCHLORIDE 20 MG/ML
10 INJECTION INTRAMUSCULAR; INTRAVENOUS EVERY 6 HOURS PRN
Status: DISCONTINUED | OUTPATIENT
Start: 2018-01-01 | End: 2018-01-01

## 2018-01-01 RX ORDER — CARBOXYMETHYLCELLULOSE SODIUM 5 MG/ML
1 SOLUTION/ DROPS OPHTHALMIC
Status: DISCONTINUED | OUTPATIENT
Start: 2018-01-01 | End: 2018-01-01

## 2018-01-01 RX ORDER — PIPERACILLIN SODIUM, TAZOBACTAM SODIUM 2; .25 G/10ML; G/10ML
2.25 INJECTION, POWDER, LYOPHILIZED, FOR SOLUTION INTRAVENOUS EVERY 6 HOURS
Status: DISCONTINUED | OUTPATIENT
Start: 2018-01-01 | End: 2018-01-01

## 2018-01-01 RX ORDER — OXYCODONE HYDROCHLORIDE 5 MG/1
5 TABLET ORAL EVERY 4 HOURS PRN
Status: DISCONTINUED | OUTPATIENT
Start: 2018-01-01 | End: 2018-01-01

## 2018-01-01 RX ORDER — HEPARIN SODIUM 5000 [USP'U]/.5ML
5000 INJECTION, SOLUTION INTRAVENOUS; SUBCUTANEOUS EVERY 8 HOURS
Status: DISCONTINUED | OUTPATIENT
Start: 2018-01-01 | End: 2018-01-01 | Stop reason: CLARIF

## 2018-01-01 RX ORDER — HYDROMORPHONE HYDROCHLORIDE 1 MG/ML
0.3 INJECTION, SOLUTION INTRAMUSCULAR; INTRAVENOUS; SUBCUTANEOUS ONCE
Status: COMPLETED | OUTPATIENT
Start: 2018-01-01 | End: 2018-01-01

## 2018-01-01 RX ORDER — CIPROFLOXACIN 500 MG/1
500 TABLET, FILM COATED ORAL ONCE
Status: COMPLETED | OUTPATIENT
Start: 2018-01-01 | End: 2018-01-01

## 2018-01-01 RX ORDER — ONDANSETRON 2 MG/ML
4 INJECTION INTRAMUSCULAR; INTRAVENOUS EVERY 6 HOURS PRN
Status: DISCONTINUED | OUTPATIENT
Start: 2018-01-01 | End: 2018-01-01 | Stop reason: CLARIF

## 2018-01-01 RX ORDER — HYDRALAZINE HYDROCHLORIDE 20 MG/ML
20 INJECTION INTRAMUSCULAR; INTRAVENOUS EVERY 6 HOURS
Status: DISCONTINUED | OUTPATIENT
Start: 2018-01-01 | End: 2018-01-01

## 2018-01-01 RX ORDER — AMLODIPINE BESYLATE 10 MG/1
10 TABLET ORAL DAILY
Status: DISCONTINUED | OUTPATIENT
Start: 2018-01-01 | End: 2018-01-01

## 2018-01-01 RX ORDER — ACETAMINOPHEN 325 MG/1
975 TABLET ORAL EVERY 8 HOURS
Status: DISCONTINUED | OUTPATIENT
Start: 2018-01-01 | End: 2018-01-01

## 2018-01-01 RX ORDER — CIPROFLOXACIN 250 MG/1
250 TABLET, FILM COATED ORAL 2 TIMES DAILY
Qty: 14 TABLET | Refills: 0 | Status: SHIPPED | OUTPATIENT
Start: 2018-01-01 | End: 2018-01-01

## 2018-01-01 RX ORDER — AMLODIPINE BESYLATE 10 MG/1
10 TABLET ORAL
Status: DISCONTINUED | OUTPATIENT
Start: 2018-01-01 | End: 2018-01-01

## 2018-01-01 RX ORDER — DEXMEDETOMIDINE HYDROCHLORIDE 4 UG/ML
0.2-0.7 INJECTION, SOLUTION INTRAVENOUS CONTINUOUS
Status: DISCONTINUED | OUTPATIENT
Start: 2018-01-01 | End: 2018-01-01

## 2018-01-01 RX ORDER — CIPROFLOXACIN 2 MG/ML
400 INJECTION, SOLUTION INTRAVENOUS
Status: DISCONTINUED | OUTPATIENT
Start: 2018-01-01 | End: 2018-01-01 | Stop reason: HOSPADM

## 2018-01-01 RX ORDER — FENTANYL CITRATE 50 UG/ML
INJECTION, SOLUTION INTRAMUSCULAR; INTRAVENOUS PRN
Status: DISCONTINUED | OUTPATIENT
Start: 2018-01-01 | End: 2018-01-01

## 2018-01-01 RX ORDER — LIDOCAINE HYDROCHLORIDE AND EPINEPHRINE 10; 10 MG/ML; UG/ML
INJECTION, SOLUTION INFILTRATION; PERINEURAL
Status: COMPLETED
Start: 2018-01-01 | End: 2018-01-01

## 2018-01-01 RX ORDER — CIPROFLOXACIN 2 MG/ML
400 INJECTION, SOLUTION INTRAVENOUS
Status: COMPLETED | OUTPATIENT
Start: 2018-01-01 | End: 2018-01-01

## 2018-01-01 RX ORDER — HEPARIN SODIUM 5000 [USP'U]/.5ML
5000 INJECTION, SOLUTION INTRAVENOUS; SUBCUTANEOUS
Status: COMPLETED | OUTPATIENT
Start: 2018-01-01 | End: 2018-01-01

## 2018-01-01 RX ORDER — GLYCOPYRROLATE 0.2 MG/ML
0.2 INJECTION, SOLUTION INTRAMUSCULAR; INTRAVENOUS ONCE
Status: COMPLETED | OUTPATIENT
Start: 2018-01-01 | End: 2018-01-01

## 2018-01-01 RX ORDER — METOPROLOL TARTRATE 1 MG/ML
5 INJECTION, SOLUTION INTRAVENOUS EVERY 5 MIN PRN
Status: DISCONTINUED | OUTPATIENT
Start: 2018-01-01 | End: 2018-01-01

## 2018-01-01 RX ORDER — PREGABALIN 50 MG/1
50 CAPSULE ORAL 2 TIMES DAILY
Status: DISCONTINUED | OUTPATIENT
Start: 2018-01-01 | End: 2018-01-01

## 2018-01-01 RX ORDER — LIDOCAINE 40 MG/G
CREAM TOPICAL
Status: DISCONTINUED | OUTPATIENT
Start: 2018-01-01 | End: 2018-01-01

## 2018-01-01 RX ORDER — FUROSEMIDE 10 MG/ML
10 INJECTION INTRAMUSCULAR; INTRAVENOUS ONCE
Status: COMPLETED | OUTPATIENT
Start: 2018-01-01 | End: 2018-01-01

## 2018-01-01 RX ORDER — LEVOTHYROXINE SODIUM ANHYDROUS 100 UG/5ML
62.5 INJECTION, POWDER, LYOPHILIZED, FOR SOLUTION INTRAVENOUS ONCE
Status: DISCONTINUED | OUTPATIENT
Start: 2018-01-01 | End: 2018-01-01

## 2018-01-01 RX ORDER — ACETAMINOPHEN 325 MG/1
975 TABLET ORAL EVERY 8 HOURS
Status: DISCONTINUED | OUTPATIENT
Start: 2018-01-01 | End: 2018-01-01 | Stop reason: ALTCHOICE

## 2018-01-01 RX ORDER — HYDRALAZINE HYDROCHLORIDE 20 MG/ML
10 INJECTION INTRAMUSCULAR; INTRAVENOUS EVERY 6 HOURS
Status: DISCONTINUED | OUTPATIENT
Start: 2018-01-01 | End: 2018-01-01

## 2018-01-01 RX ORDER — IOPAMIDOL 755 MG/ML
135 INJECTION, SOLUTION INTRAVASCULAR ONCE
Status: COMPLETED | OUTPATIENT
Start: 2018-01-01 | End: 2018-01-01

## 2018-01-01 RX ORDER — HEPARIN SODIUM,PORCINE 10 UNIT/ML
2-5 VIAL (ML) INTRAVENOUS
Status: DISCONTINUED | OUTPATIENT
Start: 2018-01-01 | End: 2018-01-01 | Stop reason: CLARIF

## 2018-01-01 RX ORDER — ONDANSETRON 2 MG/ML
INJECTION INTRAMUSCULAR; INTRAVENOUS PRN
Status: DISCONTINUED | OUTPATIENT
Start: 2018-01-01 | End: 2018-01-01

## 2018-01-01 RX ORDER — BISACODYL 10 MG
10 SUPPOSITORY, RECTAL RECTAL ONCE
Status: COMPLETED | OUTPATIENT
Start: 2018-01-01 | End: 2018-01-01

## 2018-01-01 RX ORDER — NALOXONE HYDROCHLORIDE 0.4 MG/ML
.1-.4 INJECTION, SOLUTION INTRAMUSCULAR; INTRAVENOUS; SUBCUTANEOUS
Status: CANCELLED | OUTPATIENT
Start: 2018-01-01

## 2018-01-01 RX ORDER — NICOTINE POLACRILEX 4 MG
15-30 LOZENGE BUCCAL
Status: DISCONTINUED | OUTPATIENT
Start: 2018-01-01 | End: 2018-01-01

## 2018-01-01 RX ORDER — LEVOTHYROXINE SODIUM ANHYDROUS 100 UG/5ML
62.5 INJECTION, POWDER, LYOPHILIZED, FOR SOLUTION INTRAVENOUS ONCE
Status: COMPLETED | OUTPATIENT
Start: 2018-01-01 | End: 2018-01-01

## 2018-01-01 RX ORDER — DEXTROSE MONOHYDRATE 100 MG/ML
INJECTION, SOLUTION INTRAVENOUS CONTINUOUS
Status: DISCONTINUED | OUTPATIENT
Start: 2018-01-01 | End: 2018-01-01

## 2018-01-01 RX ORDER — POTASSIUM CL/LIDO/0.9 % NACL 10MEQ/0.1L
10 INTRAVENOUS SOLUTION, PIGGYBACK (ML) INTRAVENOUS
Status: DISCONTINUED | OUTPATIENT
Start: 2018-01-01 | End: 2018-01-01 | Stop reason: CLARIF

## 2018-01-01 RX ORDER — FUROSEMIDE 10 MG/ML
40 INJECTION INTRAMUSCULAR; INTRAVENOUS ONCE
Status: DISCONTINUED | OUTPATIENT
Start: 2018-01-01 | End: 2018-01-01

## 2018-01-01 RX ORDER — CIPROFLOXACIN 2 MG/ML
400 INJECTION, SOLUTION INTRAVENOUS
Status: CANCELLED | OUTPATIENT
Start: 2018-01-01

## 2018-01-01 RX ORDER — POTASSIUM CHLORIDE 29.8 MG/ML
20 INJECTION INTRAVENOUS
Status: DISCONTINUED | OUTPATIENT
Start: 2018-01-01 | End: 2018-01-01 | Stop reason: CLARIF

## 2018-01-01 RX ORDER — HYDROMORPHONE HCL/0.9% NACL/PF 0.2MG/0.2
0.2 SYRINGE (ML) INTRAVENOUS
Status: DISCONTINUED | OUTPATIENT
Start: 2018-01-01 | End: 2018-01-01 | Stop reason: HOSPADM

## 2018-01-01 RX ORDER — FENTANYL CITRATE 50 UG/ML
50 INJECTION, SOLUTION INTRAMUSCULAR; INTRAVENOUS ONCE
Status: COMPLETED | OUTPATIENT
Start: 2018-01-01 | End: 2018-01-01

## 2018-01-01 RX ORDER — SODIUM CHLORIDE 9 MG/ML
INJECTION, SOLUTION INTRAVENOUS CONTINUOUS
Status: DISCONTINUED | OUTPATIENT
Start: 2018-01-01 | End: 2018-01-01 | Stop reason: ALTCHOICE

## 2018-01-01 RX ORDER — ONDANSETRON 2 MG/ML
4 INJECTION INTRAMUSCULAR; INTRAVENOUS EVERY 6 HOURS PRN
Status: DISCONTINUED | OUTPATIENT
Start: 2018-01-01 | End: 2018-01-01

## 2018-01-01 RX ORDER — SIMETHICONE 80 MG
80 TABLET,CHEWABLE ORAL EVERY 6 HOURS PRN
Status: DISCONTINUED | OUTPATIENT
Start: 2018-01-01 | End: 2018-01-01

## 2018-01-01 RX ORDER — CIPROFLOXACIN 2 MG/ML
400 INJECTION, SOLUTION INTRAVENOUS SEE ADMIN INSTRUCTIONS
Status: CANCELLED | OUTPATIENT
Start: 2018-01-01

## 2018-01-01 RX ORDER — LABETALOL HYDROCHLORIDE 5 MG/ML
20 INJECTION, SOLUTION INTRAVENOUS ONCE
Status: COMPLETED | OUTPATIENT
Start: 2018-01-01 | End: 2018-01-01

## 2018-01-01 RX ORDER — DEXTROSE, SODIUM CHLORIDE, AND POTASSIUM CHLORIDE 5; .2; .15 G/100ML; G/100ML; G/100ML
INJECTION INTRAVENOUS CONTINUOUS
Status: DISCONTINUED | OUTPATIENT
Start: 2018-01-01 | End: 2018-01-01

## 2018-01-01 RX ORDER — ACETAMINOPHEN 650 MG/1
650 SUPPOSITORY RECTAL EVERY 8 HOURS
Status: DISCONTINUED | OUTPATIENT
Start: 2018-01-01 | End: 2018-01-01

## 2018-01-01 RX ORDER — ALBUMIN, HUMAN INJ 5% 5 %
25 SOLUTION INTRAVENOUS ONCE
Status: COMPLETED | OUTPATIENT
Start: 2018-01-01 | End: 2018-01-01

## 2018-01-01 RX ORDER — LANOLIN ALCOHOL/MO/W.PET/CERES
3 CREAM (GRAM) TOPICAL
Status: DISCONTINUED | OUTPATIENT
Start: 2018-01-01 | End: 2018-01-01

## 2018-01-01 RX ORDER — ATORVASTATIN CALCIUM 80 MG/1
80 TABLET, FILM COATED ORAL DAILY
Status: DISCONTINUED | OUTPATIENT
Start: 2018-01-01 | End: 2018-01-01 | Stop reason: CLARIF

## 2018-01-01 RX ORDER — FUROSEMIDE 20 MG
20 TABLET ORAL DAILY
Status: DISCONTINUED | OUTPATIENT
Start: 2018-01-01 | End: 2018-01-01

## 2018-01-01 RX ORDER — GABAPENTIN 300 MG/1
300 CAPSULE ORAL ONCE
Status: DISCONTINUED | OUTPATIENT
Start: 2018-01-01 | End: 2018-01-01 | Stop reason: HOSPADM

## 2018-01-01 RX ORDER — HEPARIN SODIUM 10000 [USP'U]/ML
5000 INJECTION, SOLUTION INTRAVENOUS; SUBCUTANEOUS
Status: CANCELLED | OUTPATIENT
Start: 2018-01-01

## 2018-01-01 RX ORDER — HYDRALAZINE HYDROCHLORIDE 10 MG/1
10 TABLET, FILM COATED ORAL 2 TIMES DAILY
Status: DISCONTINUED | OUTPATIENT
Start: 2018-01-01 | End: 2018-01-01

## 2018-01-01 RX ORDER — SODIUM BICARBONATE 84 MG/ML
1 INJECTION, SOLUTION INTRAVENOUS CONTINUOUS
Status: DISCONTINUED | OUTPATIENT
Start: 2018-01-01 | End: 2018-01-01

## 2018-01-01 RX ORDER — CLINDAMYCIN PHOSPHATE 900 MG/50ML
900 INJECTION, SOLUTION INTRAVENOUS SEE ADMIN INSTRUCTIONS
Status: CANCELLED | OUTPATIENT
Start: 2018-01-01 | End: 2019-08-01

## 2018-01-01 RX ORDER — ACETAMINOPHEN 650 MG/1
650 SUPPOSITORY RECTAL EVERY 6 HOURS PRN
Status: DISCONTINUED | OUTPATIENT
Start: 2018-01-01 | End: 2018-01-01

## 2018-01-01 RX ORDER — SODIUM CHLORIDE, SODIUM LACTATE, POTASSIUM CHLORIDE, CALCIUM CHLORIDE 600; 310; 30; 20 MG/100ML; MG/100ML; MG/100ML; MG/100ML
INJECTION, SOLUTION INTRAVENOUS CONTINUOUS PRN
Status: DISCONTINUED | OUTPATIENT
Start: 2018-01-01 | End: 2018-01-01

## 2018-01-01 RX ORDER — ACETAMINOPHEN 650 MG/1
650 SUPPOSITORY RECTAL EVERY 4 HOURS PRN
Status: DISCONTINUED | OUTPATIENT
Start: 2018-01-01 | End: 2018-01-01

## 2018-01-01 RX ORDER — FLUCONAZOLE 2 MG/ML
400 INJECTION, SOLUTION INTRAVENOUS ONCE
Status: COMPLETED | OUTPATIENT
Start: 2018-01-01 | End: 2018-01-01

## 2018-01-01 RX ORDER — PROPOFOL 10 MG/ML
5-75 INJECTION, EMULSION INTRAVENOUS CONTINUOUS
Status: DISCONTINUED | OUTPATIENT
Start: 2018-01-01 | End: 2018-01-01 | Stop reason: HOSPADM

## 2018-01-01 RX ORDER — CEFTRIAXONE 2 G/1
2 INJECTION, POWDER, FOR SOLUTION INTRAMUSCULAR; INTRAVENOUS EVERY 24 HOURS
Status: DISCONTINUED | OUTPATIENT
Start: 2018-01-01 | End: 2018-01-01

## 2018-01-01 RX ORDER — ATORVASTATIN CALCIUM 80 MG/1
80 TABLET, FILM COATED ORAL
Status: DISCONTINUED | OUTPATIENT
Start: 2018-01-01 | End: 2018-01-01

## 2018-01-01 RX ORDER — ALBUMIN, HUMAN INJ 5% 5 %
25 SOLUTION INTRAVENOUS ONCE
Status: DISCONTINUED | OUTPATIENT
Start: 2018-01-01 | End: 2018-01-01

## 2018-01-01 RX ORDER — ACETAMINOPHEN 325 MG/1
975 TABLET ORAL ONCE
Status: DISCONTINUED | OUTPATIENT
Start: 2018-01-01 | End: 2018-01-01 | Stop reason: HOSPADM

## 2018-01-01 RX ORDER — LIDOCAINE 4 G/G
1 PATCH TOPICAL
Status: DISCONTINUED | OUTPATIENT
Start: 2018-01-01 | End: 2018-01-01 | Stop reason: DRUGHIGH

## 2018-01-01 RX ORDER — MONTELUKAST SODIUM 5 MG/1
10 TABLET, CHEWABLE ORAL
Status: DISCONTINUED | OUTPATIENT
Start: 2018-01-01 | End: 2018-01-01

## 2018-01-01 RX ORDER — FLUCONAZOLE 2 MG/ML
200 INJECTION, SOLUTION INTRAVENOUS EVERY 24 HOURS
Status: DISCONTINUED | OUTPATIENT
Start: 2018-01-01 | End: 2018-01-01 | Stop reason: CLARIF

## 2018-01-01 RX ORDER — ATROPINE SULFATE 10 MG/ML
1-2 SOLUTION/ DROPS OPHTHALMIC
Status: DISCONTINUED | OUTPATIENT
Start: 2018-01-01 | End: 2018-01-01 | Stop reason: HOSPADM

## 2018-01-01 RX ORDER — MEROPENEM 1 G/1
1 INJECTION, POWDER, FOR SOLUTION INTRAVENOUS EVERY 12 HOURS
Status: DISCONTINUED | OUTPATIENT
Start: 2018-01-01 | End: 2018-01-01

## 2018-01-01 RX ORDER — METOPROLOL TARTRATE 1 MG/ML
INJECTION, SOLUTION INTRAVENOUS
Status: COMPLETED
Start: 2018-01-01 | End: 2018-01-01

## 2018-01-01 RX ORDER — NALOXONE HYDROCHLORIDE 0.4 MG/ML
.1-.4 INJECTION, SOLUTION INTRAMUSCULAR; INTRAVENOUS; SUBCUTANEOUS
Status: DISCONTINUED | OUTPATIENT
Start: 2018-01-01 | End: 2018-01-01 | Stop reason: CLARIF

## 2018-01-01 RX ORDER — DEXTROSE MONOHYDRATE 25 G/50ML
INJECTION, SOLUTION INTRAVENOUS
Status: COMPLETED
Start: 2018-01-01 | End: 2018-01-01

## 2018-01-01 RX ORDER — ALBUTEROL SULFATE 90 UG/1
2 AEROSOL, METERED RESPIRATORY (INHALATION) EVERY 5 MIN PRN
Status: DISCONTINUED | OUTPATIENT
Start: 2018-01-01 | End: 2018-01-01 | Stop reason: HOSPADM

## 2018-01-01 RX ORDER — LEVOTHYROXINE SODIUM 125 UG/1
125 TABLET ORAL
Status: DISCONTINUED | OUTPATIENT
Start: 2018-01-01 | End: 2018-01-01 | Stop reason: ALTCHOICE

## 2018-01-01 RX ORDER — AMLODIPINE BESYLATE 10 MG/1
10 TABLET ORAL EVERY MORNING
Status: DISCONTINUED | OUTPATIENT
Start: 2018-01-01 | End: 2018-01-01

## 2018-01-01 RX ORDER — SODIUM CHLORIDE FOR INHALATION 3 %
3 VIAL, NEBULIZER (ML) INHALATION
Status: DISCONTINUED | OUTPATIENT
Start: 2018-01-01 | End: 2018-01-01 | Stop reason: CLARIF

## 2018-01-01 RX ORDER — ONDANSETRON 4 MG/1
4 TABLET, ORALLY DISINTEGRATING ORAL EVERY 30 MIN PRN
Status: DISCONTINUED | OUTPATIENT
Start: 2018-01-01 | End: 2018-01-01

## 2018-01-01 RX ORDER — METOPROLOL TARTRATE 50 MG
50 TABLET ORAL
Status: DISCONTINUED | OUTPATIENT
Start: 2018-01-01 | End: 2018-01-01

## 2018-01-01 RX ORDER — AMOXICILLIN 250 MG
2 CAPSULE ORAL
Status: DISCONTINUED | OUTPATIENT
Start: 2018-01-01 | End: 2018-01-01 | Stop reason: CLARIF

## 2018-01-01 RX ORDER — ALBUMIN, HUMAN INJ 5% 5 %
SOLUTION INTRAVENOUS CONTINUOUS PRN
Status: DISCONTINUED | OUTPATIENT
Start: 2018-01-01 | End: 2018-01-01

## 2018-01-01 RX ORDER — MEROPENEM 500 MG/1
500 INJECTION, POWDER, FOR SOLUTION INTRAVENOUS EVERY 12 HOURS
Status: DISCONTINUED | OUTPATIENT
Start: 2018-01-01 | End: 2018-01-01 | Stop reason: CLARIF

## 2018-01-01 RX ORDER — AMINOPHYLLINE 25 MG/ML
50-100 INJECTION, SOLUTION INTRAVENOUS
Status: DISCONTINUED | OUTPATIENT
Start: 2018-01-01 | End: 2018-01-01 | Stop reason: HOSPADM

## 2018-01-01 RX ORDER — DOCUSATE SODIUM 100 MG/1
100 CAPSULE, LIQUID FILLED ORAL 2 TIMES DAILY
Status: DISCONTINUED | OUTPATIENT
Start: 2018-01-01 | End: 2018-01-01

## 2018-01-01 RX ADMIN — Medication 50 MCG/HR: at 20:23

## 2018-01-01 RX ADMIN — METOPROLOL TARTRATE 25 MG: 25 TABLET, FILM COATED ORAL at 08:20

## 2018-01-01 RX ADMIN — FENTANYL CITRATE 25 MCG: 50 INJECTION INTRAMUSCULAR; INTRAVENOUS at 07:44

## 2018-01-01 RX ADMIN — LEVALBUTEROL HYDROCHLORIDE 0.63 MG: 0.63 SOLUTION RESPIRATORY (INHALATION) at 13:03

## 2018-01-01 RX ADMIN — MULTIVITAMIN 15 ML: LIQUID ORAL at 08:05

## 2018-01-01 RX ADMIN — ESCITALOPRAM OXALATE 10 MG: 10 TABLET ORAL at 08:18

## 2018-01-01 RX ADMIN — INSULIN ASPART 1 UNITS: 100 INJECTION, SOLUTION INTRAVENOUS; SUBCUTANEOUS at 23:25

## 2018-01-01 RX ADMIN — INSULIN HUMAN 14 UNITS: 100 INJECTION, SUSPENSION SUBCUTANEOUS at 15:12

## 2018-01-01 RX ADMIN — OXYCODONE HYDROCHLORIDE 5 MG: 5 TABLET ORAL at 15:32

## 2018-01-01 RX ADMIN — HEPARIN SODIUM 5000 UNITS: 5000 INJECTION, SOLUTION INTRAVENOUS; SUBCUTANEOUS at 02:26

## 2018-01-01 RX ADMIN — Medication 20 MG: at 19:00

## 2018-01-01 RX ADMIN — ACETAMINOPHEN 975 MG: 325 TABLET, FILM COATED ORAL at 17:35

## 2018-01-01 RX ADMIN — HEPARIN SODIUM 5000 UNITS: 5000 INJECTION, SOLUTION INTRAVENOUS; SUBCUTANEOUS at 23:51

## 2018-01-01 RX ADMIN — HEPARIN SODIUM 5000 UNITS: 5000 INJECTION, SOLUTION INTRAVENOUS; SUBCUTANEOUS at 08:41

## 2018-01-01 RX ADMIN — INSULIN ASPART 3 UNITS: 100 INJECTION, SOLUTION INTRAVENOUS; SUBCUTANEOUS at 03:25

## 2018-01-01 RX ADMIN — ESCITALOPRAM OXALATE 10 MG: 10 TABLET ORAL at 08:00

## 2018-01-01 RX ADMIN — LEVOTHYROXINE SODIUM ANHYDROUS 62.5 MCG: 100 INJECTION, POWDER, LYOPHILIZED, FOR SOLUTION INTRAVENOUS at 08:10

## 2018-01-01 RX ADMIN — INSULIN HUMAN 25 UNITS: 100 INJECTION, SUSPENSION SUBCUTANEOUS at 08:42

## 2018-01-01 RX ADMIN — INSULIN ASPART 1 UNITS: 100 INJECTION, SOLUTION INTRAVENOUS; SUBCUTANEOUS at 12:29

## 2018-01-01 RX ADMIN — INSULIN ASPART 3 UNITS: 100 INJECTION, SOLUTION INTRAVENOUS; SUBCUTANEOUS at 20:41

## 2018-01-01 RX ADMIN — PANTOPRAZOLE SODIUM 40 MG: 40 INJECTION, POWDER, FOR SOLUTION INTRAVENOUS at 08:18

## 2018-01-01 RX ADMIN — HEPARIN SODIUM 5000 UNITS: 5000 INJECTION, SOLUTION INTRAVENOUS; SUBCUTANEOUS at 16:43

## 2018-01-01 RX ADMIN — ONDANSETRON 4 MG: 2 INJECTION INTRAMUSCULAR; INTRAVENOUS at 18:38

## 2018-01-01 RX ADMIN — HEPARIN SODIUM 5000 UNITS: 5000 INJECTION, SOLUTION INTRAVENOUS; SUBCUTANEOUS at 09:19

## 2018-01-01 RX ADMIN — HUMAN ALBUMIN MICROSPHERES AND PERFLUTREN 6 ML: 10; .22 INJECTION, SOLUTION INTRAVENOUS at 10:00

## 2018-01-01 RX ADMIN — LEVOTHYROXINE SODIUM ANHYDROUS 62.5 MCG: 100 INJECTION, POWDER, LYOPHILIZED, FOR SOLUTION INTRAVENOUS at 08:23

## 2018-01-01 RX ADMIN — CEFTRIAXONE SODIUM 2 G: 2 INJECTION, POWDER, FOR SOLUTION INTRAMUSCULAR; INTRAVENOUS at 16:58

## 2018-01-01 RX ADMIN — SIMETHICONE CHEW TAB 80 MG 80 MG: 80 TABLET ORAL at 18:13

## 2018-01-01 RX ADMIN — SODIUM CHLORIDE, POTASSIUM CHLORIDE, SODIUM LACTATE AND CALCIUM CHLORIDE: 600; 310; 30; 20 INJECTION, SOLUTION INTRAVENOUS at 08:30

## 2018-01-01 RX ADMIN — SODIUM CHLORIDE 500 ML: 9 INJECTION, SOLUTION INTRAVENOUS at 22:34

## 2018-01-01 RX ADMIN — CALCIUM GLUCONATE: 98 INJECTION, SOLUTION INTRAVENOUS at 20:36

## 2018-01-01 RX ADMIN — LIDOCAINE 2 PATCH: 560 PATCH PERCUTANEOUS; TOPICAL; TRANSDERMAL at 20:09

## 2018-01-01 RX ADMIN — AMIODARONE HYDROCHLORIDE 1 MG/MIN: 50 INJECTION, SOLUTION INTRAVENOUS at 12:47

## 2018-01-01 RX ADMIN — Medication 2 G: at 11:12

## 2018-01-01 RX ADMIN — MONTELUKAST SODIUM 10 MG: 5 TABLET, CHEWABLE ORAL at 23:05

## 2018-01-01 RX ADMIN — HEPARIN SODIUM 5000 UNITS: 5000 INJECTION, SOLUTION INTRAVENOUS; SUBCUTANEOUS at 20:31

## 2018-01-01 RX ADMIN — FUROSEMIDE 40 MG: 10 INJECTION, SOLUTION INTRAVENOUS at 10:48

## 2018-01-01 RX ADMIN — NOREPINEPHRINE BITARTRATE 0.03 MCG/KG/MIN: 1 INJECTION INTRAVENOUS at 16:13

## 2018-01-01 RX ADMIN — METRONIDAZOLE 500 MG: 500 INJECTION, SOLUTION INTRAVENOUS at 21:00

## 2018-01-01 RX ADMIN — INSULIN ASPART 2 UNITS: 100 INJECTION, SOLUTION INTRAVENOUS; SUBCUTANEOUS at 05:08

## 2018-01-01 RX ADMIN — LIDOCAINE 2 PATCH: 560 PATCH PERCUTANEOUS; TOPICAL; TRANSDERMAL at 20:42

## 2018-01-01 RX ADMIN — HEPARIN SODIUM 5000 UNITS: 5000 INJECTION, SOLUTION INTRAVENOUS; SUBCUTANEOUS at 16:37

## 2018-01-01 RX ADMIN — ESCITALOPRAM OXALATE 10 MG: 10 TABLET ORAL at 11:10

## 2018-01-01 RX ADMIN — PROPOFOL 15 MCG/KG/MIN: 10 INJECTION, EMULSION INTRAVENOUS at 17:03

## 2018-01-01 RX ADMIN — Medication 10 MG: at 12:01

## 2018-01-01 RX ADMIN — ACETAMINOPHEN 975 MG: 325 TABLET, FILM COATED ORAL at 18:12

## 2018-01-01 RX ADMIN — ATORVASTATIN CALCIUM 80 MG: 80 TABLET, FILM COATED ORAL at 20:11

## 2018-01-01 RX ADMIN — MULTIVITAMIN 15 ML: LIQUID ORAL at 09:04

## 2018-01-01 RX ADMIN — CEFTRIAXONE SODIUM 2 G: 2 INJECTION, POWDER, FOR SOLUTION INTRAMUSCULAR; INTRAVENOUS at 16:34

## 2018-01-01 RX ADMIN — HEPARIN SODIUM 5000 UNITS: 5000 INJECTION, SOLUTION INTRAVENOUS; SUBCUTANEOUS at 00:21

## 2018-01-01 RX ADMIN — HEPARIN SODIUM 5000 UNITS: 5000 INJECTION, SOLUTION INTRAVENOUS; SUBCUTANEOUS at 02:11

## 2018-01-01 RX ADMIN — METOPROLOL TARTRATE 5 MG: 5 INJECTION INTRAVENOUS at 08:31

## 2018-01-01 RX ADMIN — SODIUM CHLORIDE: 9 INJECTION, SOLUTION INTRAVENOUS at 03:14

## 2018-01-01 RX ADMIN — HEPARIN SODIUM 5000 UNITS: 5000 INJECTION, SOLUTION INTRAVENOUS; SUBCUTANEOUS at 16:27

## 2018-01-01 RX ADMIN — METRONIDAZOLE 500 MG: 500 INJECTION, SOLUTION INTRAVENOUS at 04:38

## 2018-01-01 RX ADMIN — FUROSEMIDE 40 MG: 10 INJECTION, SOLUTION INTRAVENOUS at 16:20

## 2018-01-01 RX ADMIN — ESCITALOPRAM OXALATE 10 MG: 10 TABLET ORAL at 07:52

## 2018-01-01 RX ADMIN — PROPOFOL 5 MCG/KG/MIN: 10 INJECTION, EMULSION INTRAVENOUS at 09:16

## 2018-01-01 RX ADMIN — LEVALBUTEROL HYDROCHLORIDE 0.63 MG: 0.63 SOLUTION RESPIRATORY (INHALATION) at 20:06

## 2018-01-01 RX ADMIN — METOROPROLOL TARTRATE 5 MG: 5 INJECTION, SOLUTION INTRAVENOUS at 05:27

## 2018-01-01 RX ADMIN — HEPARIN SODIUM 5000 UNITS: 5000 INJECTION, SOLUTION INTRAVENOUS; SUBCUTANEOUS at 09:14

## 2018-01-01 RX ADMIN — AMLODIPINE BESYLATE 10 MG: 10 TABLET ORAL at 08:16

## 2018-01-01 RX ADMIN — AMLODIPINE BESYLATE 10 MG: 10 TABLET ORAL at 09:16

## 2018-01-01 RX ADMIN — INSULIN ASPART 2 UNITS: 100 INJECTION, SOLUTION INTRAVENOUS; SUBCUTANEOUS at 08:18

## 2018-01-01 RX ADMIN — LIDOCAINE 2 PATCH: 560 PATCH PERCUTANEOUS; TOPICAL; TRANSDERMAL at 20:27

## 2018-01-01 RX ADMIN — METOPROLOL TARTRATE 100 MG: 50 TABLET ORAL at 20:16

## 2018-01-01 RX ADMIN — ATORVASTATIN CALCIUM 80 MG: 80 TABLET, FILM COATED ORAL at 20:37

## 2018-01-01 RX ADMIN — METRONIDAZOLE 500 MG: 500 INJECTION, SOLUTION INTRAVENOUS at 12:35

## 2018-01-01 RX ADMIN — ACETAMINOPHEN 650 MG: 650 SUPPOSITORY RECTAL at 20:44

## 2018-01-01 RX ADMIN — FUROSEMIDE 40 MG: 10 INJECTION, SOLUTION INTRAVENOUS at 14:03

## 2018-01-01 RX ADMIN — FENTANYL CITRATE 25 MCG: 50 INJECTION INTRAMUSCULAR; INTRAVENOUS at 09:27

## 2018-01-01 RX ADMIN — CARBOXYMETHYLCELLULOSE SODIUM 1 DROP: 5 SOLUTION/ DROPS OPHTHALMIC at 01:05

## 2018-01-01 RX ADMIN — ESCITALOPRAM OXALATE 10 MG: 10 TABLET ORAL at 09:16

## 2018-01-01 RX ADMIN — HEPARIN SODIUM 5000 UNITS: 5000 INJECTION, SOLUTION INTRAVENOUS; SUBCUTANEOUS at 08:16

## 2018-01-01 RX ADMIN — LIDOCAINE HYDROCHLORIDE 60 MG: 20 INJECTION, SOLUTION INFILTRATION; PERINEURAL at 08:12

## 2018-01-01 RX ADMIN — ACETAMINOPHEN 975 MG: 325 TABLET, FILM COATED ORAL at 09:19

## 2018-01-01 RX ADMIN — FUROSEMIDE 60 MG: 10 INJECTION, SOLUTION INTRAVENOUS at 23:05

## 2018-01-01 RX ADMIN — OXYCODONE HYDROCHLORIDE 5 MG: 5 TABLET ORAL at 16:31

## 2018-01-01 RX ADMIN — INSULIN ASPART 2 UNITS: 100 INJECTION, SOLUTION INTRAVENOUS; SUBCUTANEOUS at 16:31

## 2018-01-01 RX ADMIN — MONTELUKAST SODIUM 10 MG: 5 TABLET, CHEWABLE ORAL at 00:03

## 2018-01-01 RX ADMIN — CARBOXYMETHYLCELLULOSE SODIUM 1 DROP: 5 SOLUTION/ DROPS OPHTHALMIC at 17:26

## 2018-01-01 RX ADMIN — FLUCONAZOLE 400 MG: 2 INJECTION, SOLUTION INTRAVENOUS at 20:04

## 2018-01-01 RX ADMIN — Medication 0.2 MG: at 04:23

## 2018-01-01 RX ADMIN — FUROSEMIDE 40 MG: 10 INJECTION, SOLUTION INTRAVENOUS at 13:21

## 2018-01-01 RX ADMIN — FUROSEMIDE 20 MG: 10 INJECTION, SOLUTION INTRAVENOUS at 08:53

## 2018-01-01 RX ADMIN — METRONIDAZOLE 500 MG: 500 INJECTION, SOLUTION INTRAVENOUS at 05:34

## 2018-01-01 RX ADMIN — ROCURONIUM BROMIDE 100 MG: 10 INJECTION INTRAVENOUS at 08:12

## 2018-01-01 RX ADMIN — Medication 12.5 MG: at 11:09

## 2018-01-01 RX ADMIN — ACETAMINOPHEN 650 MG: 325 TABLET, FILM COATED ORAL at 20:51

## 2018-01-01 RX ADMIN — INSULIN ASPART 1 UNITS: 100 INJECTION, SOLUTION INTRAVENOUS; SUBCUTANEOUS at 20:27

## 2018-01-01 RX ADMIN — FUROSEMIDE 40 MG: 10 INJECTION, SOLUTION INTRAVENOUS at 09:14

## 2018-01-01 RX ADMIN — PROPOFOL 50 MG: 10 INJECTION, EMULSION INTRAVENOUS at 10:22

## 2018-01-01 RX ADMIN — POLYETHYLENE GLYCOL 3350 17 G: 17 POWDER, FOR SOLUTION ORAL at 08:10

## 2018-01-01 RX ADMIN — AMLODIPINE BESYLATE 10 MG: 10 TABLET ORAL at 09:18

## 2018-01-01 RX ADMIN — FENTANYL CITRATE 50 MCG: 50 INJECTION INTRAMUSCULAR; INTRAVENOUS at 10:56

## 2018-01-01 RX ADMIN — HYDRALAZINE HYDROCHLORIDE 20 MG: 10 TABLET, FILM COATED ORAL at 08:24

## 2018-01-01 RX ADMIN — CARBOXYMETHYLCELLULOSE SODIUM 1 DROP: 5 SOLUTION/ DROPS OPHTHALMIC at 18:19

## 2018-01-01 RX ADMIN — INSULIN ASPART 1 UNITS: 100 INJECTION, SOLUTION INTRAVENOUS; SUBCUTANEOUS at 23:13

## 2018-01-01 RX ADMIN — OXYCODONE HYDROCHLORIDE 5 MG: 5 TABLET ORAL at 04:36

## 2018-01-01 RX ADMIN — LIDOCAINE 2 PATCH: 560 PATCH PERCUTANEOUS; TOPICAL; TRANSDERMAL at 20:49

## 2018-01-01 RX ADMIN — INSULIN ASPART 1 UNITS: 100 INJECTION, SOLUTION INTRAVENOUS; SUBCUTANEOUS at 05:44

## 2018-01-01 RX ADMIN — PROPOFOL 5 MCG/KG/MIN: 10 INJECTION, EMULSION INTRAVENOUS at 11:16

## 2018-01-01 RX ADMIN — PANTOPRAZOLE SODIUM 40 MG: 40 TABLET, DELAYED RELEASE ORAL at 08:21

## 2018-01-01 RX ADMIN — METRONIDAZOLE 500 MG: 500 INJECTION, SOLUTION INTRAVENOUS at 21:11

## 2018-01-01 RX ADMIN — INSULIN ASPART 7 UNITS: 100 INJECTION, SOLUTION INTRAVENOUS; SUBCUTANEOUS at 20:09

## 2018-01-01 RX ADMIN — ONDANSETRON 4 MG: 2 INJECTION INTRAMUSCULAR; INTRAVENOUS at 08:54

## 2018-01-01 RX ADMIN — I.V. FAT EMULSION 250 ML: 20 EMULSION INTRAVENOUS at 21:07

## 2018-01-01 RX ADMIN — SODIUM CHLORIDE, PRESERVATIVE FREE 84 ML: 5 INJECTION INTRAVENOUS at 19:37

## 2018-01-01 RX ADMIN — Medication 0.2 MG: at 20:41

## 2018-01-01 RX ADMIN — TETROFOSMIN 9.6 MCI.: 1.38 INJECTION, POWDER, LYOPHILIZED, FOR SOLUTION INTRAVENOUS at 12:28

## 2018-01-01 RX ADMIN — METOROPROLOL TARTRATE 5 MG: 5 INJECTION, SOLUTION INTRAVENOUS at 04:52

## 2018-01-01 RX ADMIN — LIDOCAINE 2 PATCH: 560 PATCH PERCUTANEOUS; TOPICAL; TRANSDERMAL at 20:12

## 2018-01-01 RX ADMIN — OXYCODONE HYDROCHLORIDE 5 MG: 5 TABLET ORAL at 04:52

## 2018-01-01 RX ADMIN — SODIUM CHLORIDE, POTASSIUM CHLORIDE, SODIUM LACTATE AND CALCIUM CHLORIDE: 600; 310; 30; 20 INJECTION, SOLUTION INTRAVENOUS at 12:18

## 2018-01-01 RX ADMIN — ALBUMIN HUMAN: 0.05 INJECTION, SOLUTION INTRAVENOUS at 12:23

## 2018-01-01 RX ADMIN — HYDRALAZINE HYDROCHLORIDE 10 MG: 20 INJECTION INTRAMUSCULAR; INTRAVENOUS at 14:06

## 2018-01-01 RX ADMIN — ATORVASTATIN CALCIUM 80 MG: 80 TABLET, FILM COATED ORAL at 20:17

## 2018-01-01 RX ADMIN — MONTELUKAST SODIUM 10 MG: 10 TABLET, FILM COATED ORAL at 11:09

## 2018-01-01 RX ADMIN — METOPROLOL TARTRATE 50 MG: 50 TABLET ORAL at 20:34

## 2018-01-01 RX ADMIN — LEVALBUTEROL HYDROCHLORIDE 0.63 MG: 0.63 SOLUTION RESPIRATORY (INHALATION) at 08:06

## 2018-01-01 RX ADMIN — HEPARIN SODIUM 5000 UNITS: 5000 INJECTION, SOLUTION INTRAVENOUS; SUBCUTANEOUS at 04:08

## 2018-01-01 RX ADMIN — HEPARIN SODIUM 5000 UNITS: 5000 INJECTION, SOLUTION INTRAVENOUS; SUBCUTANEOUS at 17:24

## 2018-01-01 RX ADMIN — AMLODIPINE BESYLATE 10 MG: 10 TABLET ORAL at 11:09

## 2018-01-01 RX ADMIN — DILTIAZEM HYDROCHLORIDE 15 MG/HR: 5 INJECTION INTRAVENOUS at 00:51

## 2018-01-01 RX ADMIN — HUMAN ALBUMIN MICROSPHERES AND PERFLUTREN 6 ML: 10; .22 INJECTION, SOLUTION INTRAVENOUS at 14:30

## 2018-01-01 RX ADMIN — BUPIVACAINE 20 ML: 13.3 INJECTION, SUSPENSION, LIPOSOMAL INFILTRATION at 08:30

## 2018-01-01 RX ADMIN — PANTOPRAZOLE SODIUM 40 MG: 40 INJECTION, POWDER, FOR SOLUTION INTRAVENOUS at 08:16

## 2018-01-01 RX ADMIN — CARBOXYMETHYLCELLULOSE SODIUM 1 DROP: 5 SOLUTION/ DROPS OPHTHALMIC at 12:20

## 2018-01-01 RX ADMIN — INSULIN ASPART 2 UNITS: 100 INJECTION, SOLUTION INTRAVENOUS; SUBCUTANEOUS at 11:38

## 2018-01-01 RX ADMIN — ACETAMINOPHEN 975 MG: 325 TABLET, FILM COATED ORAL at 08:18

## 2018-01-01 RX ADMIN — POLYETHYLENE GLYCOL 3350 17 G: 17 POWDER, FOR SOLUTION ORAL at 08:05

## 2018-01-01 RX ADMIN — ESMOLOL HYDROCHLORIDE 250 MCG/KG/MIN: 20 INJECTION INTRAVENOUS at 11:08

## 2018-01-01 RX ADMIN — ATORVASTATIN CALCIUM 80 MG: 80 TABLET, FILM COATED ORAL at 20:22

## 2018-01-01 RX ADMIN — FUROSEMIDE 40 MG: 10 INJECTION, SOLUTION INTRAVENOUS at 08:18

## 2018-01-01 RX ADMIN — DEXTROSE MONOHYDRATE 3 MCG/KG/MIN: 50 INJECTION, SOLUTION INTRAVENOUS at 21:37

## 2018-01-01 RX ADMIN — ESMOLOL HYDROCHLORIDE 50 MCG/KG/MIN: 20 INJECTION INTRAVENOUS at 09:17

## 2018-01-01 RX ADMIN — HEPARIN SODIUM 5000 UNITS: 5000 INJECTION, SOLUTION INTRAVENOUS; SUBCUTANEOUS at 23:50

## 2018-01-01 RX ADMIN — PHENYLEPHRINE HYDROCHLORIDE 0.5 MCG/KG/MIN: 10 INJECTION, SOLUTION INTRAMUSCULAR; INTRAVENOUS; SUBCUTANEOUS at 11:50

## 2018-01-01 RX ADMIN — HEPARIN SODIUM 5000 UNITS: 5000 INJECTION, SOLUTION INTRAVENOUS; SUBCUTANEOUS at 08:32

## 2018-01-01 RX ADMIN — I.V. FAT EMULSION 250 ML: 20 EMULSION INTRAVENOUS at 20:34

## 2018-01-01 RX ADMIN — METOPROLOL TARTRATE 50 MG: 50 TABLET ORAL at 20:49

## 2018-01-01 RX ADMIN — CARBOXYMETHYLCELLULOSE SODIUM 1 DROP: 5 SOLUTION/ DROPS OPHTHALMIC at 08:21

## 2018-01-01 RX ADMIN — SODIUM CHLORIDE, POTASSIUM CHLORIDE, SODIUM LACTATE AND CALCIUM CHLORIDE 500 ML: 600; 310; 30; 20 INJECTION, SOLUTION INTRAVENOUS at 11:17

## 2018-01-01 RX ADMIN — Medication 0.2 MG: at 12:43

## 2018-01-01 RX ADMIN — INSULIN ASPART 1 UNITS: 100 INJECTION, SOLUTION INTRAVENOUS; SUBCUTANEOUS at 12:48

## 2018-01-01 RX ADMIN — LIDOCAINE 2 PATCH: 560 PATCH PERCUTANEOUS; TOPICAL; TRANSDERMAL at 20:34

## 2018-01-01 RX ADMIN — HEPARIN SODIUM 5000 UNITS: 5000 INJECTION, SOLUTION INTRAVENOUS; SUBCUTANEOUS at 00:18

## 2018-01-01 RX ADMIN — AMIODARONE HYDROCHLORIDE 150 MG: 1.5 INJECTION, SOLUTION INTRAVENOUS at 12:05

## 2018-01-01 RX ADMIN — METOPROLOL TARTRATE 5 MG: 5 INJECTION INTRAVENOUS at 03:49

## 2018-01-01 RX ADMIN — INSULIN ASPART 3 UNITS: 100 INJECTION, SOLUTION INTRAVENOUS; SUBCUTANEOUS at 23:26

## 2018-01-01 RX ADMIN — PANTOPRAZOLE SODIUM 40 MG: 40 TABLET, DELAYED RELEASE ORAL at 09:17

## 2018-01-01 RX ADMIN — SENNOSIDES AND DOCUSATE SODIUM 2 TABLET: 8.6; 5 TABLET ORAL at 07:52

## 2018-01-01 RX ADMIN — IOPAMIDOL 135 ML: 755 INJECTION, SOLUTION INTRAVENOUS at 11:51

## 2018-01-01 RX ADMIN — Medication 100 MCG/HR: at 10:12

## 2018-01-01 RX ADMIN — SODIUM CHLORIDE: 9 INJECTION, SOLUTION INTRAVENOUS at 11:38

## 2018-01-01 RX ADMIN — MICAFUNGIN SODIUM 100 MG: 10 INJECTION, POWDER, LYOPHILIZED, FOR SOLUTION INTRAVENOUS at 14:13

## 2018-01-01 RX ADMIN — Medication 5 MG: at 11:40

## 2018-01-01 RX ADMIN — PANTOPRAZOLE SODIUM 40 MG: 40 INJECTION, POWDER, FOR SOLUTION INTRAVENOUS at 08:03

## 2018-01-01 RX ADMIN — INSULIN ASPART 3 UNITS: 100 INJECTION, SOLUTION INTRAVENOUS; SUBCUTANEOUS at 00:21

## 2018-01-01 RX ADMIN — LEVOTHYROXINE SODIUM ANHYDROUS 62.5 MCG: 100 INJECTION, POWDER, LYOPHILIZED, FOR SOLUTION INTRAVENOUS at 14:34

## 2018-01-01 RX ADMIN — HUMAN INSULIN 1.5 UNITS/HR: 100 INJECTION, SOLUTION SUBCUTANEOUS at 05:59

## 2018-01-01 RX ADMIN — LEVALBUTEROL HYDROCHLORIDE 0.63 MG: 0.63 SOLUTION RESPIRATORY (INHALATION) at 08:42

## 2018-01-01 RX ADMIN — METOROPROLOL TARTRATE 5 MG: 5 INJECTION, SOLUTION INTRAVENOUS at 05:07

## 2018-01-01 RX ADMIN — I.V. FAT EMULSION 250 ML: 20 EMULSION INTRAVENOUS at 21:16

## 2018-01-01 RX ADMIN — HEPARIN SODIUM 5000 UNITS: 5000 INJECTION, SOLUTION INTRAVENOUS; SUBCUTANEOUS at 09:25

## 2018-01-01 RX ADMIN — ACETAMINOPHEN 975 MG: 325 TABLET, FILM COATED ORAL at 11:09

## 2018-01-01 RX ADMIN — TETROFOSMIN 39.2 MCI.: 1.38 INJECTION, POWDER, LYOPHILIZED, FOR SOLUTION INTRAVENOUS at 14:26

## 2018-01-01 RX ADMIN — PREGABALIN 50 MG: 50 CAPSULE ORAL at 09:18

## 2018-01-01 RX ADMIN — INSULIN ASPART 6 UNITS: 100 INJECTION, SOLUTION INTRAVENOUS; SUBCUTANEOUS at 18:07

## 2018-01-01 RX ADMIN — CEFEPIME HYDROCHLORIDE 2 G: 2 INJECTION, POWDER, FOR SOLUTION INTRAVENOUS at 18:42

## 2018-01-01 RX ADMIN — POTASSIUM CHLORIDE 10 MEQ: 10 INJECTION, SOLUTION INTRAVENOUS at 14:17

## 2018-01-01 RX ADMIN — MIDAZOLAM 1 MG: 1 INJECTION INTRAMUSCULAR; INTRAVENOUS at 11:40

## 2018-01-01 RX ADMIN — HEPARIN SODIUM 5000 UNITS: 5000 INJECTION, SOLUTION INTRAVENOUS; SUBCUTANEOUS at 12:12

## 2018-01-01 RX ADMIN — ROCURONIUM BROMIDE 20 MG: 10 INJECTION INTRAVENOUS at 10:22

## 2018-01-01 RX ADMIN — METRONIDAZOLE 500 MG: 500 INJECTION, SOLUTION INTRAVENOUS at 15:06

## 2018-01-01 RX ADMIN — FUROSEMIDE 40 MG: 10 INJECTION, SOLUTION INTRAVENOUS at 08:16

## 2018-01-01 RX ADMIN — SODIUM CHLORIDE, POTASSIUM CHLORIDE, SODIUM LACTATE AND CALCIUM CHLORIDE: 600; 310; 30; 20 INJECTION, SOLUTION INTRAVENOUS at 07:20

## 2018-01-01 RX ADMIN — INSULIN ASPART 3 UNITS: 100 INJECTION, SOLUTION INTRAVENOUS; SUBCUTANEOUS at 08:37

## 2018-01-01 RX ADMIN — LEVALBUTEROL HYDROCHLORIDE 0.63 MG: 0.63 SOLUTION RESPIRATORY (INHALATION) at 08:20

## 2018-01-01 RX ADMIN — FUROSEMIDE 40 MG: 10 INJECTION, SOLUTION INTRAVENOUS at 09:23

## 2018-01-01 RX ADMIN — METOPROLOL TARTRATE 100 MG: 50 TABLET ORAL at 21:00

## 2018-01-01 RX ADMIN — LEVOTHYROXINE SODIUM ANHYDROUS 62.5 MCG: 100 INJECTION, POWDER, LYOPHILIZED, FOR SOLUTION INTRAVENOUS at 12:12

## 2018-01-01 RX ADMIN — SODIUM CHLORIDE: 9 INJECTION, SOLUTION INTRAVENOUS at 16:46

## 2018-01-01 RX ADMIN — DILTIAZEM HYDROCHLORIDE 5 MG/HR: 5 INJECTION INTRAVENOUS at 10:52

## 2018-01-01 RX ADMIN — FUROSEMIDE 20 MG: 10 INJECTION, SOLUTION INTRAVENOUS at 08:16

## 2018-01-01 RX ADMIN — SENNOSIDES AND DOCUSATE SODIUM 2 TABLET: 8.6; 5 TABLET ORAL at 08:22

## 2018-01-01 RX ADMIN — LEVOTHYROXINE SODIUM 125 MCG: 25 TABLET ORAL at 08:21

## 2018-01-01 RX ADMIN — VANCOMYCIN HYDROCHLORIDE 2000 MG: 1 INJECTION, POWDER, LYOPHILIZED, FOR SOLUTION INTRAVENOUS at 20:05

## 2018-01-01 RX ADMIN — VASOPRESSIN 2.4 UNITS/HR: 20 INJECTION INTRAVENOUS at 22:08

## 2018-01-01 RX ADMIN — MONTELUKAST SODIUM 10 MG: 5 TABLET, CHEWABLE ORAL at 22:29

## 2018-01-01 RX ADMIN — HYDRALAZINE HYDROCHLORIDE 20 MG: 10 TABLET, FILM COATED ORAL at 20:38

## 2018-01-01 RX ADMIN — FUROSEMIDE 40 MG: 10 INJECTION, SOLUTION INTRAVENOUS at 16:54

## 2018-01-01 RX ADMIN — ACETAMINOPHEN 975 MG: 325 TABLET, FILM COATED ORAL at 17:52

## 2018-01-01 RX ADMIN — PROPOFOL 10 MCG/KG/MIN: 10 INJECTION, EMULSION INTRAVENOUS at 18:02

## 2018-01-01 RX ADMIN — SODIUM CHLORIDE, POTASSIUM CHLORIDE, SODIUM LACTATE AND CALCIUM CHLORIDE 500 ML: 600; 310; 30; 20 INJECTION, SOLUTION INTRAVENOUS at 11:35

## 2018-01-01 RX ADMIN — SODIUM CHLORIDE, POTASSIUM CHLORIDE, SODIUM LACTATE AND CALCIUM CHLORIDE 500 ML: 600; 310; 30; 20 INJECTION, SOLUTION INTRAVENOUS at 13:57

## 2018-01-01 RX ADMIN — METOPROLOL TARTRATE 25 MG: 25 TABLET ORAL at 20:02

## 2018-01-01 RX ADMIN — SODIUM CHLORIDE 1000 ML: 9 INJECTION, SOLUTION INTRAVENOUS at 18:18

## 2018-01-01 RX ADMIN — FUROSEMIDE 20 MG: 10 INJECTION, SOLUTION INTRAVENOUS at 11:29

## 2018-01-01 RX ADMIN — METRONIDAZOLE 500 MG: 500 INJECTION, SOLUTION INTRAVENOUS at 21:55

## 2018-01-01 RX ADMIN — HEPARIN SODIUM 5000 UNITS: 5000 INJECTION, SOLUTION INTRAVENOUS; SUBCUTANEOUS at 17:52

## 2018-01-01 RX ADMIN — ACETAMINOPHEN 975 MG: 325 TABLET, FILM COATED ORAL at 00:17

## 2018-01-01 RX ADMIN — FUROSEMIDE 40 MG: 10 INJECTION, SOLUTION INTRAVENOUS at 17:25

## 2018-01-01 RX ADMIN — IOPAMIDOL 135 ML: 755 INJECTION, SOLUTION INTRAVENOUS at 19:36

## 2018-01-01 RX ADMIN — POTASSIUM PHOSPHATE, MONOBASIC AND POTASSIUM PHOSPHATE, DIBASIC 15 MMOL: 224; 236 INJECTION, SOLUTION INTRAVENOUS at 12:50

## 2018-01-01 RX ADMIN — INSULIN ASPART 3 UNITS: 100 INJECTION, SOLUTION INTRAVENOUS; SUBCUTANEOUS at 12:38

## 2018-01-01 RX ADMIN — HYDRALAZINE HYDROCHLORIDE 10 MG: 20 INJECTION INTRAMUSCULAR; INTRAVENOUS at 03:26

## 2018-01-01 RX ADMIN — HEPARIN SODIUM 5000 UNITS: 5000 INJECTION, SOLUTION INTRAVENOUS; SUBCUTANEOUS at 23:25

## 2018-01-01 RX ADMIN — PROPOFOL 30 MCG/KG/MIN: 10 INJECTION, EMULSION INTRAVENOUS at 21:23

## 2018-01-01 RX ADMIN — POTASSIUM CHLORIDE: 2 INJECTION, SOLUTION, CONCENTRATE INTRAVENOUS at 20:16

## 2018-01-01 RX ADMIN — FUROSEMIDE 40 MG: 10 INJECTION, SOLUTION INTRAVENOUS at 11:15

## 2018-01-01 RX ADMIN — INSULIN ASPART 3 UNITS: 100 INJECTION, SOLUTION INTRAVENOUS; SUBCUTANEOUS at 23:42

## 2018-01-01 RX ADMIN — HYDRALAZINE HYDROCHLORIDE 10 MG: 10 TABLET, FILM COATED ORAL at 09:18

## 2018-01-01 RX ADMIN — SENNOSIDES AND DOCUSATE SODIUM 2 TABLET: 8.6; 5 TABLET ORAL at 20:17

## 2018-01-01 RX ADMIN — HYDRALAZINE HYDROCHLORIDE 10 MG: 20 INJECTION INTRAMUSCULAR; INTRAVENOUS at 10:06

## 2018-01-01 RX ADMIN — MEROPENEM 1 G: 1 INJECTION, POWDER, FOR SOLUTION INTRAVENOUS at 09:06

## 2018-01-01 RX ADMIN — ESCITALOPRAM OXALATE 10 MG: 10 TABLET ORAL at 08:16

## 2018-01-01 RX ADMIN — SODIUM CHLORIDE: 9 INJECTION, SOLUTION INTRAVENOUS at 15:12

## 2018-01-01 RX ADMIN — SODIUM CHLORIDE: 9 INJECTION, SOLUTION INTRAVENOUS at 02:13

## 2018-01-01 RX ADMIN — KETAMINE HYDROCHLORIDE 0.12 MG/KG/HR: 100 INJECTION, SOLUTION, CONCENTRATE INTRAMUSCULAR; INTRAVENOUS at 09:01

## 2018-01-01 RX ADMIN — INSULIN ASPART 2 UNITS: 100 INJECTION, SOLUTION INTRAVENOUS; SUBCUTANEOUS at 07:57

## 2018-01-01 RX ADMIN — METRONIDAZOLE 500 MG: 500 INJECTION, SOLUTION INTRAVENOUS at 13:56

## 2018-01-01 RX ADMIN — MONTELUKAST SODIUM 10 MG: 5 TABLET, CHEWABLE ORAL at 21:06

## 2018-01-01 RX ADMIN — DEXTROSE AND SODIUM CHLORIDE: 5; 450 INJECTION, SOLUTION INTRAVENOUS at 04:38

## 2018-01-01 RX ADMIN — INSULIN ASPART 1 UNITS: 100 INJECTION, SOLUTION INTRAVENOUS; SUBCUTANEOUS at 16:44

## 2018-01-01 RX ADMIN — SODIUM CHLORIDE, POTASSIUM CHLORIDE, SODIUM LACTATE AND CALCIUM CHLORIDE: 600; 310; 30; 20 INJECTION, SOLUTION INTRAVENOUS at 11:31

## 2018-01-01 RX ADMIN — HEPARIN SODIUM 5000 UNITS: 5000 INJECTION, SOLUTION INTRAVENOUS; SUBCUTANEOUS at 10:09

## 2018-01-01 RX ADMIN — METRONIDAZOLE 500 MG: 500 INJECTION, SOLUTION INTRAVENOUS at 21:18

## 2018-01-01 RX ADMIN — IPRATROPIUM BROMIDE AND ALBUTEROL SULFATE 3 ML: .5; 3 SOLUTION RESPIRATORY (INHALATION) at 19:58

## 2018-01-01 RX ADMIN — LIDOCAINE 2 PATCH: 560 PATCH PERCUTANEOUS; TOPICAL; TRANSDERMAL at 20:08

## 2018-01-01 RX ADMIN — ATORVASTATIN CALCIUM 80 MG: 80 TABLET, FILM COATED ORAL at 20:02

## 2018-01-01 RX ADMIN — ACETAMINOPHEN 975 MG: 325 TABLET, FILM COATED ORAL at 02:13

## 2018-01-01 RX ADMIN — INSULIN ASPART 4 UNITS: 100 INJECTION, SOLUTION INTRAVENOUS; SUBCUTANEOUS at 15:21

## 2018-01-01 RX ADMIN — ACETAMINOPHEN 975 MG: 325 TABLET, FILM COATED ORAL at 09:18

## 2018-01-01 RX ADMIN — METOPROLOL TARTRATE 50 MG: 50 TABLET ORAL at 20:36

## 2018-01-01 RX ADMIN — INSULIN ASPART 2 UNITS: 100 INJECTION, SOLUTION INTRAVENOUS; SUBCUTANEOUS at 20:47

## 2018-01-01 RX ADMIN — OXYCODONE HYDROCHLORIDE 5 MG: 5 TABLET ORAL at 06:34

## 2018-01-01 RX ADMIN — ACETAMINOPHEN 975 MG: 325 TABLET, FILM COATED ORAL at 10:12

## 2018-01-01 RX ADMIN — METOPROLOL TARTRATE 50 MG: 50 TABLET ORAL at 09:04

## 2018-01-01 RX ADMIN — METRONIDAZOLE 500 MG: 500 INJECTION, SOLUTION INTRAVENOUS at 19:45

## 2018-01-01 RX ADMIN — LEVOTHYROXINE SODIUM ANHYDROUS 62.5 MCG: 100 INJECTION, POWDER, LYOPHILIZED, FOR SOLUTION INTRAVENOUS at 10:46

## 2018-01-01 RX ADMIN — METRONIDAZOLE 500 MG: 500 INJECTION, SOLUTION INTRAVENOUS at 05:00

## 2018-01-01 RX ADMIN — DEXTROSE MONOHYDRATE 3 MCG/KG/MIN: 50 INJECTION, SOLUTION INTRAVENOUS at 06:11

## 2018-01-01 RX ADMIN — HEPARIN SODIUM 5000 UNITS: 5000 INJECTION, SOLUTION INTRAVENOUS; SUBCUTANEOUS at 18:07

## 2018-01-01 RX ADMIN — MICAFUNGIN SODIUM 100 MG: 10 INJECTION, POWDER, LYOPHILIZED, FOR SOLUTION INTRAVENOUS at 15:37

## 2018-01-01 RX ADMIN — METOPROLOL TARTRATE 25 MG: 25 TABLET, FILM COATED ORAL at 10:48

## 2018-01-01 RX ADMIN — CARBOXYMETHYLCELLULOSE SODIUM 1 DROP: 5 SOLUTION/ DROPS OPHTHALMIC at 22:16

## 2018-01-01 RX ADMIN — ATORVASTATIN CALCIUM 80 MG: 80 TABLET, FILM COATED ORAL at 07:51

## 2018-01-01 RX ADMIN — ATORVASTATIN CALCIUM 80 MG: 80 TABLET, FILM COATED ORAL at 08:21

## 2018-01-01 RX ADMIN — FUROSEMIDE 20 MG: 20 TABLET ORAL at 10:27

## 2018-01-01 RX ADMIN — HEPARIN SODIUM 5000 UNITS: 5000 INJECTION, SOLUTION INTRAVENOUS; SUBCUTANEOUS at 09:05

## 2018-01-01 RX ADMIN — FUROSEMIDE 40 MG: 10 INJECTION, SOLUTION INTRAVENOUS at 09:17

## 2018-01-01 RX ADMIN — OXYCODONE HYDROCHLORIDE 5 MG: 5 TABLET ORAL at 13:05

## 2018-01-01 RX ADMIN — AMLODIPINE BESYLATE 10 MG: 10 TABLET ORAL at 08:18

## 2018-01-01 RX ADMIN — Medication 0.2 MG: at 17:38

## 2018-01-01 RX ADMIN — ATORVASTATIN CALCIUM 80 MG: 80 TABLET, FILM COATED ORAL at 20:38

## 2018-01-01 RX ADMIN — DOCUSATE SODIUM 100 MG: 100 CAPSULE, LIQUID FILLED ORAL at 09:18

## 2018-01-01 RX ADMIN — LEVOTHYROXINE SODIUM 125 MCG: 25 TABLET ORAL at 09:17

## 2018-01-01 RX ADMIN — FUROSEMIDE 20 MG: 10 INJECTION, SOLUTION INTRAVENOUS at 13:21

## 2018-01-01 RX ADMIN — PANTOPRAZOLE SODIUM 40 MG: 40 TABLET, DELAYED RELEASE ORAL at 08:02

## 2018-01-01 RX ADMIN — INSULIN ASPART 3 UNITS: 100 INJECTION, SOLUTION INTRAVENOUS; SUBCUTANEOUS at 20:22

## 2018-01-01 RX ADMIN — INSULIN ASPART 1 UNITS: 100 INJECTION, SOLUTION INTRAVENOUS; SUBCUTANEOUS at 04:33

## 2018-01-01 RX ADMIN — CALCIUM GLUCONATE: 98 INJECTION, SOLUTION INTRAVENOUS at 21:43

## 2018-01-01 RX ADMIN — MULTIVITAMIN 15 ML: LIQUID ORAL at 07:51

## 2018-01-01 RX ADMIN — INSULIN ASPART 3 UNITS: 100 INJECTION, SOLUTION INTRAVENOUS; SUBCUTANEOUS at 17:03

## 2018-01-01 RX ADMIN — SENNOSIDES AND DOCUSATE SODIUM 2 TABLET: 8.6; 5 TABLET ORAL at 20:19

## 2018-01-01 RX ADMIN — REGADENOSON 0.4 MG: 0.08 INJECTION, SOLUTION INTRAVENOUS at 13:42

## 2018-01-01 RX ADMIN — HEPARIN SODIUM 5000 UNITS: 5000 INJECTION, SOLUTION INTRAVENOUS; SUBCUTANEOUS at 07:32

## 2018-01-01 RX ADMIN — LIDOCAINE 2 PATCH: 560 PATCH PERCUTANEOUS; TOPICAL; TRANSDERMAL at 20:20

## 2018-01-01 RX ADMIN — INSULIN ASPART 1 UNITS: 100 INJECTION, SOLUTION INTRAVENOUS; SUBCUTANEOUS at 08:11

## 2018-01-01 RX ADMIN — CARBOXYMETHYLCELLULOSE SODIUM 1 DROP: 5 SOLUTION/ DROPS OPHTHALMIC at 23:05

## 2018-01-01 RX ADMIN — DILTIAZEM HYDROCHLORIDE 15 MG/HR: 5 INJECTION INTRAVENOUS at 12:52

## 2018-01-01 RX ADMIN — PROPOFOL 5 MCG/KG/MIN: 10 INJECTION, EMULSION INTRAVENOUS at 20:23

## 2018-01-01 RX ADMIN — ONDANSETRON 4 MG: 2 INJECTION INTRAMUSCULAR; INTRAVENOUS at 17:43

## 2018-01-01 RX ADMIN — METOPROLOL TARTRATE 100 MG: 50 TABLET, FILM COATED ORAL at 09:18

## 2018-01-01 RX ADMIN — Medication 0.3 MG: at 05:27

## 2018-01-01 RX ADMIN — METRONIDAZOLE 500 MG: 500 INJECTION, SOLUTION INTRAVENOUS at 22:07

## 2018-01-01 RX ADMIN — INSULIN ASPART 2 UNITS: 100 INJECTION, SOLUTION INTRAVENOUS; SUBCUTANEOUS at 04:35

## 2018-01-01 RX ADMIN — FUROSEMIDE 40 MG: 10 INJECTION INTRAMUSCULAR; INTRAVENOUS at 12:50

## 2018-01-01 RX ADMIN — INSULIN ASPART 3 UNITS: 100 INJECTION, SOLUTION INTRAVENOUS; SUBCUTANEOUS at 09:14

## 2018-01-01 RX ADMIN — INSULIN ASPART 3 UNITS: 100 INJECTION, SOLUTION INTRAVENOUS; SUBCUTANEOUS at 16:53

## 2018-01-01 RX ADMIN — LEVOTHYROXINE SODIUM ANHYDROUS 62.5 MCG: 100 INJECTION, POWDER, LYOPHILIZED, FOR SOLUTION INTRAVENOUS at 08:35

## 2018-01-01 RX ADMIN — CARBOXYMETHYLCELLULOSE SODIUM 1 DROP: 5 SOLUTION/ DROPS OPHTHALMIC at 20:45

## 2018-01-01 RX ADMIN — PHENYLEPHRINE HYDROCHLORIDE 0.5 MCG/KG/MIN: 10 INJECTION, SOLUTION INTRAMUSCULAR; INTRAVENOUS; SUBCUTANEOUS at 06:26

## 2018-01-01 RX ADMIN — INSULIN ASPART 6 UNITS: 100 INJECTION, SOLUTION INTRAVENOUS; SUBCUTANEOUS at 23:37

## 2018-01-01 RX ADMIN — Medication 0.2 MG: at 05:25

## 2018-01-01 RX ADMIN — INSULIN ASPART 5 UNITS: 100 INJECTION, SOLUTION INTRAVENOUS; SUBCUTANEOUS at 00:19

## 2018-01-01 RX ADMIN — LEVALBUTEROL HYDROCHLORIDE 0.63 MG: 0.63 SOLUTION RESPIRATORY (INHALATION) at 13:05

## 2018-01-01 RX ADMIN — MEROPENEM 500 MG: 500 INJECTION, POWDER, FOR SOLUTION INTRAVENOUS at 03:58

## 2018-01-01 RX ADMIN — LIDOCAINE HYDROCHLORIDE 2 ML: 10 INJECTION, SOLUTION EPIDURAL; INFILTRATION; INTRACAUDAL; PERINEURAL at 14:04

## 2018-01-01 RX ADMIN — ATORVASTATIN CALCIUM 80 MG: 80 TABLET, FILM COATED ORAL at 08:00

## 2018-01-01 RX ADMIN — CEFTRIAXONE SODIUM 2 G: 2 INJECTION, POWDER, FOR SOLUTION INTRAMUSCULAR; INTRAVENOUS at 14:48

## 2018-01-01 RX ADMIN — CARBOXYMETHYLCELLULOSE SODIUM 1 DROP: 5 SOLUTION/ DROPS OPHTHALMIC at 03:57

## 2018-01-01 RX ADMIN — METOPROLOL TARTRATE 100 MG: 50 TABLET ORAL at 20:38

## 2018-01-01 RX ADMIN — MICAFUNGIN SODIUM 100 MG: 10 INJECTION, POWDER, LYOPHILIZED, FOR SOLUTION INTRAVENOUS at 17:13

## 2018-01-01 RX ADMIN — INSULIN ASPART 2 UNITS: 100 INJECTION, SOLUTION INTRAVENOUS; SUBCUTANEOUS at 11:48

## 2018-01-01 RX ADMIN — LIDOCAINE 2 PATCH: 560 PATCH PERCUTANEOUS; TOPICAL; TRANSDERMAL at 21:09

## 2018-01-01 RX ADMIN — Medication 0.2 MG: at 13:53

## 2018-01-01 RX ADMIN — INSULIN ASPART 1 UNITS: 100 INJECTION, SOLUTION INTRAVENOUS; SUBCUTANEOUS at 16:49

## 2018-01-01 RX ADMIN — INSULIN ASPART 4 UNITS: 100 INJECTION, SOLUTION INTRAVENOUS; SUBCUTANEOUS at 13:08

## 2018-01-01 RX ADMIN — LIDOCAINE 2 PATCH: 560 PATCH PERCUTANEOUS; TOPICAL; TRANSDERMAL at 20:36

## 2018-01-01 RX ADMIN — SODIUM CHLORIDE: 9 INJECTION, SOLUTION INTRAVENOUS at 10:35

## 2018-01-01 RX ADMIN — METOPROLOL TARTRATE 5 MG: 5 INJECTION INTRAVENOUS at 08:38

## 2018-01-01 RX ADMIN — LEVALBUTEROL HYDROCHLORIDE 0.63 MG: 0.63 SOLUTION RESPIRATORY (INHALATION) at 20:15

## 2018-01-01 RX ADMIN — Medication 50 MCG/HR: at 14:23

## 2018-01-01 RX ADMIN — SODIUM CHLORIDE, POTASSIUM CHLORIDE, SODIUM LACTATE AND CALCIUM CHLORIDE 500 ML: 600; 310; 30; 20 INJECTION, SOLUTION INTRAVENOUS at 00:20

## 2018-01-01 RX ADMIN — LEVALBUTEROL HYDROCHLORIDE 0.63 MG: 0.63 SOLUTION RESPIRATORY (INHALATION) at 13:02

## 2018-01-01 RX ADMIN — ACETAMINOPHEN 975 MG: 325 TABLET, FILM COATED ORAL at 01:14

## 2018-01-01 RX ADMIN — MULTIVITAMIN 15 ML: LIQUID ORAL at 08:00

## 2018-01-01 RX ADMIN — INSULIN ASPART 6 UNITS: 100 INJECTION, SOLUTION INTRAVENOUS; SUBCUTANEOUS at 20:54

## 2018-01-01 RX ADMIN — INSULIN ASPART 3 UNITS: 100 INJECTION, SOLUTION INTRAVENOUS; SUBCUTANEOUS at 16:17

## 2018-01-01 RX ADMIN — MICAFUNGIN SODIUM 100 MG: 10 INJECTION, POWDER, LYOPHILIZED, FOR SOLUTION INTRAVENOUS at 15:16

## 2018-01-01 RX ADMIN — Medication 12.5 MG: at 20:22

## 2018-01-01 RX ADMIN — FUROSEMIDE 20 MG: 10 INJECTION, SOLUTION INTRAVENOUS at 10:44

## 2018-01-01 RX ADMIN — INSULIN ASPART 1 UNITS: 100 INJECTION, SOLUTION INTRAVENOUS; SUBCUTANEOUS at 20:33

## 2018-01-01 RX ADMIN — ACETAMINOPHEN 975 MG: 325 TABLET, FILM COATED ORAL at 16:37

## 2018-01-01 RX ADMIN — INSULIN ASPART 3 UNITS: 100 INJECTION, SOLUTION INTRAVENOUS; SUBCUTANEOUS at 03:20

## 2018-01-01 RX ADMIN — FUROSEMIDE 40 MG: 10 INJECTION, SOLUTION INTRAVENOUS at 15:00

## 2018-01-01 RX ADMIN — Medication 0.2 MG: at 21:29

## 2018-01-01 RX ADMIN — INSULIN ASPART 3 UNITS: 100 INJECTION, SOLUTION INTRAVENOUS; SUBCUTANEOUS at 04:16

## 2018-01-01 RX ADMIN — MEROPENEM 500 MG: 500 INJECTION, POWDER, FOR SOLUTION INTRAVENOUS at 18:18

## 2018-01-01 RX ADMIN — METRONIDAZOLE 500 MG: 500 INJECTION, SOLUTION INTRAVENOUS at 14:37

## 2018-01-01 RX ADMIN — FUROSEMIDE 40 MG: 40 TABLET ORAL at 09:16

## 2018-01-01 RX ADMIN — PROPOFOL 80 MG: 10 INJECTION, EMULSION INTRAVENOUS at 08:12

## 2018-01-01 RX ADMIN — INSULIN ASPART 4 UNITS: 100 INJECTION, SOLUTION INTRAVENOUS; SUBCUTANEOUS at 00:09

## 2018-01-01 RX ADMIN — LEVALBUTEROL HYDROCHLORIDE 0.63 MG: 0.63 SOLUTION RESPIRATORY (INHALATION) at 08:03

## 2018-01-01 RX ADMIN — LIDOCAINE 2 PATCH: 560 PATCH PERCUTANEOUS; TOPICAL; TRANSDERMAL at 20:37

## 2018-01-01 RX ADMIN — FENTANYL CITRATE 50 MCG: 50 INJECTION INTRAMUSCULAR; INTRAVENOUS at 02:32

## 2018-01-01 RX ADMIN — ROCURONIUM BROMIDE 30 MG: 10 INJECTION INTRAVENOUS at 10:47

## 2018-01-01 RX ADMIN — INSULIN ASPART 4 UNITS: 100 INJECTION, SOLUTION INTRAVENOUS; SUBCUTANEOUS at 11:25

## 2018-01-01 RX ADMIN — ACETAMINOPHEN 650 MG: 650 SUPPOSITORY RECTAL at 03:34

## 2018-01-01 RX ADMIN — SENNOSIDES AND DOCUSATE SODIUM 2 TABLET: 8.6; 5 TABLET ORAL at 20:32

## 2018-01-01 RX ADMIN — ACETAMINOPHEN 975 MG: 325 TABLET, FILM COATED ORAL at 19:00

## 2018-01-01 RX ADMIN — ATORVASTATIN CALCIUM 80 MG: 80 TABLET, FILM COATED ORAL at 09:04

## 2018-01-01 RX ADMIN — LEVOTHYROXINE SODIUM ANHYDROUS 62.5 MCG: 100 INJECTION, POWDER, LYOPHILIZED, FOR SOLUTION INTRAVENOUS at 08:16

## 2018-01-01 RX ADMIN — MEROPENEM 1 G: 1 INJECTION, POWDER, FOR SOLUTION INTRAVENOUS at 19:45

## 2018-01-01 RX ADMIN — CALCIUM GLUCONATE: 98 INJECTION, SOLUTION INTRAVENOUS at 20:34

## 2018-01-01 RX ADMIN — PROPOFOL 30 MCG/KG/MIN: 10 INJECTION, EMULSION INTRAVENOUS at 18:30

## 2018-01-01 RX ADMIN — HUMAN INSULIN 1 UNITS/HR: 100 INJECTION, SOLUTION SUBCUTANEOUS at 07:05

## 2018-01-01 RX ADMIN — HEPARIN SODIUM 5000 UNITS: 5000 INJECTION, SOLUTION INTRAVENOUS; SUBCUTANEOUS at 18:19

## 2018-01-01 RX ADMIN — METOPROLOL TARTRATE 5 MG: 5 INJECTION INTRAVENOUS at 08:50

## 2018-01-01 RX ADMIN — LEVALBUTEROL HYDROCHLORIDE 0.63 MG: 0.63 SOLUTION RESPIRATORY (INHALATION) at 01:52

## 2018-01-01 RX ADMIN — MINERAL OIL AND WHITE PETROLATUM: 30; 940 OINTMENT OPHTHALMIC at 10:52

## 2018-01-01 RX ADMIN — ESCITALOPRAM OXALATE 10 MG: 10 TABLET ORAL at 08:13

## 2018-01-01 RX ADMIN — MONTELUKAST SODIUM 10 MG: 5 TABLET, CHEWABLE ORAL at 00:18

## 2018-01-01 RX ADMIN — Medication 2 G: at 22:05

## 2018-01-01 RX ADMIN — Medication 0.2 MG: at 23:17

## 2018-01-01 RX ADMIN — PANTOPRAZOLE SODIUM 40 MG: 40 INJECTION, POWDER, FOR SOLUTION INTRAVENOUS at 08:24

## 2018-01-01 RX ADMIN — FENTANYL CITRATE 100 MCG: 50 INJECTION, SOLUTION INTRAMUSCULAR; INTRAVENOUS at 09:13

## 2018-01-01 RX ADMIN — ATORVASTATIN CALCIUM 80 MG: 80 TABLET, FILM COATED ORAL at 20:33

## 2018-01-01 RX ADMIN — INSULIN ASPART 2 UNITS: 100 INJECTION, SOLUTION INTRAVENOUS; SUBCUTANEOUS at 23:57

## 2018-01-01 RX ADMIN — LEVOTHYROXINE SODIUM ANHYDROUS 62.5 MCG: 100 INJECTION, POWDER, LYOPHILIZED, FOR SOLUTION INTRAVENOUS at 07:53

## 2018-01-01 RX ADMIN — MAGNESIUM SULFATE HEPTAHYDRATE 4 G: 40 INJECTION, SOLUTION INTRAVENOUS at 09:24

## 2018-01-01 RX ADMIN — POLYETHYLENE GLYCOL 3350 17 G: 17 POWDER, FOR SOLUTION ORAL at 08:37

## 2018-01-01 RX ADMIN — ACETAMINOPHEN 975 MG: 325 TABLET, FILM COATED ORAL at 18:54

## 2018-01-01 RX ADMIN — INSULIN ASPART 3 UNITS: 100 INJECTION, SOLUTION INTRAVENOUS; SUBCUTANEOUS at 17:07

## 2018-01-01 RX ADMIN — INSULIN ASPART 2 UNITS: 100 INJECTION, SOLUTION INTRAVENOUS; SUBCUTANEOUS at 12:14

## 2018-01-01 RX ADMIN — HEPARIN SODIUM 5000 UNITS: 5000 INJECTION, SOLUTION INTRAVENOUS; SUBCUTANEOUS at 08:02

## 2018-01-01 RX ADMIN — INSULIN ASPART 1 UNITS: 100 INJECTION, SOLUTION INTRAVENOUS; SUBCUTANEOUS at 23:44

## 2018-01-01 RX ADMIN — ONDANSETRON 4 MG: 2 INJECTION INTRAMUSCULAR; INTRAVENOUS at 07:49

## 2018-01-01 RX ADMIN — PANTOPRAZOLE SODIUM 40 MG: 40 INJECTION, POWDER, FOR SOLUTION INTRAVENOUS at 08:37

## 2018-01-01 RX ADMIN — FUROSEMIDE 20 MG: 10 INJECTION, SOLUTION INTRAVENOUS at 13:03

## 2018-01-01 RX ADMIN — DILTIAZEM HYDROCHLORIDE 15 MG/HR: 5 INJECTION INTRAVENOUS at 03:33

## 2018-01-01 RX ADMIN — HYDROCORTISONE SODIUM SUCCINATE 50 MG: 100 INJECTION, POWDER, FOR SOLUTION INTRAMUSCULAR; INTRAVENOUS at 10:36

## 2018-01-01 RX ADMIN — METRONIDAZOLE 500 MG: 500 INJECTION, SOLUTION INTRAVENOUS at 05:12

## 2018-01-01 RX ADMIN — FUROSEMIDE 40 MG: 40 TABLET ORAL at 16:24

## 2018-01-01 RX ADMIN — INSULIN ASPART 2 UNITS: 100 INJECTION, SOLUTION INTRAVENOUS; SUBCUTANEOUS at 05:15

## 2018-01-01 RX ADMIN — MICONAZOLE NITRATE: 2 POWDER TOPICAL at 08:21

## 2018-01-01 RX ADMIN — CEFTRIAXONE 2 G: 2 INJECTION, POWDER, FOR SOLUTION INTRAMUSCULAR; INTRAVENOUS at 12:45

## 2018-01-01 RX ADMIN — FUROSEMIDE 20 MG: 10 INJECTION, SOLUTION INTRAVENOUS at 18:04

## 2018-01-01 RX ADMIN — POTASSIUM CHLORIDE: 2 INJECTION, SOLUTION, CONCENTRATE INTRAVENOUS at 20:30

## 2018-01-01 RX ADMIN — METOPROLOL TARTRATE 5 MG: 5 INJECTION INTRAVENOUS at 21:17

## 2018-01-01 RX ADMIN — PANTOPRAZOLE SODIUM 40 MG: 40 TABLET, DELAYED RELEASE ORAL at 09:40

## 2018-01-01 RX ADMIN — HYDRALAZINE HYDROCHLORIDE 20 MG: 10 TABLET, FILM COATED ORAL at 21:00

## 2018-01-01 RX ADMIN — SODIUM CHLORIDE, POTASSIUM CHLORIDE, SODIUM LACTATE AND CALCIUM CHLORIDE 500 ML: 600; 310; 30; 20 INJECTION, SOLUTION INTRAVENOUS at 10:56

## 2018-01-01 RX ADMIN — SIMETHICONE CHEW TAB 80 MG 80 MG: 80 TABLET ORAL at 23:54

## 2018-01-01 RX ADMIN — MULTIVITAMIN 15 ML: LIQUID ORAL at 08:21

## 2018-01-01 RX ADMIN — DILTIAZEM HYDROCHLORIDE 15 MG/HR: 5 INJECTION INTRAVENOUS at 10:38

## 2018-01-01 RX ADMIN — PHENYLEPHRINE HYDROCHLORIDE 200 MCG: 10 INJECTION, SOLUTION INTRAMUSCULAR; INTRAVENOUS; SUBCUTANEOUS at 17:51

## 2018-01-01 RX ADMIN — INSULIN ASPART 1 UNITS: 100 INJECTION, SOLUTION INTRAVENOUS; SUBCUTANEOUS at 16:42

## 2018-01-01 RX ADMIN — SODIUM CHLORIDE 500 ML: 9 INJECTION, SOLUTION INTRAVENOUS at 06:11

## 2018-01-01 RX ADMIN — DILTIAZEM HYDROCHLORIDE 15 MG/HR: 5 INJECTION INTRAVENOUS at 10:44

## 2018-01-01 RX ADMIN — ESCITALOPRAM OXALATE 10 MG: 10 TABLET ORAL at 08:24

## 2018-01-01 RX ADMIN — Medication 12.5 MG: at 09:17

## 2018-01-01 RX ADMIN — DOCUSATE SODIUM 100 MG: 100 CAPSULE, LIQUID FILLED ORAL at 20:26

## 2018-01-01 RX ADMIN — PHENYLEPHRINE HYDROCHLORIDE 0.3 MCG/KG/MIN: 10 INJECTION, SOLUTION INTRAMUSCULAR; INTRAVENOUS; SUBCUTANEOUS at 12:00

## 2018-01-01 RX ADMIN — HEPARIN SODIUM 5000 UNITS: 5000 INJECTION, SOLUTION INTRAVENOUS; SUBCUTANEOUS at 16:24

## 2018-01-01 RX ADMIN — DILTIAZEM HYDROCHLORIDE 15 MG/HR: 5 INJECTION INTRAVENOUS at 03:55

## 2018-01-01 RX ADMIN — LEVOTHYROXINE SODIUM ANHYDROUS 62.5 MCG: 100 INJECTION, POWDER, LYOPHILIZED, FOR SOLUTION INTRAVENOUS at 08:31

## 2018-01-01 RX ADMIN — SODIUM CHLORIDE, POTASSIUM CHLORIDE, SODIUM LACTATE AND CALCIUM CHLORIDE: 600; 310; 30; 20 INJECTION, SOLUTION INTRAVENOUS at 00:31

## 2018-01-01 RX ADMIN — PHENYLEPHRINE HYDROCHLORIDE 200 MCG: 10 INJECTION, SOLUTION INTRAMUSCULAR; INTRAVENOUS; SUBCUTANEOUS at 17:53

## 2018-01-01 RX ADMIN — SENNOSIDES AND DOCUSATE SODIUM 2 TABLET: 8.6; 5 TABLET ORAL at 10:49

## 2018-01-01 RX ADMIN — METRONIDAZOLE 500 MG: 500 INJECTION, SOLUTION INTRAVENOUS at 05:41

## 2018-01-01 RX ADMIN — METRONIDAZOLE 500 MG: 500 INJECTION, SOLUTION INTRAVENOUS at 05:13

## 2018-01-01 RX ADMIN — DILTIAZEM HYDROCHLORIDE 5 MG/HR: 5 INJECTION INTRAVENOUS at 09:30

## 2018-01-01 RX ADMIN — METOPROLOL TARTRATE 25 MG: 25 TABLET ORAL at 20:08

## 2018-01-01 RX ADMIN — FENTANYL CITRATE 100 MCG: 50 INJECTION, SOLUTION INTRAMUSCULAR; INTRAVENOUS at 08:12

## 2018-01-01 RX ADMIN — OXYCODONE HYDROCHLORIDE 5 MG: 5 TABLET ORAL at 21:26

## 2018-01-01 RX ADMIN — METRONIDAZOLE 500 MG: 500 INJECTION, SOLUTION INTRAVENOUS at 12:12

## 2018-01-01 RX ADMIN — HEPARIN SODIUM 5000 UNITS: 5000 INJECTION, SOLUTION INTRAVENOUS; SUBCUTANEOUS at 23:52

## 2018-01-01 RX ADMIN — ATORVASTATIN CALCIUM 80 MG: 80 TABLET, FILM COATED ORAL at 08:20

## 2018-01-01 RX ADMIN — LEVOTHYROXINE SODIUM ANHYDROUS 62.5 MCG: 100 INJECTION, POWDER, LYOPHILIZED, FOR SOLUTION INTRAVENOUS at 09:13

## 2018-01-01 RX ADMIN — METOPROLOL TARTRATE 50 MG: 50 TABLET ORAL at 10:12

## 2018-01-01 RX ADMIN — BUPIVACAINE HYDROCHLORIDE 20 ML: 2.5 INJECTION, SOLUTION EPIDURAL; INFILTRATION; INTRACAUDAL at 08:30

## 2018-01-01 RX ADMIN — LEVALBUTEROL HYDROCHLORIDE 0.63 MG: 0.63 SOLUTION RESPIRATORY (INHALATION) at 12:43

## 2018-01-01 RX ADMIN — HEPARIN SODIUM 5000 UNITS: 5000 INJECTION, SOLUTION INTRAVENOUS; SUBCUTANEOUS at 16:31

## 2018-01-01 RX ADMIN — INSULIN ASPART 1 UNITS: 100 INJECTION, SOLUTION INTRAVENOUS; SUBCUTANEOUS at 03:52

## 2018-01-01 RX ADMIN — DILTIAZEM HYDROCHLORIDE 15 MG/HR: 5 INJECTION INTRAVENOUS at 18:00

## 2018-01-01 RX ADMIN — ALBUMIN HUMAN 25 G: 0.25 SOLUTION INTRAVENOUS at 17:20

## 2018-01-01 RX ADMIN — HYDROCORTISONE SODIUM SUCCINATE 50 MG: 100 INJECTION, POWDER, FOR SOLUTION INTRAMUSCULAR; INTRAVENOUS at 05:19

## 2018-01-01 RX ADMIN — METOPROLOL TARTRATE 50 MG: 50 TABLET ORAL at 08:00

## 2018-01-01 RX ADMIN — HEPARIN SODIUM 5000 UNITS: 5000 INJECTION, SOLUTION INTRAVENOUS; SUBCUTANEOUS at 01:14

## 2018-01-01 RX ADMIN — ONDANSETRON 8 MG: 4 TABLET, ORALLY DISINTEGRATING ORAL at 12:49

## 2018-01-01 RX ADMIN — FUROSEMIDE 20 MG: 10 INJECTION, SOLUTION INTRAVENOUS at 19:01

## 2018-01-01 RX ADMIN — ATORVASTATIN CALCIUM 80 MG: 80 TABLET, FILM COATED ORAL at 20:20

## 2018-01-01 RX ADMIN — FENTANYL CITRATE 50 MCG: 50 INJECTION, SOLUTION INTRAMUSCULAR; INTRAVENOUS at 10:56

## 2018-01-01 RX ADMIN — Medication 0.2 MG: at 15:00

## 2018-01-01 RX ADMIN — HYDRALAZINE HYDROCHLORIDE 10 MG: 10 TABLET, FILM COATED ORAL at 13:58

## 2018-01-01 RX ADMIN — CEFTRIAXONE SODIUM 2 G: 2 INJECTION, POWDER, FOR SOLUTION INTRAMUSCULAR; INTRAVENOUS at 15:34

## 2018-01-01 RX ADMIN — CEFTRIAXONE 2 G: 2 INJECTION, POWDER, FOR SOLUTION INTRAMUSCULAR; INTRAVENOUS at 11:38

## 2018-01-01 RX ADMIN — OXYCODONE HYDROCHLORIDE 5 MG: 5 TABLET ORAL at 12:49

## 2018-01-01 RX ADMIN — PROCHLORPERAZINE EDISYLATE 5 MG: 5 INJECTION INTRAMUSCULAR; INTRAVENOUS at 10:05

## 2018-01-01 RX ADMIN — LEVALBUTEROL HYDROCHLORIDE 0.63 MG: 0.63 SOLUTION RESPIRATORY (INHALATION) at 20:35

## 2018-01-01 RX ADMIN — POTASSIUM PHOSPHATE, MONOBASIC AND POTASSIUM PHOSPHATE, DIBASIC 15 MMOL: 224; 236 INJECTION, SOLUTION INTRAVENOUS at 17:52

## 2018-01-01 RX ADMIN — HEPARIN SODIUM 5000 UNITS: 5000 INJECTION, SOLUTION INTRAVENOUS; SUBCUTANEOUS at 08:37

## 2018-01-01 RX ADMIN — CARBOXYMETHYLCELLULOSE SODIUM 1 DROP: 5 SOLUTION/ DROPS OPHTHALMIC at 04:40

## 2018-01-01 RX ADMIN — ALBUMIN HUMAN 12.5 G: 0.25 SOLUTION INTRAVENOUS at 21:31

## 2018-01-01 RX ADMIN — NOREPINEPHRINE BITARTRATE 0.28 MCG/KG/MIN: 1 INJECTION INTRAVENOUS at 09:13

## 2018-01-01 RX ADMIN — LEVOTHYROXINE SODIUM ANHYDROUS 62.5 MCG: 100 INJECTION, POWDER, LYOPHILIZED, FOR SOLUTION INTRAVENOUS at 08:44

## 2018-01-01 RX ADMIN — INSULIN ASPART 1 UNITS: 100 INJECTION, SOLUTION INTRAVENOUS; SUBCUTANEOUS at 05:16

## 2018-01-01 RX ADMIN — MICONAZOLE NITRATE: 2 POWDER TOPICAL at 23:23

## 2018-01-01 RX ADMIN — CEFTRIAXONE SODIUM 2 G: 2 INJECTION, POWDER, FOR SOLUTION INTRAMUSCULAR; INTRAVENOUS at 16:11

## 2018-01-01 RX ADMIN — SODIUM CHLORIDE, POTASSIUM CHLORIDE, SODIUM LACTATE AND CALCIUM CHLORIDE 500 ML: 600; 310; 30; 20 INJECTION, SOLUTION INTRAVENOUS at 01:57

## 2018-01-01 RX ADMIN — MONTELUKAST SODIUM 10 MG: 5 TABLET, CHEWABLE ORAL at 21:21

## 2018-01-01 RX ADMIN — HEPARIN SODIUM 5000 UNITS: 5000 INJECTION, SOLUTION INTRAVENOUS; SUBCUTANEOUS at 18:11

## 2018-01-01 RX ADMIN — PROPOFOL 5 MCG/KG/MIN: 10 INJECTION, EMULSION INTRAVENOUS at 07:55

## 2018-01-01 RX ADMIN — LIDOCAINE 2 PATCH: 560 PATCH PERCUTANEOUS; TOPICAL; TRANSDERMAL at 21:00

## 2018-01-01 RX ADMIN — Medication 0.2 MG: at 23:14

## 2018-01-01 RX ADMIN — POTASSIUM CHLORIDE 10 MEQ: 10 INJECTION, SOLUTION INTRAVENOUS at 11:58

## 2018-01-01 RX ADMIN — LEVALBUTEROL HYDROCHLORIDE 0.63 MG: 0.63 SOLUTION RESPIRATORY (INHALATION) at 01:03

## 2018-01-01 RX ADMIN — POTASSIUM PHOSPHATE, MONOBASIC AND POTASSIUM PHOSPHATE, DIBASIC 20 MMOL: 224; 236 INJECTION, SOLUTION INTRAVENOUS at 12:00

## 2018-01-01 RX ADMIN — LIDOCAINE 2 PATCH: 560 PATCH PERCUTANEOUS; TOPICAL; TRANSDERMAL at 19:57

## 2018-01-01 RX ADMIN — INSULIN ASPART 2 UNITS: 100 INJECTION, SOLUTION INTRAVENOUS; SUBCUTANEOUS at 10:19

## 2018-01-01 RX ADMIN — ALTEPLASE 2 MG: 2.2 INJECTION, POWDER, LYOPHILIZED, FOR SOLUTION INTRAVENOUS at 21:06

## 2018-01-01 RX ADMIN — OXYCODONE HYDROCHLORIDE 5 MG: 5 TABLET ORAL at 11:10

## 2018-01-01 RX ADMIN — DILTIAZEM HYDROCHLORIDE 15 MG/HR: 5 INJECTION INTRAVENOUS at 19:01

## 2018-01-01 RX ADMIN — HYDRALAZINE HYDROCHLORIDE 10 MG: 20 INJECTION INTRAMUSCULAR; INTRAVENOUS at 08:23

## 2018-01-01 RX ADMIN — CEFTRIAXONE 2 G: 2 INJECTION, POWDER, FOR SOLUTION INTRAMUSCULAR; INTRAVENOUS at 10:12

## 2018-01-01 RX ADMIN — AMLODIPINE BESYLATE 10 MG: 10 TABLET ORAL at 08:22

## 2018-01-01 RX ADMIN — FUROSEMIDE 40 MG: 10 INJECTION, SOLUTION INTRAVENOUS at 14:50

## 2018-01-01 RX ADMIN — Medication 100 MCG/HR: at 06:18

## 2018-01-01 RX ADMIN — MAGNESIUM SULFATE HEPTAHYDRATE 2 G: 40 INJECTION, SOLUTION INTRAVENOUS at 07:03

## 2018-01-01 RX ADMIN — PANTOPRAZOLE SODIUM 40 MG: 40 TABLET, DELAYED RELEASE ORAL at 09:06

## 2018-01-01 RX ADMIN — CEFTRIAXONE 2 G: 2 INJECTION, POWDER, FOR SOLUTION INTRAMUSCULAR; INTRAVENOUS at 11:13

## 2018-01-01 RX ADMIN — INSULIN ASPART 1 UNITS: 100 INJECTION, SOLUTION INTRAVENOUS; SUBCUTANEOUS at 08:41

## 2018-01-01 RX ADMIN — LEVALBUTEROL HYDROCHLORIDE 0.63 MG: 0.63 SOLUTION RESPIRATORY (INHALATION) at 16:36

## 2018-01-01 RX ADMIN — INSULIN ASPART 9 UNITS: 100 INJECTION, SOLUTION INTRAVENOUS; SUBCUTANEOUS at 15:35

## 2018-01-01 RX ADMIN — MICONAZOLE NITRATE: 2 POWDER TOPICAL at 08:44

## 2018-01-01 RX ADMIN — INSULIN ASPART 3 UNITS: 100 INJECTION, SOLUTION INTRAVENOUS; SUBCUTANEOUS at 21:15

## 2018-01-01 RX ADMIN — METOPROLOL TARTRATE 25 MG: 25 TABLET, FILM COATED ORAL at 20:01

## 2018-01-01 RX ADMIN — PROPOFOL 100 MG: 10 INJECTION, EMULSION INTRAVENOUS at 17:49

## 2018-01-01 RX ADMIN — HYDRALAZINE HYDROCHLORIDE 10 MG: 20 INJECTION INTRAMUSCULAR; INTRAVENOUS at 01:47

## 2018-01-01 RX ADMIN — HEPARIN SODIUM 5000 UNITS: 5000 INJECTION, SOLUTION INTRAVENOUS; SUBCUTANEOUS at 00:08

## 2018-01-01 RX ADMIN — POTASSIUM CHLORIDE: 2 INJECTION, SOLUTION, CONCENTRATE INTRAVENOUS at 21:01

## 2018-01-01 RX ADMIN — METOPROLOL TARTRATE 50 MG: 50 TABLET ORAL at 08:21

## 2018-01-01 RX ADMIN — OXYCODONE HYDROCHLORIDE 5 MG: 5 TABLET ORAL at 00:34

## 2018-01-01 RX ADMIN — AMLODIPINE BESYLATE 10 MG: 10 TABLET ORAL at 07:52

## 2018-01-01 RX ADMIN — INSULIN ASPART 1 UNITS: 100 INJECTION, SOLUTION INTRAVENOUS; SUBCUTANEOUS at 12:49

## 2018-01-01 RX ADMIN — FUROSEMIDE 8 MG/HR: 10 INJECTION, SOLUTION INTRAVENOUS at 00:45

## 2018-01-01 RX ADMIN — SENNOSIDES AND DOCUSATE SODIUM 2 TABLET: 8.6; 5 TABLET ORAL at 09:19

## 2018-01-01 RX ADMIN — EPOPROSTENOL SODIUM 20 NG/KG/MIN: 1500000 INJECTION, POWDER, LYOPHILIZED, FOR SOLUTION INTRAVENOUS at 17:12

## 2018-01-01 RX ADMIN — ACETAMINOPHEN 650 MG: 650 SUPPOSITORY RECTAL at 11:33

## 2018-01-01 RX ADMIN — HEPARIN SODIUM 5000 UNITS: 5000 INJECTION, SOLUTION INTRAVENOUS; SUBCUTANEOUS at 18:35

## 2018-01-01 RX ADMIN — HUMAN INSULIN 3 UNITS/HR: 100 INJECTION, SOLUTION SUBCUTANEOUS at 12:39

## 2018-01-01 RX ADMIN — Medication 0.2 MG: at 04:40

## 2018-01-01 RX ADMIN — ACETAMINOPHEN 975 MG: 325 TABLET, FILM COATED ORAL at 18:11

## 2018-01-01 RX ADMIN — PANTOPRAZOLE SODIUM 40 MG: 40 INJECTION, POWDER, FOR SOLUTION INTRAVENOUS at 12:08

## 2018-01-01 RX ADMIN — FUROSEMIDE 20 MG: 10 INJECTION, SOLUTION INTRAVENOUS at 15:09

## 2018-01-01 RX ADMIN — HEPARIN SODIUM 5000 UNITS: 5000 INJECTION, SOLUTION INTRAVENOUS; SUBCUTANEOUS at 00:46

## 2018-01-01 RX ADMIN — Medication 2 G: at 16:52

## 2018-01-01 RX ADMIN — Medication: at 19:39

## 2018-01-01 RX ADMIN — FUROSEMIDE 20 MG: 10 INJECTION, SOLUTION INTRAVENOUS at 17:36

## 2018-01-01 RX ADMIN — HEPARIN SODIUM 5000 UNITS: 5000 INJECTION, SOLUTION INTRAVENOUS; SUBCUTANEOUS at 20:45

## 2018-01-01 RX ADMIN — PROCHLORPERAZINE EDISYLATE 5 MG: 5 INJECTION INTRAMUSCULAR; INTRAVENOUS at 16:27

## 2018-01-01 RX ADMIN — METRONIDAZOLE 500 MG: 500 INJECTION, SOLUTION INTRAVENOUS at 08:00

## 2018-01-01 RX ADMIN — INSULIN ASPART 1 UNITS: 100 INJECTION, SOLUTION INTRAVENOUS; SUBCUTANEOUS at 05:23

## 2018-01-01 RX ADMIN — ALBUMIN HUMAN: 0.05 INJECTION, SOLUTION INTRAVENOUS at 10:57

## 2018-01-01 RX ADMIN — ACETAMINOPHEN 975 MG: 325 TABLET, FILM COATED ORAL at 08:20

## 2018-01-01 RX ADMIN — ACETAMINOPHEN 975 MG: 325 TABLET, FILM COATED ORAL at 02:30

## 2018-01-01 RX ADMIN — HEPARIN SODIUM 5000 UNITS: 5000 INJECTION, SOLUTION INTRAVENOUS; SUBCUTANEOUS at 01:24

## 2018-01-01 RX ADMIN — ATORVASTATIN CALCIUM 80 MG: 80 TABLET, FILM COATED ORAL at 20:18

## 2018-01-01 RX ADMIN — MONTELUKAST SODIUM 10 MG: 5 TABLET, CHEWABLE ORAL at 21:57

## 2018-01-01 RX ADMIN — DOCUSATE SODIUM 100 MG: 100 CAPSULE, LIQUID FILLED ORAL at 20:37

## 2018-01-01 RX ADMIN — HYDRALAZINE HYDROCHLORIDE 20 MG: 10 TABLET, FILM COATED ORAL at 20:17

## 2018-01-01 RX ADMIN — HUMAN INSULIN 2 UNITS/HR: 100 INJECTION, SOLUTION SUBCUTANEOUS at 02:35

## 2018-01-01 RX ADMIN — HYDRALAZINE HYDROCHLORIDE 20 MG: 10 TABLET, FILM COATED ORAL at 09:16

## 2018-01-01 RX ADMIN — HEPARIN SODIUM 5000 UNITS: 5000 INJECTION, SOLUTION INTRAVENOUS; SUBCUTANEOUS at 00:17

## 2018-01-01 RX ADMIN — ESCITALOPRAM OXALATE 10 MG: 10 TABLET ORAL at 10:49

## 2018-01-01 RX ADMIN — INSULIN ASPART 3 UNITS: 100 INJECTION, SOLUTION INTRAVENOUS; SUBCUTANEOUS at 08:13

## 2018-01-01 RX ADMIN — INSULIN ASPART 3 UNITS: 100 INJECTION, SOLUTION INTRAVENOUS; SUBCUTANEOUS at 21:02

## 2018-01-01 RX ADMIN — METOPROLOL TARTRATE 100 MG: 50 TABLET ORAL at 08:18

## 2018-01-01 RX ADMIN — METOPROLOL TARTRATE 100 MG: 50 TABLET ORAL at 08:24

## 2018-01-01 RX ADMIN — INSULIN ASPART 3 UNITS: 100 INJECTION, SOLUTION INTRAVENOUS; SUBCUTANEOUS at 20:32

## 2018-01-01 RX ADMIN — INSULIN ASPART 1 UNITS: 100 INJECTION, SOLUTION INTRAVENOUS; SUBCUTANEOUS at 07:32

## 2018-01-01 RX ADMIN — DEXMEDETOMIDINE HYDROCHLORIDE 0.2 MCG/KG/HR: 4 INJECTION, SOLUTION INTRAVENOUS at 05:38

## 2018-01-01 RX ADMIN — LEVALBUTEROL HYDROCHLORIDE 0.63 MG: 0.63 SOLUTION RESPIRATORY (INHALATION) at 00:26

## 2018-01-01 RX ADMIN — SENNOSIDES AND DOCUSATE SODIUM 2 TABLET: 8.6; 5 TABLET ORAL at 20:34

## 2018-01-01 RX ADMIN — Medication 50 MCG/HR: at 04:34

## 2018-01-01 RX ADMIN — POTASSIUM CHLORIDE 10 MEQ: 10 INJECTION, SOLUTION INTRAVENOUS at 13:08

## 2018-01-01 RX ADMIN — INSULIN ASPART 1 UNITS: 100 INJECTION, SOLUTION INTRAVENOUS; SUBCUTANEOUS at 23:53

## 2018-01-01 RX ADMIN — HEPARIN SODIUM 5000 UNITS: 5000 INJECTION, SOLUTION INTRAVENOUS; SUBCUTANEOUS at 17:51

## 2018-01-01 RX ADMIN — HYDRALAZINE HYDROCHLORIDE 20 MG: 10 TABLET, FILM COATED ORAL at 08:14

## 2018-01-01 RX ADMIN — HYDRALAZINE HYDROCHLORIDE 20 MG: 10 TABLET, FILM COATED ORAL at 08:16

## 2018-01-01 RX ADMIN — POTASSIUM CHLORIDE: 2 INJECTION, SOLUTION, CONCENTRATE INTRAVENOUS at 20:28

## 2018-01-01 RX ADMIN — CEFTRIAXONE 2 G: 2 INJECTION, POWDER, FOR SOLUTION INTRAMUSCULAR; INTRAVENOUS at 10:36

## 2018-01-01 RX ADMIN — ROCURONIUM BROMIDE 100 MG: 10 INJECTION INTRAVENOUS at 17:49

## 2018-01-01 RX ADMIN — INSULIN ASPART 2 UNITS: 100 INJECTION, SOLUTION INTRAVENOUS; SUBCUTANEOUS at 21:07

## 2018-01-01 RX ADMIN — DEXTROSE AND SODIUM CHLORIDE: 5; 450 INJECTION, SOLUTION INTRAVENOUS at 16:22

## 2018-01-01 RX ADMIN — ACETAMINOPHEN 975 MG: 325 TABLET, FILM COATED ORAL at 01:44

## 2018-01-01 RX ADMIN — CARBOXYMETHYLCELLULOSE SODIUM 1 DROP: 5 SOLUTION/ DROPS OPHTHALMIC at 19:56

## 2018-01-01 RX ADMIN — LEVALBUTEROL HYDROCHLORIDE 0.63 MG: 0.63 SOLUTION RESPIRATORY (INHALATION) at 04:46

## 2018-01-01 RX ADMIN — PROPOFOL 30 MCG/KG/MIN: 10 INJECTION, EMULSION INTRAVENOUS at 06:11

## 2018-01-01 RX ADMIN — VANCOMYCIN HYDROCHLORIDE 2000 MG: 1 INJECTION, POWDER, LYOPHILIZED, FOR SOLUTION INTRAVENOUS at 17:54

## 2018-01-01 RX ADMIN — LEVALBUTEROL HYDROCHLORIDE 0.63 MG: 0.63 SOLUTION RESPIRATORY (INHALATION) at 02:57

## 2018-01-01 RX ADMIN — Medication 0.2 MG: at 00:56

## 2018-01-01 RX ADMIN — POTASSIUM CHLORIDE 20 MEQ: 29.8 INJECTION, SOLUTION INTRAVENOUS at 10:50

## 2018-01-01 RX ADMIN — MICAFUNGIN SODIUM 100 MG: 10 INJECTION, POWDER, LYOPHILIZED, FOR SOLUTION INTRAVENOUS at 15:22

## 2018-01-01 RX ADMIN — MONTELUKAST SODIUM 10 MG: 5 TABLET, CHEWABLE ORAL at 21:15

## 2018-01-01 RX ADMIN — ACETAMINOPHEN 650 MG: 650 SUPPOSITORY RECTAL at 02:09

## 2018-01-01 RX ADMIN — INSULIN ASPART 2 UNITS: 100 INJECTION, SOLUTION INTRAVENOUS; SUBCUTANEOUS at 11:32

## 2018-01-01 RX ADMIN — INSULIN ASPART 1 UNITS: 100 INJECTION, SOLUTION INTRAVENOUS; SUBCUTANEOUS at 16:31

## 2018-01-01 RX ADMIN — ESCITALOPRAM OXALATE 10 MG: 10 TABLET ORAL at 07:51

## 2018-01-01 RX ADMIN — HEPARIN SODIUM 5000 UNITS: 5000 INJECTION, SOLUTION INTRAVENOUS; SUBCUTANEOUS at 00:56

## 2018-01-01 RX ADMIN — ACETAMINOPHEN 650 MG: 325 TABLET, FILM COATED ORAL at 05:46

## 2018-01-01 RX ADMIN — SODIUM CHLORIDE, POTASSIUM CHLORIDE, SODIUM LACTATE AND CALCIUM CHLORIDE 500 ML: 600; 310; 30; 20 INJECTION, SOLUTION INTRAVENOUS at 10:39

## 2018-01-01 RX ADMIN — OXYCODONE HYDROCHLORIDE 5 MG: 5 TABLET ORAL at 20:22

## 2018-01-01 RX ADMIN — FUROSEMIDE 40 MG: 10 INJECTION, SOLUTION INTRAVENOUS at 08:26

## 2018-01-01 RX ADMIN — DILTIAZEM HYDROCHLORIDE 15 MG/HR: 5 INJECTION INTRAVENOUS at 19:49

## 2018-01-01 RX ADMIN — HEPARIN SODIUM 5000 UNITS: 5000 INJECTION, SOLUTION INTRAVENOUS; SUBCUTANEOUS at 08:14

## 2018-01-01 RX ADMIN — ESCITALOPRAM OXALATE 10 MG: 10 TABLET ORAL at 09:18

## 2018-01-01 RX ADMIN — ACETAMINOPHEN 650 MG: 325 TABLET, FILM COATED ORAL at 09:04

## 2018-01-01 RX ADMIN — MONTELUKAST SODIUM 10 MG: 5 TABLET, CHEWABLE ORAL at 20:57

## 2018-01-01 RX ADMIN — LEVALBUTEROL HYDROCHLORIDE 0.63 MG: 0.63 SOLUTION RESPIRATORY (INHALATION) at 20:46

## 2018-01-01 RX ADMIN — Medication 50 MCG/HR: at 02:58

## 2018-01-01 RX ADMIN — INSULIN ASPART 4 UNITS: 100 INJECTION, SOLUTION INTRAVENOUS; SUBCUTANEOUS at 12:17

## 2018-01-01 RX ADMIN — LEVOTHYROXINE SODIUM ANHYDROUS 62.5 MCG: 100 INJECTION, POWDER, LYOPHILIZED, FOR SOLUTION INTRAVENOUS at 08:38

## 2018-01-01 RX ADMIN — ONDANSETRON 4 MG: 2 INJECTION INTRAMUSCULAR; INTRAVENOUS at 13:24

## 2018-01-01 RX ADMIN — OXYCODONE HYDROCHLORIDE 5 MG: 5 TABLET ORAL at 05:56

## 2018-01-01 RX ADMIN — LEVALBUTEROL HYDROCHLORIDE 0.63 MG: 0.63 SOLUTION RESPIRATORY (INHALATION) at 21:56

## 2018-01-01 RX ADMIN — HEPARIN SODIUM 5000 UNITS: 5000 INJECTION, SOLUTION INTRAVENOUS; SUBCUTANEOUS at 17:09

## 2018-01-01 RX ADMIN — LEVALBUTEROL HYDROCHLORIDE 0.63 MG: 0.63 SOLUTION RESPIRATORY (INHALATION) at 04:30

## 2018-01-01 RX ADMIN — SENNOSIDES AND DOCUSATE SODIUM 2 TABLET: 8.6; 5 TABLET ORAL at 08:14

## 2018-01-01 RX ADMIN — LEVOTHYROXINE SODIUM 125 MCG: 25 TABLET ORAL at 11:10

## 2018-01-01 RX ADMIN — MICONAZOLE NITRATE: 2 POWDER TOPICAL at 20:34

## 2018-01-01 RX ADMIN — INSULIN ASPART 3 UNITS: 100 INJECTION, SOLUTION INTRAVENOUS; SUBCUTANEOUS at 20:29

## 2018-01-01 RX ADMIN — POTASSIUM CHLORIDE: 2 INJECTION, SOLUTION, CONCENTRATE INTRAVENOUS at 20:37

## 2018-01-01 RX ADMIN — EPINEPHRINE 0.03 MCG/KG/MIN: 1 INJECTION PARENTERAL at 03:51

## 2018-01-01 RX ADMIN — HEPARIN SODIUM 5000 UNITS: 5000 INJECTION, SOLUTION INTRAVENOUS; SUBCUTANEOUS at 16:48

## 2018-01-01 RX ADMIN — HEPARIN SODIUM 5000 UNITS: 5000 INJECTION, SOLUTION INTRAVENOUS; SUBCUTANEOUS at 08:24

## 2018-01-01 RX ADMIN — CARBOXYMETHYLCELLULOSE SODIUM 1 DROP: 5 SOLUTION/ DROPS OPHTHALMIC at 08:22

## 2018-01-01 RX ADMIN — HEPARIN SODIUM 5000 UNITS: 5000 INJECTION, SOLUTION INTRAVENOUS; SUBCUTANEOUS at 00:43

## 2018-01-01 RX ADMIN — METOPROLOL TARTRATE 75 MG: 50 TABLET, FILM COATED ORAL at 20:33

## 2018-01-01 RX ADMIN — FENTANYL CITRATE 50 MCG: 50 INJECTION, SOLUTION INTRAMUSCULAR; INTRAVENOUS at 18:16

## 2018-01-01 RX ADMIN — INSULIN ASPART 1 UNITS: 100 INJECTION, SOLUTION INTRAVENOUS; SUBCUTANEOUS at 00:41

## 2018-01-01 RX ADMIN — ACETAMINOPHEN 975 MG: 325 TABLET, FILM COATED ORAL at 17:09

## 2018-01-01 RX ADMIN — ACETAMINOPHEN 975 MG: 325 TABLET, FILM COATED ORAL at 09:22

## 2018-01-01 RX ADMIN — OXYCODONE HYDROCHLORIDE 5 MG: 5 TABLET ORAL at 01:39

## 2018-01-01 RX ADMIN — MONTELUKAST SODIUM 10 MG: 5 TABLET, CHEWABLE ORAL at 22:14

## 2018-01-01 RX ADMIN — ACETAMINOPHEN 325 MG: 325 TABLET, FILM COATED ORAL at 23:54

## 2018-01-01 RX ADMIN — RACEPINEPHRINE HYDROCHLORIDE 0.5 ML: 11.25 SOLUTION RESPIRATORY (INHALATION) at 17:20

## 2018-01-01 RX ADMIN — VECURONIUM BROMIDE 10 MG: 1 INJECTION, POWDER, LYOPHILIZED, FOR SOLUTION INTRAVENOUS at 21:14

## 2018-01-01 RX ADMIN — FUROSEMIDE 40 MG: 10 INJECTION, SOLUTION INTRAVENOUS at 17:08

## 2018-01-01 RX ADMIN — CIPROFLOXACIN HYDROCHLORIDE 500 MG: 500 TABLET, FILM COATED ORAL at 17:09

## 2018-01-01 RX ADMIN — I.V. FAT EMULSION 250 ML: 20 EMULSION INTRAVENOUS at 20:37

## 2018-01-01 RX ADMIN — INSULIN ASPART 2 UNITS: 100 INJECTION, SOLUTION INTRAVENOUS; SUBCUTANEOUS at 04:38

## 2018-01-01 RX ADMIN — SENNOSIDES AND DOCUSATE SODIUM 2 TABLET: 8.6; 5 TABLET ORAL at 20:11

## 2018-01-01 RX ADMIN — MONTELUKAST SODIUM 10 MG: 10 TABLET, FILM COATED ORAL at 09:16

## 2018-01-01 RX ADMIN — ESCITALOPRAM OXALATE 10 MG: 10 TABLET ORAL at 08:22

## 2018-01-01 RX ADMIN — DEXTROSE MONOHYDRATE 25 ML: 500 INJECTION PARENTERAL at 20:50

## 2018-01-01 RX ADMIN — METOPROLOL TARTRATE 5 MG: 5 INJECTION INTRAVENOUS at 03:22

## 2018-01-01 RX ADMIN — INSULIN ASPART 1 UNITS: 100 INJECTION, SOLUTION INTRAVENOUS; SUBCUTANEOUS at 12:11

## 2018-01-01 RX ADMIN — Medication 0.2 MG: at 09:36

## 2018-01-01 RX ADMIN — PHENYLEPHRINE HYDROCHLORIDE 0.5 MCG/KG/MIN: 10 INJECTION, SOLUTION INTRAMUSCULAR; INTRAVENOUS; SUBCUTANEOUS at 05:37

## 2018-01-01 RX ADMIN — INSULIN ASPART 2 UNITS: 100 INJECTION, SOLUTION INTRAVENOUS; SUBCUTANEOUS at 14:25

## 2018-01-01 RX ADMIN — METOPROLOL TARTRATE 25 MG: 25 TABLET ORAL at 20:19

## 2018-01-01 RX ADMIN — METRONIDAZOLE 500 MG: 500 INJECTION, SOLUTION INTRAVENOUS at 13:20

## 2018-01-01 RX ADMIN — ACETAMINOPHEN 975 MG: 325 TABLET, FILM COATED ORAL at 00:08

## 2018-01-01 RX ADMIN — INSULIN ASPART 2 UNITS: 100 INJECTION, SOLUTION INTRAVENOUS; SUBCUTANEOUS at 01:08

## 2018-01-01 RX ADMIN — PROPOFOL 30 MCG/KG/MIN: 10 INJECTION, EMULSION INTRAVENOUS at 10:36

## 2018-01-01 RX ADMIN — HEPARIN SODIUM 5000 UNITS: 5000 INJECTION, SOLUTION INTRAVENOUS; SUBCUTANEOUS at 08:05

## 2018-01-01 RX ADMIN — LEVALBUTEROL HYDROCHLORIDE 0.63 MG: 0.63 SOLUTION RESPIRATORY (INHALATION) at 07:42

## 2018-01-01 RX ADMIN — IPRATROPIUM BROMIDE AND ALBUTEROL SULFATE 3 ML: .5; 3 SOLUTION RESPIRATORY (INHALATION) at 16:14

## 2018-01-01 RX ADMIN — DILTIAZEM HYDROCHLORIDE 15 MG: 5 INJECTION INTRAVENOUS at 09:12

## 2018-01-01 RX ADMIN — CARBOXYMETHYLCELLULOSE SODIUM 1 DROP: 5 SOLUTION/ DROPS OPHTHALMIC at 01:08

## 2018-01-01 RX ADMIN — POTASSIUM CHLORIDE 20 MEQ: 29.8 INJECTION, SOLUTION INTRAVENOUS at 09:16

## 2018-01-01 RX ADMIN — PANTOPRAZOLE SODIUM 40 MG: 40 INJECTION, POWDER, FOR SOLUTION INTRAVENOUS at 08:23

## 2018-01-01 RX ADMIN — INSULIN ASPART 1 UNITS: 100 INJECTION, SOLUTION INTRAVENOUS; SUBCUTANEOUS at 08:30

## 2018-01-01 RX ADMIN — KETAMINE HCL-NACL SOLN PREF SY 50 MG/5ML-0.9% (10MG/ML) 50 MG: 10 SOLUTION PREFILLED SYRINGE at 08:12

## 2018-01-01 RX ADMIN — MONTELUKAST SODIUM 10 MG: 5 TABLET, CHEWABLE ORAL at 21:25

## 2018-01-01 RX ADMIN — ACETAMINOPHEN 650 MG: 650 SUPPOSITORY RECTAL at 01:43

## 2018-01-01 RX ADMIN — HEPARIN SODIUM 5000 UNITS: 5000 INJECTION, SOLUTION INTRAVENOUS; SUBCUTANEOUS at 17:10

## 2018-01-01 RX ADMIN — HYDRALAZINE HYDROCHLORIDE 10 MG: 10 TABLET, FILM COATED ORAL at 20:37

## 2018-01-01 RX ADMIN — HEPARIN SODIUM 5000 UNITS: 5000 INJECTION, SOLUTION INTRAVENOUS; SUBCUTANEOUS at 00:15

## 2018-01-01 RX ADMIN — LEVALBUTEROL HYDROCHLORIDE 0.63 MG: 0.63 SOLUTION RESPIRATORY (INHALATION) at 13:08

## 2018-01-01 RX ADMIN — PANTOPRAZOLE SODIUM 40 MG: 40 INJECTION, POWDER, FOR SOLUTION INTRAVENOUS at 09:14

## 2018-01-01 RX ADMIN — PANTOPRAZOLE SODIUM 40 MG: 40 TABLET, DELAYED RELEASE ORAL at 08:22

## 2018-01-01 RX ADMIN — INSULIN ASPART 1 UNITS: 100 INJECTION, SOLUTION INTRAVENOUS; SUBCUTANEOUS at 17:25

## 2018-01-01 RX ADMIN — ACETAMINOPHEN 975 MG: 325 TABLET, FILM COATED ORAL at 00:18

## 2018-01-01 RX ADMIN — METRONIDAZOLE 500 MG: 500 INJECTION, SOLUTION INTRAVENOUS at 14:34

## 2018-01-01 RX ADMIN — POTASSIUM CHLORIDE, DEXTROSE MONOHYDRATE AND SODIUM CHLORIDE: 150; 5; 200 INJECTION, SOLUTION INTRAVENOUS at 22:44

## 2018-01-01 RX ADMIN — ACETAMINOPHEN 650 MG: 325 TABLET, FILM COATED ORAL at 17:03

## 2018-01-01 RX ADMIN — ONDANSETRON 4 MG: 2 INJECTION INTRAMUSCULAR; INTRAVENOUS at 09:34

## 2018-01-01 RX ADMIN — INSULIN ASPART 6 UNITS: 100 INJECTION, SOLUTION INTRAVENOUS; SUBCUTANEOUS at 08:23

## 2018-01-01 RX ADMIN — METOPROLOL TARTRATE 25 MG: 25 TABLET ORAL at 13:34

## 2018-01-01 RX ADMIN — LEVOTHYROXINE SODIUM 125 MCG: 25 TABLET ORAL at 08:20

## 2018-01-01 RX ADMIN — SODIUM CHLORIDE 500 ML: 9 INJECTION, SOLUTION INTRAVENOUS at 05:00

## 2018-01-01 RX ADMIN — VANCOMYCIN HYDROCHLORIDE 1500 MG: 10 INJECTION, POWDER, LYOPHILIZED, FOR SOLUTION INTRAVENOUS at 00:24

## 2018-01-01 RX ADMIN — HEPARIN SODIUM 5000 UNITS: 5000 INJECTION, SOLUTION INTRAVENOUS; SUBCUTANEOUS at 04:40

## 2018-01-01 RX ADMIN — INSULIN ASPART 1 UNITS: 100 INJECTION, SOLUTION INTRAVENOUS; SUBCUTANEOUS at 20:02

## 2018-01-01 RX ADMIN — PHENYLEPHRINE HYDROCHLORIDE 200 MCG: 10 INJECTION, SOLUTION INTRAMUSCULAR; INTRAVENOUS; SUBCUTANEOUS at 17:49

## 2018-01-01 RX ADMIN — GABAPENTIN 300 MG: 300 CAPSULE ORAL at 07:41

## 2018-01-01 RX ADMIN — METOPROLOL TARTRATE 100 MG: 50 TABLET ORAL at 20:20

## 2018-01-01 RX ADMIN — ESCITALOPRAM OXALATE 10 MG: 10 TABLET ORAL at 08:20

## 2018-01-01 RX ADMIN — LEVALBUTEROL HYDROCHLORIDE 0.63 MG: 0.63 SOLUTION RESPIRATORY (INHALATION) at 01:30

## 2018-01-01 RX ADMIN — ALBUMIN HUMAN 12.5 G: 0.25 SOLUTION INTRAVENOUS at 05:00

## 2018-01-01 RX ADMIN — LEVALBUTEROL HYDROCHLORIDE 0.63 MG: 0.63 SOLUTION RESPIRATORY (INHALATION) at 20:29

## 2018-01-01 RX ADMIN — GLYCOPYRROLATE 0.2 MG: 0.2 INJECTION, SOLUTION INTRAMUSCULAR; INTRAVENOUS at 12:51

## 2018-01-01 RX ADMIN — POTASSIUM CHLORIDE 10 MEQ: 10 INJECTION, SOLUTION INTRAVENOUS at 11:00

## 2018-01-01 RX ADMIN — ACETAMINOPHEN 650 MG: 650 SUPPOSITORY RECTAL at 21:05

## 2018-01-01 RX ADMIN — Medication 0.2 MG: at 18:13

## 2018-01-01 RX ADMIN — HEPARIN SODIUM 5000 UNITS: 5000 INJECTION, SOLUTION INTRAVENOUS; SUBCUTANEOUS at 07:44

## 2018-01-01 RX ADMIN — HUMAN INSULIN 3 UNITS/HR: 100 INJECTION, SOLUTION SUBCUTANEOUS at 12:29

## 2018-01-01 RX ADMIN — ALBUMIN HUMAN 50 G: 0.25 SOLUTION INTRAVENOUS at 14:09

## 2018-01-01 RX ADMIN — FUROSEMIDE 40 MG: 10 INJECTION, SOLUTION INTRAVENOUS at 16:37

## 2018-01-01 RX ADMIN — LIDOCAINE HYDROCHLORIDE AND EPINEPHRINE 30 ML: 10; 10 INJECTION, SOLUTION INFILTRATION; PERINEURAL at 12:34

## 2018-01-01 RX ADMIN — LEVOTHYROXINE SODIUM ANHYDROUS 62.5 MCG: 100 INJECTION, POWDER, LYOPHILIZED, FOR SOLUTION INTRAVENOUS at 08:41

## 2018-01-01 RX ADMIN — CEFTRIAXONE 2 G: 2 INJECTION, POWDER, FOR SOLUTION INTRAMUSCULAR; INTRAVENOUS at 12:16

## 2018-01-01 RX ADMIN — SIMETHICONE CHEW TAB 80 MG 80 MG: 80 TABLET ORAL at 08:00

## 2018-01-01 RX ADMIN — HYDRALAZINE HYDROCHLORIDE 10 MG: 20 INJECTION INTRAMUSCULAR; INTRAVENOUS at 20:29

## 2018-01-01 RX ADMIN — INSULIN ASPART 3 UNITS: 100 INJECTION, SOLUTION INTRAVENOUS; SUBCUTANEOUS at 01:03

## 2018-01-01 RX ADMIN — LIDOCAINE 2 PATCH: 560 PATCH PERCUTANEOUS; TOPICAL; TRANSDERMAL at 20:39

## 2018-01-01 RX ADMIN — ONDANSETRON 4 MG: 2 INJECTION INTRAMUSCULAR; INTRAVENOUS at 08:30

## 2018-01-01 RX ADMIN — HEPARIN SODIUM 5000 UNITS: 5000 INJECTION, SOLUTION INTRAVENOUS; SUBCUTANEOUS at 17:03

## 2018-01-01 RX ADMIN — MULTIVITAMIN 15 ML: LIQUID ORAL at 08:20

## 2018-01-01 RX ADMIN — INSULIN ASPART 2 UNITS: 100 INJECTION, SOLUTION INTRAVENOUS; SUBCUTANEOUS at 09:08

## 2018-01-01 RX ADMIN — HEPARIN SODIUM 5000 UNITS: 5000 INJECTION, SOLUTION INTRAVENOUS; SUBCUTANEOUS at 08:47

## 2018-01-01 RX ADMIN — ATORVASTATIN CALCIUM 80 MG: 80 TABLET, FILM COATED ORAL at 10:49

## 2018-01-01 RX ADMIN — LEVOTHYROXINE SODIUM 125 MCG: 25 TABLET ORAL at 08:00

## 2018-01-01 RX ADMIN — METOPROLOL TARTRATE 50 MG: 50 TABLET ORAL at 10:13

## 2018-01-01 RX ADMIN — INSULIN ASPART 1 UNITS: 100 INJECTION, SOLUTION INTRAVENOUS; SUBCUTANEOUS at 20:31

## 2018-01-01 RX ADMIN — INSULIN ASPART 2 UNITS: 100 INJECTION, SOLUTION INTRAVENOUS; SUBCUTANEOUS at 00:23

## 2018-01-01 RX ADMIN — INSULIN ASPART 1 UNITS: 100 INJECTION, SOLUTION INTRAVENOUS; SUBCUTANEOUS at 17:10

## 2018-01-01 RX ADMIN — AMLODIPINE BESYLATE 10 MG: 10 TABLET ORAL at 09:17

## 2018-01-01 RX ADMIN — ACETAMINOPHEN 975 MG: 325 TABLET, FILM COATED ORAL at 08:24

## 2018-01-01 RX ADMIN — INSULIN ASPART 5 UNITS: 100 INJECTION, SOLUTION INTRAVENOUS; SUBCUTANEOUS at 03:48

## 2018-01-01 RX ADMIN — ATORVASTATIN CALCIUM 80 MG: 80 TABLET, FILM COATED ORAL at 21:00

## 2018-01-01 RX ADMIN — METOPROLOL TARTRATE 5 MG: 5 INJECTION INTRAVENOUS at 15:37

## 2018-01-01 RX ADMIN — METOPROLOL TARTRATE 5 MG: 5 INJECTION INTRAVENOUS at 09:33

## 2018-01-01 RX ADMIN — METOPROLOL TARTRATE 75 MG: 50 TABLET, FILM COATED ORAL at 08:13

## 2018-01-01 RX ADMIN — AMLODIPINE BESYLATE 10 MG: 10 TABLET ORAL at 12:43

## 2018-01-01 RX ADMIN — POLYETHYLENE GLYCOL 3350 17 G: 17 POWDER, FOR SOLUTION ORAL at 09:23

## 2018-01-01 RX ADMIN — INSULIN ASPART 1 UNITS: 100 INJECTION, SOLUTION INTRAVENOUS; SUBCUTANEOUS at 20:17

## 2018-01-01 RX ADMIN — Medication 50 MCG/HR: at 07:54

## 2018-01-01 RX ADMIN — CEFTRIAXONE SODIUM 2 G: 2 INJECTION, POWDER, FOR SOLUTION INTRAMUSCULAR; INTRAVENOUS at 14:28

## 2018-01-01 RX ADMIN — VASOPRESSIN 2.4 UNITS/HR: 20 INJECTION INTRAVENOUS at 08:16

## 2018-01-01 RX ADMIN — PROCHLORPERAZINE EDISYLATE 5 MG: 5 INJECTION INTRAMUSCULAR; INTRAVENOUS at 14:09

## 2018-01-01 RX ADMIN — LEVOTHYROXINE SODIUM ANHYDROUS 62.5 MCG: 100 INJECTION, POWDER, LYOPHILIZED, FOR SOLUTION INTRAVENOUS at 09:33

## 2018-01-01 RX ADMIN — HYDRALAZINE HYDROCHLORIDE 20 MG: 10 TABLET, FILM COATED ORAL at 08:18

## 2018-01-01 RX ADMIN — DEXTROSE MONOHYDRATE 25 ML: 25 INJECTION, SOLUTION INTRAVENOUS at 20:50

## 2018-01-01 RX ADMIN — HYDRALAZINE HYDROCHLORIDE 20 MG: 10 TABLET, FILM COATED ORAL at 20:19

## 2018-01-01 RX ADMIN — SODIUM CHLORIDE: 9 INJECTION, SOLUTION INTRAVENOUS at 02:27

## 2018-01-01 RX ADMIN — AMLODIPINE BESYLATE 10 MG: 10 TABLET ORAL at 06:32

## 2018-01-01 RX ADMIN — NOREPINEPHRINE BITARTRATE 0.37 MCG/KG/MIN: 1 INJECTION INTRAVENOUS at 02:58

## 2018-01-01 RX ADMIN — DILTIAZEM HYDROCHLORIDE 10 MG: 5 INJECTION INTRAVENOUS at 10:07

## 2018-01-01 RX ADMIN — ESCITALOPRAM OXALATE 10 MG: 10 TABLET ORAL at 09:04

## 2018-01-01 RX ADMIN — MONTELUKAST SODIUM 10 MG: 10 TABLET, FILM COATED ORAL at 09:18

## 2018-01-01 RX ADMIN — HEPARIN SODIUM 5000 UNITS: 5000 INJECTION, SOLUTION INTRAVENOUS; SUBCUTANEOUS at 16:20

## 2018-01-01 RX ADMIN — SODIUM CHLORIDE, POTASSIUM CHLORIDE, SODIUM LACTATE AND CALCIUM CHLORIDE: 600; 310; 30; 20 INJECTION, SOLUTION INTRAVENOUS at 05:47

## 2018-01-01 RX ADMIN — HEPARIN SODIUM 5000 UNITS: 5000 INJECTION, SOLUTION INTRAVENOUS; SUBCUTANEOUS at 10:49

## 2018-01-01 RX ADMIN — INSULIN ASPART 3 UNITS: 100 INJECTION, SOLUTION INTRAVENOUS; SUBCUTANEOUS at 04:01

## 2018-01-01 RX ADMIN — HEPARIN SODIUM 5000 UNITS: 5000 INJECTION, SOLUTION INTRAVENOUS; SUBCUTANEOUS at 08:46

## 2018-01-01 RX ADMIN — PROPOFOL 20 MCG/KG/MIN: 10 INJECTION, EMULSION INTRAVENOUS at 00:24

## 2018-01-01 RX ADMIN — LEVALBUTEROL HYDROCHLORIDE 0.63 MG: 0.63 SOLUTION RESPIRATORY (INHALATION) at 08:18

## 2018-01-01 RX ADMIN — BISACODYL 10 MG: 10 SUPPOSITORY RECTAL at 12:44

## 2018-01-01 RX ADMIN — INSULIN ASPART 1 UNITS: 100 INJECTION, SOLUTION INTRAVENOUS; SUBCUTANEOUS at 20:21

## 2018-01-01 RX ADMIN — SUGAMMADEX 200 MG: 100 INJECTION, SOLUTION INTRAVENOUS at 18:25

## 2018-01-01 RX ADMIN — SODIUM CHLORIDE: 9 INJECTION, SOLUTION INTRAVENOUS at 19:38

## 2018-01-01 RX ADMIN — DOCUSATE SODIUM 100 MG: 100 CAPSULE, LIQUID FILLED ORAL at 11:10

## 2018-01-01 RX ADMIN — EPOPROSTENOL SODIUM 20 NG/KG/MIN: 1500000 INJECTION, POWDER, LYOPHILIZED, FOR SOLUTION INTRAVENOUS at 08:39

## 2018-01-01 RX ADMIN — LEVALBUTEROL HYDROCHLORIDE 0.63 MG: 0.63 SOLUTION RESPIRATORY (INHALATION) at 21:00

## 2018-01-01 RX ADMIN — CIPROFLOXACIN 400 MG: 2 INJECTION, SOLUTION INTRAVENOUS at 07:33

## 2018-01-01 RX ADMIN — AMLODIPINE BESYLATE 10 MG: 10 TABLET ORAL at 08:24

## 2018-01-01 RX ADMIN — INSULIN ASPART 1 UNITS: 100 INJECTION, SOLUTION INTRAVENOUS; SUBCUTANEOUS at 00:15

## 2018-01-01 RX ADMIN — HYDRALAZINE HYDROCHLORIDE 20 MG: 10 TABLET, FILM COATED ORAL at 20:33

## 2018-01-01 RX ADMIN — MEROPENEM 1 G: 1 INJECTION, POWDER, FOR SOLUTION INTRAVENOUS at 08:37

## 2018-01-01 RX ADMIN — Medication 3 MG: at 21:06

## 2018-01-01 RX ADMIN — POLYETHYLENE GLYCOL 3350 17 G: 17 POWDER, FOR SOLUTION ORAL at 08:56

## 2018-01-01 RX ADMIN — HEPARIN SODIUM 5000 UNITS: 5000 INJECTION, SOLUTION INTRAVENOUS; SUBCUTANEOUS at 07:55

## 2018-01-01 RX ADMIN — CALCIUM GLUCONATE: 98 INJECTION, SOLUTION INTRAVENOUS at 22:21

## 2018-01-01 RX ADMIN — INSULIN ASPART 3 UNITS: 100 INJECTION, SOLUTION INTRAVENOUS; SUBCUTANEOUS at 03:26

## 2018-01-01 RX ADMIN — DEXTROSE AND SODIUM CHLORIDE: 5; 450 INJECTION, SOLUTION INTRAVENOUS at 14:23

## 2018-01-01 RX ADMIN — Medication 10 MG: at 11:54

## 2018-01-01 RX ADMIN — METOPROLOL TARTRATE 100 MG: 50 TABLET ORAL at 08:16

## 2018-01-01 RX ADMIN — INSULIN ASPART 3 UNITS: 100 INJECTION, SOLUTION INTRAVENOUS; SUBCUTANEOUS at 08:12

## 2018-01-01 RX ADMIN — LEVOTHYROXINE SODIUM 125 MCG: 25 TABLET ORAL at 09:18

## 2018-01-01 RX ADMIN — INSULIN ASPART 7 UNITS: 100 INJECTION, SOLUTION INTRAVENOUS; SUBCUTANEOUS at 13:00

## 2018-01-01 RX ADMIN — ESCITALOPRAM OXALATE 10 MG: 10 TABLET ORAL at 08:21

## 2018-01-01 RX ADMIN — SENNOSIDES AND DOCUSATE SODIUM 2 TABLET: 8.6; 5 TABLET ORAL at 20:38

## 2018-01-01 RX ADMIN — PANTOPRAZOLE SODIUM 40 MG: 40 INJECTION, POWDER, FOR SOLUTION INTRAVENOUS at 08:10

## 2018-01-01 RX ADMIN — LIDOCAINE 2 PATCH: 560 PATCH PERCUTANEOUS; TOPICAL; TRANSDERMAL at 20:35

## 2018-01-01 RX ADMIN — SODIUM BICARBONATE 1 MEQ/HR: 84 INJECTION, SOLUTION INTRAVENOUS at 05:26

## 2018-01-01 RX ADMIN — Medication 10 MG: at 09:35

## 2018-01-01 RX ADMIN — HEPARIN SODIUM 5000 UNITS: 5000 INJECTION, SOLUTION INTRAVENOUS; SUBCUTANEOUS at 15:39

## 2018-01-01 RX ADMIN — ALBUMIN HUMAN 25 G: 0.05 INJECTION, SOLUTION INTRAVENOUS at 14:48

## 2018-01-01 RX ADMIN — ALBUMIN HUMAN 50 G: 0.25 SOLUTION INTRAVENOUS at 13:21

## 2018-01-01 RX ADMIN — INSULIN ASPART 4 UNITS: 100 INJECTION, SOLUTION INTRAVENOUS; SUBCUTANEOUS at 16:53

## 2018-01-01 RX ADMIN — EPOPROSTENOL SODIUM 20 NG/KG/MIN: 1500000 INJECTION, POWDER, LYOPHILIZED, FOR SOLUTION INTRAVENOUS at 00:49

## 2018-01-01 RX ADMIN — SODIUM CHLORIDE 500 ML: 9 INJECTION, SOLUTION INTRAVENOUS at 07:27

## 2018-01-01 RX ADMIN — Medication 0.2 MG: at 09:51

## 2018-01-01 RX ADMIN — FLUCONAZOLE 200 MG: 200 INJECTION, SOLUTION INTRAVENOUS at 16:46

## 2018-01-01 RX ADMIN — METOPROLOL TARTRATE 100 MG: 50 TABLET ORAL at 08:25

## 2018-01-01 RX ADMIN — METOPROLOL TARTRATE 5 MG: 5 INJECTION INTRAVENOUS at 08:04

## 2018-01-01 RX ADMIN — SODIUM CHLORIDE, PRESERVATIVE FREE 84 ML: 5 INJECTION INTRAVENOUS at 11:51

## 2018-01-01 RX ADMIN — INSULIN ASPART 2 UNITS: 100 INJECTION, SOLUTION INTRAVENOUS; SUBCUTANEOUS at 08:41

## 2018-01-01 RX ADMIN — CALCIUM GLUCONATE: 98 INJECTION, SOLUTION INTRAVENOUS at 21:05

## 2018-01-01 RX ADMIN — HEPARIN SODIUM 5000 UNITS: 5000 INJECTION, SOLUTION INTRAVENOUS; SUBCUTANEOUS at 23:44

## 2018-01-01 RX ADMIN — OXYCODONE HYDROCHLORIDE 5 MG: 5 TABLET ORAL at 15:15

## 2018-01-01 RX ADMIN — METOPROLOL TARTRATE 50 MG: 50 TABLET ORAL at 07:51

## 2018-01-01 RX ADMIN — FUROSEMIDE 10 MG: 10 INJECTION, SOLUTION INTRAVENOUS at 16:24

## 2018-01-01 RX ADMIN — Medication 0.2 MG: at 03:36

## 2018-01-01 RX ADMIN — Medication 0.2 MG: at 02:31

## 2018-01-01 RX ADMIN — PROPOFOL 5 MCG/KG/MIN: 10 INJECTION, EMULSION INTRAVENOUS at 16:22

## 2018-01-01 ASSESSMENT — PAIN DESCRIPTION - DESCRIPTORS
DESCRIPTORS: ACHING
DESCRIPTORS: CONSTANT;SORE
DESCRIPTORS: ACHING;CRAMPING
DESCRIPTORS: OTHER (COMMENT)
DESCRIPTORS: PATIENT UNABLE TO DESCRIBE
DESCRIPTORS: SORE
DESCRIPTORS: ACHING
DESCRIPTORS: CRAMPING
DESCRIPTORS: ACHING;CONSTANT
DESCRIPTORS: CRAMPING
DESCRIPTORS: CRAMPING
DESCRIPTORS: ACHING;CONSTANT
DESCRIPTORS: SHARP
DESCRIPTORS: SORE
DESCRIPTORS: CRAMPING
DESCRIPTORS: DISCOMFORT
DESCRIPTORS: ACHING
DESCRIPTORS: DISCOMFORT
DESCRIPTORS: ACHING
DESCRIPTORS: CRAMPING

## 2018-01-01 ASSESSMENT — ACTIVITIES OF DAILY LIVING (ADL)
ADLS_ACUITY_SCORE: 16
ADLS_ACUITY_SCORE: 14
ADLS_ACUITY_SCORE: 18
ADLS_ACUITY_SCORE: 18
ADLS_ACUITY_SCORE: 13
ADLS_ACUITY_SCORE: 14
ADLS_ACUITY_SCORE: 12
ADLS_ACUITY_SCORE: 18
ADLS_ACUITY_SCORE: 16
ADLS_ACUITY_SCORE: 14
ADLS_ACUITY_SCORE: 13
ADLS_ACUITY_SCORE: 16
ADLS_ACUITY_SCORE: 16
ADLS_ACUITY_SCORE: 18
ADLS_ACUITY_SCORE: 14
ADLS_ACUITY_SCORE: 16
ADLS_ACUITY_SCORE: 16
ADLS_ACUITY_SCORE: 13
ADLS_ACUITY_SCORE: 14
ADLS_ACUITY_SCORE: 12
ADLS_ACUITY_SCORE: 16
ADLS_ACUITY_SCORE: 12
ADLS_ACUITY_SCORE: 18
ADLS_ACUITY_SCORE: 12
ADLS_ACUITY_SCORE: 14
ADLS_ACUITY_SCORE: 12
ADLS_ACUITY_SCORE: 14
ADLS_ACUITY_SCORE: 14
ADLS_ACUITY_SCORE: 16
ADLS_ACUITY_SCORE: 14
ADLS_ACUITY_SCORE: 14
ADLS_ACUITY_SCORE: 13
ADLS_ACUITY_SCORE: 14
ADLS_ACUITY_SCORE: 14
ADLS_ACUITY_SCORE: 16
ADLS_ACUITY_SCORE: 16
ADLS_ACUITY_SCORE: 13
ADLS_ACUITY_SCORE: 12
ADLS_ACUITY_SCORE: 16
ADLS_ACUITY_SCORE: 18
ADLS_ACUITY_SCORE: 13
ADLS_ACUITY_SCORE: 13
ADLS_ACUITY_SCORE: 18
ADLS_ACUITY_SCORE: 16
ADLS_ACUITY_SCORE: 16
ADLS_ACUITY_SCORE: 18
ADLS_ACUITY_SCORE: 18
ADLS_ACUITY_SCORE: 13
ADLS_ACUITY_SCORE: 13
ADLS_ACUITY_SCORE: 18
ADLS_ACUITY_SCORE: 16
ADLS_ACUITY_SCORE: 18
ADLS_ACUITY_SCORE: 16
ADLS_ACUITY_SCORE: 16
ADLS_ACUITY_SCORE: 13
ADLS_ACUITY_SCORE: 12
ADLS_ACUITY_SCORE: 14
ADLS_ACUITY_SCORE: 13
ADLS_ACUITY_SCORE: 12
ADLS_ACUITY_SCORE: 14
ADLS_ACUITY_SCORE: 16
ADLS_ACUITY_SCORE: 16
ADLS_ACUITY_SCORE: 14
ADLS_ACUITY_SCORE: 16
ADLS_ACUITY_SCORE: 14
ADLS_ACUITY_SCORE: 13
ADLS_ACUITY_SCORE: 12
ADLS_ACUITY_SCORE: 14
ADLS_ACUITY_SCORE: 14
ADLS_ACUITY_SCORE: 13
ADLS_ACUITY_SCORE: 12
ADLS_ACUITY_SCORE: 14
ADLS_ACUITY_SCORE: 18
ADLS_ACUITY_SCORE: 16
ADLS_ACUITY_SCORE: 14
ADLS_ACUITY_SCORE: 18
ADLS_ACUITY_SCORE: 13
ADLS_ACUITY_SCORE: 13
ADLS_ACUITY_SCORE: 12
ADLS_ACUITY_SCORE: 16
ADLS_ACUITY_SCORE: 14
ADLS_ACUITY_SCORE: 18
ADLS_ACUITY_SCORE: 16
ADLS_ACUITY_SCORE: 12
ADLS_ACUITY_SCORE: 18
ADLS_ACUITY_SCORE: 14
ADLS_ACUITY_SCORE: 14
ADLS_ACUITY_SCORE: 18
ADLS_ACUITY_SCORE: 14
ADLS_ACUITY_SCORE: 13
ADLS_ACUITY_SCORE: 13
ADLS_ACUITY_SCORE: 16
ADLS_ACUITY_SCORE: 13
ADLS_ACUITY_SCORE: 14
ADLS_ACUITY_SCORE: 14
ADLS_ACUITY_SCORE: 16
ADLS_ACUITY_SCORE: 13
ADLS_ACUITY_SCORE: 14
ADLS_ACUITY_SCORE: 16
ADLS_ACUITY_SCORE: 13
ADLS_ACUITY_SCORE: 14
ADLS_ACUITY_SCORE: 18
ADLS_ACUITY_SCORE: 14
ADLS_ACUITY_SCORE: 18
ADLS_ACUITY_SCORE: 12
ADLS_ACUITY_SCORE: 13
ADLS_ACUITY_SCORE: 14
ADLS_ACUITY_SCORE: 16
ADLS_ACUITY_SCORE: 18

## 2018-01-01 ASSESSMENT — PAIN SCALES - GENERAL
PAINLEVEL: NO PAIN (0)

## 2018-01-01 ASSESSMENT — LIFESTYLE VARIABLES: TOBACCO_USE: 1

## 2018-06-15 PROBLEM — C68.9 UROTHELIAL CANCER (H): Status: ACTIVE | Noted: 2018-01-01

## 2018-06-15 NOTE — LETTER
6/15/2018         RE: Sunitha Jacques  [address] [place] [id number]        Dear Colleague,    Thank you for referring your patient, Sunitha Jacques, to the [facility]. Please see a copy of my visit note below.    [facility]    NEW PATIENT VISIT NOTE    PATIENT NAME: Sunitha Jacques MRN # [id number]  DATE OF VISIT: Moon 15, 2018 YOB: 1940    REFERRING PROVIDER: No referring provider defined for this encounter.    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 She was referred to Urologist in [place]. She had cystoscopy with biopsy. Pathology from this resection revealed urothelial cancer. Urine cytology was also positive for malignant cells. She saw Dr. [place] in [place] and received 4 cycles of chemotherapy with carboplatin with gemcitabine. She feels forgotten after that. She approached her local urologist. She was referred back to her oncologist who recommended a repeat cystoscopy which showed improved but residual disease. She completed her chemotherapy in end of March. She tolerated her chemotherapy remarkably well. She has no residual complains from her chemotherapy.     She had CVA x 2 in March of 2017. She has no residual deficits. She has lymphedema in both her feet.     She has good energy and appetite. She has a stable weight. She lost about 30 lbs since March 2017. She remembers gaining a lot of weight.     She denies any pain. She lost her son in January 2017. She has depression since then.     She has low back pain. She can walk only short distances and has to stop for back pain. She had bilateral knee replacement.     She denies CP or SOB.        PAST MEDICAL HISTORY   HTN  Dyslipidemia  DM TII  CVA x 2 in March 2017; on coumadin since then  Muscle invasive bladder cancer       CURRENT OUTPATIENT MEDICATIONS     Current Outpatient Prescriptions   Medication Sig     Acetaminophen (TYLENOL PO) Take 1,000 mg by mouth 4 times daily Reported on 4/26/2017     amLODIPine (NORVASC) 10 MG tablet Take 1 tablet (10 mg) by mouth daily     atorvastatin (LIPITOR) 80 MG tablet Take 1 tablet (80 mg) by mouth daily     cetirizine (ZYRTEC) 10 MG tablet Take 10 mg by mouth daily     escitalopram (LEXAPRO) 10 MG tablet Take 1 tablet (10 mg) by mouth daily     Escitalopram Oxalate (LEXAPRO PO) Take 10 mg by mouth daily     furosemide (LASIX) 40 MG tablet Take 1 tablet (40 mg) by mouth 2 times daily     glipiZIDE (GLUCOTROL) 5 MG tablet Take 1 tablet (5 mg) by mouth every morning (before breakfast) (Patient not taking: Reported on 6/15/2018)     hydrALAZINE (APRESOLINE) 10 MG tablet Take 1 tablet (10 mg) by mouth 4 times daily     levothyroxine (SYNTHROID/LEVOTHROID) 150 MCG tablet Take 1 tablet (150 mcg) by mouth daily     magnesium citrate 1.745 GM/30ML solution Take 296 mLs by mouth once     magnesium hydroxide (MILK OF MAGNESIA) 400 MG/5ML suspension Take 30 mLs by mouth daily as needed for constipation or heartburn     METOPROLOL TARTRATE PO Take 25 mg by mouth 2 times daily     multivitamin, therapeutic with minerals (MULTI-VITAMIN) TABS tablet Take 1 tablet by mouth daily     pregabalin (LYRICA) 50 MG capsule Take 1 capsule (50 mg) by mouth 2 times daily     quinapril (ACCUPRIL) 40 MG Tab Take 1 tablet (40 mg) by mouth daily     VITAMIN D, CHOLECALCIFEROL, PO Take 1,000 Units by mouth daily     warfarin (COUMADIN) 2.5 MG tablet As directed by Anticoagulation Clinic,current dose 5 mg Mon, Fri, 2.5 mg rest of week     No current facility-administered medications for this visit.         ALLERGIES      Allergies   Allergen Reactions     Metaproterenol      Penicillins      Prednisone      Sulfa Drugs         SOCIAL HISTORY   She is  and lives alone. 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     She does not smoke. She smoked half pack a day and quit at 41 yrs of age. She does not drink alcohol or use recreational drugs.      FAMILY HISTORY   - CAD in paternal side of family - father  - Mother and relatives had DM TII    Mother had ovarian cancer - 63 yrs of age   Uncle had severe DM TII  Alzheimers disease on maternal side of family  CVA in maternal relatives     REVIEW OF SYSTEMS   As above in the HPI, o/w complete 12-point ROS was negative.     PHYSICAL EXAM   B/P: 145/60, T: 98, P: 83, R: 16  SpO2 Readings from Last 4 Encounters:   06/15/18 94%   06/15/18 94%   03/31/17 96%     Wt Readings from Last 3 Encounters:   06/15/18 103.4 kg (227 lb 15.3 oz)   06/15/18 103.4 kg (228 lb)   04/26/17 101.8 kg (224 lb 8 oz)     GEN: NAD  HEENT: PERRL, EOMI, no icterus, injection or pallor. Oropharynx is clear.  NECK: no cervical or supraclavicular lymphadenopathy  LUNGS: clear bilaterally  CV: regular, no murmurs, rubs, or gallops  ABDOMEN: soft, non-tender, non-distended, normal bowel sounds, no hepatosplenomegaly by percussion or palpation  EXT: warm, well perfused, no edema  NEURO: alert  SKIN: no rashes     LABORATORY AND IMAGING STUDIES     Recent Labs   Lab Test  04/13/17   1544  04/07/17   0644  03/31/17   0704  03/24/17   0701   NA   --   144  143  145*   POTASSIUM   --   4.2  4.7  4.2   CHLORIDE   --   104  102  106   CO2   --   34*  37*  35*   ANIONGAP   --   6  4  4   BUN   --   21  16  13   CR  0.99  0.85  0.86  0.79   GLC   --   93  101*  96   ANNE   --   8.8  9.1  8.3*     No results for input(s): MAG, PHOS in the last 81814 hours.  Recent Labs   Lab Test  04/13/17   1544  04/07/17   0644  03/31/17   0704  03/24/17   0701   WBC  6.7  5.2  5.5  6.3   HGB  14.7  12.3  11.8  10.8*   PLT  292  262  211  235   MCV  90  90  91  91   NEUTROPHIL  61.5   --    --    --      No results for input(s): BILITOTAL, ALKPHOS, ALT, AST, ALBUMIN, LDH in the last 10952 hours.  TSH   Date Value Ref  Range Status   04/13/2017 1.88 0.40 - 4.00 mU/L Final       ASSESSMENT    1. High-grade muscle invasive urothelial carcinoma status post neoadjuvant chemotherapy with carboplatin and gemcitabine  2. ECOG performance status 1  3. Chronic kidney disease stage III  4. Multiple medical comorbidities including hypertension, diabetes mellitus type 2, CVA twice in March 2017    DISCUSSION   I had a lengthy discussion with Sunitha who is accompanied by her son at this clinic visit.  She has locally advanced urothelial carcinoma.  She had the initial cystoscopy and biopsy done in August of last year.  She has completed neoadjuvant chemotherapy with carboplatin and gemcitabine.  She notes that her last chemotherapy dose was in March.  She feels that she was lost to follow-up.  She has been previously treated for her cervical cancer with brachytherapy.  With all of her comorbidities she has been considered as a poor candidate for surgery.  She has seen Dr. Germain in urology just prior to clinic visit with me.  I have reviewed her case with Dr. Germain and he is willing to try cystectomy for her.    I would recommend that we proceed with a new PET CT scan to stage her more accurately prior to this high-risk surgery.  I explained that the [facility] PET CT [facility].  If for some reason she has metastatic disease and there would be no reason to consider surgery.  However if she has localized disease or there is no residual disease I would recommend that we proceed with surgery.  If for some reason the surgery is going to take a prolonged period of time we could even consider one cycle of chemotherapy while we are waiting for the surgery.  For the most part I would try to avoid chemotherapy at this time as a single cycle would not make a big difference but could cause side effects or complications that could possibly delay the surgery.    If she does end up having  metastatic disease then the management would be very different.  We would consider immunotherapy for her.  I briefly reviewed the option of PD1 inhibitors for recurrent metastatic urothelial carcinoma.  However I would not recommend it at this time as is a role in perioperative management has not been established.     PLAN   1. I will see her as soon as possible with labs and restaging PET CT scan  2. As long as there is no evidence of metastatic disease on the PET CT scan I would recommend curative cystectomy for her  3. I would coordinate care with urology team    All questions for patient and family were answered to their satisfaction.    Over 60 min of direct face to face time spent with patient with more than 50% time spent in counseling and coordinating care.      Nikolas Espinal  Adj ,  Division of Hematology, Oncology & Transplantation  [facility].       Again, thank you for allowing me to participate in the care of your patient.        Sincerely,        Nikolas Espinal MD

## 2018-06-15 NOTE — PATIENT INSTRUCTIONS
PET-CT at [facility] soon and to see me after visit. Scheduled  Lily MARY      Schedule PET-CT on 6/27/18 and to see me same day [facility] [place] Scheduled  Lily MARY  AVS given to patient

## 2018-06-15 NOTE — PROGRESS NOTES
"      Chief Complaint:   Bladder Cancer         Consult or Referral:     Mr. Sunitha Jacques is a 77 year old female seen in consultation from Dr. Thomason.         History of Present Illness:    Sunitha Jacques is a very pleasant 77 year old female who presents with a history of muscle-invasive bladder cancer. She inicially presented in August 2017 with gross hematuria. 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 Cytology was positive. Subsequently referred to oncology and was treated with 4 cycles of carboplatin and chemotherapy secondary to her reduced GFR at baseline. She subsequently feels like she was \"forgotten about.\" Returned for repeat cystoscopy after completion of chemo but had residual disease. Also reportedly has atrophic right kidney and developed severe hydronephrosis of the left kidney. Had left PNT placed just last week.     Of note, she has a history of cervical cancer and was treated with Salineno therapy in the 1970s. Also has history of stroke in March 2017 with no significant sequelae.         Past Medical History:   HTN  HLD  DM Type II  CVA March 2017  Bladder cancer  CKD         Past Surgical History:   Elis  Appy  Tonsillectoy  TKA x 2  Shoulder repair         Medications     Current Outpatient Prescriptions   Medication     Acetaminophen (TYLENOL PO)     amLODIPine (NORVASC) 10 MG tablet     atorvastatin (LIPITOR) 80 MG tablet     cetirizine (ZYRTEC) 10 MG tablet     escitalopram (LEXAPRO) 10 MG tablet     Escitalopram Oxalate (LEXAPRO PO)     furosemide (LASIX) 40 MG tablet     hydrALAZINE (APRESOLINE) 10 MG tablet     levothyroxine (SYNTHROID/LEVOTHROID) 150 MCG tablet     magnesium citrate 1.745 GM/30ML solution     magnesium hydroxide (MILK OF MAGNESIA) 400 MG/5ML suspension     METOPROLOL TARTRATE PO     multivitamin, therapeutic with minerals (MULTI-VITAMIN) TABS tablet     pregabalin (LYRICA) 50 MG capsule     quinapril " "(ACCUPRIL) 40 MG Tab     VITAMIN D, CHOLECALCIFEROL, PO     warfarin (COUMADIN) 2.5 MG tablet     glipiZIDE (GLUCOTROL) 5 MG tablet     No current facility-administered medications for this visit.           Family History:   No family history on file.  No  malignancy         Social History:     Social History     Social History     Marital status:      Spouse name: N/A     Number of children: N/A     Years of education: N/A     Occupational History     Not on file.     Social History Main Topics     Smoking status: Not on file     Smokeless tobacco: Not on file     Alcohol use Not on file     Drug use: Not on file     Sexual activity: Not on file     Other Topics Concern     Not on file     Social History Narrative   Worked as radiology technician         Allergies:   Metaproterenol; Penicillins; Prednisone; and Sulfa drugs         Review of Systems:  From intake questionnaire     Negative 14 system review except as noted on HPI, nurse's note.         Physical Exam:     Patient is a 77 year old  female   Vitals: Blood pressure 145/60, pulse 83, temperature 98  F (36.7  C), temperature source Tympanic, resp. rate 16, height 1.568 m (5' 1.75\"), weight 103.4 kg (228 lb), SpO2 94 %.  General Appearance Adult: Body mass index is 42.04 kg/(m^2).  Alert, no acute distress, oriented  HENT: throat/mouth:normal, good dentition  Lungs: no respiratory distress, or pursed lip breathing; no CVAT  Heart: No obvious jugular venous distension present; no acute murmurs noted  Abdomen: soft, nontender, no organomegaly or masses, well-healed low-midline incision  Back: PNT with clear urine output  Musculoskeltal: extremities normal, mild bilateral peripheral edema  Skin: no suspicious lesions or rashes  Neuro: Alert, oriented, speech and mentation normal  Psych: affect and mood normal  Gait: Normal      Labs and Pathology:    I reviewed all applicable laboratory and pathology data and went over findings with " patient  Significant for   Lab Results   Component Value Date    CR 0.99 04/13/2017    CR 0.85 04/07/2017    CR 0.86 03/31/2017    CR 0.79 03/24/2017       Imaging:    I reviewed all applicable imaging and went over findings with patient.  Scans not available for review today. Reports reviewed from 8/2017. Atrophy of right kidney. Apparent new hydronephrosis of left kidney and recent PNT placed.      Outside and Past Medical records:    I spent 10 minutes reviewing outside and past medical records.         Assessment and Plan:     Assessment: 77 year old female with muscle invasive bladder cancer. Has already received neoadjuvant chemotherapy with gemcitabine and carboplatin. We had a long discussion about muscle invasive bladder cancer.  We discussed the fact that once urothelial cancer invades the bladder muscle layer it can potentially invade the blood vessels and lymphatic channels and spread throughout the body.  Once urothelial cancer spreads to distant organs, the opportunity for cure is rare, so we have the best shot for cure with aggressive surgical intervention.  We discussed without treatment that most patients will die in 2-3 years.    We discussed radical cystectomy and the potential complications associated with it, including a 2-3% perioperative mortality risk, and the risk of complications is about 60%-70%.  We also discussed that about 20-30% of patients will need to go to a rehab facility after discharge from the hospital.  Though most patients get through this operation, there is often a significant delay in regaining appetite and sometimes takes up to a year to feel back to normal.    We also discussed the various types of urinary diversion including ileal conduit, [place] Pouch and ileal neobladder.  The various pros and cons of each type of urinary diversion was discussed and all questions were answered.  Prospective quality of life data is lacking that compares the various diversion types, but  retrospective data suggest the differences are small by diversion type. Given her previous radiation and chronic kidney disease, would opt for ileal conduit.    The patient and her family had many questions and we went over these carefully.  We provided the patient with a bladder cancer binder and indicated the resources available inside, including the bladder cancer support group.    In the end, we discussed her options in the setting of her disease. We discussed that while the typical course would include radical cystectomy, her case is complicated by her impaired renal function, and history of pelvic radiation with for cervical cancer. Her previous urologists considered her a poor candidate for surgery, and she would indeed be rather high risk. That being said, her options are rather limited in the absence of surgery.    I would like her see Dr. Espinal of medical oncology to discuss options should we  Not proceed with surgery. Will plan for PET CT to re-stage her disease and attempt to ensure no metastasis prior to high-risk surgery. I would also like to see her previous scans to review current extent of disease.    Plan:  Medical Oncology referral  PET CT  Schedule for open pelvic exenteration with ileal conduit urinary diversion, possible transverse colon conduit.    Orders  Orders Placed This Encounter   Procedures     Josei-Operative Worksheet  (Urology General)     PAC Visit Referral (For [facility] Only)     Girma Sharma MD  Urology  [facility]  Clinic Phone [phone]

## 2018-06-15 NOTE — LETTER
"6/15/2018       RE: Sunitha Jacques  [address] [place] [id number]     Dear Colleague,    Thank you for referring your patient, Sunitha Jacques, to the [facility] CANCER CARE at [facility]. Please see a copy of my visit note below.          Chief Complaint:   Bladder Cancer         Consult or Referral:     Mr. Sunitha Jacques is a 77 year old female seen in consultation from Dr. Thomason.         History of Present Illness:    Sunitha Jacques is a very pleasant 77 year old female who presents with a history of muscle-invasive bladder cancer. She inicially presented in August 2017 with gross hematuria. 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 Cytology was positive. Subsequently referred to oncology and was treated with 4 cycles of carboplatin and chemotherapy secondary to her reduced GFR at baseline. She subsequently feels like she was \"forgotten about.\" Returned for repeat cystoscopy after completion of chemo but had residual disease. Also reportedly has atrophic right kidney and developed severe hydronephrosis of the left kidney. Had left PNT placed just last week.     Of note, she has a history of cervical cancer and was treated with [place] therapy in the 1970s. Also has history of stroke in March 2017 with no significant sequelae.         Past Medical History:   HTN  HLD  DM Type II  CVA March 2017  Bladder cancer  CKD         Past Surgical History:   Elis  Appy  Tonsillectoy  TKA x 2  Shoulder repair         Medications     Current Outpatient Prescriptions   Medication     Acetaminophen (TYLENOL PO)     amLODIPine (NORVASC) 10 MG tablet     atorvastatin (LIPITOR) 80 MG tablet     cetirizine (ZYRTEC) 10 MG tablet     escitalopram (LEXAPRO) 10 MG tablet     Escitalopram Oxalate (LEXAPRO PO)     furosemide (LASIX) 40 MG tablet     hydrALAZINE (APRESOLINE) 10 MG tablet     " "levothyroxine (SYNTHROID/LEVOTHROID) 150 MCG tablet     magnesium citrate 1.745 GM/30ML solution     magnesium hydroxide (MILK OF MAGNESIA) 400 MG/5ML suspension     METOPROLOL TARTRATE PO     multivitamin, therapeutic with minerals (MULTI-VITAMIN) TABS tablet     pregabalin (LYRICA) 50 MG capsule     quinapril (ACCUPRIL) 40 MG Tab     VITAMIN D, CHOLECALCIFEROL, PO     warfarin (COUMADIN) 2.5 MG tablet     glipiZIDE (GLUCOTROL) 5 MG tablet     No current facility-administered medications for this visit.           Family History:   No family history on file.  No  malignancy         Social History:     Social History     Social History     Marital status:      Spouse name: N/A     Number of children: N/A     Years of education: N/A     Occupational History     Not on file.     Social History Main Topics     Smoking status: Not on file     Smokeless tobacco: Not on file     Alcohol use Not on file     Drug use: Not on file     Sexual activity: Not on file     Other Topics Concern     Not on file     Social History Narrative   Worked as radiology technician         Allergies:   Metaproterenol; Penicillins; Prednisone; and Sulfa drugs         Review of Systems:  From intake questionnaire     Negative 14 system review except as noted on HPI, nurse's note.         Physical Exam:     Patient is a 77 year old  female   Vitals: Blood pressure 145/60, pulse 83, temperature 98  F (36.7  C), temperature source Tympanic, resp. rate 16, height 1.568 m (5' 1.75\"), weight 103.4 kg (228 lb), SpO2 94 %.  General Appearance Adult: Body mass index is 42.04 kg/(m^2).  Alert, no acute distress, oriented  HENT: throat/mouth:normal, good dentition  Lungs: no respiratory distress, or pursed lip breathing; no CVAT  Heart: No obvious jugular venous distension present; no acute murmurs noted  Abdomen: soft, nontender, no organomegaly or masses, well-healed low-midline incision  Back: PNT with clear urine output  Musculoskeltal: " extremities normal, mild bilateral peripheral edema  Skin: no suspicious lesions or rashes  Neuro: Alert, oriented, speech and mentation normal  Psych: affect and mood normal  Gait: Normal      Labs and Pathology:    I reviewed all applicable laboratory and pathology data and went over findings with patient  Significant for   Lab Results   Component Value Date    CR 0.99 04/13/2017    CR 0.85 04/07/2017    CR 0.86 03/31/2017    CR 0.79 03/24/2017       Imaging:    I reviewed all applicable imaging and went over findings with patient.  Scans not available for review today. Reports reviewed from 8/2017. Atrophy of right kidney. Apparent new hydronephrosis of left kidney and recent PNT placed.      Outside and Past Medical records:    I spent 10 minutes reviewing outside and past medical records.         Assessment and Plan:     Assessment: 77 year old female with muscle invasive bladder cancer. Has already received neoadjuvant chemotherapy with gemcitabine and carboplatin. We had a long discussion about muscle invasive bladder cancer.  We discussed the fact that once urothelial cancer invades the bladder muscle layer it can potentially invade the blood vessels and lymphatic channels and spread throughout the body.  Once urothelial cancer spreads to distant organs, the opportunity for cure is rare, so we have the best shot for cure with aggressive surgical intervention.  We discussed without treatment that most patients will die in 2-3 years.    We discussed radical cystectomy and the potential complications associated with it, including a 2-3% perioperative mortality risk, and the risk of complications is about 60%-70%.  We also discussed that about 20-30% of patients will need to go to a rehab facility after discharge from the hospital.  Though most patients get through this operation, there is often a significant delay in regaining appetite and sometimes takes up to a year to feel back to normal.    We also  discussed the various types of urinary diversion including ileal conduit, [place] Pouch and ileal neobladder.  The various pros and cons of each type of urinary diversion was discussed and all questions were answered.  Prospective quality of life data is lacking that compares the various diversion types, but retrospective data suggest the differences are small by diversion type. Given her previous radiation and chronic kidney disease, would opt for ileal conduit.    The patient and her family had many questions and we went over these carefully.  We provided the patient with a bladder cancer binder and indicated the resources available inside, including the bladder cancer support group.    In the end, we discussed her options in the setting of her disease. We discussed that while the typical course would include radical cystectomy, her case is complicated by her impaired renal function, and history of pelvic radiation with for cervical cancer. Her previous urologists considered her a poor candidate for surgery, and she would indeed be rather high risk. That being said, her options are rather limited in the absence of surgery.    I would like her see Dr. Espinal of medical oncology to discuss options should we  Not proceed with surgery. Will plan for PET CT to re-stage her disease and attempt to ensure no metastasis prior to high-risk surgery. I would also like to see her previous scans to review current extent of disease.    Plan:  Medical Oncology referral  PET CT  Schedule for open pelvic exenteration with ileal conduit urinary diversion, possible transverse colon conduit.    Orders  Orders Placed This Encounter   Procedures     Josie-Operative Worksheet  (Urology General)     PAC Visit Referral (For [facility] Only)     Girma Sharma MD  Urology  [facility]  Clinic Phone [phone]        Again, thank you for allowing me to participate in the care of your patient.      Sincerely,    Girma CABELLO  MD Edith

## 2018-06-15 NOTE — LETTER
"    6/15/2018         RE: Sunitha Jacques  [address] [place] [address]        Dear Colleague,    Thank you for referring your patient, Sunitha Jacques, to the [facility] CANCER CARE. Please see a copy of my visit note below.          Chief Complaint:   Bladder Cancer         Consult or Referral:     Mr. Sunitha Jacques is a 77 year old female seen in consultation from Dr. Murdock.         History of Present Illness:    Sunitha Jacques is a very pleasant 77 year old female who presents with a history of muscle-invasive bladder cancer. She inicially presented in August 2017 with gross hematuria. 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 Cytology was positive. Subsequently referred to oncology and was treated with 4 cycles of carboplatin and chemotherapy secondary to her reduced GFR at baseline. She subsequently feels like she was \"forgotten about.\" Returned for repeat cystoscopy after completion of chemo but had residual disease. Also reportedly has atrophic right kidney and developed severe hydronephrosis of the left kidney. Had left PNT placed just last week.     Of note, she has a history of cervical cancer and was treated with [place] therapy in the 1970s. Also has history of stroke in March 2017 with no significant sequelae.         Past Medical History:   HTN  HLD  DM Type II  CVA March 2017  Bladder cancer  CKD         Past Surgical History:   Elis  Appy  Tonsillectoy  TKA x 2  Shoulder repair         Medications     Current Outpatient Prescriptions   Medication     Acetaminophen (TYLENOL PO)     amLODIPine (NORVASC) 10 MG tablet     atorvastatin (LIPITOR) 80 MG tablet     cetirizine (ZYRTEC) 10 MG tablet     escitalopram (LEXAPRO) 10 MG tablet     Escitalopram Oxalate (LEXAPRO PO)     furosemide (LASIX) 40 MG tablet     hydrALAZINE (APRESOLINE) 10 MG tablet     levothyroxine (SYNTHROID/LEVOTHROID) 150 MCG " "tablet     magnesium citrate 1.745 GM/30ML solution     magnesium hydroxide (MILK OF MAGNESIA) 400 MG/5ML suspension     METOPROLOL TARTRATE PO     multivitamin, therapeutic with minerals (MULTI-VITAMIN) TABS tablet     pregabalin (LYRICA) 50 MG capsule     quinapril (ACCUPRIL) 40 MG Tab     VITAMIN D, CHOLECALCIFEROL, PO     warfarin (COUMADIN) 2.5 MG tablet     glipiZIDE (GLUCOTROL) 5 MG tablet     No current facility-administered medications for this visit.           Family History:   No family history on file.  No  malignancy         Social History:     Social History     Social History     Marital status:      Spouse name: N/A     Number of children: N/A     Years of education: N/A     Occupational History     Not on file.     Social History Main Topics     Smoking status: Not on file     Smokeless tobacco: Not on file     Alcohol use Not on file     Drug use: Not on file     Sexual activity: Not on file     Other Topics Concern     Not on file     Social History Narrative   Worked as radiology technician         Allergies:   Metaproterenol; Penicillins; Prednisone; and Sulfa drugs         Review of Systems:  From intake questionnaire     Negative 14 system review except as noted on HPI, nurse's note.         Physical Exam:     Patient is a 77 year old  female   Vitals: Blood pressure 145/60, pulse 83, temperature 98  F (36.7  C), temperature source Tympanic, resp. rate 16, height 1.568 m (5' 1.75\"), weight 103.4 kg (228 lb), SpO2 94 %.  General Appearance Adult: Body mass index is 42.04 kg/(m^2).  Alert, no acute distress, oriented  HENT: throat/mouth:normal, good dentition  Lungs: no respiratory distress, or pursed lip breathing; no CVAT  Heart: No obvious jugular venous distension present; no acute murmurs noted  Abdomen: soft, nontender, no organomegaly or masses, well-healed low-midline incision  Back: PNT with clear urine output  Musculoskeltal: extremities normal, mild bilateral peripheral " edema  Skin: no suspicious lesions or rashes  Neuro: Alert, oriented, speech and mentation normal  Psych: affect and mood normal  Gait: Normal      Labs and Pathology:    I reviewed all applicable laboratory and pathology data and went over findings with patient  Significant for   Lab Results   Component Value Date    CR 0.99 04/13/2017    CR 0.85 04/07/2017    CR 0.86 03/31/2017    CR 0.79 03/24/2017       Imaging:    I reviewed all applicable imaging and went over findings with patient.  Scans not available for review today. Reports reviewed from 8/2017. Atrophy of right kidney. Apparent new hydronephrosis of left kidney and recent PNT placed.      Outside and Past Medical records:    I spent 10 minutes reviewing outside and past medical records.         Assessment and Plan:     Assessment: 77 year old female with muscle invasive bladder cancer. Has already received neoadjuvant chemotherapy with gemcitabine and carboplatin. We had a long discussion about muscle invasive bladder cancer.  We discussed the fact that once urothelial cancer invades the bladder muscle layer it can potentially invade the blood vessels and lymphatic channels and spread throughout the body.  Once urothelial cancer spreads to distant organs, the opportunity for cure is rare, so we have the best shot for cure with aggressive surgical intervention.  We discussed without treatment that most patients will die in 2-3 years.    We discussed radical cystectomy and the potential complications associated with it, including a 2-3% perioperative mortality risk, and the risk of complications is about 60%-70%.  We also discussed that about 20-30% of patients will need to go to a rehab facility after discharge from the hospital.  Though most patients get through this operation, there is often a significant delay in regaining appetite and sometimes takes up to a year to feel back to normal.    We also discussed the various types of urinary diversion  including ileal conduit, [place] Pouch and ileal neobladder.  The various pros and cons of each type of urinary diversion was discussed and all questions were answered.  Prospective quality of life data is lacking that compares the various diversion types, but retrospective data suggest the differences are small by diversion type. Given her previous radiation and chronic kidney disease, would opt for ileal conduit.    The patient and her family had many questions and we went over these carefully.  We provided the patient with a bladder cancer binder and indicated the resources available inside, including the bladder cancer support group.    In the end, we discussed her options in the setting of her disease. We discussed that while the typical course would include radical cystectomy, her case is complicated by her impaired renal function, and history of pelvic radiation with for cervical cancer. Her previous urologists considered her a poor candidate for surgery, and she would indeed be rather high risk. That being said, her options are rather limited in the absence of surgery.    I would like her see Dr. Espinal of medical oncology to discuss options should we  Not proceed with surgery. Will plan for PET CT to re-stage her disease and attempt to ensure no metastasis prior to high-risk surgery. I would also like to see her previous scans to review current extent of disease.    Plan:  Medical Oncology referral  PET CT  Schedule for open pelvic exenteration with ileal conduit urinary diversion, possible transverse colon conduit.    Orders  Orders Placed This Encounter   Procedures     Josie-Operative Worksheet  (Urology General)     PAC Visit Referral (For [facility] Only)     Girma Sharma MD  Urology  [facility]  Clinic Phone [phone]        Again, thank you for allowing me to participate in the care of your patient.        Sincerely,        Girma Sharma MD

## 2018-06-15 NOTE — NURSING NOTE
"Oncology Rooming Note    Moon 15, 2018 9:57 AM   Sunitha Jacques is a 77 year old female who presents for:    Chief Complaint   Patient presents with     Oncology Clinic Visit     New patient consult     Initial Vitals: /60  Pulse 83  Temp 98  F (36.7  C) (Tympanic)  Resp 16  Ht 1.568 m (5' 1.75\")  Wt 103.4 kg (228 lb)  SpO2 94%  BMI 42.04 kg/m2 Estimated body mass index is 42.04 kg/(m^2) as calculated from the following:    Height as of this encounter: 1.568 m (5' 1.75\").    Weight as of this encounter: 103.4 kg (228 lb). Body surface area is 2.12 meters squared.  No Pain (0) Comment: Data Unavailable   No LMP recorded.  Allergies reviewed: Yes  Medications reviewed: Yes    Medications: Medication refills not needed today.  Pharmacy name entered into [facility]: [id number] - [address]    Clinical concerns: follow up     8 minutes for nursing intake (face to face time)     Rosemarie Donis CMA   DISCHARGE PLAN:  Next appointments: See patient instruction section  Departure Mode: Ambulatory  Accompanied by: son  0 minutes for nursing discharge (face to face time)   Rosemarie Donis CMA                  "

## 2018-06-15 NOTE — MR AVS SNAPSHOT
After Visit Summary   6/15/2018    Sunitha Jacques    MRN: [id number]           Patient Information     Date Of Birth          1940        Visit Information        Provider Department      6/15/2018 11:45 AM Nikolas Esipnal MD [facility]        Today's Diagnoses     Urothelial cancer (H)    -  1    Malignant neoplasm of anterior wall of bladder (H)           Care Instructions    [facility] to [facility] me after visit. Scheduled  Lily MARY      Schedule PET-CT on 6/27/18 and to see me same day at [facility] after scan Scheduled  Lily MARY  AVS given to patient              Follow-ups after your visit        Your next 10 appointments already scheduled     Jun 27, 2018 11:30 AM CDT   PET ONCOLOGY WHOLE BODY with [id number]   [facility] ([facility])    [facility] [place] [address]   [phone]           Tell your doctor:   If there is any chance you may be pregnant or if you are breastfeeding.   If you have problems lying in small spaces (claustrophobia). If you do, your doctor may give you medicine to help you relax. If you have diabetes:   Have your exam early in the morning. Your blood glucose will go up as the day goes by.   Your glucose level must be 180 or less at the start of the exam. Please take any medicines you need to ensure this blood glucose level.   If you are taking insulin in the morning take with breakfast by 6 am and schedule procedure between 12 and 2:15 pm.   If you are taking insulin at night take nightly dose, fast overnight, schedule procedure before 10 am.   If you take insulin both morning and night take morning dose by 6am and schedule procedure between 12 and 2:15 pm.   24 hours before your scan: Don t do any heavy exercise. (No jogging, aerobics or other workouts.) Exercise will make your pictures less accurate.  At least 7 hours before your scan, or the evening before if you have an early  "appointment: Eat a low carb, high protein meal (Lean meats, seafood, beans, soy, low-fat dairy, eggs, nuts & seeds). 6 hours before your scan:   Stop all food and liquids (except water).   Do not chew gum or suck on mints.   If you need to take medicine with food, you may take it with a few crackers.  Please call your Imaging Department at your exam site with any questions.            Jun 27, 2018  3:30 PM CDT   (Arrive by 3:15 PM)   Return Visit with Nikolas Espinal MD   [facility] ([facility] [place])    [address]  [address]  [facility] [address] [phone]              Future tests that were ordered for you today     Open Future Orders        Priority Expected Expires Ordered    PET Oncology Whole Body Routine  6/15/2019 6/15/2018            Who to contact     If you have questions or need follow up information about today's clinic visit or your schedule please contact [facility] directly at [phone].  Normal or non-critical lab and imaging results will be communicated to you by MyChart, letter or phone within 4 business days after the clinic has received the results. If you do not hear from us within 7 days, please contact the clinic through MyChart or phone. If you have a critical or abnormal lab result, we will notify you by phone as soon as possible.  Submit refill requests through [employer] or call your pharmacy and they will forward the refill request to us. Please allow 3 business days for your refill to be completed.          Additional Information About Your Visit        Care EveryWhere ID     This is your Care EveryWhere ID. This could be used by other organizations to access your [place] medical records  [id number]        Your Vitals Were     Pulse Temperature Respirations Height Pulse Oximetry BMI (Body Mass Index)    83 98  F (36.7  C) (Tympanic) 16 1.568 m (5' 1.75\") 94% 42.03 kg/m2       Blood Pressure from " Last 3 Encounters:   06/15/18 145/60   06/15/18 145/60   04/26/17 149/66    Weight from Last 3 Encounters:   06/15/18 103.4 kg (227 lb 15.3 oz)   06/15/18 103.4 kg (228 lb)   04/26/17 101.8 kg (224 lb 8 oz)                 Today's Medication Changes          These changes are accurate as of 6/15/18 12:51 PM.  If you have any questions, ask your nurse or doctor.               Stop taking these medicines if you haven't already. Please contact your care team if you have questions.     MAGNESIUM OXIDE PO   Stopped by:  Girma Sharma MD           Potassium Chloride ER 20 MEQ Tbcr   Stopped by:  Girma Sharma MD                    Primary Care Provider    Pradeep Zungia       No address on file        Equal Access to Services     SHELLY JACOB : Hadii alissa tinsley hadasho Soomaali, waaxda luqadaha, qaybta kaalmada adeegyada, waxay jyothi hayfigueroa lino . So [facility] [phone].    ATENCIÓN: Si habla español, tiene a tomlin disposición servicios gratuitos de asistencia lingüística. Llame al [phone].    We comply with applicable federal civil rights laws and [place] laws. We do not discriminate on the basis of race, color, national origin, age, disability, sex, sexual orientation, or gender identity.            Thank you!     Thank you for choosing [facility]  for your care. Our goal is always to provide you with excellent care. Hearing back from our patients is one way we can continue to improve our services. Please take a few minutes to complete the written survey that you may receive in the mail after your visit with us. Thank you!             Your Updated Medication List - Protect others around you: Learn how to safely use, store and throw away your medicines at www.disposemymeds.org.          This list is accurate as of 6/15/18 12:51 PM.  Always use your most recent med list.                   Brand Name Dispense Instructions for use Diagnosis    amLODIPine  10 MG tablet    NORVASC    90 tablet    Take 1 tablet (10 mg) by mouth daily    Essential hypertension       atorvastatin 80 MG tablet    LIPITOR    90 tablet    Take 1 tablet (80 mg) by mouth daily    Essential hypertension, Chronic diastolic congestive heart failure (H)       cetirizine 10 MG tablet    zyrTEC     Take 10 mg by mouth daily        furosemide 40 MG tablet    LASIX    180 tablet    Take 1 tablet (40 mg) by mouth 2 times daily    Chronic diastolic congestive heart failure (H)       glipiZIDE 5 MG tablet    GLUCOTROL    90 tablet    Take 1 tablet (5 mg) by mouth every morning (before breakfast)    Type 2 diabetes mellitus with other neurologic complication       hydrALAZINE 10 MG tablet    APRESOLINE    90 tablet    Take 1 tablet (10 mg) by mouth 4 times daily    Benign essential hypertension       levothyroxine 150 MCG tablet    SYNTHROID/LEVOTHROID    90 tablet    Take 1 tablet (150 mcg) by mouth daily    Acquired hypothyroidism       * LEXAPRO PO      Take 10 mg by mouth daily        * escitalopram 10 MG tablet    LEXAPRO    90 tablet    Take 1 tablet (10 mg) by mouth daily    Adjustment disorder with depressed mood       magnesium citrate 1.745 GM/30ML solution      Take 296 mLs by mouth once        magnesium hydroxide 400 MG/5ML suspension    MILK OF MAGNESIA     Take 30 mLs by mouth daily as needed for constipation or heartburn        METOPROLOL TARTRATE PO      Take 25 mg by mouth 2 times daily        Multi-vitamin Tabs tablet      Take 1 tablet by mouth daily        pregabalin 50 MG capsule    LYRICA    60 capsule    Take 1 capsule (50 mg) by mouth 2 times daily    Diabetic polyneuropathy associated with type 2 diabetes mellitus (H)       quinapril 40 MG Tab    ACCUPRIL    90 tablet    Take 1 tablet (40 mg) by mouth daily    Essential hypertension, Type 2 diabetes mellitus with other neurologic complication       TYLENOL PO      Take 1,000 mg by mouth 4 times daily Reported on 4/26/2017         VITAMIN D (CHOLECALCIFEROL) PO      Take 1,000 Units by mouth daily        warfarin 2.5 MG tablet    COUMADIN    144 tablet    As directed by Anticoagulation Clinic,current dose 5 mg Mon, Fri, 2.5 mg rest of week    Long-term (current) use of anticoagulants, Cerebrovascular accident (CVA), unspecified mechanism (H), Atrial fibrillation with rapid ventricular response (H), Long-term (current) use of anticoagulants       * Notice:  This list has 2 medication(s) that are the same as other medications prescribed for you. Read the directions carefully, and ask your doctor or other care provider to review them with you.

## 2018-06-15 NOTE — PROGRESS NOTES
[facility]INIC    NEW PATIENT VISIT NOTE    PATIENT NAME: Sunitha Jacques MRN # [id number]  DATE OF VISIT: Moon 15, 2018 YOB: 1940    REFERRING PROVIDER: No referring provider defined for this encounter.    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 She was referred to Urologist in [place]. She had cystoscopy with biopsy. Pathology from this resection revealed urothelial cancer. Urine cytology was also positive for malignant cells. She saw Dr. Murdock in [place] and received 4 cycles of chemotherapy with carboplatin with gemcitabine. She feels forgotten after that. She approached her local urologist. She was referred back to her oncologist who recommended a repeat cystoscopy which showed improved but residual disease. She completed her chemotherapy in end of March. She tolerated her chemotherapy remarkably well. She has no residual complains from her chemotherapy.     She had CVA x 2 in March of 2017. She has no residual deficits. She has lymphedema in both her feet.     She has good energy and appetite. She has a stable weight. She lost about 30 lbs since March 2017. She remembers gaining a lot of weight.     She denies any pain. She lost her son in January 2017. She has depression since then.     She has low back pain. She can walk only short distances and has to stop for back pain. She had bilateral knee replacement.     She denies CP or SOB.        PAST MEDICAL HISTORY   HTN  Dyslipidemia  DM TII  CVA x 2 in March 2017; on coumadin since then  Muscle invasive bladder cancer      CURRENT OUTPATIENT MEDICATIONS     Current Outpatient Prescriptions   Medication Sig     Acetaminophen (TYLENOL PO) Take 1,000 mg by mouth 4 times daily Reported on 4/26/2017     amLODIPine (NORVASC) 10 MG tablet Take 1 tablet (10 mg) by mouth daily      atorvastatin (LIPITOR) 80 MG tablet Take 1 tablet (80 mg) by mouth daily     cetirizine (ZYRTEC) 10 MG tablet Take 10 mg by mouth daily     escitalopram (LEXAPRO) 10 MG tablet Take 1 tablet (10 mg) by mouth daily     Escitalopram Oxalate (LEXAPRO PO) Take 10 mg by mouth daily     furosemide (LASIX) 40 MG tablet Take 1 tablet (40 mg) by mouth 2 times daily     glipiZIDE (GLUCOTROL) 5 MG tablet Take 1 tablet (5 mg) by mouth every morning (before breakfast) (Patient not taking: Reported on 6/15/2018)     hydrALAZINE (APRESOLINE) 10 MG tablet Take 1 tablet (10 mg) by mouth 4 times daily     levothyroxine (SYNTHROID/LEVOTHROID) 150 MCG tablet Take 1 tablet (150 mcg) by mouth daily     magnesium citrate 1.745 GM/30ML solution Take 296 mLs by mouth once     magnesium hydroxide (MILK OF MAGNESIA) 400 MG/5ML suspension Take 30 mLs by mouth daily as needed for constipation or heartburn     METOPROLOL TARTRATE PO Take 25 mg by mouth 2 times daily     multivitamin, therapeutic with minerals (MULTI-VITAMIN) TABS tablet Take 1 tablet by mouth daily     pregabalin (LYRICA) 50 MG capsule Take 1 capsule (50 mg) by mouth 2 times daily     quinapril (ACCUPRIL) 40 MG Tab Take 1 tablet (40 mg) by mouth daily     VITAMIN D, CHOLECALCIFEROL, PO Take 1,000 Units by mouth daily     warfarin (COUMADIN) 2.5 MG tablet As directed by Anticoagulation Clinic,current dose 5 mg Mon, Fri, 2.5 mg rest of week     No current facility-administered medications for this visit.         ALLERGIES      Allergies   Allergen Reactions     Metaproterenol      Penicillins      Prednisone      Sulfa Drugs         SOCIAL HISTORY   She is  and lives alone. 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     She does not smoke. She smoked half pack a day and quit at 41 yrs of age. She does not drink alcohol or use recreational drugs.      FAMILY HISTORY   - CAD in paternal side of family - father  - Mother and relatives had DM TII     Mother had ovarian cancer - 63 yrs of age   Uncle had severe DM TII  Alzheimers disease on maternal side of family  CVA in maternal relatives     REVIEW OF SYSTEMS   As above in the HPI, o/w complete 12-point ROS was negative.     PHYSICAL EXAM   B/P: 145/60, T: 98, P: 83, R: 16  SpO2 Readings from Last 4 Encounters:   06/15/18 94%   06/15/18 94%   03/31/17 96%     Wt Readings from Last 3 Encounters:   06/15/18 103.4 kg (227 lb 15.3 oz)   06/15/18 103.4 kg (228 lb)   04/26/17 101.8 kg (224 lb 8 oz)     GEN: NAD  HEENT: PERRL, EOMI, no icterus, injection or pallor. Oropharynx is clear.  NECK: no cervical or supraclavicular lymphadenopathy  LUNGS: clear bilaterally  CV: regular, no murmurs, rubs, or gallops  ABDOMEN: soft, non-tender, non-distended, normal bowel sounds, no hepatosplenomegaly by percussion or palpation  EXT: warm, well perfused, no edema  NEURO: alert  SKIN: no rashes     LABORATORY AND IMAGING STUDIES     Recent Labs   Lab Test  04/13/17   1544  04/07/17   0644  03/31/17   0704  03/24/17   0701   NA   --   144  143  145*   POTASSIUM   --   4.2  4.7  4.2   CHLORIDE   --   104  102  106   CO2   --   34*  37*  35*   ANIONGAP   --   6  4  4   BUN   --   21  16  13   CR  0.99  0.85  0.86  0.79   GLC   --   93  101*  96   ANNE   --   8.8  9.1  8.3*     No results for input(s): MAG, PHOS in the last 17638 hours.  Recent Labs   Lab Test  04/13/17   1544  04/07/17   0644  03/31/17   0704  03/24/17   0701   WBC  6.7  5.2  5.5  6.3   HGB  14.7  12.3  11.8  10.8*   PLT  292  262  211  235   MCV  90  90  91  91   NEUTROPHIL  61.5   --    --    --      No results for input(s): BILITOTAL, ALKPHOS, ALT, AST, ALBUMIN, LDH in the last 00109 hours.  TSH   Date Value Ref Range Status   04/13/2017 1.88 0.40 - 4.00 mU/L Final       ASSESSMENT    1. High-grade muscle invasive urothelial carcinoma status post neoadjuvant chemotherapy with carboplatin and gemcitabine  2. ECOG performance status 1  3. Chronic kidney disease  stage III  4. Multiple medical comorbidities including hypertension, diabetes mellitus type 2, CVA twice in March 2017    DISCUSSION   I had a lengthy discussion with Sunitha who is accompanied by her son at this clinic visit.  She has locally advanced urothelial carcinoma.  She had the initial cystoscopy and biopsy done in August of last year.  She has completed neoadjuvant chemotherapy with carboplatin and gemcitabine.  She notes that her last chemotherapy dose was in March.  She feels that she was lost to follow-up.  She has been previously treated for her cervical cancer with brachytherapy.  With all of her comorbidities she has been considered as a poor candidate for surgery.  She has seen Dr. Germain in urology just prior to clinic visit with me.  I have reviewed her case with Dr. Germain and he is willing to try cystectomy for her.    I would recommend that we proceed with a new PET CT scan to stage her more accurately prior to this high-risk surgery.  I explained that the [facility].  If for some reason she has metastatic disease and there would be no reason to consider surgery.  However if she has localized disease or there is no residual disease I would recommend that we proceed with surgery.  If for some reason the surgery is going to take a prolonged period of time we could even consider one cycle of chemotherapy while we are waiting for the surgery.  For the most part I would try to avoid chemotherapy at this time as a single cycle would not make a big difference but could cause side effects or complications that could possibly delay the surgery.    If she does end up having metastatic disease then the management would be very different.  We would consider immunotherapy for her.  I briefly reviewed the option of PD1 inhibitors for recurrent metastatic urothelial carcinoma.  However I would not recommend it at this time as is a  role in perioperative management has not been established.     PLAN   1. I will see her as soon as possible with labs and restaging PET CT scan  2. As long as there is no evidence of metastatic disease on the PET CT scan I would recommend curative cystectomy for her  3. I would coordinate care with urology team    All questions for patient and family were answered to their satisfaction.    Over 60 min of direct face to face time spent with patient with more than 50% time spent in counseling and coordinating care.      Nikolas Mendoza ,  Division of Hematology, Oncology & Transplantation  [facility].

## 2018-06-15 NOTE — NURSING NOTE
Surgical education complete today with patient for cystectomy. See education section for further details.     NOHEMY Lynn

## 2018-06-18 NOTE — NURSING NOTE
Dr. Sharma requested that patient have images sent here. Pt states she is picking up the disc tomorrow and will mail it to our clinic to have the images pushed to our system.    NOHEMY Lynn

## 2018-06-21 NOTE — TELEPHONE ENCOUNTER
Sunitha called today concerned about the odor from her nephrostomy bag. She said even after she empties her bag it has a very distinct strong urine smell that is gradually getting worse. She's wondering if there is any way to make the smell better. Patient said urine is clear and yellow, denies fevers or pain. States left neph tube placed on 6/12 by Dr. Murdock in [place]. She has home care 3X a week, but [facility] home care wasn't instructed on how to care for neph tube and is checking the site only. Patient said she got no instructions from Dr. Murdock on neph tube care and doesn't know how long it will be in place. Nurse advised patient to contact Dr. Carcamo's office and clarify nephrostomy care instructions and to notify clinic to coordinate care with home care for further neph tube if needed. Patient will call clinic back if further questions or concerns.

## 2018-06-26 NOTE — TELEPHONE ENCOUNTER
Called to schedule surgery with Dr Sharma on 7/16/18 @ 7:30 am @ [place] OR.  Surgery packet mailed on 6/27/18.  PAC, WOC and US scheduled on 7/5/18.

## 2018-06-27 NOTE — LETTER
6/27/2018       RE: Sunitha Jacques  [address] [facility] [address]     Dear Colleague,    Thank you for referring your patient, Sunitha Jacques, to the [facility]. Please see a copy of my visit note below.    [facility]  HEMATOLOGY AND ONCOLOGY    FOLLOW-UP VISIT NOTE    PATIENT NAME: Sunitha Jacques MRN # [id number]  DATE OF VISIT: Jun 27, 2018 YOB: 1940    REFERRING PROVIDER: Referred Self, MD  No address on file    CANCER TYPE: High-grade muscle invasive urothelial carcinoma  STAGE: II              TREATMENT SUMMARY:  Sunitha had hematuria last August and went to her PCP. She was referred to Urologist in [place]. She had cystoscopy with biopsy. Pathology from this resection revealed urothelial cancer. Urine cytology was also positive for malignant cells. She saw Dr. Murdock in [place] and received 4 cycles of chemotherapy with carboplatin with gemcitabine. She feels forgotten after that. She approached her local urologist. She was referred back to her oncologist who recommended a repeat cystoscopy which showed improved but residual disease. She completed her chemotherapy in end of March. She tolerated her chemotherapy remarkably well. She has no residual complains from her chemotherapy.     CURRENT INTERVENTIONS:  Post chemotherapy with neoadjuvant intent    SUBJECTIVE   Sunitha Jacques is being followed for locally advanced bladder cancer    Sunitha is here with her son Hemanth. She has no new complains. She is being seen with restaging PET-CT scans.       PAST MEDICAL HISTORY   HTN  Dyslipidemia  DM TII  CVA x 2 in March 2017; on coumadin since then  Muscle invasive bladder cancer      CURRENT OUTPATIENT MEDICATIONS     Current Outpatient Prescriptions   Medication Sig     Acetaminophen (TYLENOL PO) Take 1,000 mg by mouth 4 times daily Reported on 4/26/2017     amLODIPine (NORVASC) 10 MG tablet Take 1 tablet (10 mg) by mouth  daily     atorvastatin (LIPITOR) 80 MG tablet Take 1 tablet (80 mg) by mouth daily     cetirizine (ZYRTEC) 10 MG tablet Take 10 mg by mouth daily     escitalopram (LEXAPRO) 10 MG tablet Take 1 tablet (10 mg) by mouth daily     Escitalopram Oxalate (LEXAPRO PO) Take 10 mg by mouth daily     furosemide (LASIX) 40 MG tablet Take 1 tablet (40 mg) by mouth 2 times daily     glipiZIDE (GLUCOTROL) 5 MG tablet Take 1 tablet (5 mg) by mouth every morning (before breakfast)     hydrALAZINE (APRESOLINE) 10 MG tablet Take 1 tablet (10 mg) by mouth 4 times daily     levothyroxine (SYNTHROID/LEVOTHROID) 150 MCG tablet Take 1 tablet (150 mcg) by mouth daily     magnesium citrate 1.745 GM/30ML solution Take 296 mLs by mouth once     magnesium hydroxide (MILK OF MAGNESIA) 400 MG/5ML suspension Take 30 mLs by mouth daily as needed for constipation or heartburn     METOPROLOL TARTRATE PO Take 25 mg by mouth 2 times daily     multivitamin, therapeutic with minerals (MULTI-VITAMIN) TABS tablet Take 1 tablet by mouth daily     pregabalin (LYRICA) 50 MG capsule Take 1 capsule (50 mg) by mouth 2 times daily     quinapril (ACCUPRIL) 40 MG Tab Take 1 tablet (40 mg) by mouth daily     VITAMIN D, CHOLECALCIFEROL, PO Take 1,000 Units by mouth daily     warfarin (COUMADIN) 2.5 MG tablet As directed by Anticoagulation Clinic,current dose 5 mg Mon, Fri, 2.5 mg rest of week     No current facility-administered medications for this visit.         ALLERGIES      Allergies   Allergen Reactions     Metaproterenol      Penicillins      Prednisone      Sulfa Drugs         REVIEW OF SYSTEMS   As above in the HPI, o/w complete 12-point ROS was negative.     PHYSICAL EXAM   There were no vitals taken for this visit.  GEN: NAD  HEENT: PERRL, EOMI, no icterus, injection or pallor. Oropharynx is clear.  LYMPHATICs: no cervical or supraclavicular lymphadenopathy; no other abn lymphadenopathy  PULMONARY: clear with good air entry bilaterally  CARDIOVASCULAR:  regular, no murmurs, rubs, or gallops  GASTROINTESTINAL: soft, non-tender, non-distended, normal bowel sounds, no hepatosplenomegaly by percussion or palpation  MUSCULOSKELTAL: warm, well perfused, no edema  NEURO: awake, alert and oriented to time place and person, cranial nerves intact - II - XII, no focal neurologic deficits  SKIN: no rashes     LABORATORY AND IMAGING STUDIES     Recent Labs   Lab Test  06/27/18   1114  04/13/17   1544  04/07/17   0644  03/31/17   0704  03/24/17   0701   NA   --    --   144  143  145*   POTASSIUM   --    --   4.2  4.7  4.2   CHLORIDE   --    --   104  102  106   CO2   --    --   34*  37*  35*   ANIONGAP   --    --   6  4  4   BUN   --    --   21  16  13   CR   --   0.99  0.85  0.86  0.79   GLC  117*   --   93  101*  96   ANNE   --    --   8.8  9.1  8.3*     No results for input(s): MAG, PHOS in the last 75255 hours.  Recent Labs   Lab Test  04/13/17   1544  04/07/17   0644  03/31/17   0704  03/24/17   0701   WBC  6.7  5.2  5.5  6.3   HGB  14.7  12.3  11.8  10.8*   PLT  292  262  211  235   MCV  90  90  91  91   NEUTROPHIL  61.5   --    --    --      No results for input(s): BILITOTAL, ALKPHOS, ALT, AST, ALBUMIN, LDH in the last 53801 hours.  TSH   Date Value Ref Range Status   04/13/2017 1.88 0.40 - 4.00 mU/L Final     No results for input(s): CEA in the last 46763 hours.  Results for orders placed or performed in visit on 06/27/18   PET Oncology Whole Body    Narrative    Preliminary  1. Hypermetabolic tumor along the left aspect of the urinary bladder  appears to extend beyond the bladder wall.  2. Hypermetabolic focus of FDG activity in the low left hemipelvis,  favors urinary stasis within the distal left ureter.  3. No evidence for distant metastatic disease otherwise seen.               ASSESSMENT AND PLAN   1. High-grade muscle invasive urothelial carcinoma status post neoadjuvant chemotherapy with carboplatin and gemcitabine  2. ECOG performance status 1  3. Chronic kidney  disease stage III  4. Multiple medical comorbidities including hypertension, diabetes mellitus type 2, CVA twice in March 2017    I had a lengthy discussion with Sunitha, who comes with her family at this clinic visit.  She had a PET/CT scan just prior to this clinic visit.  The official read from this PET scan is pending as yet.  I did a preliminary read in there.  I can see FDG uptake in the bladder muscle.  There is no evidence of disease metastatic to either lymph nodes or any other distant site.  She still has surgically resectable disease.  I would recommend that we proceed with surgery at the earliest.  I would like to see her in about 6 weeks' time post surgery to discuss the possibility of any adjuvant therapy.  If she has positive margins, she might need additional chemoradiation.  Alternatively, we could also review adjuvant therapy in the form of a clinical trial with a PD-1 inhibitor nivolumab versus placebo for her high-risk disease.      She had several questions for me, but most of them were pertaining to the planned surgery soon.  I have encouraged her to bring these up with Dr. Sharma at their next visit on 07/05.  I explained about the location of this consultation, which is essentially the same building as this current visit except on the 4th floor.  They will be seeing Dr. Sharma and Rachana Jhaveri.      I would schedule a followup about 6 weeks after the surgery with labs and restaging scans to see me.       Over 25 min of direct face to face time spent with patient with more than 50% time spent in counseling and coordinating care.      Again, thank you for allowing me to participate in the care of your patient.      Sincerely,    Nikolas Espinal MD

## 2018-06-27 NOTE — NURSING NOTE
"Oncology Rooming Note    June 27, 2018 3:49 PM   Sunitha Jacques is a 77 year old female who presents for:    Chief Complaint   Patient presents with     Oncology Clinic Visit     Return Urothelial Ca     Initial Vitals: /67  Pulse 66  Temp 98.1  F (36.7  C) (Oral)  Resp 16  Ht 1.568 m (5' 1.75\")  Wt 103 kg (227 lb)  SpO2 95%  BMI 41.86 kg/m2 Estimated body mass index is 41.86 kg/(m^2) as calculated from the following:    Height as of this encounter: 1.568 m (5' 1.75\").    Weight as of this encounter: 103 kg (227 lb). Body surface area is 2.12 meters squared.  No Pain (0) Comment: Data Unavailable   No LMP recorded.  Allergies reviewed: Yes  Medications reviewed: Yes    Medications: Medication refills not needed today.  Pharmacy name entered into [facility]: [id number] [address]    Clinical concerns: results; Colonoscopy due, see AVS.    6 minutes for nursing intake (face to face time)     Prema Cortes, YAA              "

## 2018-06-27 NOTE — MR AVS SNAPSHOT
After Visit Summary   6/27/2018    Sunitha Jacques    MRN: [id number]           Patient Information     Date Of Birth          1940        Visit Information        Provider Department      6/27/2018 3:30 PM Nikolas Espinal MD [facility]        Today's Diagnoses     Urothelial cancer (H)    -  1       Follow-ups after your visit        Your next 10 appointments already scheduled     Jul 05, 2018 12:15 PM CDT   (Arrive by 12:00 PM)   Return Visit with  Prostate Cancer Ctr Nurse   [facility]rology and Inst for Prostate and Urologic Cancers ([facility] [place])    [address] [place] [address]  4th [facility] [address]   [phone]            Jul 05, 2018 12:30 PM CDT   NEW OSTOMY NURSE VISIT with Noreen Christina RN   [facility] Wound Ostomy ([facility] [place])    [address] [place] [address]   [phone]            Jul 05, 2018  1:30 PM CDT   (Arrive by 1:15 PM)   PAC EVALUATION with Margaret Golden PA-C   [facility] Preoperative Assessment Center ([facility])    [address] [place] [id number] [phone]            Jul 05, 2018  2:30 PM CDT   (Arrive by 2:15 PM)   PAC RN ASSESSMENT with Tu Pac Rn   [facility] Preoperative Assessment Center ([facility])    [address] [place] [id number] [phone]            Jul 05, 2018  3:00 PM CDT   (Arrive by 2:45 PM)   PAC Anesthesia Consult with  Pac Anesthesiologist   [facility] Preoperative Assessment Center ([facility])    [address] [place] [id number] [phone]            Jul 05, 2018  3:30 PM CDT   US LOWER EXTREMITY VENOUS DUPLEX BILATERAL with UCUSV2   [facility] Imaging Center US ([facility])    [address] [place] [address]    [phone]           Please bring a list of your medicines (including vitamins, minerals and over-the-counter drugs). Also, tell your doctor about any allergies you may have. Wear comfortable clothes and leave your valuables at home.  You do not need to do anything special to prepare for your exam.  Please call the Imaging Department at your exam site with any questions.            Jul 05, 2018  4:30 PM CDT   (Arrive by 4:15 PM)   Return Visit with Austin Mota MD   [facility] Plastic and Reconstructive Surgery ([facility] [place])    [address]  4th Floor  [facility] [id number] [phone]            Jul 16, 2018   Procedure with Girma Sharma MD   [facility] [place], Same Day Surgery (--)    [address] [facility] [address]   [phone]            Aug 07, 2018  1:30 PM CDT   Return Visit with Girma Sharma MD   [facility] [place] ([facility])    [address]  [address]  [facility] [address]   [phone]            Sep 05, 2018  9:40 AM CDT   CT CHEST/ABDOMEN/PELVIS W CONTRAST with UCCT1   [facility] [place] [facility] ([place] [facility])    [address]  1st Floor  [facility] [id number] [phone]           Please bring any scans or X-rays taken at other hospitals, if similar tests were done. Also bring a list of your medicines, including vitamins, minerals and over-the-counter drugs. It is safest to leave personal items at home.  Be sure to tell your doctor:   If you have any allergies.   If there s any chance you are pregnant.   If you are breastfeeding.  How to prepare:   Do not eat or drink for 2 hours before your exam. If you need to take medicine, you may take it with small sips of water. (We may ask you to take liquid medicine as well.)   Please wear loose clothing, such as a sweat suit or jogging clothes. Avoid snaps, zippers and other  "metal. We may ask you to undress and put on a hospital gown.  Please arrive 30 minutes early for your CT. Once in the department you might be asked to drink water 15-20 minutes prior to your exam.  If indicated you may be asked to drink an oral contrast in advance of your CT.  If this is the case, the imaging team will let you know or be in contact with you prior to your appointment  Patients over 70 or patients with diabetes or kidney problems:   If you haven t had a blood test (creatinine test) within the last 30 days, the Cardiologist/Radiologist may require you to get this test prior to your exam.  If you have diabetes:   Continue to take your metformin medication on the day of your exam  If you have any questions, please call the Imaging Department where you will have your exam.              Who to contact     If you have questions or need follow up information about today's clinic visit or your schedule please contact [facility] CANCER [facility] directly at [phone].  Normal or non-critical lab and imaging results will be communicated to you by MyChart, letter or phone within 4 business days after the clinic has received the results. If you do not hear from us within 7 days, please contact the clinic through [employer] or phone. If you have a critical or abnormal lab result, we will notify you by phone as soon as possible.  Submit refill requests through [employer] or call your pharmacy and they will forward the refill request to us. Please allow 3 business days for your refill to be completed.          Additional Information About Your Visit        Care EveryWhere ID     This is your Care EveryWhere ID. This could be used by other organizations to access your [facility] medical records  [id number]        Your Vitals Were     Pulse Temperature Respirations Height Pulse Oximetry BMI (Body Mass Index)    66 98.1  F (36.7  C) (Oral) 16 1.568 m (5' 1.75\") 95% 41.86 kg/m2       Blood Pressure from Last 3 " Encounters:   06/27/18 187/67   06/15/18 145/60   06/15/18 145/60    Weight from Last 3 Encounters:   06/27/18 103 kg (227 lb)   06/15/18 103.4 kg (227 lb 15.3 oz)   06/15/18 103.4 kg (228 lb)               Primary Care Provider    Pradeep Zuniga       No address on file        Equal Access to Services     [facility] : Hadii aad ku hadasho Soomaali, waaxda luqadaha, qaybta kaalmada adeegyada, waxay idiin hayaan adeallan barragan lasophian ah. So [facility] [phone].    ATENCIÓN: Si habla español, tiene a tomlin disposición servicios gratuitos de asistencia lingüística. Llame al [phone].    We comply with applicable federal civil rights laws and [place] laws. We do not discriminate on the basis of race, color, national origin, age, disability, sex, sexual orientation, or gender identity.            Thank you!     Thank you for choosing [facility]ER [facility]  for your care. Our goal is always to provide you with excellent care. Hearing back from our patients is one way we can continue to improve our services. Please take a few minutes to complete the written survey that you may receive in the mail after your visit with us. Thank you!             Your Updated Medication List - Protect others around you: Learn how to safely use, store and throw away your medicines at www.disposemymeds.org.          This list is accurate as of 6/27/18 11:59 PM.  Always use your most recent med list.                   Brand Name Dispense Instructions for use Diagnosis    amLODIPine 10 MG tablet    NORVASC    90 tablet    Take 1 tablet (10 mg) by mouth daily    Essential hypertension       atorvastatin 80 MG tablet    LIPITOR    90 tablet    Take 1 tablet (80 mg) by mouth daily    Essential hypertension, Chronic diastolic congestive heart failure (H)       cetirizine 10 MG tablet    zyrTEC     Take 10 mg by mouth daily        furosemide 40 MG tablet    LASIX    180 tablet    Take 1 tablet (40 mg) by mouth 2 times daily    Chronic  diastolic congestive heart failure (H)       glipiZIDE 5 MG tablet    GLUCOTROL    90 tablet    Take 1 tablet (5 mg) by mouth every morning (before breakfast)    Type 2 diabetes mellitus with other neurologic complication       hydrALAZINE 10 MG tablet    APRESOLINE    90 tablet    Take 1 tablet (10 mg) by mouth 4 times daily    Benign essential hypertension       levothyroxine 150 MCG tablet    SYNTHROID/LEVOTHROID    90 tablet    Take 1 tablet (150 mcg) by mouth daily    Acquired hypothyroidism       * LEXAPRO PO      Take 10 mg by mouth daily        * escitalopram 10 MG tablet    LEXAPRO    90 tablet    Take 1 tablet (10 mg) by mouth daily    Adjustment disorder with depressed mood       magnesium citrate 1.745 GM/30ML solution      Take 296 mLs by mouth once        magnesium hydroxide 400 MG/5ML suspension    MILK OF MAGNESIA     Take 30 mLs by mouth daily as needed for constipation or heartburn        METOPROLOL TARTRATE PO      Take 25 mg by mouth 2 times daily        Multi-vitamin Tabs tablet      Take 1 tablet by mouth daily        pregabalin 50 MG capsule    LYRICA    60 capsule    Take 1 capsule (50 mg) by mouth 2 times daily    Diabetic polyneuropathy associated with type 2 diabetes mellitus (H)       quinapril 40 MG Tab    ACCUPRIL    90 tablet    Take 1 tablet (40 mg) by mouth daily    Essential hypertension, Type 2 diabetes mellitus with other neurologic complication       TYLENOL PO      Take 1,000 mg by mouth 4 times daily Reported on 4/26/2017        VITAMIN D (CHOLECALCIFEROL) PO      Take 1,000 Units by mouth daily        warfarin 2.5 MG tablet    COUMADIN    144 tablet    As directed by Anticoagulation Clinic,current dose 5 mg Mon, Fri, 2.5 mg rest of week    Long-term (current) use of anticoagulants, Cerebrovascular accident (CVA), unspecified mechanism (H), Atrial fibrillation with rapid ventricular response (H), Long-term (current) use of anticoagulants       * Notice:  This list has 2  medication(s) that are the same as other medications prescribed for you. Read the directions carefully, and ask your doctor or other care provider to review them with you.

## 2018-06-27 NOTE — PROGRESS NOTES
[facility]  HEMATOLOGY AND ONCOLOGY    FOLLOW-UP VISIT NOTE    PATIENT NAME: Sunitha Jacques MRN # [id number]  DATE OF VISIT: Jun 27, 2018 YOB: 1940    REFERRING PROVIDER: Referred Self, MD  No address on file    CANCER TYPE: High-grade muscle invasive urothelial carcinoma  STAGE: II              TREATMENT SUMMARY:  Sunitha had hematuria last August and went to her PCP. She was referred to Urologist in [place]. She had cystoscopy with biopsy. Pathology from this resection revealed urothelial cancer. Urine cytology was also positive for malignant cells. She saw Dr. Murdock in [place] and received 4 cycles of chemotherapy with carboplatin with gemcitabine. She feels forgotten after that. She approached her local urologist. She was referred back to her oncologist who recommended a repeat cystoscopy which showed improved but residual disease. She completed her chemotherapy in end of March. She tolerated her chemotherapy remarkably well. She has no residual complains from her chemotherapy.     CURRENT INTERVENTIONS:  Post chemotherapy with neoadjuvant intent    SUBJECTIVE   Sunitha Jacques is being followed for locally advanced bladder cancer    Sunitha is here with her son Hemanth. She has no new complains. She is being seen with restaging PET-CT scans.       PAST MEDICAL HISTORY   HTN  Dyslipidemia  DM TII  CVA x 2 in March 2017; on coumadin since then  Muscle invasive bladder cancer      CURRENT OUTPATIENT MEDICATIONS     Current Outpatient Prescriptions   Medication Sig     Acetaminophen (TYLENOL PO) Take 1,000 mg by mouth 4 times daily Reported on 4/26/2017     amLODIPine (NORVASC) 10 MG tablet Take 1 tablet (10 mg) by mouth daily     atorvastatin (LIPITOR) 80 MG tablet Take 1 tablet (80 mg) by mouth daily     cetirizine (ZYRTEC) 10 MG tablet Take 10 mg by mouth daily     escitalopram (LEXAPRO) 10 MG tablet Take 1 tablet (10 mg) by mouth daily     Escitalopram Oxalate  (LEXAPRO PO) Take 10 mg by mouth daily     furosemide (LASIX) 40 MG tablet Take 1 tablet (40 mg) by mouth 2 times daily     glipiZIDE (GLUCOTROL) 5 MG tablet Take 1 tablet (5 mg) by mouth every morning (before breakfast)     hydrALAZINE (APRESOLINE) 10 MG tablet Take 1 tablet (10 mg) by mouth 4 times daily     levothyroxine (SYNTHROID/LEVOTHROID) 150 MCG tablet Take 1 tablet (150 mcg) by mouth daily     magnesium citrate 1.745 GM/30ML solution Take 296 mLs by mouth once     magnesium hydroxide (MILK OF MAGNESIA) 400 MG/5ML suspension Take 30 mLs by mouth daily as needed for constipation or heartburn     METOPROLOL TARTRATE PO Take 25 mg by mouth 2 times daily     multivitamin, therapeutic with minerals (MULTI-VITAMIN) TABS tablet Take 1 tablet by mouth daily     pregabalin (LYRICA) 50 MG capsule Take 1 capsule (50 mg) by mouth 2 times daily     quinapril (ACCUPRIL) 40 MG Tab Take 1 tablet (40 mg) by mouth daily     VITAMIN D, CHOLECALCIFEROL, PO Take 1,000 Units by mouth daily     warfarin (COUMADIN) 2.5 MG tablet As directed by Anticoagulation Clinic,current dose 5 mg Mon, Fri, 2.5 mg rest of week     No current facility-administered medications for this visit.         ALLERGIES      Allergies   Allergen Reactions     Metaproterenol      Penicillins      Prednisone      Sulfa Drugs         REVIEW OF SYSTEMS   As above in the HPI, o/w complete 12-point ROS was negative.     PHYSICAL EXAM   There were no vitals taken for this visit.  GEN: NAD  HEENT: PERRL, EOMI, no icterus, injection or pallor. Oropharynx is clear.  LYMPHATICs: no cervical or supraclavicular lymphadenopathy; no other abn lymphadenopathy  PULMONARY: clear with good air entry bilaterally  CARDIOVASCULAR: regular, no murmurs, rubs, or gallops  GASTROINTESTINAL: soft, non-tender, non-distended, normal bowel sounds, no hepatosplenomegaly by percussion or palpation  MUSCULOSKELTAL: warm, well perfused, no edema  NEURO: awake, alert and oriented to time  place and person, cranial nerves intact - II - XII, no focal neurologic deficits  SKIN: no rashes     LABORATORY AND IMAGING STUDIES     Recent Labs   Lab Test  06/27/18   1114  04/13/17   1544  04/07/17   0644  03/31/17   0704  03/24/17   0701   NA   --    --   144  143  145*   POTASSIUM   --    --   4.2  4.7  4.2   CHLORIDE   --    --   104  102  106   CO2   --    --   34*  37*  35*   ANIONGAP   --    --   6  4  4   BUN   --    --   21  16  13   CR   --   0.99  0.85  0.86  0.79   GLC  117*   --   93  101*  96   ANNE   --    --   8.8  9.1  8.3*     No results for input(s): MAG, PHOS in the last 80120 hours.  Recent Labs   Lab Test  04/13/17   1544  04/07/17   0644  03/31/17   0704  03/24/17   0701   WBC  6.7  5.2  5.5  6.3   HGB  14.7  12.3  11.8  10.8*   PLT  292  262  211  235   MCV  90  90  91  91   NEUTROPHIL  61.5   --    --    --      No results for input(s): BILITOTAL, ALKPHOS, ALT, AST, ALBUMIN, LDH in the last 23997 hours.  TSH   Date Value Ref Range Status   04/13/2017 1.88 0.40 - 4.00 mU/L Final     No results for input(s): CEA in the last 40565 hours.  Results for orders placed or performed in visit on 06/27/18   PET Oncology Whole Body    Narrative    Preliminary  1. Hypermetabolic tumor along the left aspect of the urinary bladder  appears to extend beyond the bladder wall.  2. Hypermetabolic focus of FDG activity in the low left hemipelvis,  favors urinary stasis within the distal left ureter.  3. No evidence for distant metastatic disease otherwise seen.               ASSESSMENT AND PLAN   1. High-grade muscle invasive urothelial carcinoma status post neoadjuvant chemotherapy with carboplatin and gemcitabine  2. ECOG performance status 1  3. Chronic kidney disease stage III  4. Multiple medical comorbidities including hypertension, diabetes mellitus type 2, CVA twice in March 2017    I had a lengthy discussion with Sunitha, who comes with her family at this clinic visit.  She had a PET/CT scan just  prior to this clinic visit.  The official read from this PET scan is pending as yet.  I did a preliminary read in there.  I can see FDG uptake in the bladder muscle.  There is no evidence of disease metastatic to either lymph nodes or any other distant site.  She still has surgically resectable disease.  I would recommend that we proceed with surgery at the earliest.  I would like to see her in about 6 weeks' time post surgery to discuss the possibility of any adjuvant therapy.  If she has positive margins, she might need additional chemoradiation.  Alternatively, we could also review adjuvant therapy in the form of a clinical trial with a PD-1 inhibitor nivolumab versus placebo for her high-risk disease.      She had several questions for me, but most of them were pertaining to the planned surgery soon.  I have encouraged her to bring these up with Dr. Sharma at their next visit on 07/05.  I explained about the location of this consultation, which is essentially the same building as this current visit except on the 4th floor.  They will be seeing Dr. Sharma and Rachana Jhaveir.      I would schedule a followup about 6 weeks after the surgery with labs and restaging scans to see me.       Over 25 min of direct face to face time spent with patient with more than 50% time spent in counseling and coordinating care.

## 2018-07-05 NOTE — PROGRESS NOTES
WOC Preoperative Ostomy Consult    Referral from Dr. Sharma  Consult attended by patient and son  Diagnosis: urothelial cancer Date of Surgery: 07/16/18   Type of Surgery: Open Radical Cystectomy  with Bilateral Lymphandectomy, Ileo Conduit Diversion, Possible Colon Conduit   Emotional readiness for surgery: no acute distress  Physical Limitations: With the following limitations:  Morbidly obese and non-working Right kidney  Abdomen assessed for site by: Patient's ability to visiualize area, avoidance of skin creases and scars, palpating for rectus abdominus muscle and clothing fit  Teaching: Anatomy/picture of stoma, stoma/bowel function postop, intro to pouches, written/media offered and role of WOC/postop followup explained.  Stoma site marking:  Marking pen and tegaderm   Location chosen: right and left lower quandrant      [employer]'s Colostomy packet given to patient  Leah Mars NP was available for supervision of care if needed or if questions should arise and regarding plan of care.  Noreen Christina RN CWON

## 2018-07-05 NOTE — MR AVS SNAPSHOT
After Visit Summary   7/5/2018    Sunitha Jacques    MRN: [id number]           Patient Information     Date Of Birth          1940        Visit Information        Provider Department      7/5/2018 12:30 PM Noreen Christina, [facility] Wound Ostomy        Today's Diagnoses     Ostomy nurse consultation    -  1    Encounter for counseling for urostomy management           Follow-ups after your visit        Your next 10 appointments already scheduled     Jul 05, 2018  1:30 PM CDT   (Arrive by 1:15 PM)   PAC EVALUATION with Margaret Golden PA-C   [facility] ([facility])    [address]  [employer] [address]   [phone]            Jul 05, 2018  2:30 PM CDT   (Arrive by 2:15 PM)   PAC RN ASSESSMENT with Tu Pac Rn   [facility] Pre[facility] [place] ([facility])    [address]  [employer] [place] [address]   [phone]            Jul 05, 2018  3:00 PM CDT   (Arrive by 2:45 PM)   PAC Anesthesia Consult with  Pac Anesthesiologist   [facility] ([facility])    [address]  [address] [place] [address]   [phone]            Jul 05, 2018  3:30 PM CDT   US LOWER EXTREMITY VENOUS DUPLEX BILATERAL with [email or web address]   [facility] ([facility])    [address] [place] [address]  [address] [place] [id number] [phone]           Please bring a list of your medicines (including vitamins, minerals and over-the-counter drugs). Also, tell your doctor about any allergies you may have. Wear comfortable clothes and leave your valuables at home.  You do not need to do anything special to prepare for your exam.  Please call the Imaging Department at your exam site with any questions.            Jul 05, 2018  4:30 PM CDT   (Arrive by 4:15 PM)   Return Visit with Austin  MD Svetlana   [facility] [place] ([facility] [place])    [address]  4th Floor  [facility] [address]   [phone]            Jul 16, 2018   Procedure with Girma Sharma MD   [facility] [place] Same Day [facility] (--)    [address]  [place] [address]   [phone]            Aug 07, 2018  1:30 PM CDT   Return Visit with Girma Sharma MD   [facility] [place] ([facility])    [address]  [address]  [facility] [address]   [phone]            Sep 05, 2018  9:40 AM CDT   CT CHEST/ABDOMEN/PELVIS W CONTRAST with UCCT1   [facility] [place] [facility] ([facility])    [address]  1st Floor  [facility] [id number] [phone]           Please bring any scans or X-rays taken at other hospitals, if similar tests were done. Also bring a list of your medicines, including vitamins, minerals and over-the-counter drugs. It is safest to leave personal items at home.  Be sure to tell your doctor:   If you have any allergies.   If there s any chance you are pregnant.   If you are breastfeeding.  How to prepare:   Do not eat or drink for 2 hours before your exam. If you need to take medicine, you may take it with small sips of water. (We may ask you to take liquid medicine as well.)   Please wear loose clothing, such as a sweat suit or jogging clothes. Avoid snaps, zippers and other metal. We may ask you to undress and put on a hospital gown.  Please arrive 30 minutes early for your CT. Once in the department you might be asked to drink water 15-20 minutes prior to your exam.  If indicated you may be asked to drink an oral contrast in advance of your CT.  If this is the case, the imaging team will let you know or be in contact with you prior to your appointment  Patients over 70 or patients with diabetes or kidney problems:   If you haven t had  a blood test (creatinine test) within the last 30 days, the Cardiologist/Radiologist may require you to get this test prior to your exam.  If you have diabetes:   Continue to take your metformin medication on the day of your exam  If you have any questions, please call the Imaging Department where you will have your exam.            Sep 05, 2018 12:30 PM CDT   [facility] Lab Draw with  [facility] DRAW   [facility] ([facility])    [address] [place] [address] Se  [address]  [facility] [address] [phone]            Sep 05, 2018  1:00 PM CDT   (Arrive by 12:45 PM)   Return Visit with Nikolas Espinal MD   [facility] ([facility])    [address] [place] [address]  [address]  [facility] [address] [phone]              Future tests that were ordered for you today     Open Future Orders        Priority Expected Expires Ordered    UA with Microscopic reflex to Culture Routine  7/5/2019 7/5/2018            Who to contact     Please call your clinic at [phone] to:    Ask questions about your health    Make or cancel appointments    Discuss your medicines    Learn about your test results    Speak to your doctor            Additional Information About Your Visit        Care EveryWhere ID     This is your Care EveryWhere ID. This could be used by other organizations to access your [facility] medical records  [id number]         Blood Pressure from Last 3 Encounters:   06/27/18 187/67   06/15/18 145/60   06/15/18 145/60    Weight from Last 3 Encounters:   06/27/18 103 kg (227 lb)   06/15/18 103.4 kg (227 lb 15.3 oz)   06/15/18 103.4 kg (228 lb)              Today, you had the following     No orders found for display       Primary Care Provider    Pradeep Zuniga       No address on file        Equal Access to Services     ZEB JACOB : michael Oh, raghu mclaughlin, mikey anders  laurel grayaan ah. So [facility] [phone].    ATENCIÓN: Si rosalva og, tiene a tomlin disposición servicios gratuitos de asistencia lingüística. Lonnie hopper [phone].    We comply with applicable federal civil rights laws and [place] laws. We do not discriminate on the basis of race, color, national origin, age, disability, sex, sexual orientation, or gender identity.            Thank you!     Thank you for choosing [facility] OSTOMY  for your care. Our goal is always to provide you with excellent care. Hearing back from our patients is one way we can continue to improve our services. Please take a few minutes to complete the written survey that you may receive in the mail after your visit with us. Thank you!             Your Updated Medication List - Protect others around you: Learn how to safely use, store and throw away your medicines at www.disposemymeds.org.          This list is accurate as of 7/5/18 12:50 PM.  Always use your most recent med list.                   Brand Name Dispense Instructions for use Diagnosis    amLODIPine 10 MG tablet    NORVASC    90 tablet    Take 1 tablet (10 mg) by mouth daily    Essential hypertension       atorvastatin 80 MG tablet    LIPITOR    90 tablet    Take 1 tablet (80 mg) by mouth daily    Essential hypertension, Chronic diastolic congestive heart failure (H)       cetirizine 10 MG tablet    zyrTEC     Take 10 mg by mouth daily        furosemide 40 MG tablet    LASIX    180 tablet    Take 1 tablet (40 mg) by mouth 2 times daily    Chronic diastolic congestive heart failure (H)       glipiZIDE 5 MG tablet    GLUCOTROL    90 tablet    Take 1 tablet (5 mg) by mouth every morning (before breakfast)    Type 2 diabetes mellitus with other neurologic complication       hydrALAZINE 10 MG tablet    APRESOLINE    90 tablet    Take 1 tablet (10 mg) by mouth 4 times daily    Benign essential hypertension       levothyroxine 150 MCG tablet    SYNTHROID/LEVOTHROID    90 tablet     Take 1 tablet (150 mcg) by mouth daily    Acquired hypothyroidism       * LEXAPRO PO      Take 10 mg by mouth daily        * escitalopram 10 MG tablet    LEXAPRO    90 tablet    Take 1 tablet (10 mg) by mouth daily    Adjustment disorder with depressed mood       magnesium citrate 1.745 GM/30ML solution      Take 296 mLs by mouth once        magnesium hydroxide 400 MG/5ML suspension    MILK OF MAGNESIA     Take 30 mLs by mouth daily as needed for constipation or heartburn        METOPROLOL TARTRATE PO      Take 25 mg by mouth 2 times daily        Multi-vitamin Tabs tablet      Take 1 tablet by mouth daily        pregabalin 50 MG capsule    LYRICA    60 capsule    Take 1 capsule (50 mg) by mouth 2 times daily    Diabetic polyneuropathy associated with type 2 diabetes mellitus (H)       quinapril 40 MG Tab    ACCUPRIL    90 tablet    Take 1 tablet (40 mg) by mouth daily    Essential hypertension, Type 2 diabetes mellitus with other neurologic complication       TYLENOL PO      Take 1,000 mg by mouth 4 times daily Reported on 4/26/2017        VITAMIN D (CHOLECALCIFEROL) PO      Take 1,000 Units by mouth daily        warfarin 2.5 MG tablet    COUMADIN    144 tablet    As directed by Anticoagulation Clinic,current dose 5 mg Mon, Fri, 2.5 mg rest of week    Long-term (current) use of anticoagulants, Cerebrovascular accident (CVA), unspecified mechanism (H), Atrial fibrillation with rapid ventricular response (H), Long-term (current) use of anticoagulants       * Notice:  This list has 2 medication(s) that are the same as other medications prescribed for you. Read the directions carefully, and ask your doctor or other care provider to review them with you.

## 2018-07-05 NOTE — ANESTHESIA PREPROCEDURE EVALUATION
Anesthesia Evaluation     . Pt has had prior anesthetic. Type: General and MAC    History of anesthetic complications   - PONV        ROS/MED HX    ENT/Pulmonary:     (+)sleep apnea, tobacco use, Past use doesn't use CPAP , . .    Neurologic:     (+)CVA (2017) date: 2017 with deficits- mild memory loss. Only residual is left foot tingling,     Cardiovascular: Comment: Area of reversible perfusion deficit on stress echo. Did not have a followup cath per cardiology     (+) hypertension--CAD, --. : . CHF . PARTIDA, . :. . Previous cardiac testing Echodate:3/16/17results:LVEF 60-65% with normal wall motion. Normal RV size and sytoli function. Aortic sclerosis without stenosis, mild AI, mild MR, mild TR. LA is dilated. Ascending aorta is dilated at 3.9cm without evidence of dissection. Stress Testdate:7/16/18 results:1. Overall quality of the study: diagnostic.   2. SPECT images demonstrate a moderate sized perfusion abnormality of  mild intensity in the distal septal, anterior and apical segments of  the myocardium on the stress images which partially resolves with  attenuation correction. The rest images show partial reversibility.  These results suggest a high post-scan likelihood of angiographically  significant coronary artery disease (distal LAD territory). The summed  stress score is 5.   3. Left ventricular ejection fraction is 46%. Left ventricular  end-diastolic volume is 148 mL. End-systolic volume is 80 mL.  4. Baseline EKG shows sinus rhythm with left bundle branch block.  After Lexiscan, there were infrequent PAC/PVCs, otherwise no change.ECG reviewed date:3/2017 results:AFIB date: results:          METS/Exercise Tolerance:  3 - Able to walk 1-2 blocks without stopping   Hematologic:  - neg hematologic  ROS       Musculoskeletal:   (+) , , other musculoskeletal- chronic hip pain      GI/Hepatic:     (+) GERD Asymptomatic on medication,       Renal/Genitourinary: Comment: Elevated Cr of 2.2 (6/27/18)    (+)  chronic renal disease, type: CRI,       Endo: Comment: Morbid obesity    (+) type II DM Last HgA1c: 6.2 date: 7/2018 thyroid problem hypothyroidism, Obesity, .      Psychiatric:     (+) psychiatric history depression      Infectious Disease: Comment: History of sepsis due to MRSA and treated in 2017, followed by Lina Oliver and ID at that time.  (+) MRSA,       Malignancy:   (+) Malignancy History of Other  Other CA bladder Active status post Chemo         Other:    (+) no H/O Chronic Pain,                   Physical Exam  Normal systems: pulmonary    Airway   Mallampati: III  Neck ROM: full    Dental   (+) upper dentures and missing    Cardiovascular   Rhythm and rate: regular and normal  (+) murmur       Pulmonary    breath sounds clear to auscultation               PAC Discussion and Assessment    ASA Classification: 3  Case is suitable for: [place]  Anesthetic techniques and relevant risks discussed: GA with regional block for post-op pain control  Invasive monitoring and risk discussed:   Types:   Possibility and Risk of blood transfusion discussed:   NPO instructions given:   Additional anesthetic preparation and risks discussed:   Needs early admission to pre-op area:   Other:     PAC Resident/NP Anesthesia Assessment:  Sunitha Jacques is a 77 year old female who presents for pre-operative H & P in preparation for an Open Radical Cystectomy, bilateral pelvic lymphadenectomy, ileal conduit urinary diversion, possible colon conduit urinary diversion on 7/16/18 with Dr. Sharma for urothelial carcinoma at the [facility].    PAC referral for risk assessment and optimization for anesthesia with comorbid conditions of:    Pre-operative considerations:  1.  Cardiac:  Functional status METS =3     Risk of Major Adverse Cardiac event: >11 %  -CAD, Paroxysmal AFIB, Chronic Diastolic HF, HTN, HLD  *takes amlodipine(cont DOS), furosemide(cont DOS due to CHF),  metoprolol(cont DOS), quinapril(hold DOS)  2.  Pulm:     -MIKKI, doesn't wear CPAP   -Former 12 pack year smoker, quit 1980  3.  Endo:  -DM T2 not on medications, no complications  -Morbid Obesity, BMI 41.5  4.  Renal:  -CKD stage 3, elevated Cr 2.2 (6/27/18)  5.  Neuro:  -CVA due to AFIB 2017   *takes warfarin, plan TBD between surgeon's team and PCP  6.  GI:  PONV  7.  ID:  -History of sepsis due to MRSA April 2017 treated through [facility] and followed by ID at that time    Patient with known CAD and CHF, Mets <4 with PARTIDA and high risk surgery, so ordered a Dobutamine Stress ECHO to be done through her hospital at home.  Pending these results will determine if the patient is optimized for the surgery.     Discussed the above A/P with Dr. Franz.  Margaret Golden MS PA-C  07/05/18 3:48 PM        Mid-Level Provider/Resident:   Date:   Time:     Attending Anesthesiologist Anesthesia Assessment:  77 year old for cystectomy, conduit in management of bladder cancer. Patient does have CAD listed in her history , but very little detail about what this entails. We will get a dobutamine stress echo to assess function and possible ischemic disease.     In addition she has an atrophic right kidney and developed severe hydronephrosis of the left kidney; creatinine is about 1.1. Of note, she has a history of cervical cancer and was treated with [place] therapy in the 1970s. Also has history of stroke in March 2017 with no significant sequelae.    Will complete evaluation once results of stress test are available.    Reviewed and Signed by PAC Anesthesiologist  Anesthesiologist: luann  Date: 7/5/2018  Time:   Pass/Fail: Pass  Disposition:     PAC Pharmacist Assessment:        Pharmacist:   Date:   Time:      Anesthesia Plan      History & Physical Review  History and physical reviewed and following examination; no interval change.    ASA Status:  3 .        Plan for General and ETT with Intravenous induction. Maintenance will  be Inhalation.    PONV prophylaxis:  Ondansetron (or other 5HT-3) and Dexamethasone or Solumedrol  Additional equipment: Arterial Line, Central Line and CVP      Postoperative Care  Postoperative pain management:  IV analgesics and Multi-modal analgesia.      Consents  Anesthetic plan, risks, benefits and alternatives discussed with:  Patient..                          .

## 2018-07-05 NOTE — MR AVS SNAPSHOT
After Visit Summary   2018    Sunitha Jacques    MRN: [id number]           Patient Information     Date Of Birth          1940        Visit Information        Provider Department      2018 2:30 PM Rn, [facility]        Care Instructions    Preparing for Your Surgery      Name:  Sunitha Jacques   MRN:  [id number]   :  1940   Today's Date:  2018     Arriving for surgery: Cystectomy   Surgery date: 2018   Arrival time:  5:30 am  Please come to:       [facility] [address]  [address] [place] [address]    -   parking is available in front of the hospital from 5:15 am to 8:00 pm    -  Stop at the Information Desk in the lobby    -   Inform the information person that you are here for surgery. An escort to 3c will be provided. If you would not like an escort, please proceed to 3C on the 3rd floor. [phone]     What can I eat or drink?  -  Clear liquids only for 24 hours before surgery per directions of surgeon  -  You may have water, apple juice or 7up/Sprite until 2 hours prior to your surgery.    Which medicines can I take?      Stop Aspirin, vitamins and supplements one week prior to surgery.    Hold Ibuprofen and Naproxen for 24 hours prior to surgery.     -  Do NOT take these medications in the morning, the day of surgery:      Hydralazine (Apresoline)     Quinapril (Accupril )              Plan for Warfarin (Coumadin)  To be instructed by Surgeon's nurse Rachana Jhaveri--she will contact you with details [phone]      -  Please take these medications the day of surgery:     Amlodipine      Atorvastatin (Lipitor)     Cetirizine (Zyrtec)     Escitalopram (lexapro)     Furosemide (Lasix)              How do I prepare myself?  -  Take two showers: one the night before surgery; and one the morning of surgery.         Use Scrubcare or Hibiclens to wash from neck down.  You may  use your own shampoo and conditioner. No other hair products.   -  Do NOT use lotion, powder, deodorant, or antiperspirant the day of your surgery.  -  Do NOT wear any makeup, fingernail polish or jewelry.  -  Begin using Incentive Spirometer 1 week prior to surgery.  Use 4 times per day, up to 5-10  breaths each time.  Bring Incentive Spirometer to hospital.  - Do not bring your own medications to the hospital, except for inhalers and eye drops.  -  Bring your ID and insurance card.         - Administer 1 Fleets Enema the evening before surgery     Questions or Concerns:  -If you have questions or concerns regarding the day of surgery, please call [phone].       -For questions after surgery please call your surgeons office.           Enhanced Recovery After Surgery     This is a team effort, including you, to get you back on your feet, eating and drinking normally and out of the hospital as quickly as possible.  The goals are:     1) NO INFECTIONS and   2) RETURN TO NORMAL DIET    How can we achieve these goals?  1) STAY ACTIVE: Walk every day before your surgery; try to increase the amount every day.  Walk after surgery as much as you can-the nurses will help you.  Walking speeds healing and gets you home quicker, you heal better at home and have less risk of infection.     2) INCENTIVE SPIROMETER: Practice your incentive spirometer 4 times per day with 5-10 repetitions each time.  Using the incentive spirometer can strengthen your muscles between your ribs and help you have a strong cough after surgery.  A more effective cough can help prevent problems with your lungs.    3) STAY HYDRATED: Drink clear liquids up until 2 hours before your surgery. We would like you to purchase a drink such as Gatorade or Ensure Clear (not the milkshake type).  Drink this before bedtime and on the way into the hospital, drink between 8-12 ounces or until you feel hydrated.  Keeping well hydrated leads to your veins being  plump, you wake up faster, and you are less likely to be nauseated. Start drinking water as soon as you can after surgery and advance to clear liquids and food as tolerated.  IV fluids contain salt, drinking fluids will minimize the amount of IV fluids you need and decrease the amount of salt you get.    The most common reason for the patient to be readmitted is dehydration. Staying hydrated after you go home from the hospital is very important.  Ensure or Ensure Clear are good options to keep you hydrated.     4) PAIN MANAGEMENT: If we minimize the amount of opioids and narcotics, and use regional blocks (which numb the area where your surgery is) along with oral pain medications; you will have less side effects of nausea and constipation. Narcotics can slow down your bowels and cause you to stay in the hospital longer.     Our goal is to keep you comfortable; eating and drinking normally and back home safely.     Using an Incentive Spirometer    An incentive spirometer is a device that helps you do deep breathing exercises. These exercises expand your lungs, aid in circulation, and help prevent pneumonia. Deep breathing exercises also help you breathe better and improve the function of your lungs by:    Keeping your lungs clear    Strengthening your breathing muscles    Helping prevent respiratory complications or problems  The incentive spirometer gives you a way to take an active part in your care. A nurse or therapist will teach you breathing exercises. To do these exercises, you will breathe in through your mouth and not your nose. The incentive spirometer only works correctly if you breathe in through your mouth.    Steps to clear lungs  Step 1. Exhale normally. Then, inhale normally.    Relax and breathe out.  Step 2. Place your lips tightly around the mouthpiece.    Make sure the device is upright and not tilted.  Step 3. Inhale as much air as you can through the mouthpiece (don't breath through your  nose).    Inhale slowly and deeply.    Hold your breath long enough to keep the balls or disk raised for at least 3 to 5 seconds, or as instructed by your healthcare provider.  Step 4. Repeat the exercise regularly.  Begin using the Incentive Spirometer one week prior to your surgery, 4 times per day-5 times each.          Follow-ups after your visit        Your next 10 appointments already scheduled     Jul 05, 2018  2:30 PM CDT   (Arrive by 2:15 PM)   PAC RN ASSESSMENT with  Pac Rn   [facility] ([facility])    [address] [place] [id number] [phone]            Jul 05, 2018  3:00 PM CDT   (Arrive by 2:45 PM)   PAC Anesthesia Consult with  Pac Anesthesiologist   [facility] [place] ([facility])    [address] [place] [id number] [phone]            Jul 05, 2018  3:30 PM CDT   US LOWER EXTREMITY VENOUS DUPLEX BILATERAL with UCUS3   [facility] ([facility])    [address] [place] [id number] [phone]           Please bring a list of your medicines (including vitamins, minerals and over-the-counter drugs). Also, tell your doctor about any allergies you may have. Wear comfortable clothes and leave your valuables at home.  You do not need to do anything special to prepare for your exam.  Please call the Imaging Department at your exam site with any questions.            Jul 05, 2018  4:30 PM CDT   (Arrive by 4:15 PM)   Return Visit with Austin Mota MD   [facility] ([facility])    [address] [place] [id number] [phone]            Jul 16, 2018   Procedure with Girma Sharma MD   [facility] [place], Same Day Surgery (--)    [address] [facility] [id number] [phone]             Aug 07, 2018  1:30 PM CDT   Return Visit with Girma Sharma MD   [facility] [place] ([facility])    [address] [facility]  [address]  [facility] [id number] [phone]            Sep 05, 2018  9:40 AM CDT   CT CHEST/ABDOMEN/PELVIS W CONTRAST with UCCT1   [facility] CT ([facility])    [address]  1st Floor  [facility] [address] [phone]           Please bring any scans or X-rays taken at other hospitals, if similar tests were done. Also bring a list of your medicines, including vitamins, minerals and over-the-counter drugs. It is safest to leave personal items at home.  Be sure to tell your doctor:   If you have any allergies.   If there s any chance you are pregnant.   If you are breastfeeding.  How to prepare:   Do not eat or drink for 2 hours before your exam. If you need to take medicine, you may take it with small sips of water. (We may ask you to take liquid medicine as well.)   Please wear loose clothing, such as a sweat suit or jogging clothes. Avoid snaps, zippers and other metal. We may ask you to undress and put on a hospital gown.  Please arrive 30 minutes early for your CT. Once in the department you might be asked to drink water 15-20 minutes prior to your exam.  If indicated you may be asked to drink an oral contrast in advance of your CT.  If this is the case, the imaging team will let you know or be in contact with you prior to your appointment  Patients over 70 or patients with diabetes or kidney problems:   If you haven t had a blood test (creatinine test) within the last 30 days, the Cardiologist/Radiologist may require you to get this test prior to your exam.  If you have diabetes:   Continue to take your metformin medication on the day of your exam  If you have any questions, please call the Imaging Department where you will have your exam.            Sep 05,  2018 12:30 PM CDT   [facility] with  [facility]   [facility] ([facility])    [address]  [address]  [facility] [id number] [phone]            Sep 05, 2018  1:00 PM CDT   (Arrive by 12:45 PM)   Return Visit with Nikolas Espinal MD   [facility] ([facility])    [address]  [address]  [facility] [id number] [phone]              Future tests that were ordered for you today     Open Future Orders        Priority Expected Expires Ordered    ABO/Rh type and screen Routine 7/5/2018 8/4/2018 7/5/2018    Hemoglobin A1c Routine 7/5/2018 8/4/2018 7/5/2018    UA with Microscopic reflex to Culture Routine  7/5/2019 7/5/2018            Who to contact     Please call your clinic at [phone] to:    Ask questions about your health    Make or cancel appointments    Discuss your medicines    Learn about your test results    Speak to your doctor            Additional Information About Your Visit        Care EveryWhere ID     This is your Care EveryWhere ID. This could be used by other organizations to access your [place] medical records  [id number]         Blood Pressure from Last 3 Encounters:   07/05/18 151/69   06/27/18 187/67   06/15/18 145/60    Weight from Last 3 Encounters:   07/05/18 103.9 kg (229 lb)   06/27/18 103 kg (227 lb)   06/15/18 103.4 kg (227 lb 15.3 oz)              Today, you had the following     No orders found for display         Today's Medication Changes          These changes are accurate as of 7/5/18  2:14 PM.  If you have any questions, ask your nurse or doctor.               These medicines have changed or have updated prescriptions.        Dose/Directions    amLODIPine 10 MG tablet   Commonly known as:  NORVASC   This may have changed:  when to take this   Used for:  Essential hypertension        Dose:  10 mg   Take 1 tablet (10 mg) by mouth daily   Quantity:   90 tablet   Refills:  1       atorvastatin 80 MG tablet   Commonly known as:  LIPITOR   This may have changed:  when to take this   Used for:  Essential hypertension, Chronic diastolic congestive heart failure (H)        Dose:  80 mg   Take 1 tablet (80 mg) by mouth daily   Quantity:  90 tablet   Refills:  1                Primary Care Provider    Pradeep Zuniga       No address on file        Equal Access to Services     SHELLY JACOB : Hadii alissa tinsley hadclemento Soomaali, waaxda luqadaha, qaybta kaalmada clintfransiscoda, mikey roe mememckenna anders mercedesroni lino . So [facility] [phone].    ATENCIÓN: Si habla español, tiene a tomlin disposición servicios gratuitos de asistencia lingüística. [facility] [phone].    We comply with applicable federal civil rights laws and [place] laws. We do not discriminate on the basis of race, color, national origin, age, disability, sex, sexual orientation, or gender identity.            Thank you!     Thank you for choosing [facility]  for your care. Our goal is always to provide you with excellent care. Hearing back from our patients is one way we can continue to improve our services. Please take a few minutes to complete the written survey that you may receive in the mail after your visit with us. Thank you!             Your Updated Medication List - Protect others around you: Learn how to safely use, store and throw away your medicines at www.disposemymeds.org.          This list is accurate as of 7/5/18  2:14 PM.  Always use your most recent med list.                   Brand Name Dispense Instructions for use Diagnosis    amLODIPine 10 MG tablet    NORVASC    90 tablet    Take 1 tablet (10 mg) by mouth daily    Essential hypertension       atorvastatin 80 MG tablet    LIPITOR    90 tablet    Take 1 tablet (80 mg) by mouth daily    Essential hypertension, Chronic diastolic congestive heart failure (H)       CALCITRIOL PO      Take 0.5 mcg by mouth daily         cetirizine 10 MG tablet    zyrTEC     Take 10 mg by mouth every evening        clidinium-chlordiazePOXIDE 5-2.5 MG Caps per capsule    LIBRAX     Take 1 capsule by mouth every morning (before breakfast)        escitalopram 10 MG tablet    LEXAPRO    90 tablet    Take 1 tablet (10 mg) by mouth daily    Adjustment disorder with depressed mood       furosemide 40 MG tablet    LASIX    180 tablet    Take 1 tablet (40 mg) by mouth 2 times daily    Chronic diastolic congestive heart failure (H)       hydrALAZINE 10 MG tablet    APRESOLINE    90 tablet    Take 10 mg by mouth 2 times daily    Benign essential hypertension       LEVOTHYROXINE SODIUM PO      Take 125 mcg by mouth daily        METOPROLOL TARTRATE PO      Take 100 mg by mouth 2 times daily        Multi-vitamin Tabs tablet      Take 1 tablet by mouth daily        pregabalin 50 MG capsule    LYRICA    60 capsule    Take 1 capsule (50 mg) by mouth 2 times daily    Diabetic polyneuropathy associated with type 2 diabetes mellitus (H)       quinapril 40 MG Tab    ACCUPRIL    90 tablet    Take 1 tablet (40 mg) by mouth daily    Essential hypertension, Type 2 diabetes mellitus with other neurologic complication       SINGULAIR PO      Take 10 mg by mouth At Bedtime        TYLENOL PO      Take 1,000 mg by mouth 4 times daily Reported on 4/26/2017        VITAMIN D (CHOLECALCIFEROL) PO      Take 1,000 Units by mouth daily        WARFARIN SODIUM PO      Take 2.5 mg by mouth daily

## 2018-07-05 NOTE — PATIENT INSTRUCTIONS
Preparing for Your Surgery      Name:  Sunitha Jacques   MRN:  [id number]   :  1940   Today's Date:  2018     Arriving for surgery: Cystectomy   Surgery date: 2018   Arrival time:  5:30 am  Please come to:       [facility] [address]  [address] [place]  [address]    -   parking is available in front of the hospital from 5:15 am to 8:00 pm    -  Stop at the Information Desk in the lobby    -   Inform the information person that you are here for surgery. An escort to 3c will be provided. If you would not like an escort, please proceed to 3C on the 3rd floor. [phone]     What can I eat or drink?  -  Clear liquids only for 24 hours before surgery per directions of surgeon  -  You may have water, apple juice or 7up/Sprite until 2 hours prior to your surgery.    Which medicines can I take?      Stop Aspirin, vitamins and supplements one week prior to surgery.    Hold Ibuprofen and Naproxen for 24 hours prior to surgery.     -  Do NOT take these medications in the morning, the day of surgery:      Hydralazine (Apresoline)     Quinapril (Accupril )              Plan for Warfarin (Coumadin)  To be instructed by Surgeon's nurse Rachana hJaveri--she will contact you with details [phone]      -  Please take these medications the day of surgery:     Amlodipine      Atorvastatin (Lipitor)     Cetirizine (Zyrtec)     Escitalopram (lexapro)     Furosemide (Lasix)      Thyroid medication              How do I prepare myself?  -  Take two showers: one the night before surgery; and one the morning of surgery.         Use Scrubcare or Hibiclens to wash from neck down.  You may use your own shampoo and conditioner. No other hair products.   -  Do NOT use lotion, powder, deodorant, or antiperspirant the day of your surgery.  -  Do NOT wear any makeup, fingernail polish or jewelry.  -  Begin using Incentive Spirometer 1 week prior to surgery.  Use 4 times  per day, up to 5-10  breaths each time.  Bring Incentive Spirometer to hospital.  - Do not bring your own medications to the hospital, except for inhalers and eye drops.  -  Bring your ID and insurance card.         - Administer 1 Fleets Enema the evening before surgery     Questions or Concerns:  -If you have questions or concerns regarding the day of surgery, please call [phone].       -For questions after surgery please call your surgeons office.           Enhanced Recovery After Surgery     This is a team effort, including you, to get you back on your feet, eating and drinking normally and out of the hospital as quickly as possible.  The goals are:     1) NO INFECTIONS and   2) RETURN TO NORMAL DIET    How can we achieve these goals?  1) STAY ACTIVE: Walk every day before your surgery; try to increase the amount every day.  Walk after surgery as much as you can-the nurses will help you.  Walking speeds healing and gets you home quicker, you heal better at home and have less risk of infection.     2) INCENTIVE SPIROMETER: Practice your incentive spirometer 4 times per day with 5-10 repetitions each time.  Using the incentive spirometer can strengthen your muscles between your ribs and help you have a strong cough after surgery.  A more effective cough can help prevent problems with your lungs.    3) STAY HYDRATED: Drink clear liquids up until 2 hours before your surgery. We would like you to purchase a drink such as Gatorade or Ensure Clear (not the milkshake type).  Drink this before bedtime and on the way into the hospital, drink between 8-12 ounces or until you feel hydrated.  Keeping well hydrated leads to your veins being plump, you wake up faster, and you are less likely to be nauseated. Start drinking water as soon as you can after surgery and advance to clear liquids and food as tolerated.  IV fluids contain salt, drinking fluids will minimize the amount of IV fluids you need and decrease the amount  of salt you get.    The most common reason for the patient to be readmitted is dehydration. Staying hydrated after you go home from the hospital is very important.  Ensure or Ensure Clear are good options to keep you hydrated.     4) PAIN MANAGEMENT: If we minimize the amount of opioids and narcotics, and use regional blocks (which numb the area where your surgery is) along with oral pain medications; you will have less side effects of nausea and constipation. Narcotics can slow down your bowels and cause you to stay in the hospital longer.     Our goal is to keep you comfortable; eating and drinking normally and back home safely.     Using an Incentive Spirometer    An incentive spirometer is a device that helps you do deep breathing exercises. These exercises expand your lungs, aid in circulation, and help prevent pneumonia. Deep breathing exercises also help you breathe better and improve the function of your lungs by:    Keeping your lungs clear    Strengthening your breathing muscles    Helping prevent respiratory complications or problems  The incentive spirometer gives you a way to take an active part in your care. A nurse or therapist will teach you breathing exercises. To do these exercises, you will breathe in through your mouth and not your nose. The incentive spirometer only works correctly if you breathe in through your mouth.    Steps to clear lungs  Step 1. Exhale normally. Then, inhale normally.    Relax and breathe out.  Step 2. Place your lips tightly around the mouthpiece.    Make sure the device is upright and not tilted.  Step 3. Inhale as much air as you can through the mouthpiece (don't breath through your nose).    Inhale slowly and deeply.    Hold your breath long enough to keep the balls or disk raised for at least 3 to 5 seconds, or as instructed by your healthcare provider.  Step 4. Repeat the exercise regularly.  Begin using the Incentive Spirometer one week prior to your surgery, 4  times per day-5 times each.

## 2018-07-05 NOTE — H&P
Pre-Operative H & P     CC:  Preoperative exam to assess for increased cardiopulmonary risk while undergoing surgery and anesthesia.    Date of Encounter: July 5, 2018   Primary Care Physician:  Pradeep Zuniga   Reason for Visit/Surgery:  Coronary artery disease involving native coronary artery of native heart without angina pectoris [I25.10]      HPI  Sunitha Jacques is a 77 year old female who presents for pre-operative H & P in preparation for an Open Radical Cystectomy, bilateral pelvic lymphadenectomy, ileal conduit urinary diversion, possible colon conduit urinary diversion on 7/16/18 with Dr. Sharma for urothelial carcinoma at the [facility].     Sunitha had hematuria last August 2017.  She had a cystoscopy with biopsy and the Pathology revealed urothelial cancer.  She received 4 cycles of chemotherapy at that time.  She had a repeat cystoscopy which showed an improvement, but residual disease, so the above surgery was recommended.  She has significant comorbidities of a CVA in 2017 due to AFIB.  The AFIB was a new finding for her.  She also has been diagnosed with CHF and CAD, though there are no records  showing cardiac testing other than an EKG.  She has a 12 pack year smoking history, but did quit in 1980 with no known pulmonary disease.        History is obtained from the patient and  medical record including Care Everywhere.        Past Medical History  Past Medical History:   Diagnosis Date     Atrial fibrillation with rapid ventricular response (H) 2/11/2017     CAD (coronary artery disease)      Cerebrovascular accident (H) 3/21/2017     CHF (congestive heart failure) (H) 3/21/2017     CRD (chronic renal disease), stage III     Due to bladder cancer     Essential hypertension 4/9/2017     GERD (gastroesophageal reflux disease)      History of MRSA infection 2017    treated april 2017     Hypertension      Hypothyroidism, unspecified type 12/14/2017     Long  term current use of anticoagulant 2/12/2017     Morbid obesity (H) 4/13/2017     MIKKI (obstructive sleep apnea)      T2DM (type 2 diabetes mellitus) (H)         Past Surgical History  Past Surgical History:   Procedure Laterality Date     APPENDECTOMY  1975     AS TOTAL KNEE ARTHROPLASTY Bilateral 2015     CHOLECYSTECTOMY  1975     COLONOSCOPY       SHOULDER SURGERY  2003     TONSILLECTOMY      as a child       Hx of Blood transfusions/reactions: No transfusion history       Personal or FH with difficulty with Anesthesia:  PONV    Prior to Admission Medications  Current Outpatient Prescriptions   Medication Sig Dispense Refill     Acetaminophen (TYLENOL PO) Take 1,000 mg by mouth 4 times daily Reported on 4/26/2017       amLODIPine (NORVASC) 10 MG tablet Take 1 tablet (10 mg) by mouth daily (Patient taking differently: Take 10 mg by mouth every morning ) 90 tablet 1     atorvastatin (LIPITOR) 80 MG tablet Take 1 tablet (80 mg) by mouth daily (Patient taking differently: Take 80 mg by mouth every evening ) 90 tablet 1     CALCITRIOL PO Take 0.5 mcg by mouth daily       cetirizine (ZYRTEC) 10 MG tablet Take 10 mg by mouth every evening        clidinium-chlordiazePOXIDE (LIBRAX) 5-2.5 MG CAPS per capsule Take 1 capsule by mouth every morning (before breakfast)       escitalopram (LEXAPRO) 10 MG tablet Take 1 tablet (10 mg) by mouth daily 90 tablet 1     furosemide (LASIX) 40 MG tablet Take 1 tablet (40 mg) by mouth 2 times daily 180 tablet 0     hydrALAZINE (APRESOLINE) 10 MG tablet Take 10 mg by mouth 2 times daily  90 tablet 1     LEVOTHYROXINE SODIUM PO Take 125 mcg by mouth daily       METOPROLOL TARTRATE PO Take 100 mg by mouth 2 times daily        Montelukast Sodium (SINGULAIR PO) Take 10 mg by mouth At Bedtime       multivitamin, therapeutic with minerals (MULTI-VITAMIN) TABS tablet Take 1 tablet by mouth daily       pregabalin (LYRICA) 50 MG capsule Take 1 capsule (50 mg) by mouth 2 times daily 60 capsule 0      quinapril (ACCUPRIL) 40 MG Tab Take 1 tablet (40 mg) by mouth daily 90 tablet 1     VITAMIN D, CHOLECALCIFEROL, PO Take 1,000 Units by mouth daily       WARFARIN SODIUM PO Take 2.5 mg by mouth daily        [DISCONTINUED] Escitalopram Oxalate (LEXAPRO PO) Take 10 mg by mouth daily           Allergies  Metaproterenol; Penicillins; Prednisone; and Sulfa drugs     Social History  Social History     Social History     Marital status:      Spouse name: N/A     Number of children: N/A     Years of education: N/A     Occupational History     Not on file.     Social History Main Topics     Smoking status: Former Smoker     Packs/day: 0.50     Years: 25.00     Quit date: 7/5/1980     Smokeless tobacco: Never Used     Alcohol use No     Drug use: No     Sexual activity: Not on file     Other Topics Concern     Not on file     Social History Narrative          Family History  No family history of bleeding, clotting disorders or complications with anesthesia.    ROS/MED HX     ENT/Pulmonary:     (+)sleep apnea, tobacco use, Past use doesn't use CPAP , . .    Neurologic:     (+)CVA (2017) date: 2017 with deficits- mild memory loss,     Cardiovascular:     (+) hypertension--CAD, --. : . CHF . PARTIDA, . :. . Previous cardiac testing date:results:date: results:ECG reviewed date:3/2017 results:AFIB date: results:        METS/Exercise Tolerance:  3 - Able to walk 1-2 blocks without stopping   Hematologic:  - neg hematologic  ROS     Musculoskeletal:   (+) , , other musculoskeletal- chronic hip pain    GI/Hepatic:     (+) GERD Asymptomatic on medication,     Renal/Genitourinary: Comment: Elevated Cr of 2.2 (6/27/18)    (+) chronic renal disease, type: CRI,     Endo: Comment: Morbid obesity    (+) type II DM thyroid problem hypothyroidism, Obesity, .    Psychiatric:     (+) psychiatric history depression    Infectious Disease: Comment: History of sepsis due to MRSA and treated in 2017, followed by Lina Oliver and ID at that  "time.  (+) MRSA,     Malignancy:   (+) Malignancy History of Other  Other CA bladder Active status post Chemo       Other:    (+) no H/O Chronic Pain,           Cardiology Tests: (personally reviewed):   Review Results Below in A/P    Labs: (personally reviewed):  Lab Results   Component Value Date    WBC 5.2 07/05/2018    HGB 11.6 (L) 07/05/2018    HCT 36.0 07/05/2018     07/05/2018    INR 2.1 (A) 05/12/2017     04/07/2017    POTASSIUM 4.2 04/07/2017    ANNE 8.8 04/07/2017     (A) 06/27/2018    CR 0.99 04/13/2017    GFR 21 06/27/2018    BUN 21 04/07/2017    CO2 34 (H) 04/07/2017    .      Physical Exam:  No LMP recorded.   Vital signs:  /69  Pulse 69  Temp 98  F (36.7  C)  Ht 1.568 m (5' 1.75\")  Wt 103.9 kg (229 lb)  SpO2 95%  BMI 42.22 kg/m2    Constitutional: Awake, alert, cooperative, no apparent distress, and appears stated age.  Eyes: Pupils equal, round and reactive to light, sclera clear, conjunctiva normal.  HENT: Normocephalic, oral pharynx with moist mucus membranes. No goiter appreciated.   Respiratory: Clear to auscultation bilaterally, no crackles or wheezing.  Cardiovascular: Regular rate and rhythm and  overt murmur noted.  No carotid bruits auscultated. Mild LE edema. Palpable pulses to radial  DP and PT arteries.   GI: Normal bowel sounds, soft, non-distended, non-tender, no masses palpated  Skin: Warm and dry.  No rashes at anticipated surgical site.   Musculoskeletal: Full extension of the neck.  No redness, warmth, or swelling of exposed joints noted. Gross motor strength is normal.    Neurologic: Awake, alert, oriented to name, place and time.  Gait is normal.   Neuropsychiatric: Calm, cooperative. Normal affect.     Assessment/Plan  Sunitha Jacques is a 77 year old female who presents for pre-operative H & P in preparation for an Open Radical Cystectomy, bilateral pelvic lymphadenectomy, ileal conduit urinary diversion, possible colon conduit urinary diversion on " "7/16/18 with Dr. Sharma for [facility]rothelial carcinoma at the [facility].    PAC referral for risk assessment and optimization for anesthesia with comorbid conditions of:    Pre-operative considerations:  1.  Cardiac:  Functional status METS =3     Risk of Major Adverse Cardiac event: >11 %  -CAD, Paroxysmal AFIB, Chronic Diastolic HF, HTN, HLD  *takes amlodipine(cont DOS), furosemide(cont DOS due to CHF), metoprolol(cont DOS), quinapril(hold DOS)  2.  Pulm:     -MIKKI, doesn t wear CPAP   -Former 12 pack year smoker, quit 1980  3.  Endo:  -DM T2 not on medications, no complications  -Morbid Obesity, BMI 41.5  4.  Renal:  -CKD stage 3, elevated Cr 2.2 (6/27/18)  5.  Neuro:  -CVA due to AFIB 2017   *takes warfarin, plan TBD between surgeon s team and PCP  6.  GI:  PONV  7.  ID:  -History of sepsis due to MRSA April 2017 treated through [facility] and followed by ID at that time    Addendum:  Patient with positive NM stress test, so referred to Cardiology for clearance,   \"I would be hesitant to proceed to coronary angiography because of the following reasons:     1. Given her morbid obesity, female sex and prior cardiac catheterizations showing no obstructive CAD, it is quite possible she does not have significant CAD, I.e., the stress test is false positive.      2. Her CKD stage 3 puts her at a high-risk for contrast-induced nephrotoxicity from the contrast that will be used for her coronary angiography.     3. If she has a coronary angiography and does indeed have a coronary stenosis, and she needs an angioplasty or stenting, she would not be able to have bladder surgery due to the need to anticoagulate for 3-6 months depending on the type of stent she receives. This delay may make her resectable bladder cancer unresectable.     Given all the above, I would proceed to surgery without any further cardiac interventions. We discussed the approximately 10% chance of a major " "cardiac event given her risk factors and that the risk cannot be significantly lowered in her case. \"  Alvin Webb MD  Cardiology     Patient is  is an acceptable candidate for the proposed procedure.       AVS given to patient regarding medication instructions,  surgery time/arrival time and NPO status.  I spent a total of 60 minutes face to face with Sunitha Jacques during today's office visit.  Over 50% of this time was spent counseling the patient and coordinating care regarding her complex medical history and need for a dobutamine stress echo test with follow-up required.  See above for details.     Margaret CHAN-C   Preoperative Assessment Center  [facility]  Clinic and Surgery Center  Phone: [phone]  Fax: [phone]    "

## 2018-07-05 NOTE — NURSING NOTE
Pre Op Teaching Flowsheet       Pre and Post op Patient Education  Relevant Diagnosis:  Bladder cancer  Surgical procedure:  Open Radical Cystectomy, bilateral pelvic lymphadenectomy, ileal conduit urinary diversion, possible colon conduit urinary diversion  Teaching Topic:  Pre and post op teaching  Person Involved in teaching: Sunitha Jacques    Motivation Level:  Asks Questions: Yes  Eager to Learn:  Yes  Cooperative: Yes  Receptive (willing/able to accept information):  Yes    Patient demonstrates understanding of the following:  Date of surgery:  7/16/18  Location of surgery:  [address] [place] [address]  History and Physical and any other testing necessary prior to surgery: Yes  Required time line for completion of History and Physical and any pre-op testing: Yes    Patient demonstrates understanding of the following:  Pre-op bowel prep:  Clear liquids day prior with Fleets enema  Pre-op showering/scrub information with PCMX Soap: Yes  Blood thinner medications discussed and when to stop (if applicable):  Yes      Infection Prevention:   Patient demonstrates understanding of the following:  Surgical procedure site care taught: Yes  Signs and symptoms of infection taught:  Yes      Post-op follow-up:  Discussed how to contact the hospital, nurse, and clinic scheduling staff if necessary.    Instructional materials used/given/mailed:  [facility] Booklet, post op teaching sheet, Map, Soap, and arrival/location information.    Surgical instructions packet mailed to patient.     Patient has a  and someone to stay with her in town here.  She lives in [place].  Her son lives here.  She does currently have home health come to her house.    Patient given nutritional drink.

## 2018-07-09 NOTE — PROGRESS NOTES
Patient having upcoming procedure.  UA positive, UC negative. Spoke with Dr. Sharma who wants to treat empirically and have lab do sensitivities.  Called and spoke to lab, they will run sensitivities.  Called and spoke to patient, will send in script to pharmacy.  Patient states understanding.

## 2018-07-09 NOTE — TELEPHONE ENCOUNTER
Patient with positive Lexiscan stress test.  The son notified.  He will arrange a cardiology visit for the patient in [place].  The surgeon team was notified as the surgery is scheduled for 7/16/18.  Margaret Golden MS, PA-C

## 2018-07-17 NOTE — TELEPHONE ENCOUNTER
Medical Records Request For Appointment  URGENT! (t+2)         To: Mario Thompson From: Donna Garrido   Fax: [phone] Fax: [phone]   Date: 2018 Phone: [phone]   Pages: 1 Mailing Address: [facility]  Attn: Donna Garrido  [address], [id number]  [place] [address]   PATIENT: Sunitha Jacques  :   1940        Please fax medical records to fax # [phone] for patient s upcoming appointment in the Cardiology Clinic     **Please push images to [employer] PACS.  Please call Donna at [phone] to notify when images are pushed.  If unable to push images, please send via overnight [employer] using [employer] account #[id number] to the address above.**    PLEASE SEND:  TIME FRAME NEEDED:      Referring MD consult and progress notes Most Recent     ED/UC/Hospital discharge notes  Most Recent     Previous Cardiology notes Most Recent     Cardiothoracic Surgery Operation Notes  Most Recent     Medication List Most Recent     Echocardiogram (TTE and SALLY) Most Recent     CTA coronary arteries with calcium score (report and images) Most Recent     Coronary Angiogram (report and images) Most Recent     Carotid Ultrasound (report and images) Most Recent     Chest CT (report and images)      PFT results  Most Recent     Vein mapping (report and images) Most Recent     All related radiology reports and imaging   o MRI  o Chest XRay  o PET ScaN  o Other cardiac images   Most Recent                            Confidentiality Notice:  The document(s) accompanying this fax may contain protected health information under state and federal law and is legally privileged.  The information is only for the use of the intended recipient named above.  The recipient or person responsible for delivering this information is prohibited by law from disclosing this information without proper authorization to any other party, unless required to do so by law or regulation.  If you are not the  intended recipient, you are hereby notified that any review, dissemination, distribution, or copying of this message is strictly prohibited.  If you have received this communication in error, please notify us immediately by telephone to arrange for the return or destruction of the faxed documents.  Thank you.

## 2018-07-17 NOTE — TELEPHONE ENCOUNTER
FUTURE VISIT INFORMATION      FUTURE VISIT INFORMATION:    Date: 7/23/18    Time:    Location:  REFERRAL INFORMATION:    Referring provider:  Margaret Golden    Referring providers clinic:  [facility]    Reason for visit/diagnosis  Dyspnea on effort  I25.10 (ICD-10-CM) - Coronary artery disease involving native coronary artery of native heart without angina pectoris    RECORDS REQUESTED FROM:       Clinic name Comments Records Status Imaging Status   Mario Crowley Requesting all most recent cardiac testing Requested 7/17/18                                    RECORDS STATUS      All records from [facility] 7/19/18        NOTES STATUS DETAILS   OFFICE NOTE from referring provider  Internal 7/5/2018   OFFICE NOTE from other cardiologist  N/A    DISCHARGE SUMMARY from hospital  N/A    DISCHARGE REPORT from the ER N/A    OPERATIVE REPORT  N/A    MEDICATION LIST internal    LABS     BMP N/A    CBC     Internal    CMP Internal    TSH     internal    Lipids N/A    DIAGNOSTIC PROCEDURES     EKG Internal Stress EKG 7/6/18S   Monitor Reports N/A    Carotid Duplex External  Received 7/19/18, test done on 3/09/17   IMAGING (DISC & REPORT)      ECHO  External  SALLY- report in epic, 3/09/17, Echo from [facility] Received Echocardiogram from [facility] 7/19/18   Stress Tests internal   Lexiscan 7/6/18, stress EKG 7/6/18   Cath N/A    MRI/MRA N/A    CT/CTA N/A

## 2018-07-20 NOTE — TELEPHONE ENCOUNTER
FUTURE VISIT INFORMATION:    Date: 7/23/18    Time: 0700    Location: [facility]  REFERRAL INFORMATION:    Referring provider:  Margaret Golden    Referring providers clinic:  [facility]    Reason for visit/diagnosis  Dyspnea on effort    125.10 (ICD-10-CM) - Coronary artery disease involving native coronary artery of native heart without angina pectoris            NOTES STATUS DETAILS   OFFICE NOTE from referring provider  Internal 7/5/18  Margaret Golden, [facility]   OFFICE NOTE from other cardiologist  N/A    DISCHARGE SUMMARY from hospital  N/A    DISCHARGE REPORT from the ER N/A    OPERATIVE REPORT  Internal    MEDICATION LIST Internal    LABS     BMP N/A    CBC     internal 7/5/18   CMP Internal 7/5/18   TSH     Internal 4/13/17   Lipids N/A    DIAGNOSTIC PROCEDURES     EKG Internal 3/8/17   Monitor Reports N/A     N/A    IMAGING (DISC & REPORT)      ECHO  Internal SALLY 3/16/17   Stress Tests Internal Stress EKG Interpretation 7/6/18, NM MPI Lexiscan 7/6/18   Cath N/A    MRI/MRA N/A    CT/CTA N/A     N/A

## 2018-07-23 NOTE — LETTER
7/23/2018      RE: Sunitha Jacques  [address] [place] [id number]       Dear Colleague,    Thank you for the opportunity to participate in the care of your patient, Sunitha Jacques, at the [facility] acute hospital. Please see a copy of my visit note below.    HPI:     Sunitha Jacques is a 77-year-old female with bladder cancer who was seen prior to surgery for the cancer. She is scheduled for an open radical cystectomy with bilateral lymphadenectomy, ileoconduit diversion and possible colon conduit.    She was seen by anesthesia pre-operatively and was referred to a stress test due to her cardiac risk factors. She has paroxysmal atrial fibrillation, diastolic heart failure, atrial fibrillation hypertension and hyperlipidemia. She notes having cardiac catheterization in 2011 and again 2-3 years ago, both of which did not show significant CAD to her and her son's recollection.    She has no history of MI or CHF. She denies chest pain, orthopnea, PND, pedal edema, syncope or near-syncope.    Her functional status is 3 METS. She has MIKKI, but doesn't wear CPAP. She is a former 12-pack year smoker, having quit in 1980. She has type 2 diabetes but is not on any medications. She is morbidly obese with BMI of 41. She has CKD Stage 3. She takes warfarin for CVA, which was attributed to AF.      PAST MEDICAL HISTORY:  Past Medical History:   Diagnosis Date     Atrial fibrillation with rapid ventricular response (H) 2/11/2017     CAD (coronary artery disease)      Cerebrovascular accident (H) 3/21/2017     CHF (congestive heart failure) (H) 3/21/2017     CRD (chronic renal disease), stage III     Due to bladder cancer     Essential hypertension 4/9/2017     GERD (gastroesophageal reflux disease)      History of MRSA infection 2017    treated april 2017     Hypertension      Hypothyroidism, unspecified type 12/14/2017     Long term current use of anticoagulant  2/12/2017     Morbid obesity (H) 4/13/2017     MIKKI (obstructive sleep apnea)      T2DM (type 2 diabetes mellitus) (H)        CURRENT MEDICATIONS:  Current Outpatient Prescriptions   Medication Sig Dispense Refill     Acetaminophen (TYLENOL PO) Take 1,000 mg by mouth 4 times daily Reported on 4/26/2017       amLODIPine (NORVASC) 10 MG tablet Take 1 tablet (10 mg) by mouth daily (Patient taking differently: Take 10 mg by mouth every morning ) 90 tablet 1     atorvastatin (LIPITOR) 80 MG tablet Take 1 tablet (80 mg) by mouth daily (Patient taking differently: Take 80 mg by mouth every evening ) 90 tablet 1     CALCITRIOL PO Take 0.5 mcg by mouth daily       cetirizine (ZYRTEC) 10 MG tablet Take 10 mg by mouth every evening        clidinium-chlordiazePOXIDE (LIBRAX) 5-2.5 MG CAPS per capsule Take 1 capsule by mouth every morning (before breakfast)       escitalopram (LEXAPRO) 10 MG tablet Take 1 tablet (10 mg) by mouth daily 90 tablet 1     furosemide (LASIX) 40 MG tablet Take 1 tablet (40 mg) by mouth 2 times daily 180 tablet 0     hydrALAZINE (APRESOLINE) 10 MG tablet Take 10 mg by mouth 2 times daily  90 tablet 1     LEVOTHYROXINE SODIUM PO Take 125 mcg by mouth daily       METOPROLOL TARTRATE PO Take 100 mg by mouth 2 times daily        Montelukast Sodium (SINGULAIR PO) Take 10 mg by mouth At Bedtime       multivitamin, therapeutic with minerals (MULTI-VITAMIN) TABS tablet Take 1 tablet by mouth daily       pregabalin (LYRICA) 50 MG capsule Take 1 capsule (50 mg) by mouth 2 times daily 60 capsule 0     quinapril (ACCUPRIL) 40 MG Tab Take 1 tablet (40 mg) by mouth daily 90 tablet 1     VITAMIN D, CHOLECALCIFEROL, PO Take 1,000 Units by mouth daily       WARFARIN SODIUM PO Take 2.5 mg by mouth daily          PAST SURGICAL HISTORY:  Past Surgical History:   Procedure Laterality Date     APPENDECTOMY  1975     AS TOTAL KNEE ARTHROPLASTY Bilateral 2015     CHOLECYSTECTOMY  1975     COLONOSCOPY       SHOULDER SURGERY   "2003     TONSILLECTOMY      as a child       ALLERGIES     Allergies   Allergen Reactions     Metaproterenol      Penicillins      Prednisone      Sulfa Drugs        FAMILY HISTORY:  No family history on file.    SOCIAL HISTORY:  Social History     Social History     Marital status:      Spouse name: N/A     Number of children: N/A     Years of education: N/A     Social History Main Topics     Smoking status: Former Smoker     Packs/day: 0.50     Years: 25.00     Quit date: 7/5/1980     Smokeless tobacco: Never Used     Alcohol use No     Drug use: No     Sexual activity: Not Asked     Other Topics Concern     None     Social History Narrative       ROS:   Constitutional: No fever, chills, or sweats. No weight gain/loss   ENT: No visual disturbance, ear ache, epistaxis, sore throat  Allergies/Immunologic: Negative.   Respiratory: No cough, hemoptysia  Cardiovascular: As per HPI  GI: No nausea, vomiting, hematemesis, melena, or hematochezia  : No urinary frequency, dysuria, or hematuria  Integument: Negative  Psychiatric: Negative  Neuro: Negative  Endocrinology: Negative   Musculoskeletal: Negative    EXAM:  /78 (BP Location: Left arm, Patient Position: Chair, Cuff Size: Adult Large)  Pulse (!) 44  Ht 1.575 m (5' 2\")  Wt 104.8 kg (231 lb)  SpO2 95%  BMI 42.25 kg/m2  In general, the patient is a pleasant obese female in no apparent distress.    HEENT: Unremarkable.  Neck: No adenopathy.  No thyromegaly. No jugular venous distension.   Heart: RRR. Normal S1, S2 splits physiologically. No murmur, rub, click, or gallop.  Lungs: CTA.  No ronchi, wheezes, rales.  No dullness to percussion.   Abdomen: Soft, nontender, nondistended.  Extremities: No clubbing, cyanosis, or edema.  Neurologic: Alert and oriented to person/place/time, normal speech, gait and affect  Skin: No petechiae, purpura or rash.    Labs:    LIVER ENZYME RESULTS:  Lab Results   Component Value Date    AST 21 07/05/2018    ALT 22 " 07/05/2018       CBC RESULTS:  Lab Results   Component Value Date    WBC 5.2 07/05/2018    RBC 4.01 07/05/2018    HGB 11.6 (L) 07/05/2018    HCT 36.0 07/05/2018    MCV 90 07/05/2018    MCH 28.9 07/05/2018    MCHC 32.2 07/05/2018    RDW 14.7 07/05/2018     07/05/2018       BMP RESULTS:  Lab Results   Component Value Date     07/05/2018    POTASSIUM 4.3 07/05/2018    CHLORIDE 106 07/05/2018    CO2 32 07/05/2018    ANIONGAP 5 07/05/2018     (H) 07/05/2018    BUN 41 (H) 07/05/2018    CR 1.66 (H) 07/05/2018    GFRESTIMATED 30 (L) 07/05/2018    GFRESTBLACK 36 (L) 07/05/2018    ANNE 10.3 (H) 07/05/2018        A1C RESULTS:  Lab Results   Component Value Date    A1C 6.2 (H) 07/05/2018       INR RESULTS:  Lab Results   Component Value Date    INR 2.1 (A) 05/12/2017    INR 2.4 (A) 05/08/2017    INR 1.81 (H) 04/03/2017    INR 1.80 (H) 03/31/2017       Procedures:    Nuclear stress test 07/06/2018:    Examination:   NM MPI WITH LEXISCAN   Date:  7/6/2018 2:53 PM      Indication:  pre-operative risk stratification prior to urological surgery in patient unable to exercise; Pre-operative cardiovascular examination; Urothelial carcinoma (H)      Previous Study: No previous Myocardial Perfusion studies for comparison.     Protocol:    Rest and stress myocardial perfusion imaging was performed using Tc-99m tetrofosmin. Pharmacological stress was performed with 0.4 mg of Lexiscan.     Findings:  1. Overall quality of the study: diagnostic.   2. SPECT images demonstrate a moderate sized perfusion abnormality of mild intensity in the distal septal, anterior and apical segments of the myocardium on the stress images which partially resolves with  attenuation correction. The rest images show partial reversibility. These results suggest a high post-scan likelihood of angiographically significant coronary artery disease (distal LAD territory). The summed  stress score is 5.   3. Left ventricular ejection fraction is 46%.  Left ventricular end-diastolic volume is 148 mL. End-systolic volume is 80 mL.  4.  Baseline EKG shows sinus rhythm with left bundle branch block. After Lexiscan, there were infrequent PAC/PVCs, otherwise no change.     Impression:  1. Abnormal SPECT myocardial perfusion imaging study with small amount of mild ischemia in the LAD.  2. Left ventricular function is mildly reduced, calculated LVEF 46%. LVEF may be underestimated due to presence of LBBB, consider echocardiogram.     I have personally reviewed the examination and initial interpretation and I agree with the findings.     DA OSPINA MD    Assessment and Plan:     Ms. Jacques is a 77-year-old female with bladder cancer who was seen prior to bladder surgery for pre-operative cardiac evaluation. Her cardiac risk factors include age, hypertension, type 2 DM, obesity, CKD and prior history of smoking. She has not had an MI and her cardiac catheterizations did not show any significant CAD in the past. Her recent nuclear stress test suggested a small area of ischemia, in the distal LAD territory.     Her bladder surgery is semi-urgent since she has resectable cancer.     I would be hesitant to proceed to coronary angiography because of the following reasons:    1. Given her morbid obesity, female sex and prior cardiac catheterizations showing no obstructive CAD, it is quite possible she does not have significant CAD, I.e., the stress test is false positive.     2. Her CKD stage 3 puts her at a high-risk for contrast-induced nephrotoxicity from the contrast that will be used for her coronary angiography.    3. If she has a coronary angiography and does indeed have a coronary stenosis, and she needs an angioplasty or stenting, she would not be able to have bladder surgery due to the need to anticoagulate for 3-6 months depending on the type of stent she receives. This delay may make her resectable bladder cancer unresectable.    Given all the above, I would  proceed to surgery without any further cardiac interventions.B We discussed the approximately 10% chance of a major cardiac event given her risk factors and that the risk cannot be significantly lowered in her case.     She is on aspirin and atorvastatin, and she should be on these for the foreseeable future, except in the perioperative period when the aspirin could be stopped for the risk of bleeding.    I will plan to see Mr. Jacques back in 3 months time for further management of possible CAD.    Alvin Webb MD  Cardiology

## 2018-07-23 NOTE — PROGRESS NOTES
HPI:     Sunitha Jacques is a 77-year-old female with bladder cancer who was seen prior to surgery for the cancer. She is scheduled for an open radical cystectomy with bilateral lymphadenectomy, ileoconduit diversion and possible colon conduit.    She was seen by anesthesia pre-operatively and was referred to a stress test due to her cardiac risk factors. She has paroxysmal atrial fibrillation, diastolic heart failure, atrial fibrillation hypertension and hyperlipidemia. She notes having cardiac catheterization in 2011 and again 2-3 years ago, both of which did not show significant CAD to her and her son's recollection.    She has no history of MI or CHF. She denies chest pain, orthopnea, PND, pedal edema, syncope or near-syncope.    Her functional status is 3 METS. She has MIKKI, but doesn't wear CPAP. She is a former 12-pack year smoker, having quit in 1980. She has type 2 diabetes but is not on any medications. She is morbidly obese with BMI of 41. She has CKD Stage 3. She takes warfarin for CVA, which was attributed to AF.      PAST MEDICAL HISTORY:  Past Medical History:   Diagnosis Date     Atrial fibrillation with rapid ventricular response (H) 2/11/2017     CAD (coronary artery disease)      Cerebrovascular accident (H) 3/21/2017     CHF (congestive heart failure) (H) 3/21/2017     CRD (chronic renal disease), stage III     Due to bladder cancer     Essential hypertension 4/9/2017     GERD (gastroesophageal reflux disease)      History of MRSA infection 2017    treated april 2017     Hypertension      Hypothyroidism, unspecified type 12/14/2017     Long term current use of anticoagulant 2/12/2017     Morbid obesity (H) 4/13/2017     MIKKI (obstructive sleep apnea)      T2DM (type 2 diabetes mellitus) (H)        CURRENT MEDICATIONS:  Current Outpatient Prescriptions   Medication Sig Dispense Refill     Acetaminophen (TYLENOL PO) Take 1,000 mg by mouth 4 times daily Reported on 4/26/2017       amLODIPine (NORVASC)  10 MG tablet Take 1 tablet (10 mg) by mouth daily (Patient taking differently: Take 10 mg by mouth every morning ) 90 tablet 1     atorvastatin (LIPITOR) 80 MG tablet Take 1 tablet (80 mg) by mouth daily (Patient taking differently: Take 80 mg by mouth every evening ) 90 tablet 1     CALCITRIOL PO Take 0.5 mcg by mouth daily       cetirizine (ZYRTEC) 10 MG tablet Take 10 mg by mouth every evening        clidinium-chlordiazePOXIDE (LIBRAX) 5-2.5 MG CAPS per capsule Take 1 capsule by mouth every morning (before breakfast)       escitalopram (LEXAPRO) 10 MG tablet Take 1 tablet (10 mg) by mouth daily 90 tablet 1     furosemide (LASIX) 40 MG tablet Take 1 tablet (40 mg) by mouth 2 times daily 180 tablet 0     hydrALAZINE (APRESOLINE) 10 MG tablet Take 10 mg by mouth 2 times daily  90 tablet 1     LEVOTHYROXINE SODIUM PO Take 125 mcg by mouth daily       METOPROLOL TARTRATE PO Take 100 mg by mouth 2 times daily        Montelukast Sodium (SINGULAIR PO) Take 10 mg by mouth At Bedtime       multivitamin, therapeutic with minerals (MULTI-VITAMIN) TABS tablet Take 1 tablet by mouth daily       pregabalin (LYRICA) 50 MG capsule Take 1 capsule (50 mg) by mouth 2 times daily 60 capsule 0     quinapril (ACCUPRIL) 40 MG Tab Take 1 tablet (40 mg) by mouth daily 90 tablet 1     VITAMIN D, CHOLECALCIFEROL, PO Take 1,000 Units by mouth daily       WARFARIN SODIUM PO Take 2.5 mg by mouth daily          PAST SURGICAL HISTORY:  Past Surgical History:   Procedure Laterality Date     APPENDECTOMY  1975     AS TOTAL KNEE ARTHROPLASTY Bilateral 2015     CHOLECYSTECTOMY  1975     COLONOSCOPY       SHOULDER SURGERY  2003     TONSILLECTOMY      as a child       ALLERGIES     Allergies   Allergen Reactions     Metaproterenol      Penicillins      Prednisone      Sulfa Drugs        FAMILY HISTORY:  No family history on file.    SOCIAL HISTORY:  Social History     Social History     Marital status:      Spouse name: N/A     Number of  "children: N/A     Years of education: N/A     Social History Main Topics     Smoking status: Former Smoker     Packs/day: 0.50     Years: 25.00     Quit date: 7/5/1980     Smokeless tobacco: Never Used     Alcohol use No     Drug use: No     Sexual activity: Not Asked     Other Topics Concern     None     Social History Narrative       ROS:   Constitutional: No fever, chills, or sweats. No weight gain/loss   ENT: No visual disturbance, ear ache, epistaxis, sore throat  Allergies/Immunologic: Negative.   Respiratory: No cough, hemoptysia  Cardiovascular: As per HPI  GI: No nausea, vomiting, hematemesis, melena, or hematochezia  : No urinary frequency, dysuria, or hematuria  Integument: Negative  Psychiatric: Negative  Neuro: Negative  Endocrinology: Negative   Musculoskeletal: Negative    EXAM:  /78 (BP Location: Left arm, Patient Position: Chair, Cuff Size: Adult Large)  Pulse (!) 44  Ht 1.575 m (5' 2\")  Wt 104.8 kg (231 lb)  SpO2 95%  BMI 42.25 kg/m2  In general, the patient is a pleasant obese female in no apparent distress.    HEENT: Unremarkable.  Neck: No adenopathy.  No thyromegaly. No jugular venous distension.   Heart: RRR. Normal S1, S2 splits physiologically. No murmur, rub, click, or gallop.  Lungs: CTA.  No ronchi, wheezes, rales.  No dullness to percussion.   Abdomen: Soft, nontender, nondistended.  Extremities: No clubbing, cyanosis, or edema.  Neurologic: Alert and oriented to person/place/time, normal speech, gait and affect  Skin: No petechiae, purpura or rash.    Labs:    LIVER ENZYME RESULTS:  Lab Results   Component Value Date    AST 21 07/05/2018    ALT 22 07/05/2018       CBC RESULTS:  Lab Results   Component Value Date    WBC 5.2 07/05/2018    RBC 4.01 07/05/2018    HGB 11.6 (L) 07/05/2018    HCT 36.0 07/05/2018    MCV 90 07/05/2018    MCH 28.9 07/05/2018    MCHC 32.2 07/05/2018    RDW 14.7 07/05/2018     07/05/2018       BMP RESULTS:  Lab Results   Component Value Date    "  07/05/2018    POTASSIUM 4.3 07/05/2018    CHLORIDE 106 07/05/2018    CO2 32 07/05/2018    ANIONGAP 5 07/05/2018     (H) 07/05/2018    BUN 41 (H) 07/05/2018    CR 1.66 (H) 07/05/2018    GFRESTIMATED 30 (L) 07/05/2018    GFRESTBLACK 36 (L) 07/05/2018    ANNE 10.3 (H) 07/05/2018        A1C RESULTS:  Lab Results   Component Value Date    A1C 6.2 (H) 07/05/2018       INR RESULTS:  Lab Results   Component Value Date    INR 2.1 (A) 05/12/2017    INR 2.4 (A) 05/08/2017    INR 1.81 (H) 04/03/2017    INR 1.80 (H) 03/31/2017       Procedures:    Nuclear stress test 07/06/2018:    Examination:   NM MPI WITH LEXISCAN   Date:  7/6/2018 2:53 PM      Indication:  pre-operative risk stratification prior to urological surgery in patient unable to exercise; Pre-operative cardiovascular examination; Urothelial carcinoma (H)      Previous Study: No previous Myocardial Perfusion studies for comparison.     Protocol:    Rest and stress myocardial perfusion imaging was performed using Tc-99m tetrofosmin. Pharmacological stress was performed with 0.4 mg of Lexiscan.     Findings:  1. Overall quality of the study: diagnostic.   2. SPECT images demonstrate a moderate sized perfusion abnormality of mild intensity in the distal septal, anterior and apical segments of the myocardium on the stress images which partially resolves with  attenuation correction. The rest images show partial reversibility. These results suggest a high post-scan likelihood of angiographically significant coronary artery disease (distal LAD territory). The summed  stress score is 5.   3. Left ventricular ejection fraction is 46%. Left ventricular end-diastolic volume is 148 mL. End-systolic volume is 80 mL.  4.  Baseline EKG shows sinus rhythm with left bundle branch block. After Lexiscan, there were infrequent PAC/PVCs, otherwise no change.     Impression:  1. Abnormal SPECT myocardial perfusion imaging study with small amount of mild ischemia in the  LAD.  2. Left ventricular function is mildly reduced, calculated LVEF 46%. LVEF may be underestimated due to presence of LBBB, consider echocardiogram.     I have personally reviewed the examination and initial interpretation and I agree with the findings.     DA OSPINA MD    Assessment and Plan:     Ms. Jacques is a 77-year-old female with bladder cancer who was seen prior to bladder surgery for pre-operative cardiac evaluation. Her cardiac risk factors include age, hypertension, type 2 DM, obesity, CKD and prior history of smoking. She has not had an MI and her cardiac catheterizations did not show any significant CAD in the past. Her recent nuclear stress test suggested a small area of ischemia, in the distal LAD territory.     Her bladder surgery is semi-urgent since she has resectable cancer.     I would be hesitant to proceed to coronary angiography because of the following reasons:    1. Given her morbid obesity, female sex and prior cardiac catheterizations showing no obstructive CAD, it is quite possible she does not have significant CAD, I.e., the stress test is false positive.     2. Her CKD stage 3 puts her at a high-risk for contrast-induced nephrotoxicity from the contrast that will be used for her coronary angiography.    3. If she has a coronary angiography and does indeed have a coronary stenosis, and she needs an angioplasty or stenting, she would not be able to have bladder surgery due to the need to anticoagulate for 3-6 months depending on the type of stent she receives. This delay may make her resectable bladder cancer unresectable.    Given all the above, I would proceed to surgery without any further cardiac interventions.B We discussed the approximately 10% chance of a major cardiac event given her risk factors and that the risk cannot be significantly lowered in her case.     She is on aspirin and atorvastatin, and she should be on these for the foreseeable future, except in the  perioperative period when the aspirin could be stopped for the risk of bleeding.    I will plan to see Mr. Jacques back in 3 months time for further management of possible CAD.    Alvin Webb MD  Cardiology

## 2018-07-23 NOTE — NURSING NOTE
Chief Complaint   Patient presents with     New Patient     reason for visit: New Pt Visit     Vitals were taken and medications were reconciled. EKG was performed.    TED Atkinson  6:47 AM

## 2018-07-23 NOTE — NURSING NOTE
Med Reconcile: Reviewed and verified all current medications with the patient. The updated medication list was printed and given to the patient.  Return Appointment: Patient given instructions regarding scheduling next clinic visit as needed. Patient demonstrated understanding of this information and agreed to call with further questions or concerns.  Pt has been cleared for surgery.  Records have been requested from [facility] for Pt [id number] Angiogram.  Patient stated she understood all health information given and agreed to call with further questions or concerns.    Saloni Goodman LPN

## 2018-07-23 NOTE — MR AVS SNAPSHOT
After Visit Summary   7/23/2018    Sunitha Jacques    MRN: [id number]           Patient Information     Date Of Birth          1940        Visit Information        Provider Department      7/23/2018 7:00 AM Alvin Webb MD [facility]        Today's Diagnoses     Coronary artery disease involving native coronary artery of native heart without angina pectoris    -  1    Dyspnea on effort          Care Instructions    Patient Instructions:  It was a pleasure to see you in the cardiology clinic today.      If you have any questions, you can reach my nurse, Saloni Goodman LPN, at [phone].  Press Option #1 for the [facility], and then press Option #3 for nursing.    We are encouraging the use of YourNextLeap to communicate with your HealthCare Provider    Medication Changes: None.    Studies Ordered: None.    The results from today include: None.    Please follow up: With Dr. Webb as needed.  You have been cleared for surgery.  We are just waiting on your records from [place].    Sincerely,    Alvin Webb MD     If you have an urgent need after hours (8:00 am to 4:30 pm) please call [phone] and ask for the cardiology fellow on call.                    Follow-ups after your visit        Your next 10 appointments already scheduled     Aug 08, 2018   Procedure with Girma Sharma MD   [facility] [place], Same Day Surgery (--)    [address] [address] [phone]            Aug 21, 2018  1:15 PM CDT   Return Visit with Girma Sharma MD   [facility] [place] [facility] ([facility])    [address]  [address]  [facility] [address]   [phone]            Sep 05, 2018  9:40 AM CDT   CT CHEST/ABDOMEN/PELVIS W CONTRAST with UCCT1   [facility] CT ([facility])    [address]  1st Floor  [facility]  [id number] [phone]           Please bring any scans or X-rays taken at other hospitals, if similar tests were done. Also bring a list of your medicines, including vitamins, minerals and over-the-counter drugs. It is safest to leave personal items at home.  Be sure to tell your doctor:   If you have any allergies.   If there s any chance you are pregnant.   If you are breastfeeding.  How to prepare:   Do not eat or drink for 2 hours before your exam. If you need to take medicine, you may take it with small sips of water. (We may ask you to take liquid medicine as well.)   Please wear loose clothing, such as a sweat suit or jogging clothes. Avoid snaps, zippers and other metal. We may ask you to undress and put on a hospital gown.  Please arrive 30 minutes early for your CT. Once in the department you might be asked to drink water 15-20 minutes prior to your exam.  If indicated you may be asked to drink an oral contrast in advance of your CT.  If this is the case, the imaging team will let you know or be in contact with you prior to your appointment  Patients over 70 or patients with diabetes or kidney problems:   If you haven t had a blood test (creatinine test) within the last 30 days, the Cardiologist/Radiologist may require you to get this test prior to your exam.  If you have diabetes:   Continue to take your metformin medication on the day of your exam  If you have any questions, please call the Imaging Department where you will have your exam.            Sep 05, 2018 12:30 PM CDT   [facility] Draw with  [facility] DRAW   [facility] ([facility])    [address]  [address]  [facility] [address] [phone]            Sep 05, 2018  1:00 PM CDT   (Arrive by 12:45 PM)   Return Visit with Nikolas Espinal MD   [facility] ([facility])    [address]  [address]  [facility] [address]  "  [phone]              Who to contact     If you have questions or need follow up information about today's clinic visit or your schedule please contact [facility] directly at [phone].  Normal or non-critical lab and imaging results will be communicated to you by MyChart, letter or phone within 4 business days after the clinic has received the results. If you do not hear from us within 7 days, please contact the clinic through MyChart or phone. If you have a critical or abnormal lab result, we will notify you by phone as soon as possible.  Submit refill requests through Top[employer] or call your pharmacy and they will forward the refill request to us. Please allow 3 business days for your refill to be completed.          Additional Information About Your Visit        Care EveryWhere ID     This is your Care EveryWhere ID. This could be used by other organizations to access your [place] medical records  [id number]        Your Vitals Were     Pulse Height Pulse Oximetry BMI (Body Mass Index)          44 1.575 m (5' 2\") 95% 42.25 kg/m2         Blood Pressure from Last 3 Encounters:   07/23/18 173/78   07/05/18 151/69   06/27/18 187/67    Weight from Last 3 Encounters:   07/23/18 104.8 kg (231 lb)   07/05/18 103.9 kg (229 lb)   06/27/18 103 kg (227 lb)              We Performed the Following     CARDIOLOGY EVAL ADULT REFERRAL     EKG 12-lead, tracing only (Same Day)          Today's Medication Changes          These changes are accurate as of 7/23/18  8:04 AM.  If you have any questions, ask your nurse or doctor.               These medicines have changed or have updated prescriptions.        Dose/Directions    amLODIPine 10 MG tablet   Commonly known as:  NORVASC   This may have changed:  when to take this   Used for:  Essential hypertension        Dose:  10 mg   Take 1 tablet (10 mg) by mouth daily   Quantity:  90 tablet   Refills:  1       atorvastatin 80 MG tablet   Commonly known as:  LIPITOR "   This may have changed:  when to take this   Used for:  Essential hypertension, Chronic diastolic congestive heart failure (H)        Dose:  80 mg   Take 1 tablet (80 mg) by mouth daily   Quantity:  90 tablet   Refills:  1                Primary Care Provider    Pradeep Zuniga       No address on file        Equal Access to Services     ZEB NIDIA : Hadii alissa tinsley otto Sodelmer, waaxda luqadaha, qaybta kaalmada adejaycee, mikey analiin hayaan clintallan barragan zoie snider. So [facility] [phone].    ATENCIÓN: Si habla español, tiene a tomlin disposición servicios gratuitos de asistencia lingüística. Llame al [phone].    We comply with applicable federal civil rights laws and [place] laws. We do not discriminate on the basis of race, color, national origin, age, disability, sex, sexual orientation, or gender identity.            Thank you!     Thank you for choosing [facility]  for your care. Our goal is always to provide you with excellent care. Hearing back from our patients is one way we can continue to improve our services. Please take a few minutes to complete the written survey that you may receive in the mail after your visit with us. Thank you!             Your Updated Medication List - Protect others around you: Learn how to safely use, store and throw away your medicines at www.disposemymeds.org.          This list is accurate as of 7/23/18  8:04 AM.  Always use your most recent med list.                   Brand Name Dispense Instructions for use Diagnosis    amLODIPine 10 MG tablet    NORVASC    90 tablet    Take 1 tablet (10 mg) by mouth daily    Essential hypertension       atorvastatin 80 MG tablet    LIPITOR    90 tablet    Take 1 tablet (80 mg) by mouth daily    Essential hypertension, Chronic diastolic congestive heart failure (H)       CALCITRIOL PO      Take 0.5 mcg by mouth daily        cetirizine 10 MG tablet    zyrTEC     Take 10 mg by mouth every evening        clidinium-chlordiazePOXIDE  5-2.5 MG Caps per capsule    LIBRAX     Take 1 capsule by mouth every morning (before breakfast)        escitalopram 10 MG tablet    LEXAPRO    90 tablet    Take 1 tablet (10 mg) by mouth daily    Adjustment disorder with depressed mood       furosemide 40 MG tablet    LASIX    180 tablet    Take 1 tablet (40 mg) by mouth 2 times daily    Chronic diastolic congestive heart failure (H)       hydrALAZINE 10 MG tablet    APRESOLINE    90 tablet    Take 10 mg by mouth 2 times daily    Benign essential hypertension       LEVOTHYROXINE SODIUM PO      Take 125 mcg by mouth daily        METOPROLOL TARTRATE PO      Take 100 mg by mouth 2 times daily        Multi-vitamin Tabs tablet      Take 1 tablet by mouth daily        pregabalin 50 MG capsule    LYRICA    60 capsule    Take 1 capsule (50 mg) by mouth 2 times daily    Diabetic polyneuropathy associated with type 2 diabetes mellitus (H)       quinapril 40 MG Tab    ACCUPRIL    90 tablet    Take 1 tablet (40 mg) by mouth daily    Essential hypertension, Type 2 diabetes mellitus with other neurologic complication       SINGULAIR PO      Take 10 mg by mouth At Bedtime        TYLENOL PO      Take 1,000 mg by mouth 4 times daily Reported on 4/26/2017        VITAMIN D (CHOLECALCIFEROL) PO      Take 1,000 Units by mouth daily        WARFARIN SODIUM PO      Take 2.5 mg by mouth daily

## 2018-07-23 NOTE — PATIENT INSTRUCTIONS
Patient Instructions:  It was a pleasure to see you in the cardiology clinic today.      If you have any questions, you can reach my nurse, Saloni Goodman LPN, at [phone].  Press Option #1 for the [facility], and then press Option #3 for nursing.    We are encouraging the use of [employer] to communicate with your HealthCare Provider    Medication Changes: None.    Studies Ordered: None.    The results from today include: None.    Please follow up: With Dr. Webb as needed.  You have been cleared for surgery.  We are just waiting on your records from [place].    Sincerely,    Alvin Webb MD     If you have an urgent need after hours (8:00 am to 4:30 pm) please call [phone] and ask for the cardiology fellow on call.

## 2018-08-01 NOTE — LETTER
8/1/2018       RE: Sunitha Jacques  [address] [place] [address]     Dear Colleague,    Thank you for referring your patient, Sunitha Jacques, to the [facility] PLASTIC AND RECONSTRUCTIVE SURGERY at [facility]. Please see a copy of my visit note below.    REFERRING PHYSICIAN:  Girma Sharma MD, of Urology      PRESENTING COMPLAINT:  Consultation for pelvic reconstruction.        HISTORY OF PRESENTING COMPLAINT:  Ms. Jacques is 77 years old.  She has been diagnosed with bladder cancer.  She is being worked up for a pelvic exenteration requiring an ileal conduit.  Her history goes back to when she was 29 years of age and has history of cervical cancer treated with cobalt therapy and chemotherapy.  As a result, she has been radiated in the pelvic area and, therefore, no more radiation can be undertaken.  Nonetheless, after the pelvic exenteration, they may require a flap to try and fill in the pelvis to prevent fistulization.  I have been consulted for that purpose.      PAST MEDICAL HISTORY:  Hypertension, hyperlipidemia, diabetes, CVA in 2017, chronic kidney disease, obstructive sleep apnea, hypothyroidism, GERD, CHF, coronary artery disease and atrial fibrillation.      PAST SURGICAL HISTORY:  Tonsillectomy, open cholecystectomy, open appendectomy, right shoulder and knee surgery and a nephrostomy tube placement.      MEDICATIONS:  Coumadin, amlodipine, atorvastatin, citalopram, furosemide, pregabalin, hydralazine, levothyroxine, metoprolol and Singulair.      ALLERGIES:  Penicillin, prednisone, sulfa and metaproterenol.      SOCIAL HISTORY:  Quit smoking in the 70s.  Does not drink alcohol.  Lives in [place].      REVIEW OF SYSTEMS:  Denies chest pain, shortness of breath and MI.  Has had a CVA.  No DVT or PE.      PHYSICAL EXAMINATION:  On exam vital signs stable.  She is afebrile in no obvious distress.  She is 5 feet 2 inches, 230 pounds, BMI 42 kg/m2.   On examination of her abdomen, she has an upper midline incision.  She has no obvious hernias.  She has a thick abdominal wall with a skin pinch thickness of about 8 cm.  She has minimal radiation therapy changes in her lower abdomen.  She has no scars in the left sudhakar abdomen.      ASSESSMENT AND PLAN:  Based on above findings, a diagnosis of bladder cancer in preparation for a pelvic exenteration requirement of a flap for pelvic reconstruction was made.  I had a long discussion with the patient and her  about the findings.  Explained to them that my services may be required to help fashion a flap that will fill the pelvis to prevent bowel from entering into the pelvis to help prevent fistulization and pathologic adhesions.  Explained to them the best course of action is to use a vertical rectus abdominis musculocutaneous flap, probably from the left side given the fact that the ileostomy will be probably placed in the right side.  Given her previous history of surgery as well as radiation, we will get an ultrasound to ensure that the left deep inferior epigastric vessels are intact.  I went over the planned procedure with them in detail.  All risks, benefits and alternatives of the procedure including pain, infection, bleeding, scarring, asymmetry, seromas, hematomas, wound breakdown, wound dehiscence, loss of the flap, complications of abscesses, seromas, hematomas, especially given her Coumadin use, injury to deeper structures like nerves, vessels and bowel, DVT, PE, MI, CVA, pneumonia, renal failure and death were explained.  She understood them all and wants to proceed.  She is scheduled for next week.  I look forward to helping her out in the near future.      Total time spent with patient 30 minutes, more than half was counseling.      cc:   Girma Sharma MD   [facility]         Again, thank you for allowing me to participate in the care of your patient.      Sincerely,    RAPHAEL Calderón,  MD

## 2018-08-01 NOTE — PROGRESS NOTES
REFERRING PHYSICIAN:  Girma Sharma MD, of Urology      PRESENTING COMPLAINT:  Consultation for pelvic reconstruction.        HISTORY OF PRESENTING COMPLAINT:  Ms. Jacques is 77 years old.  She has been diagnosed with bladder cancer.  She is being worked up for a pelvic exenteration requiring an ileal conduit.  Her history goes back to when she was 29 years of age and has history of cervical cancer treated with cobalt therapy and chemotherapy.  As a result, she has been radiated in the pelvic area and, therefore, no more radiation can be undertaken.  Nonetheless, after the pelvic exenteration, they may require a flap to try and fill in the pelvis to prevent fistulization.  I have been consulted for that purpose.      PAST MEDICAL HISTORY:  Hypertension, hyperlipidemia, diabetes, CVA in 2017, chronic kidney disease, obstructive sleep apnea, hypothyroidism, GERD, CHF, coronary artery disease and atrial fibrillation.      PAST SURGICAL HISTORY:  Tonsillectomy, open cholecystectomy, open appendectomy, right shoulder and knee surgery and a nephrostomy tube placement.      MEDICATIONS:  Coumadin, amlodipine, atorvastatin, citalopram, furosemide, pregabalin, hydralazine, levothyroxine, metoprolol and Singulair.      ALLERGIES:  Penicillin, prednisone, sulfa and metaproterenol.      SOCIAL HISTORY:  Quit smoking in the 70s.  Does not drink alcohol.  Lives in [place].      REVIEW OF SYSTEMS:  Denies chest pain, shortness of breath and MI.  Has had a CVA.  No DVT or PE.      PHYSICAL EXAMINATION:  On exam vital signs stable.  She is afebrile in no obvious distress.  She is 5 feet 2 inches, 230 pounds, BMI 42 kg/m2.  On examination of her abdomen, she has an upper midline incision.  She has no obvious hernias.  She has a thick abdominal wall with a skin pinch thickness of about 8 cm.  She has minimal radiation therapy changes in her lower abdomen.  She has no scars in the left sudhakar abdomen.      ASSESSMENT AND PLAN:   Based on above findings, a diagnosis of bladder cancer in preparation for a pelvic exenteration requirement of a flap for pelvic reconstruction was made.  I had a long discussion with the patient and her  about the findings.  Explained to them that my services may be required to help fashion a flap that will fill the pelvis to prevent bowel from entering into the pelvis to help prevent fistulization and pathologic adhesions.  Explained to them the best course of action is to use a vertical rectus abdominis musculocutaneous flap, probably from the left side given the fact that the ileostomy will be probably placed in the right side.  Given her previous history of surgery as well as radiation, we will get an ultrasound to ensure that the left deep inferior epigastric vessels are intact.  I went over the planned procedure with them in detail.  All risks, benefits and alternatives of the procedure including pain, infection, bleeding, scarring, asymmetry, seromas, hematomas, wound breakdown, wound dehiscence, loss of the flap, complications of abscesses, seromas, hematomas, especially given her Coumadin use, injury to deeper structures like nerves, vessels and bowel, DVT, PE, MI, CVA, pneumonia, renal failure and death were explained.  She understood them all and wants to proceed.  She is scheduled for next week.  I look forward to helping her out in the near future.      Total time spent with patient 30 minutes, more than half was counseling.      cc:   Girma Sharma MD   [facility]

## 2018-08-01 NOTE — NURSING NOTE
"Chief Complaint   Patient presents with     Pre-Op Exam     pt here for a pre op appointment       Vitals:    08/01/18 1034   BP: 175/63   BP Location: Left arm   Patient Position: Sitting   Cuff Size: Adult Regular   Pulse: 56   Resp: 18   Temp: 98.9  F (37.2  C)   TempSrc: Oral   SpO2: 93%   Weight: 232 lb 4.8 oz   Height: 5' 2\"       Body mass index is 42.49 kg/(m^2).      THERESA Bello, EMT                      "

## 2018-08-02 NOTE — PROGRESS NOTES
Order for Abd US faxed to [facility] in [place] at [phone] (ph [phone]). Instructed to fax report back to us ASAP.  Scheduled for 1:30 today.  Contacted son Galen, to inform pt.

## 2018-08-03 NOTE — PROGRESS NOTES
Pt's son, Galen, notified of doppler US scheduled at 8:00 am on Aug 6.  Instructed him that pt should not eat or drink 8 hours prior to US.  Son verbalized understanding.

## 2018-08-07 NOTE — OR NURSING
Called pt for pre-op call. She just had two bites of toast. I told her not to eat anything else since she should be on clear liquids until 2 hrs before surgery. We went over the allowed clear liquids. We also went over medications to take and hold in detail and she verbalized understanding. She know she needs to do an enema tonight. I called Rachana Jhaveri RN Care Coordinator and informed her pt had 2 bites of toast today. She said that should be okay.

## 2018-08-08 PROBLEM — C67.9 BLADDER CANCER (H): Status: ACTIVE | Noted: 2018-01-01

## 2018-08-08 NOTE — OR NURSING
Pt. said she is still needing the bathroom due to bowel prep. Unable to do the planned epidural catheter because she had Lovenox 80 mg at 1030 yesterday. Plan is to do a TAP block in the OR. Dr. Dillon...approved giving the Heparin 5000 U subcutaneous. Patient took her own tylenol 1000 mg prior to admission. She received the Nuerontin 300 mg PO. Port-a-cath accessed without difficulty.

## 2018-08-08 NOTE — ANESTHESIA PROCEDURE NOTES
Central Line Procedure Note  Staff:     Anesthesiologist:  FLAQUITO ESTEVEZ  Location: In OR after induction  Procedure Start/Stop Times:     patient identified, IV checked, site marked, risks and benefits discussed, informed consent, monitors and equipment checked, pre-op evaluation and at physician/surgeon's request      Correct Patient: Yes      Correct Position: Yes      Correct Site: Yes      Correct Procedure: Yes      Correct Laterality:  Yes    Site Marked:  Yes  Line Placement:     Procedure:  Central Line    Insertion laterality:  Right    Insertion site:  Internal Jugular    Position:  Trendelenburg      Maximal Sterile Barriers: All elements of maximal sterile barrier technique followed      (Maximal sterile barriers include:   Sterile gown, Sterile Gloves, Mask, Cap, Whole body draped, hand hygiene and acceptable skin prep).Skin Prep: Chloraprep         Injection Technique:  Ultrasound guided    Sterile Ultrasound Technique:  Sterile probe cover and Sterile gel    Vein evaluated via U/S for patency/adequacy of catheter insertion and is adequate.  Using realtime U/S imaging the vein was punctured, and needle was observed entering vein on U/S      A permanent image is NOT entered into the patient's record.      Local skin infiltration:  None    Catheter size:  7 Fr, 3 lumen, 20 cm    Catheter length at skin (cm):  15    Cath secured with: suture and anchor securement device      Dressing:  Tegaderm and Biopatch    Complications:  None obvious    Blood aspirated all lumens: Yes      All Lumens Flushed: Yes      Verification method:  Placement to be verified post-op

## 2018-08-08 NOTE — TELEPHONE ENCOUNTER
Spoke with patient by telephone to discuss upcoming surgery. We again discussed the high-risk nature of this surgery, particularly given her cardiac status and prior pelvic radiation. Given her renal function limiting chemotherapy and previous radiation, options other than cystectomy are limited. Patient expresses a clear understanding of the procedure, expected recovery, and potential for complications, which may be significant.    Will plan for anterior pelvic exenteration with ileal conduit. Given prior radiotherapy, has met with Dr. Villa for possible flap reconstruction following surgical removal.    Girma Sharma MD  Urology  [facility]  Clinic Phone [phone]

## 2018-08-08 NOTE — ANESTHESIA POSTPROCEDURE EVALUATION
Patient: Sunitha Jacques    Procedure(s):  Anterior Pelvic Exenteration With Bilateral Lymphandectomy, Ileal Conduit Diversion, Left Vertical Rectus Abdominis Musculocutaneous Flap To Pelvis - Wound Class: II-Clean Contaminated   - Wound Class: I-Clean   - Wound Class: I-Clean    Diagnosis:Bladder Cancer  Diagnosis Additional Information: No value filed.    Anesthesia Type:  No value filed.    Note:  Anesthesia Post Evaluation    Patient location during evaluation: PACU  Patient participation: Able to fully participate in evaluation  Level of consciousness: awake  Pain management: adequate  Cardiovascular status: acceptable  Respiratory status: acceptable  Hydration status: acceptable  PONV: controlled             Last vitals:  Vitals:    08/08/18 0554   BP: 162/71   Resp: 20   Temp: 37.1  C (98.8  F)   SpO2: 96%         Electronically Signed By: Thao Means MD  August 8, 2018  6:48 PM

## 2018-08-08 NOTE — ANESTHESIA CARE TRANSFER NOTE
Patient: Sunitha Jacques    Procedure(s):  Anterior Pelvic Exenteration With Bilateral Lymphandectomy, Ileal Conduit Diversion, Left Vertical Rectus Abdominis Musculocutaneous Flap To Pelvis - Wound Class: II-Clean Contaminated   - Wound Class: I-Clean   - Wound Class: I-Clean    Diagnosis: Bladder Cancer  Diagnosis Additional Information: No value filed.    Anesthesia Type:   No value filed.     Note:  Airway :Face Mask  Patient transferred to:PACU  Handoff Report: Identifed the Patient, Identified the Reponsible Provider, Reviewed the pertinent medical history, Discussed the surgical course, Reviewed Intra-OP anesthesia mangement and issues during anesthesia, Set expectations for post-procedure period and Allowed opportunity for questions and acknowledgement of understanding      Vitals: (Last set prior to Anesthesia Care Transfer)    CRNA VITALS  8/8/2018 1826 - 8/8/2018 1857      8/8/2018             Resp Rate (set): 10                Electronically Signed By: MIKE Garrido CRNA  August 8, 2018  6:57 PM

## 2018-08-08 NOTE — OR NURSING
Patient finished in bathroom and changing at 0645, then lab and tissue procurement with her, now anesthesia consulting regarding epidural. RN pre-op assessment not started yet.

## 2018-08-08 NOTE — ANESTHESIA PROCEDURE NOTES
Peripheral Nerve Block Procedure Note    Staff:     Anesthesiologist:  EDDIE LINDO    Resident/CRNA:  HERNAN MONROE    Block performed by resident/CRNA in the presence of a teaching physician    Location: OR AFTER induction  Procedure Start/Stop TImes:      8/8/2018 8:20 AM     8/8/2018 8:35 AM    patient identified, IV checked, site marked, risks and benefits discussed, informed consent, monitors and equipment checked, pre-op evaluation, at physician/surgeon's request and post-op pain management      Correct Patient: Yes      Correct Position: Yes      Correct Site: Yes      Correct Procedure: Yes      Correct Laterality:  Yes    Site Marked:  Yes  Procedure details:     Procedure:  TAP    ASA:  3    Laterality:  Bilateral    Position:  Supine    Sterile Prep: chloraprep, patient draped, mask and sterile gloves      Local skin infiltration:  None    Needle:  Insulated    Needle gauge:  20    Needle length (inches):  3.1    Needle length (mm):  80    Ultrasound: Yes      Ultrasound used to identify targeted nerve, plexus, or vascular structure and placed a needle adjacent to it      Permanent Image entered into patiient's record      Abnormal pain on injection: No      Blood Aspirated: No      Paresthesias:  No    Bleeding at site: No      Bolus via:  Needle    Infusion Method:  Single Shot    Complications:  None

## 2018-08-08 NOTE — BRIEF OP NOTE
[facility], [place]    Brief Operative Note    Pre-operative diagnosis: Bladder Cancer  Post-operative diagnosis * No post-op diagnosis entered *  Procedure: Procedure(s):  Left Vertical Rectus Abdominis Musculocutaneous Pedicled Flap To Pelvis -  Wound Class: II-Clean Contaminated    Surgeon: Surgeon(s) and Role:    Panel 1:     * RAPHAEL Calderón MD - Primary     * Trent Freed MD - Resident - Assisting  Anesthesia: Combined General with Block   Estimated blood loss: 25 ml  Drains: [place] in subcutaneous space   Specimens:   ID Type Source Tests Collected by Time Destination   1 : Urine Urine Bladder OR DOCUMENTATION ONLY Girma Sharma MD 8/8/2018  9:50 AM    2 : Blood Blood, arterial Blood OR DOCUMENTATION ONLY Girma Sharma MD 8/8/2018  9:51 AM    A : Right distal ureter Tissue Ureter, Right SURGICAL PATHOLOGY EXAM Girma Sharma MD 8/8/2018  9:55 AM    B : Left distal ureter Tissue Ureter, Left SURGICAL PATHOLOGY EXAM Girma Sharma MD 8/8/2018 10:41 AM    C : Bladder, uterus, cervix, bilateral fallopian tubes and ovaries Tissue Pelvis SURGICAL PATHOLOGY EXAM Girma Sharma MD 8/8/2018 11:43 AM    D : right pelvic lymph nodes Tissue Lymph Node, Right Pelvic SURGICAL PATHOLOGY EXAM Girma Sharma MD 8/8/2018 12:27 PM    E :  Tissue Lymph Node, Left Pelvic SURGICAL PATHOLOGY EXAM Girma Sharma MD 8/8/2018 12:44 PM      Findings:   See dictation.  Complications: None.  Implants:  None  .    Plan:  - No need for flap checks.   - Will manage subcutaneous drain outpatient.

## 2018-08-08 NOTE — BRIEF OP NOTE
[facility], [place]    Brief Operative Note    Pre-operative diagnosis:  Bladder Cancer  Post-operative diagnosis  Bladder Cancer  Procedure: Procedure(s):  Anterior Pelvic Exenteration With Bilateral Lymphandectomy, Ileal Conduit Diversion, Left Vertical Rectus Abdominis Musculocutaneous Flap To Pelvis - Wound Class: II-Clean Contaminated   - Wound Class: I-Clean   - Wound Class: I-Clean  Surgeon: Surgeon(s) and Role:  Panel 1:     * Girma Sharma MD - Primary     * Chaim Saldana MD - Resident - Assisting     * Rasta Fried MD - Resident - Assisting    Panel 2:     * RAPHAEL Calderón MD - Primary     * Trent Freed MD - Resident - Assisting  Anesthesia:  Combined General with Block   Estimated blood loss: 800 mL  Drains:   19-Monegasque round Robby drain to bulb suction in abdomen (Urology drain)     15-Monegasque round Robby drain to bulb suction anterior to fascia (Plastics drain)     7-Fr single-J ureteral stents bilaterally  Specimens:   ID Type Source Tests Collected by Time Destination   1 : Urine Urine Bladder OR DOCUMENTATION ONLY Girma Sharma MD 8/8/2018  9:50 AM    2 : Blood Blood, arterial Blood OR DOCUMENTATION ONLY Girma Sharma MD 8/8/2018  9:51 AM    A : Right distal ureter Tissue Ureter, Right SURGICAL PATHOLOGY EXAM Girma Sharma MD 8/8/2018  9:55 AM    B : Left distal ureter Tissue Ureter, Left SURGICAL PATHOLOGY EXAM Girma Sharma MD 8/8/2018 10:41 AM    C : Bladder, uterus, cervix, bilateral fallopian tubes and ovaries Tissue Pelvis SURGICAL PATHOLOGY EXAM Girma Sharma MD 8/8/2018 11:43 AM    D : right pelvic lymph nodes Tissue Lymph Node, Right Pelvic SURGICAL PATHOLOGY EXAM Girma Sharma MD 8/8/2018 12:27 PM    E :  Tissue Lymph Node, Left Pelvic SURGICAL PATHOLOGY EXAM Girma Sharma MD 8/8/2018 12:44 PM    F : Left final  margin Other (specify in comments) Ureter, Left SURGICAL PATHOLOGY EXAM Girma Sharma MD 8/8/2018  4:11 PM    G : Right final margin Tissue Ureter, Right SURGICAL PATHOLOGY EXAM Girma Sharma MD 8/8/2018  4:44 PM      Findings:   Bilateral distal ureteral margins negative.  Complications:  None.  Implants:  None.

## 2018-08-08 NOTE — PROGRESS NOTES
SPIRITUAL HEALTH SERVICES  [facility] ([place]) 3C   PRE-SURGERY VISIT    Had pre-surgery visit with Sunitha (pt), her son Galen and his wife Mary Jaen. Sunitha had another son, Danny,  who she lost two years ago. She is Buddhist. We prayed while nurse was setting up an IV. Sunitha has 5 Grandchildren and 2 great Grandchildren.   Provided spiritual support, prayer. Ongoing  support desired.    Katya Alcantar  Volunteer   Pager [phone]

## 2018-08-09 NOTE — PROGRESS NOTES
"Urology  Progress Note    No acute events overnight  Low UOP overnight with elevation in Cr   No BM/flatus overnight   Tolerating CLD   Pain well controlled  Not yet ambulated    Exam  /57  Temp 98.4  F (36.9  C) (Axillary)  Resp 15  Ht 1.56 m (5' 1.42\")  Wt 104.3 kg (229 lb 15 oz)  SpO2 96%  BMI 42.86 kg/m2  No acute distress  Unlabored breathing  Abdomen soft, nontender, nondistended. Incisions covered with moderately saturated dressing. Stoma pink and well perfused, 2 stents and RRC in place.   RIVKA x 2 serosanguinous  Maddox with clear yellow urine in tubing  L PNT uncapped during rounds to gravity     UOP 50/NR  LUQ RIVKA 60/15  LLQ RIVKA 20/5    Labs  PACU:  WBC 14.5  Hgb 8.4  Cr 2.63 (1.96)      Assessment/Plan  78 year old y/o female POD#1 s/p anterior pelvic exteneration, VRAM flap to pelvis (Plastics), BLPLND, creation of ileal conduit for MIBC. Medicine consulted for assistance managing multiple medical comorbidities.     Neuro: tylenol, dilaudid PCA for pain control. Home escitalopram, pregabalin.  CV: HDS. Home amlodipine, statin, metop. Hold home ASA, lasix and quinapril per medicine.   Pulm: incentive spirometry while awake  FEN/GI: CLD MIVF @ 75/hr. Will provide 500 ml bolus.   Endo: BRIANA. Required insulin gtt intra-op. Home synthroid.   : Maddox in place. L PNT uncapped. Monitor urine output, will provide 500 ml bolus.  Heme: on warfarin pre-op, bridged with Lovenox. Hgb stable. Will consider starting SQH.  ID: afebrile, no leukocystosis. Completed pre-op abx.  Activity: up ad olya  PPx: SCDs. SQH.       Seen and examined with the chief resident. Will discuss with Dr. Sharma.    Girma Hall, PGY-3   Urology Resident     Contacting the Urology Team     Please use the following job codes to reach the Urology Team. Note that you must use an in house phone and that job codes cannot receive text pages.     On weekdays, dial [phone] (or star-star-star [phone] on the new [employer]) then [phone] to reach " the Adult Urology resident or PA on call    On weekdays, dial [phone] (or star-star-star [phone] on the new [employer] telephones) then [phone] to reach the Pediatric Urology resident    On weeknights and weekends, dial [phone] (or star-star-star [phone] on the new [employer] telephones) then [phone] to reach the Urology resident on call (for both Adult and Pediatrics)

## 2018-08-09 NOTE — PROGRESS NOTES
Pt to go to 6B. Dr. Saldana to place orders for insulin infusion. Hand off given to Luma Weathers RN.

## 2018-08-09 NOTE — PROGRESS NOTES
Attempted to see x 2 for coccyx pressure injury POA. She coded today and is lethargic, will attempt to see tomorrow. Spoke with RN, pt has mepilex in place and has been turned Q 2hrs.

## 2018-08-09 NOTE — PROGRESS NOTES
[facility] - Internal Medicine Daily Note   Date of Service: 08/09/2018    Patient: Sunitha Jacques  MRN: [id number]  Admission Date: 8/8/2018  Hospital Day # 1    Assessment & Plan:   Sunitha Jacques is a 78 year old female with a complicated past medical history including DM, HTN, hypothyroidism, CVA (2017), obesity, CHF, CKD, AFib (on warfarin), and MIKKI admitted on 8/8/18 after total cystectomy, pelvic exenteration (removal of uterus, vaginal cuff, part of vagina), ileal conduit and flap for invasive bladder cancer.    Recommendations for today:  - Agree with bolus. May repeat if UOP does not improve  - Increase MIVF to 125 ml/hr  - Check UA and FENA  - Consider nephrology consult given nearly anephric rise in Cr  - Check echo given syncopal event    RONNIE on CKD: Cr 2.6 this am and now up to 3.1 this pm-- up from 1.7 prior to surgery. Recent baseline creatinine 1.66-2.2, increased from 0.7-0.9 in 2017. Per urology notes she has atrophic right kidney and history of severe hydronephrosis of the left kidney in the setting of invasive bladder cancer.  - Agree with bolus   - Increase MIVF to 125 ml/hr  - Check UA and FENA  - Recheck Cr this pm  - Close monitoring of Is/Os  - Consider nephrology consult  - holding lasix and ACEi    Syncopal event: Code blue called 8/9 due to LOC and non-palpable pulses. Woke up after a few chest compressions. Had NSR on tele and BP was normal immediately afterward. Very unlikely that she had true cardiac arrest. EKG shows NSR with left bundle (present since 2016 per care everywhere).  Possibly syncope due to volume depletion vs increased vagal tone due to pain/recent surgery    - continue to monitor on tele  - Check echo given recent drop in EF     Probable CAD: Recent nuclear stress test suggested small area of ischemia in distal LAD territory. Patient was evaluated in cardiology clinic 7/23/18, no further work-up (e.g. angiogram) recommended given no prior history of  "MI, previous cardiac catheterizations (per verbal report) without significant CAD, high risk for contrast-induced nephrotoxicity with CKD 3, and semi-urgent need for surgery.  - Continue atorvastatin 80mg HS  - Unclear if taking aspirin (not on medication list but recommended by cardiology; regardless, would hold in post-op period)  - Follow-up in cardiology clinic in Oct 2018     Possible HFrEF: LVEF 46% on MPI scan on 7/6/18. Patient currently appears euvolemic (no peripheral edema, no JVD).   - Strict intake & output   - Hold scheduled lasix in the setting of RONNIE and recent large surgeryadditional fluid losses with surgery (EBL 800mL) and multiple drains    --> Can give lasix prn based on ongoing assessments of volume status   - Hold quinapril pending repeat Cr and pharm med rec  - Resume hydralazine 10mg BID (need to verify dose)     Hx of MIKKI: Per pre-op notes at [facility] patient does not use CPAP at home, however per OSH notes she does use CPAP. Currently she appears to be breathing comfortably with good waveform on capnography and PCO2 ~40. Post-op VBG with mild hypercapnia (pCO2 53 and normal pH 7.33).   - Recheck VBG if showing signs of somnolence     AFib (on LTAC): Currently in sinus. rate controlled (HR 60s post-op), stable BP. She was bridged with lovenox prior to surgery, no recent INR in our system.  - Hold warfarin per primary team. On heparin ppx  - Continue metoprolol 100mg BID     Hx of CVA: Patient had CVA in spring of 2017, reportedly had \"full recovery\" per OSH records. no focal deficits appreciated. Will need to monitor very closely for signs of stroke/TIA while off of anticoagulation.   - Neuro checks Q4hrs  - Continue atorvastatin, hold aspirin as above     PAD: Patient found to have elevated velocities in extrenal iliac arteries bilaterally and right common femoral artery suggesting stenosis (hemodynamically significant by velocity criteria) seen on pelvic arterial and venous duplex on 8/6/18, " "done in preparation for flap placement on 8/8.  - Monitor peripheral pulses closely  - Continue atorvastatin, hold aspirin as above     Hypertension: BP within normal range post-op (120-150/50-60s).   - Continue amlodipine 10mg daily  - Resume hydralazine 10mg BID   - Hold quinapril      Diabetes: Well-controlled diabetes, last HgbA1c 6.2 on 8/8/18. Patient does not take medication for DM at home. She was started on insulin drip intra-operatively due to elevated BG (194)  - Continue insulin gtt until stable. Then sliding scale insulin only     Hydronephrosis (s/p Nephrostomy Tube): Nursing noted malodorous urine from nephrostomy tube. UA dirty.   - Defer tx to urology     Hypothyroidism: Last TSH was 1.88 on 4/13/18.  - Continue levothyroxine 125mcg daily     DVT Prophylaxis: SCDs + heparin 5000 units subcutaneous Q8 hrs (starting 8/9)      Pete Bentley MD   of Medicine  Med-Peds Hospitalist  Pager [phone]    Team: Medicine [facility] 9  Page Cross Cover after 5 pm: pager [phone]     ___________________________________________________________________    Subjective & Interval Hx:    Did well overnight. Pain controlled well. However, late am sat on edge of bed and had syncopal event. Code called. Woke up after a few compressions. Tele with NSR throughout. BP stable.    Last 24 hr care team notes reviewed.   ROS:  4 point ROS including Respiratory, CV, GI and , other than that noted in the HPI, is negative      Medications: Reviewed in EPIC. List below for reference    Physical Exam:    Blood pressure 126/57, temperature 98.4  F (36.9  C), temperature source Axillary, resp. rate 16, height 1.56 m (5' 1.42\"), weight 105.3 kg (232 lb 2.3 oz), SpO2 97 %.    General: Awake, alert, pleasant, NAD  Chest/Resp: CTAB  Heart/CV: II/VI holosystolic murmur noted  Abdomen/GI: multiple incisions. Urostomy in place  Neuro/Psych: moves all ext    Lines/Tubes:   Peripheral IV 08/08/18 Right Lower forearm (Active) " "  Site Assessment [facility] 8/8/2018 10:30 PM   Line Status Saline locked 8/8/2018 10:30 PM   Phlebitis Scale 0-->no symptoms 8/8/2018 10:30 PM   Infiltration Scale 0 8/8/2018 10:30 PM   Number of days:1       Port A Cath Single Right Chest wall (Active)   Access Date 08/08/18 8/9/2018  4:00 AM   Access Attempts 1 8/8/2018  7:00 AM   Gauge/Length 20 gauge;3/4 inch 8/8/2018  7:00 AM   Site Assessment [facility] 8/8/2018 10:30 PM   Line status: Medial or Superior Lumen Infusing 8/9/2018  4:00 AM   Line status: Lateral or Inferior Lumen Saline locked 8/8/2018  7:00 AM   Line Status Blood return noted 8/8/2018  7:00 AM   Dressing Intervention Chlorhexadine sponge;Transparent 8/8/2018 10:30 PM   Dressing change due 08/15/18 8/8/2018 10:30 PM   Number of days:       CVC Triple Lumen 08/08/18 Internal jugular (Active)   Site Assessment [facility] 8/9/2018  4:00 AM   Extravasation? No 8/8/2018 10:30 PM   Dressing Intervention Chlorhexidine sponge;Transparent 8/9/2018  4:00 AM   Dressing Change Due 08/15/18 8/8/2018 10:30 PM   CVC Lumen Assessment Blue;White;Brown 8/8/2018 10:30 PM   Number of days:1       Labs & Studies of Note:  I personally reviewed all labs and imaging.   Unresulted Labs Ordered in the Past 30 Days of this Admission     Date and Time Order Name Status Description    8/8/2018 0955 Surgical pathology exam Preliminary           Medications list for Reference   Current Facility-Administered Medications   Medication     0.9% sodium chloride BOLUS     [START ON 8/11/2018] acetaminophen (TYLENOL) tablet 650 mg     acetaminophen (TYLENOL) tablet 975 mg     amLODIPine (NORVASC) tablet 10 mg     atorvastatin (LIPITOR) tablet 80 mg     bupivacaine liposome (EXPAREL) LONG ACTING injection was administered into the infiltration site to produce postsurgical analgesia. Duration of action is up to 72 hours, and other \"vidhya\" medications should not be given for 96 hours with the exception of the lidocaine 5% patch (LIDODERM) and the " lidocaine 10mg in potassium infusions. This entry is for INFORMATION ONLY.     dextrose 10 % 1,000 mL infusion     glucose gel 15-30 g    Or     dextrose 50 % injection 25-50 mL    Or     glucagon injection 1 mg     docusate sodium (COLACE) capsule 100 mg     escitalopram (LEXAPRO) tablet 10 mg     heparin sodium PF injection 5,000 Units     hydrALAZINE (APRESOLINE) tablet 10 mg     HYDROmorphone (DILAUDID) PCA 1 mg/mL OPIOID NAIVE     insulin 1 unit/mL in saline (NovoLIN, HumuLIN Regular) drip - ADULT IV Infusion     levothyroxine (SYNTHROID/LEVOTHROID) tablet 125 mcg     lidocaine (LMX4) cream     lidocaine 1 % 1 mL     magnesium sulfate 2 g in water intermittent infusion     magnesium sulfate 4 g in 100 mL sterile water (premade)     metoprolol tartrate (LOPRESSOR) tablet 100 mg     montelukast (SINGULAIR) tablet 10 mg     naloxone (NARCAN) injection 0.1-0.4 mg     ondansetron (ZOFRAN-ODT) ODT tab 4 mg    Or     ondansetron (ZOFRAN) injection 4 mg     pregabalin (LYRICA) capsule 50 mg     prochlorperazine (COMPAZINE) injection 5 mg    Or     prochlorperazine (COMPAZINE) tablet 5 mg     sodium chloride (PF) 0.9% PF flush 3 mL     sodium chloride (PF) 0.9% PF flush 3 mL     sodium chloride 0.9% infusion

## 2018-08-09 NOTE — PLAN OF CARE
Problem: Patient Care Overview  Goal: Plan of Care/Patient Progress Review  OT 6B: Cancel - OT orders received. Pt not medically appropriate to initiate OT evaluation following syncopal episode with activity earlier today. Will reschedule and initiate when appropriate.

## 2018-08-09 NOTE — PHARMACY-ADMISSION MEDICATION HISTORY
"Admission medication history interview status for the 8/8/2018 admission is complete. See Epic admission navigator for allergy information, pharmacy, prior to admission medications and immunization status.     Medication history interview sources:  Patient, Surescripts, Pharmacy ([facility], [facility] and [facility] all called)    Changes made to PTA medication list (reason)  Added: Pantoprazole 40mg tablets - take 1 tablet by mouth daily (Surescripts + Patient reported)  Deleted: none  Changed: Acetaminophen 1000mg QID->QDay (per patient), Hydralazine from 10mg bid to 20mg po bid    Additional medication history information (including reliability of information, actions taken by pharmacist):   The patient was very tired at the time of the intial interview (8/9/18) and was difficult to understand but otherwise seemed to be a good medication historian and was able to remember last doses of her medications. Patient's family is generally unaware of what medications the patient takes at home and the patient reports that she is the sole caretaker of her medications and refills.  Patient reports that her primary pharmacy of choice is [facility] mail order pharmacy.     Discrepancies between Home Med list from [facility] on 6-13-18 and Surescripts/Surgery PTA list:  - Pregabalin (lyrica) 50mg - take 1 tablet by mouth twice daily.  Pharmacy was unable to confirm or deny that this medication has an active prescription. Called [id number] - [place], [place] - [address] [address] at [phone]; [id number] - [address] at [phone]; and [facility] [phone] and all pharmacies DO NOT have an active prescription for this medication (within the past year). Patient does not fully remember if she takes this medication but responds \"Is that the expensive one?\" It is not clear if the expensive one is this medication or not. It is possible that this " medication may be provided directly from the  and thus not shown under the fill history in [facility]  - Hydralazine 10mg - Take 2 tablets (20mg) by mouth 3 times daily per medication list from [facility]. [facility] indicates that she is taking the medication 10mg by mouth TID. Patient reports taking this medication 10mg by mouth BID and had recently started taking 20mg by mouth BID.  - Metoprolol tartrate 100mg tablets by mouth BID is not on med list from [facility] and most recent  fill history in [facility] (last filled on 12/18/17).  Patient reported that she was taking metoprolol tartart 100mg bid.    - Per patient and [facility] PTA med list the patient was taking lisinopril 10mg tablets one tablet by mouth daily; however the patient reported that she was taking quinapril one 40mg tablet by mouth every day. The patient has recent fill history for both quinapril 3/1/18 for #90 days and lisinopril on 4/26/18 for # 90 days per review of EPIC fill records and verified via phone with [facility].    Things that she should be out of per [facility] fill history but patient reports that she is still taking:  - Lisinopril / Quinapril: Patient reported that she thought that she took quinapril opposed to lisinopril despite lisinopril being listed on the pre surgery PTA med list.   - Furosemide 40mg Tablets (filled on 3/12/18 for #90 days)  - Glipizide ER 5mg tablets (filled on 4/23/18 for #90 days) (patient denied taking this medication)  - Hydralazine 10mg tablets (filled on 3/1/18 for #90 days); rx take one tablet TID  Patient most likely forgets to take some doses of her medications occasionally and has accumulated a stock of medications at home and is the source of her refilling her medications late.     Patient is currently on the anticoagulation therapy warfarin  Indication: Atrial Fibrillation  INR Goal: 2.0-3.0  Regimen: Take 5mg by mouth for only one day each week and take 2.5mg once daily on all other days.  "(Unable to understand the patient on what day of the week she took her 5mg dose  Last Dose: 8/2/18 per patient and was instructed to take enoxaparin instead. Per her primary pharmacy the enoxaparin instructions were to Inject 80mg (0.8ml) twice daily on days 8/5 & 8/6, then take 1 injection of 80mg (0.8ml) on 8/7 prior to surgery.   Last INR: 1.6 on 6/22/18. Patient normally gets her INRs checked at the [facility] anticoagulation clinic ([phone])    Patient has taken the following antibiotic  Medication: Cephalexin 500mg tablets   Regimen: Take 1 tablet by mouth four times daily for 5 days  Last Dose: 8/3/18  Of note the patient currently has an Allergy listed to to cephalexin.  Per past medical records the allergy listed to this medication is tachycardia.  Patient denied having any adverse reaction to this medication and completed the 5 day course as listed.    Medication: Ciprofloxacin 250mg tablets  Regimen: Take 1 tablet by mouth twice daily for 10 days  Last Dose: Unknown. Medication was picked up on 7/27/18 per Pharmacy records  Patient reported that this antibiotic gave her a stomach ache and that she did not finish the therapy. The patient did not remember how many doses she took but only remembers that she took \"a few\" of them.    Patient reported that she received a flu vaccine this past fall and all of her listed allergies were correct (including the cephalexin allergy)    Prior to Admission medications    Medication Sig Last Dose Taking? Auth Provider   Acetaminophen (TYLENOL PO) Take 1,000 mg by mouth daily Reported on 4/26/2017 8/8/2018 at 0530 Yes Reported, Patient   amLODIPine (NORVASC) 10 MG tablet Take 1 tablet (10 mg) by mouth daily 8/8/2018 at 0530 Yes Bhargavi Marcum APRN CNP   ATORVASTATIN CALCIUM PO Take 80 mg by mouth daily 8/7/2018 at PM Yes Unknown, Entered By History   CALCITRIOL PO Take 0.5 mcg by mouth daily 8/8/2018 at 0530 Yes Unknown, Entered By History "   cetirizine (ZYRTEC) 10 MG tablet Take 10 mg by mouth every evening  8/7/2018 at pm Yes Reported, Patient   clidinium-chlordiazePOXIDE (LIBRAX) 5-2.5 MG CAPS per capsule Take 1 capsule by mouth every morning (before breakfast) 8/8/2018 at 0530 Yes Unknown, Entered By History   Enoxaparin Sodium (LOVENOX SC) Inject 80mg (0.8ml) twice daily on days 8/5 & 8/6, then take 1 injection of 80mg (0.8ml) on 8/7 prior to surgery 8/7/2018 at 1030 Yes Reported, Patient   escitalopram (LEXAPRO) 10 MG tablet Take 1 tablet (10 mg) by mouth daily 8/8/2018 at 0530 Yes Bhargavi Marcum APRN CNP   furosemide (LASIX) 40 MG tablet Take 1 tablet (40 mg) by mouth 2 times daily 8/8/2018 at 0530 Yes Bhargavi Marcum APRN CNP   hydrALAZINE (APRESOLINE) 10 MG tablet Take 20 mg by mouth 2 times daily  8/8/2018 at 0530 Yes Van Raman MD   LEVOTHYROXINE SODIUM PO Take 125 mcg by mouth daily 8/8/2018 at 0530 Yes Unknown, Entered By History   METOPROLOL TARTRATE PO Take 100 mg by mouth 2 times daily  8/8/2018 at 0530 Yes Reported, Patient   Montelukast Sodium (SINGULAIR PO) Take 10 mg by mouth every morning  8/8/2018 at 0530 Yes Unknown, Entered By History   multivitamin, therapeutic with minerals (MULTI-VITAMIN) TABS tablet Take 1 tablet by mouth daily 8/8/2018 at 0530 Yes Reported, Patient   PANTOPRAZOLE SODIUM PO Take 40 mg by mouth every morning (before breakfast) 8/8/2018 at 0530 Yes Unknown, Entered By History   pregabalin (LYRICA) 50 MG capsule Take 1 capsule (50 mg) by mouth 2 times daily 8/8/2018 at 0530 Yes Bhargavi Marcum APRN CNP   quinapril (ACCUPRIL) 40 MG Tab Take 1 tablet (40 mg) by mouth daily 8/8/2018 at 0530 Yes Bhargavi Marcum APRN CNP   VITAMIN D, CHOLECALCIFEROL, PO Take 1,000 Units by mouth daily 8/8/2018 at 0530 Yes Reported, Patient   WARFARIN SODIUM PO Take 5mg by mouth for only one day each week and take 2.5mg once daily on all other days. 8/2/2018 at PM Yes Unknown, Entered By  History     Medication history completed by: Godwin Irby on 8/9/2018 at 11:47 AM (Time spent: 3 hrs combined)    I have discussed, read and agree with medication history documentation.  Martha Jeter Pharm.D., [facility]  Pager [phone]    ADDENDUM 8/10/18 @ 1015: edited above note to add comments regarding pharmacy refills per [facility], information gathered from phone call to [facility] on 8/9/18 @ 1630 and phone call to [email or web address] 8/10/18 @ 1000. Patient is a poor-moderate historian and has AMS this morning. [facility] talked to family on 8/9/18 @ 1630 who were not aware of medications or where she receives them from. Family did provide a medication list from [facility] in [place] dated 6/13/18.  Martha Jeter Pharm.D., [facility]  Pager [phone]

## 2018-08-09 NOTE — PROGRESS NOTES
"CLINICAL NUTRITION SERVICES - ASSESSMENT NOTE     Nutrition Prescription    RECOMMENDATIONS FOR MDs/PROVIDERS TO ORDER:  Diet adv v nutrition support within 2-3 days    Malnutrition Status:    None at this time    Recommendations already ordered by Registered Dietitian (RD):  None at this time    Future/Additional Recommendations:  If appropriate, recommend TPN as follows:   CPN, goal volume 1320ml/day with 170g Dex daily (578kcal), 90g AA daily (360kcal) and 250 ml of 20% IV lipids 5x weekly = 1295 kcals/day (21 kcal/kg/day), 1.5 g PRO/kg/day, GIR 1.9 with 27% kcals from Fat.       REASON FOR ASSESSMENT  Sunitha Jacques is a/an 78 year old female assessed by the dietitian for Provider Order - Dietitian to Assess and Order per Nutrition Protocol. Provider is responsible for nutrition oversight- Chaim Saldana MD    NUTRITION HISTORY  PMH: bladder cancer    Procedure: Anterior Pelvic Exenteration With Bilateral Lymphandectomy, total cystectomy, Ileal Conduit Diversion, Left Vertical Rectus Abdominis Musculocutaneous Flap To Pelvis.    Pt reports she has not had any recent weight loss, instead ~10 lb weight gain prior to surgery. She reports no decreased appetite or PO intakes prior to surgery. Currently pt reports having no appetite and when asked about the clear liquid diet was going, pt reported that it was going terribly. She did report some abdominal pain/ nausea with CL diet.     CURRENT NUTRITION ORDERS  Diet: Clear Liquid  Intake/Tolerance: poor tolerance to clear liquid    LABS  Well-controlled diabetes, last HgbA1c 6.2 on 8/8/18. Phos: 4.9 (H), BUN: 41 (H), Cr: 2.63 (H), Labs reviewed    MEDICATIONS  Medications reviewed    ANTHROPOMETRICS  Height: 156 cm (5' 1.417\")  Most Recent Weight: 104.3 kg (229 lb 15 oz)    IBW: 47.7 kg  BMI: Obesity Grade III BMI >40  Weight History:   Wt Readings from Last 10 Encounters:   08/09/18 105.3 kg (232 lb 2.3 oz)   08/01/18 105.4 kg (232 lb 4.8 oz)   07/23/18 104.8 " kg (231 lb)   07/05/18 103.9 kg (229 lb)   06/27/18 103 kg (227 lb)   06/15/18 103.4 kg (227 lb 15.3 oz)   06/15/18 103.4 kg (228 lb)   04/26/17 101.8 kg (224 lb 8 oz)   04/13/17 102.7 kg (226 lb 6.4 oz)   04/07/17 106.6 kg (235 lb)     Dosing Weight: 62 kg (adjusted)    ASSESSED NUTRITION NEEDS  Estimated Energy Needs: 8044-8671 kcals/day (20 - 25 kcals/kg)  Justification: Obese  Estimated Protein Needs: 74-93 grams protein/day (1.2 - 1.5+ grams of pro/kg)  Justification: Increased needs, Obesity guidelines and Post-op  Estimated Fluid Needs: 1 mL/kcal/day   Justification: Maintenance and Per provider pending fluid status    PHYSICAL FINDINGS  See malnutrition section below.    MALNUTRITION  % Intake: No decreased intake noted  % Weight Loss: None noted  Subcutaneous Fat Loss: None observed  Muscle Loss: None observed  Fluid Accumulation/Edema: None noted  Malnutrition Diagnosis: Patient does not meet two of the above criteria necessary for diagnosing malnutrition    NUTRITION DIAGNOSIS  Inadequate oral intake related to slow advancement of diet 2/2 postop surgery AEB clear liquid diet order x 1 day and pt report of abdominal pain on CL.     INTERVENTIONS  Implementation  Nutrition Education: Provided education on role of RD and nutrition POC. Obtained diet hx as able. Pt was in a lot of pain upon RD visit, not very talkative.    Parenteral Nutrition/IV Fluids - recs     Goals  Diet adv v nutrition support within 2-3 days.  Patient to consume % of nutritionally adequate meal trays TID, or the equivalent with supplements/snacks.     Monitoring/Evaluation  Progress toward goals will be monitored and evaluated per protocol.      Maria Esther Mcarthur, RD, MS, LD  6B- Pager: [phone]

## 2018-08-09 NOTE — PLAN OF CARE
Problem: Patient Care Overview  Goal: Plan of Care/Patient Progress Review  PT 6B: CANCEL; pt not medically appropriate for OOB this date. Will reschedule.

## 2018-08-09 NOTE — PLAN OF CARE
Problem: Patient Care Overview  Goal: Plan of Care/Patient Progress Review  D: Bladder Cancer  I/A: Pt arrived from PACU @ 2230.  Pt somnolent/lethargic. Able to answer where she is and month, but intermittently follows commands, MD at bedside and aware.  PERRLA. Capnography in place with alarms. EtCO2 40-49, IPI 7-10, MD aware.  Sats >90% on 4L NC.  MAPs >65, NSR 60-70s.  PCA in place but unused.  Insulin gtt titrated based upon hourly BG.  Urostomy output 20ml/hr, MD paged and notified.  P: Continue to monitor and follow plan of care.  Notify team of changes in medical condition.      x2 RN skin assessment performed with Soledad MARTINEZ RN.

## 2018-08-09 NOTE — PROGRESS NOTES
Chest xray and Abd xray done. Urology MD looked at film for stent placement. Chest done for like placement. Chest looked at by Dr. Forbes. Line okay for use. Labs complete

## 2018-08-09 NOTE — ANESTHESIA POSTPROCEDURE EVALUATION
Patient: Sunitha Jacques    Procedure(s):  Anterior Pelvic Exenteration With Bilateral Lymphandectomy, Ileal Conduit Diversion, Left Vertical Rectus Abdominis Musculocutaneous Flap To Pelvis - Wound Class: II-Clean Contaminated   - Wound Class: I-Clean   - Wound Class: I-Clean    Diagnosis:Bladder Cancer  Diagnosis Additional Information: No value filed.    Anesthesia Type:  No value filed.    Note:  Anesthesia Post Evaluation    Patient location during evaluation: PACU  Patient participation: Able to fully participate in evaluation  Level of consciousness: awake and alert  Pain management: adequate  Airway patency: patent  Cardiovascular status: acceptable  Respiratory status: acceptable  Hydration status: acceptable  PONV: none     Anesthetic complications: None          Last vitals:  Vitals:    08/08/18 1945 08/08/18 2000 08/08/18 2015   BP: 145/67 129/63 (!) 148/94   Resp: 15 16 15   Temp:  36.7  C (98.1  F)    SpO2: 100% 100% 97%         Electronically Signed By: Jose E Forbes MD  August 8, 2018  9:27 PM

## 2018-08-09 NOTE — ANESTHESIA PROCEDURE NOTES
Arterial Line Procedure Note  Staff:     Anesthesiologist:  FLAQUITO ESTEVEZ  Location: In OR After Induction  Procedure Start/Stop Times:     patient identified, IV checked, site marked, risks and benefits discussed, informed consent, monitors and equipment checked, pre-op evaluation and at physician/surgeon's request      Correct Patient: Yes      Correct Position: Yes      Correct Site: Yes      Correct Procedure: Yes      Correct Laterality:  Yes    Site Marked:  Yes  Line Placement:     Procedure:  Arterial Line    Insertion Site:  Radial    Insertion laterality:  Right    Skin Prep: Chloraprep      Patient Prep: patient draped, mask, sterile gloves and hand hygiene      Local skin infiltration:  None    Ultrasound Guided?: No      Catheter size:  20 gauge, 12 cm    Cath secured with: suture      Dressing:  Tegaderm and Biopatch    Complications:  None obvious    Arterial waveform: Yes      IBP within 10% of NIBP: Yes

## 2018-08-09 NOTE — PLAN OF CARE
Problem: Surgery Nonspecified (Adult)  Goal: Signs and Symptoms of Listed Potential Problems Will be Absent, Minimized or Managed (Surgery Nonspecified)  Signs and symptoms of listed potential problems will be absent, minimized or managed by discharge/transition of care (reference Surgery Nonspecified (Adult) CPG).   Outcome: No Change  Pt likely had a syncopal episode at 1210. Pt wanted to get out of bed to the chair but passed out upon standing- 2 nursing staff members were with the pt. Writer did not feel a pulse so a code was called. Pt came around a few minutes later. Vital signs were stable. BG was stable.   On 3L of O2 via NC. LR infusing at 125cc/hour. Received two 500cc boluses of IVF. UOP through urostomy is low but MD's are aware. Using PCA of dilaudid for pain management. On an insulin gtt at algorithm 2. RIVKA drains with serosanguinous drainage. Turned and repositioned in bed. Family at bedside and supportive. Continue with POC.

## 2018-08-09 NOTE — OP NOTE
Procedure Date: 08/08/2018      PREOPERATIVE DIAGNOSIS:  Bladder cancer requiring pelvic extirpation with a pelvic reconstruction.      POSTOPERATIVE DIAGNOSIS:  Bladder cancer requiring pelvic extirpation with a pelvic reconstruction.       PLASTIC SURGERY PROCEDURES:  Left vertical rectus abdominis musculocutaneous flap placed transpelvically to buttress the closure of the vaginal vault and to fill up the pelvic dead space to prevent bowel from filling the pelvis and decreasing chances of enterovaginal fistula.      SURGEON:  Ayaan Calderón MD      FELLOW:  Trent Freed MD      ANESTHESIA:  General anesthesia with endotracheal intubation.      COMPLICATIONS:  Nil.      DRAINS:  A 15-[place] Rob[place] drain in the subcutaneous plane.      SPECIMENS:  Nil.      BLOOD LOSS:  25 mL.      DESCRIPTION OF PROCEDURE:  After informed consent was taken from the patient the proper site and procedure was ascertained with her and she was appropriately marked.  She was taken to the operating room.  She was placed in supine position with the knees comfortably flexed, pillows underneath them and pneumoboots placed and running prior to induction of anesthesia.  Preoperative antibiotics were given in the OR.  All pressure points were appropriately padded.  General anesthesia was administered without any complications.  Urology took over and will dictate their portion.  I was called to the operating room once they required the flap.  On evaluating the pelvis she had the cephalad portion of the vagina removed.  The vaginal cavity was present.  It was scarred tight from previous radiation.  Primary closure of the vaginal vault proximally could be undertaken, but given her previous history of radiation, we needed to place healthy vascularized tissue to buttress the closure as well as to prevent bowel from sticking to that area which had been radiated to prevent enterovaginal fistulae.  Decision was made to use left vertical rectus  musculocutaneous flap to accomplish this and to fill the pelvic space as well.  I preoperatively did a duplex ultrasound to make sure the deep inferior epigastric vessels on the left side were present.  I began by first closing off the vaginal vault by using 0 PDS suture in an interrupted fashion.  Once that was completed, I then palpated the deep inferior epigastric vessel which I could on the left side.  I then dopplered it all the way up to the periumbilical area, it seemed to be intact.  I therefore decided to use the left VRAM flap.  I went ahead and marked out the VRAM flap.  I then cut the island of skin, dissected down to the anterior rectus sheath, opened anterior rectus sheath cephalad to the arcuate line, dissected out the muscle and then cut the muscle distally at the subcostal region and then elevated the flap out of the rectus sheath thus releasing the VRAM flap based on the deep inferior epigastric vessels.  I dissected all the way to the arcuate line.  I then cut the arcuate line and used that as the entrance point into the pelvis.  The skin was de-epithelialized  There was good healthy bleeding tissue at the de-epithelialized portion.  Hemostasis was ensured.  I then used this flap to fill the pelvis and used the muscle to buttress the primary closure of the cephalad portion of the vaginal vault.  I used 0 PDS suture and parachuted down the muscle circumferentially buttressing the closure.  Once that was completed, I then sewed the proximal end of the anterior rectus sheath to the posterior rectus sheath at the arcuate line, thus completing the left-sided rectus fascia for closure of the abdominal wall.  I used 0 PDS suture in a running manner to close the rectus sheath as described above.  I then used a looped #1 PDS suture to close the midline fascia all the way from the pubic area, all the way towards the area where I had approximated the anterior rectus sheath and posterior rectus sheath on the  left side.  At this point, my portion of the case was completed.  Counts were correct up to this point.  The patient was stable up to this point.  I turned the patient over to the urologist to complete their portion of the case.         RAPHAEL MONTE MD             D: 2018   T: 2018   MT: RUFUS      Name:     [address]   MRN:      [id number]        Account:        [id number]   :      1940           Procedure Date: 2018      Document: [id number]

## 2018-08-09 NOTE — PROGRESS NOTES
Urology Progress Note    Interval History:      Around 12:15, I heard an overhead Code Blue page for Ms. Jacques's room.  Upon arriving at her room, the code team was present and had evaluated the patient.  Their assessment was that patient had a vasovagal episode.      As I met her, Ms. Jacques was alert and oriented.  She explained that she was attempting to sit at the edge of the bed when she felt a horrible LUQ surgical abdominal pain.  The nurse said the patient then lost consciousness.  The nurse called a code and began chest compressions (3-4 compressions, only).  This was painful for the patient and she regained consciousness. She endorses no SOB or CP.      Objective:   /39  P= 62  PulseOx - 95% on 5L n/c  Blood glucose 155    An EKG was obtained and showed NSR with PACs and LBBB. Review of previous EKGs suggests the LBBB was also seen on a 3/2017 EKG.       Physical Exam  Temp:  [97.8  F (36.6  C)-98.9  F (37.2  C)] 98.3  F (36.8  C)  Heart Rate:  [54-71] 62  Resp:  [12-20] 20  BP: ()/(48-94) 118/48  MAP:  [65 mmHg-77 mmHg] 73 mmHg  Arterial Line BP: (136-154)/(40-50) 151/45  SpO2:  [93 %-100 %] 94 %    I/O last 3 completed shifts:  In: 3343.38 [I.V.:2793.38]  Out: 1585 [Urine:670; Drains:115; Blood:800]    Gen: NAD, A&Ox3  Abd: Soft.  ND. Appropriately tender, midline dressing in place, some drainage from lower aspect of incision, RIVKA with SS output, ostomy pink and viable, stents in place.       Labs reviewed/pending  Heme:  Recent Labs  Lab 08/09/18  0509 08/08/18  1900 08/08/18  1730 08/08/18  1630  08/08/18  0630   WBC 14.5* 10.7  --   --   --  6.8   HGB 8.4* 9.2* 9.2* 8.7*  < > 11.6*    194  --   --   --  230   < > = values in this interval not displayed.  Chem:  Recent Labs  Lab 08/09/18  0509 08/08/18  1900 08/08/18  1730 08/08/18  1630  08/08/18  0630   POTASSIUM 4.0 4.1 3.8 3.5  < > 3.9   CR 2.63* 1.96*  --   --   --  1.72*   < > = values in this interval not  displayed.    Assessment/Plan  78 year old female with HTN, CHF, CVA, afib (on warfarin), DM, hypothyroisim, MIKKI, bladder cancer is now POD#1 s/p anterior pelvic exent with ileal conduit urinary diversion for bladder cancer.  Today she has suffered a syncopal episode associated with sitting at the edge of the bed, which exacerbated abdominal surgical pain, in the setting of known heart disease with concerns for hypovolemia.     Today:    EKG is unchanged from previous.     Mag was 1.5 earlier but has been replaced.  Will obtain stat BMP, CBC and mag.      Did receive chest compressions.  Will monitor HGB and pulmonary status    Remains oligouric.  Receiving 1L bolus today and MIVF have been increased to 125mL/hr (per Medicine).  FENA and UA pending.  Will flush ureteral stents.     Have contacted Medicine who will re-eval the patient given this recent episode    Patient seen by me.  Discussed with Dr. Agee.  Discussed with Dr. Sharma.     CHARLES CastleC  Urology Physician Assistant  Personal Pager: [phone]    Please call Job Code:   [phone] to reach the Urology resident or PA on call - Weekdays  [phone] to reach the Urology resident or PA on call - Weeknights and weekends

## 2018-08-09 NOTE — OR NURSING
Pt remains somnolent, responds to questions by nodding or shaking head yes or no. Can state where she is from and where her son is from, whispering, then back to sleep. Denies pain or nausea. VSS, O2 sats 97% on 3L/NC O2. Moderate drainage on lower quarter of dressing, in deep abdominal fold. ABD placed in fold at 2010 with only small amount of drainage on ABD when checked.

## 2018-08-09 NOTE — OP NOTE
OPERATIVE REPORT  08/08/2018     PREPROCEDURE DIAGNOSIS:   1. Muscle invasive bladder cancer     POSTPROCEDURE DIAGNOSIS:   1. Muscle invasive bladder cancer     PROCEDURE PERFORMED:   1. Anterior Pelvic Exenteration   2. Bilateral Lymphandectomy  3. Ileal Conduit Diversion  4. Left Vertical Rectus Abdominis Musculocutaneous Flap To Pelvis - Dr. Villa     SURGEON:   Girma Sharma MD   CO-SURGEON:  Ayaan Calderón MD  ASSISTANT(S):  MD Rasta Trevizo MD Daniel W. Smith, MD   ANESTHESIA:  General with endotracheal intubation.   EBL:    800 cc  FINDINGS:   Left ureteral identification made quite difficult by prior radiation therapy.  Both distal ureteral margins negative for malignancy on frozen section.  Unremarkable creation of ileal conduit with negative leak test.    DRAINS:   19-Azeri round Robby drain to bulb suction in abdomen (Urology Drain)       15-Azeri round Robby drain to bulb suction anterior to fascia (Plastics Drain)      7-Fr single-J stents x2      Left PNT (in place prior to surgery)  INDICATIONS:  Ms. Sunitha Jacques is a(n) 78 year old female with a history of muscle invasive bladder cancer who is s/p carboplatin chemotherapy who elects for cystectomy.  She also has a hx of cervical cancer treated with cobalt therapy in the 1970s.  After a thorough discussion of risks, benefits, and alternatives, she elected to undergo the above procedures.  SPECIMENS:     ID Type Source Tests Collected by Time Destination   1 : Urine Urine Bladder OR DOCUMENTATION ONLY Girma Sharma MD 8/8/2018  9:50 AM    2 : Blood Blood, arterial Blood OR DOCUMENTATION ONLY Girma Sharma MD 8/8/2018  9:51 AM    A : Right distal ureter Tissue Ureter, Right SURGICAL PATHOLOGY EXAM Girma Sharma MD 8/8/2018  9:55 AM    B : Left distal ureter Tissue Ureter, Left SURGICAL PATHOLOGY EXAM Girma Sharma MD 8/8/2018 10:41 AM    C : Bladder,  uterus, cervix, bilateral fallopian tubes and ovaries Tissue Pelvis SURGICAL PATHOLOGY EXAM Girma Sharma MD 8/8/2018 11:43 AM    D : right pelvic lymph nodes Tissue Lymph Node, Right Pelvic SURGICAL PATHOLOGY EXAM Girma Sharma MD 8/8/2018 12:27 PM    E :  Tissue Lymph Node, Left Pelvic SURGICAL PATHOLOGY EXAM Girma Sharma MD 8/8/2018 12:44 PM    F : Left final margin Other (specify in comments) Ureter, Left SURGICAL PATHOLOGY EXAM Girma Sharma MD 8/8/2018  4:11 PM    G : Right final margin Tissue Ureter, Right SURGICAL PATHOLOGY EXAM Girma Sharma MD 8/8/2018  4:44 PM         DESCRIPTION OF PROCEDURE: After fully informed voluntary consent was obtained, the patient was brought into the operating room, properly identified and placed in a supine position on the operating table. General anesthesia was induced, endotracheal intubation performed, & IV antibiotics were administered. TAP blocks were performed by anesthesia.  She was repositioned into the dorsal lithotomy position.  The abdomen was prepped and draped in the usual sterile fashion. A timeout was performed to confirm correct patient, procedure, and laterality.  A Maddox catheter was placed in the bladder in a sterile manner.  A midline infraumbilical incision was made with a knife at the location of the patient's prior appendectomy incision.  This was taken through the anterior rectus fascia and the peritoneal cavity was entered.  The bladder was mobilized off the pelvic sidewall on either side.     We turned our attention to ureteral identification.  We began on the right side and identified the right ureter in the retroperitoneum as it crossed the iliac vessels. The ovaries and the ovarian vessels were identified. Ovarian artery and vein were ligated with the ligasure. The ureter was identified and dissected to the bladder. At the level of the bladder, the right ureter was  clamped, divided and the distal end ligated with a 2-0 silk ligature. The proximal end was tagged with 3-0 Vicryl suture after a distal margin was sent to pathology for a frozen section (this ultimately returned negative for malignancy). A similar procedure was performed on the contralateral side, with frozen pathology negative for malignancy.  It should be noted on the left side that ureteral identification was made difficult by thickened tissue of the retroperitoneum likely associated with prior radiation therapy.     We then focused our dissection in the cul de sac inferior and posterior to where the ureteral stumps were ligated and opened the peritoneum in this location. A sponge stick was placed into the vagina for retraction.  Electrocautery was used to open the anterior vagina just posterior to the bladder; the patient's vaginal tissue was significantly thickened likely due to prior radiation therapy which made transection of the anterior vaginal wall quite difficult. The uterus, ovaries, and cervix were included in the en bloc resection with the bladder. The posterior and lateral pedicles were then divided with the LigaSure electrocautery, first on the right side and then on the left side. Of note, on the left side, the pedicle tissue was quite thickened.     The urethra was divided.  The catheter was clipped outside the body and brought through the urethra.  The specimen was removed and passed off. This included the bladder, uterus, ovaries, fallopian tubes, and anterior wall of the vagina. All bleeding points were then controlled.  We then inspected the posterior vaginal wall and noted that closure of the vaginal defect would prove quite challenging based on the size of the defect and prior radiation therapy; we notified Dr. Calderón's team that we would require their assistance for closure of the defect and placement of a flap to the pelvis. We then performed a pelvic lymph node dissection on both sides  with removing all lymph node tissue between the external iliac artery, obturator nerve distally down to the inguinal ligament and cephalad up to just above the level of the bifurcation of common iliac vessels.      The case was then turned over the Dr. Calderón's team for closure of the vaginal defect and transfer of a vertical rectus abdominis flap (dictated separately).  Following completion of this, the urology service took back over the case.    We then repositioned the retractors, identified the terminal ileum and made mesenteric windows to isolate our segment of ileum for the ileal conduit. We isolated about a 20-cm segment. We then divided the mesentery at either end of this isolated segment of ileum using the LigaSure device. The bowel itself was divided using ARIADNE 80 stapler. Bowel continuity was then restored in a standard fashion using a side-to-side anastomosis with a single fire of ARIADNE 80 and horizontal fire of the TA 60 staplers. The crotch was reinforced with 3-0 silk sutures.  The horizontal stapler line was reinforced with running 3-0 Vicryl suture.     We then brought the left ureter behind the sigmoid mesentery to the right side.  The conduit segment itself was irrigated to remove any stool that was present.  Both ureters were spatulated and anastomosed to the ileal conduit using running 4.0 PDS suture.  Before completing the ureteric anastomosis, we placed a 7F urinary diversion stent in each ureter.  The stents were secured to the mucosa of the conduit using 4.0 chromic suture.  Both stents were dripping urine intraoperatively.  The conduit was irrigated to ensure that there were no leaks at the anastomosis level; none were seen. The proximal end of the conduit was secured to the posterior abdominal peritoneum with 3-0 silk suture.  We turned our attention to the right upper quadrant.  We excised a circular disk of skin lateral to the umbilicus on the right side at the planned stoma site. We  incised the fascia in a cruciate manner and the posterior rectus fascia was also incised in a vertical manner. We pre-placed three 2-0 Vicryl sutures on the fascia.  Through this opening, we pulled out the stomal end of the ileal conduit and fashioned a standard rosebud stoma. The pre-placed fascial sutures of 2-0 Vicryl were placed through the serosa of the bowel. A 16-Greenlandic Red rubbed catheter was placed into the stoma and secured to the skin.  Once an everted stoma had been fashioned, we again inspected the abdominal cavity.  Hemostasis had been achieved.  We placed a 19-Robby drain in the pelvis which was secured to the skin with nylon suture. Abdominal closure was performed with the guidance of Dr. Villa and his team given the harvesting of rectus fascia from the left side for the flap. Below the arcuate line, the fascia was closed with #1 looped PDS. Above the arcuate line, the posterior rectus fascia on the left was re-approximated to the anterior and posterior fascia on the right using running 0 looped PDS suture.  This was reinforced with figure of eight Ethibond suture every few centimeters. Only posterior fascia was available on the left side as the anterior rectus fascia had been harvested for the flap.  A 15-Greenlandic Robby drain was placed anterior to our fascial closure.  The subcutaneous tissue was approximated with 3-0 Vicryl sutures and the skin was closed using staples. The patient tolerated the procedure well. There were no complications. Estimated blood loss was 800 mL.     All sponge, needle and instrument counts were correct and doubly verified prior to closure. The patient was taken to the PACU without any difficulty.     Girma Sharma MD  Urology  [facility]

## 2018-08-09 NOTE — CONSULTS
[facility], [place]  Internal Medicine Consult - Gold Service       Date of Admission:  8/8/2018  Consult Requested by: Chaim Saldana MD  Reason for Consult: Assist with co-management of chronic medical conditions    Assessment & Plan     Sunitha Jacques is a 78 year old female with a complicated past medical history including DM, HTN, hypothyroidism, CVA (2017), obesity, CHF, CKD, AFib (on warfarin), and MIKKI admitted on 8/8/18 after total cystectomy, pelvic exenteration (removal of uterus, vaginal cuff, part of vagina), ileal conduit and flap for invasive bladder cancer.    Probable CAD: Recent nuclear stress test suggested small area of ischemia in distal LAD territory. Patient was evaluated in cardiology clinic 7/23/18, no further work-up (e.g. angiogram) recommended given no prior history of MI, previous cardiac catheterizations (per verbal report) without significant CAD, high risk for contrast-induced nephrotoxicity with CKD 3, and semi-urgent need for surgery.  - Continue atorvastatin 80mg HS  - Unclear if taking aspirin (not on medication list but recommended by cardiology; regardless, would hold in post-op period)    --> Pharmacy consult placed to review home medications given discrepancies between [facility] and OS records  - Follow-up in cardiology clinic in Oct 2018    Possible Systolic CHF: LVEF 46% on MPI scan on 7/6/18 however SALLY on 3/16/17 (done in the setting of Staph aureus bacteremia to assess for endocarditis) showed normal LV size and systolic function (LVEF 60-65%) and normal RV size and function. Patient currently appears euvolemic (no peripheral edema, no JVD).   - Strict intake & output   - Hold scheduled lasix in the setting of additional fluid losses with surgery (EBL 800mL) and multiple drains    --> Can give lasix prn based on ongoing assessments of volume status   - Hold quinapril pending repeat Cr and pharm med rec  - Resume hydralazine 10mg BID (need to  "verify dose)    Hx of MIKKI: Per pre-op notes at [facility] patient does not use CPAP at home, however per OSH notes she does use CPAP. Currently she appears to be breathing comfortably with good waveform on capnography and PCO2 ~40. Post-op VBG with mild hypercapnia (pCO2 53 and normal pH 7.33).   - Recheck VBG in AM, if pCO2 increasing, recommend using CPAP    AFib (on LTAC): Patient remains in AFib, currently rate controlled (HR 60s post-op), stable BP. She was bridged with lovenox prior to surgery, no recent INR in our system.  - Hold warfarin per primary team  - Continue metoprolol 100mg BID    CKD: Recent baseline creatinine 1.66-2.2, increased from 0.7-0.9 in 2017. Per urology notes she has atrophic right kidney and history of severe hydronephrosis of the left kidney in the setting of invasive bladder cancer.   - Recheck Cr in morning  - Close monitoring of Is/Os    Hx of CVA: Patient had CVA in spring of 2017, reportedly had \"full recovery\" per OSH records. Currently neurologic exam is limited by somnolence but she is moving all extremities and face is symmetric, no focal deficits appreciated. Will need to monitor very closely for signs of stroke/TIA while off of anticoagulation.   - Neuro checks Q4hrs  - Continue atorvastatin, hold aspirin as above    PAD: Patient found to have elevated velocities in extrenal iliac arteries bilaterally and right common femoral artery suggesting stenosis (hemodynamically significant by velocity criteria) seen on pelvic arterial and venous duplex on 8/6/18, done in preparation for flap placement on 8/8.  - Monitor peripheral pulses closely  - Continue atorvastatin, hold aspirin as above    Hypertension: BP within normal range post-op (120-150/50-60s).   - Continue amlodipine 10mg daily  - Resume hydralazine 10mg BID (need to verify dose)  - Hold quinapril pending recheck Cr (and pharm med rec) as above    Diabetes: Well-controlled diabetes, last HgbA1c 6.2 on 8/8/18. Patient does not " take medication for DM at home. She was started on insulin drip intra-operatively due to elevated BG (194), slowly weaning off since.  - Continue to wean insulin drip    Hydronephrosis (s/p Nephrostomy Tube): Nursing noted malodorous urine from nephrostomy tube.   - Consider checking UA, will leave decision to urology     Hypothyroidism: Last TSH was 1.88 on 4/13/18.  - Continue levothyroxine 125mcg daily    DVT Prophylaxis: SCDs + heparin 5000 units subcutaneous Q8 hrs (starting 8/9)    Leighann Barrera  Internal Medicine Staff Hospitalist Service  [facility]  Pager: [phone] (Overnight)   After 5 PM, page gold team cross cover at 1221. If no response, page job code [phone].  ______________________________________________________________________    Chief Complaint     Medical co-management after surgery for invasive bladder cancer     History is obtained from the electronic health record due to patient somnolence.     History of Present Illness      Sunitha Jacques is a 78 year old female with a complicated past medical history including diabetes, hypertension, hypothyroidism, CVA (2017), obesity, CHF, CKD, AFib (on chronic anticoagulation with warfarin ), MIKKI (unclear if uses CPAP at home) and cervical cancer (s/p cobalt therapy & chemo age 29) admitted to the hospital after surgery for invasive bladder cancer including total cystectomy, pelvic exenteration (removal of uterus, vaginal cuff, part of vagina), ileal conduit and flap. The surgery was completed under general anesthesia with peripheral nerve block with  mls.  She was transferred from the PACU to  around 10pm and remained somnolent, able to follow simple commands but not answering questions at the time of this evaluation. Per nursing, she has been able to provide some answers to questions (both verbal and shaking head yes/no) but quickly falls back asleep. She remains hemodynamically stable post-operatively with  normal SpO2 on 3L NC and ETCO2 (~40) on capnography.     Review of Systems     Unable to obtain ROS due to patient somnolence.    Past Medical History      I have reviewed this patient's medical history and updated it with pertinent information if needed.   Past Medical History:   Diagnosis Date     Atrial fibrillation with rapid ventricular response (H) 2017     CAD (coronary artery disease)      Cerebrovascular accident (H) 3/21/2017     CHF (congestive heart failure) (H) 3/21/2017     CRD (chronic renal disease), stage III     Due to bladder cancer     Essential hypertension 2017     GERD (gastroesophageal reflux disease)      History of MRSA infection     treated 2017     Hypertension      Hypothyroidism, unspecified type 2017     Long term current use of anticoagulant 2017     Morbid obesity (H) 2017     MIKKI (obstructive sleep apnea)      T2DM (type 2 diabetes mellitus) (H)      Past Surgical History   I have reviewed this patient's surgical history and updated it with pertinent information if needed.  Past Surgical History:   Procedure Laterality Date     APPENDECTOMY       AS TOTAL KNEE ARTHROPLASTY Bilateral      CHOLECYSTECTOMY       COLONOSCOPY       SHOULDER SURGERY (rotator cuff repair, right)       TONSILLECTOMY       Trigger finger release, left 3rd finger      Social History   Social History   Substance Use Topics     Smoking status: Former Smoker     Packs/day: 0.50     Years: 25.00     Quit date: 1980     Smokeless tobacco: Never Used     Alcohol use No     Family History  (Obtained per chart review due to patient somnolence):    *Maternal Aunt: Alzheimer's disease  *Maternal Uncle: Alzheimer's disease  *Father: Myocardial infarction, heart disease, arthritis  *Sister: Arthritis, diabetes mellitus, hypertension  *Brother: CVA  *Son: Diabetes mellitus, hypertension, sarcoma of leg ( age 49)    Medications     List per Pre-Op Evaluation  (7/2/18):    *Medications with (*) listed in EPIC at [facility]    Acetaminophen 1000mg QID prn  Amlodipine 10mg daily  Atorvastatin 80mg daily  Calcitriol 0.5mcg daily  Cetirizine 10mg daily  Chlordiazepoxide-clidinium 5-2.5mg daily  Coumadin 2.5mg daily  Escitalopram 10mg daily  Furosemide 40mg BID  Hydralazine 20mg TID (vs *10mg BID)  Norco 5-325mg Q4hrs  Levothyroxine 125mcg daily  *Metoprolol 100mg BID  *Montelukast 10mg daily  *Quinapril 40mg daily  *Cholecalciferol 1000 units daily  Lyrica 50mg TID  Multivitamin daily  Singulair 10mg daily  Vitamin D3 1000 units daily    Allergies   Allergies   Allergen Reactions     Keflex [Cephalexin] Other (See Comments)     Metaproterenol      Penicillins      Prednisone      Sulfa Drugs      Physical Exam   Vital Signs: Temp: 98  F (36.7  C) Temp src: Oral BP: 146/50   Heart Rate: 71 Resp: 17 SpO2: 96 % O2 Device: Nasal cannula Oxygen Delivery: 3 LPM  Weight: 229 lbs 15.04 oz    General Appearance: Elderly female, lying comfortably in bed, somnolent, appears comfortable  Eyes: Pupils equal and reactive to light (~2-3), does not track, conjunctiva clear  HEENT: NC/AT, sclerae anicteric  Respiratory: Breathing comfortably on 4L NC, lungs CTAB over anterior fields, no wheezes or crackles  Cardiovascular: Irregular rhythm, normal rate, systolic ejection murmur II/VI heard best at RUSB, pulses 2+ radial bilaterally,   GI: Soft, BS absent, multiple drains in place with dressings, moderate amount of serosanguinous drainage  Skin: Mild pallor throughout, no acute rashes of ecchymoses appreciated  Musculoskeletal: Moving all extremities to command (e.g. wiggle toes, squeeze hand)  Neurologic: Somnolent but responds to some verbal commands (e.g. open eyes, wiggle toes), face symmetric with grimace, moves all extremities  Psychiatric: Unable to assess due to somnolence    Data      Data reviewed today: I reviewed all medications, new labs and imaging results over the last 24 hours. I  personally reviewed the chest x-ray image showing placement of right internal jugular line in SVC and mild pulmonary vascular congestion with left basilar atelectasis versus infection. Abdominal x-ray showed bilateral ureteral stends in good position (possible kinking of superior aspect of stents but per radiology likely projectional).     Data     Recent Labs  Lab 18  1900 18  1730 18  1630  18  0630   WBC 10.7  --   --   --  6.8   HGB 9.2* 9.2* 8.7*  < > 11.6*   MCV 86  --   --   --  90     --   --   --  230    138 139  < > 137   POTASSIUM 4.1 3.8 3.5  < > 3.9   CHLORIDE 103  --   --   --  102   CO2 26  --   --   --  31   BUN 38*  --   --   --  34*   CR 1.96*  --   --   --  1.72*   ANIONGAP 9  --   --   --  4   ANNE 8.4*  --   --   --  9.6   * 124* 114*  < > 130*   < > = values in this interval not displayed.    VB.33 / 53 /  / 28

## 2018-08-09 NOTE — PROGRESS NOTES
Plastic Surgery Progress Note    Subjective/Interval History:  No acute events.     Objective:  Temp:  [97.8  F (36.6  C)-98.9  F (37.2  C)] 98.4  F (36.9  C)  Heart Rate:  [54-71] 68  Resp:  [12-18] 16  BP: (113-163)/(50-94) 126/57  MAP:  [65 mmHg-77 mmHg] 73 mmHg  Arterial Line BP: (136-154)/(40-50) 151/45  SpO2:  [93 %-100 %] 97 %    General appearance: in NAD  Pulm: Non-labored breathing  CV: Hemodynamically stable  Abd: Soft, appropriately TTP, midline dressing in place, some drainage from lower aspect of incision, RIVKA drains with serosanguinous output, ostomy pink and viable, stents in place.  Skin: warm and well-perfused.     Assessment/Plan:   Sunitha Jacques is a 78 year old female with history of bladder cancer now s/p anterior pelvic exenteration, pelvic lymph node dissection, ileal conduit and VRAM without skin paddle to fill pelvic defect.     - No need for flap checks.   - Will continue managing subcutaneous drain outpatient.     Trent Freed, PGY4  [phone]

## 2018-08-10 NOTE — PROVIDER NOTIFICATION
Notified Urology (0039) overnight team about writers concern with pt's mentation, orientation, and that pt not easily able to be aroused. Speech is slow & garbled. VSS, NC 3-5L sating >90%. Pt able to only follow simple commands with frequent orientation/reminders. Able to swallow tylenol with 1:1 supervision and reminder. (Pt had pills sit it mouth for a while). Ileal conduit with low output (25 ml) last from 2345-1281. Per Dr. Plaza, pt is to wear CPAP @ night (RT notified and in process of placing CPAP on pt.) per Dr. Plaza told RN not to wake pt frequently for neuro checks overnight. Notify team only if UO is less than 30mL/h for over a 4-6 hour period. RN will continue to monitor and notify of any other changes.     0145- MD came to assess pt at bedside. UO remains low. (see flow sheet for output). 500 ml LR bolus to be given over 2 hours. Dr. Plaza will discuss neuro status with team.     0200- CT w/o contrast ordered. Neuro team came to assess pt at bedside, concern with hypercapnia. Pt sent to CT 0245 with Float RN.     0253- Notified Urology about possibility of needing BiPAP vs CPAP overnight and neuro suggestion of ABG once pt returns from CT. Per Dr. Plaza, continue with CPAP only and order ABG (verbal over telephone) once pt returns.     0600- MD (Dr. Plaza) called RN to change CPAP to NC and have RT to assess pt for potential need of BiPAP. Writer notified RT and changed pt to NC with 5L O2. MD's will round soon and assess pt. RT is suggesting BiPAP for patient.

## 2018-08-10 NOTE — CONSULTS
[facility], [place]      Neurology Stroke Consult    Patient Name: Sunitha Jacques  : 1940 MRN#: [id number]    STROKE DATA    Stroke Code:  Stroke code not activated.  Time patient seen:  08/10/2018 0200  Last known normal (pt's baseline):  18, exact time unknown. Per chart, appears to have been intermittently somnolent and confused since the evening of .    TPA treatment:  Not given due to outside the time window, major surgery / trauma in past 14 days.     National Institutes [employer] Stroke Scale (at presentation)  NIHSS done at:  time patient seen      Score    Level of consciousness:  (1)   Not alert; arousable w/ minor stim to obey/answer/respond     LOC questions:  (2)   Answers neither question correctly    LOC commands:  (1)   Performs one task correctly    Best gaze:  (0)   Normal    Visual:  (0)   No visual loss    Facial palsy:  (1)   Minor paralysis (flat nasolabial fold, smile asymmetry). Very mild L nasolabial fold effacement    Motor arm (left):  (0)   No drift    Motor arm (right):  (0)   No drift    Motor leg (left):  (2)   Some effort against gravity    Motor leg (right):  (2)   Some effort against gravity    Limb ataxia:  (0)   Absent    Sensory:  (0)   Normal- no sensory loss    Best language:  (1)   Mild to moderate aphasia.     Dysarthria:  (1)   Mild to moderate dysarthria    Extinction and inattention:  (0)   No abnormality        NIHSS Total Score:  11        Dysphagia Screen  Time of screenin/10/2018 020  Screening results: Failed screening - strict NPO pending SLP evaluation. Okay for nursing to clear when patient is more alert.     ASSESSMENT & RECOMMENDATIONS     79 yo f with h/o stroke (2017, details unclear but made a full recovery per chart), a fib (was bridged from warfarin with lovenox until admission), CAD, PAD, HTN, MIKKI, and HLD here for multiple pelvic surgeries for invasive bladder cancer (POD 2). Pt was seen for somnolence,  dysarthria, and confusion.  The time of onset is unclear with some suggestion of a waxing/waning mental status since the night of 8/8. There were no focal findings on physical examination. Rather, the patient has the clinical picture of acute toxic metabolic encephalopathy. There is diffuse asterixis which strongly suggests metabolic derangement (hypercarbia/respiratory failure most likely given body habitus, recent surgery). Could also r/o other causes: hepatic, electrolyte, drugs. Would hold any further gabapentin/pregabalin (given RONNIE, then would ensure renal dosing), minimize all sedating medications. Head CT did not reveal any acute pathology.      Impression:   #Acute Toxic/Metabolic Encephalopathy    Recommendations:  -correct metabolic disturbances  -continue neurochecks q4h  -resume anticoagulation as soon as able    Prophylaxis          For VTE Prevention:  - heparin SQ      HPI  Sunitha Jacques is a 78 year old female with h/o a fib (on warfarin, bridged with lovenox until admission), CVA (2017, details unclear but made a full recovery per chart), HTN, HLD, CAD, PAD, and MIKKI here for numerous pelvic surgeries for invasive bladder cancer. Neurology was consulted for dysarthria, confusion, and somnolence. The patient was unable to provide a coherent history. Per chart, the patient has had waxing/waning mental status since her surgery on 8/8. This culminated in a Code Blue on 8/9 around midday. Shortly after standing up, the patient reportedly syncopized leading to a couple chest compressions before the patient awoke from pain. No changes were noted on telemetry and the event was thought to be vasovagal. Per chart, she has been somnolent and only intermittently following commands since 8/8. On exam early this morning, the patient was again somnolent. She also had prominent asterixis. She could follow simple but not complex commands. Naming and repeating intact though speech was very sparse. She was  moving all ext equally and withdrawing symmetrically to noxious stimuli. CT Head did not reveal any acute pathology.    Pertinent Past Medical/Surgical History  Past Medical History:   Diagnosis Date     Atrial fibrillation with rapid ventricular response (H) 2/11/2017     CAD (coronary artery disease)      Cerebrovascular accident (H) 3/21/2017     CHF (congestive heart failure) (H) 3/21/2017     CRD (chronic renal disease), stage III     Due to bladder cancer     Essential hypertension 4/9/2017     GERD (gastroesophageal reflux disease)      History of MRSA infection 2017    treated april 2017     Hypertension      Hypothyroidism, unspecified type 12/14/2017     Long term current use of anticoagulant 2/12/2017     Morbid obesity (H) 4/13/2017     MIKKI (obstructive sleep apnea)      T2DM (type 2 diabetes mellitus) (H)        Past Surgical History:   Procedure Laterality Date     APPENDECTOMY  1975     AS TOTAL KNEE ARTHROPLASTY Bilateral 2015     CHOLECYSTECTOMY  1975     COLONOSCOPY       EXENTERATION ANTERIOR PELVIC N/A 8/8/2018    Procedure: EXENTERATION ANTERIOR PELVIC;  Anterior Pelvic Exenteration With Bilateral Lymphandectomy, Ileal Conduit Diversion, Left Vertical Rectus Abdominis Musculocutaneous Flap To Pelvis;  Surgeon: Girma Sharma MD;  Location: [facility] OR     GRAFT FLAP PEDICLE TRANSVERSE RECTUS ABDOMINIS MYOCUTANEOUS N/A 8/8/2018    Procedure: GRAFT FLAP PEDICLE TRANSVERSE RECTUS ABDOMINIS MYOCUTANEOUS;;  Surgeon: RAPHAEL Calderón MD;  Location: [facility] OR     LYMPHADENECTOMY ABDOMINAL Bilateral 8/8/2018    Procedure: LYMPHADENECTOMY ABDOMINAL;;  Surgeon: Girma Sharma MD;  Location: [facility] OR     SHOULDER SURGERY  2003     TONSILLECTOMY      as a child       Medications: I have reviewed this patient's current medications.    Allergies: All allergies reviewed and addressed.    Family History: I have reviewed this patient's family history.    Social History: I have reviewed  "this patient's social history.    Tobacco use: Never    ROS:  Review of systems not obtained due to patient factors - confusion    PHYSICAL EXAMINATION  Vital Signs:  B/P: 129/52,  T: 98.7,  P: Data Unavailable,  R: 15    General:  patient lying in bed without any acute distress    HEENT:  normocephalic/atraumatic  Cardio:  irregularly irregular  Pulmonary:  crackles heard  Abdomen:  obese  Extremities:  no edema  Skin:  warm/dry     Neurologic  MS: mild somnolence but arouses promptly to voice. Able to name and repeat. Speech sparse. Believes age to be \"39\" and cannot answer month. Can follow simple commands but not 2step.   CN: blinks to threat b/l, eomi, very mild L nasolabial fold effacement, hearing intact to conversation  MTR: prominent asterixis in all ext. No pronator drift in UE. LE weakly move antigravity with tenacious prompting. Moves all ext equally and symmetrically.   SENS: no neglect, briskly localizes and symmetrically withdraws to noxious stimuli in all ext.    Labs  Labs and Imaging reviewed and used in developing the plan; pertinent results included.     Lab Results   Component Value Date     (H) 08/09/2018       The patient was discussed with the Fellow, Dr. Cassidy.  The staff is Dr. Porter.    Jeff Nguyen MD   Pager: [phone]  "

## 2018-08-10 NOTE — PLAN OF CARE
Problem: Surgery Nonspecified (Adult)  Intervention: Optimize Psychosocial Response to the Surgical Experience  Neuro: A&Ox4, forgetful. Weak and Somnolent, more awake now  Cardiac: SR/ Sinus Toi. Other VSS.   Respiratory: Initially on BiPAP to maintain oxygen saturation above 90%, transitioned to nasal cannula, Sating 93-94% on 3 Liters. Lung sounds diminished throughout.  GI/: Urostomy intact with 3 stents and a red jordan, pt with very low urine output- MD aware. No BM, pt denies flatus.  Diet/appetite: pt on clear liquid diet, poor intake.  Activity:  Pt is bedrest this evening due to a Syncope episode today.  Pain: At acceptable level on current regimen. PCA in line, pt not using that much.  Skin: No new deficits noted. Abdominal dressing with drainage MD aware.  LDA: RIVKA rutledge, I.KETTY., Urostomy, PNT,     Plan: Pt received a unit of PRBC's today, tolerated the transfusion well. Pt still lethargic, intermittent confusion. A dose of Lasix given with no significant improvement in urine output.  Continue with POC. Notify primary team with changes.

## 2018-08-10 NOTE — PLAN OF CARE
Problem: Patient Care Overview  Goal: Plan of Care/Patient Progress Review  Outcome: Declining    A: A&O to self & situation intermittently, pt remains lethargic and confused (see provider notification notes/neuro note). Pt remains to be able to follow simple commands with frequent direction. VS unchanged, CPAP overnight with FiO2 40-50%, NC w/ humidification @ 3-6L sating >88%. MD aware of increase in O2 needs. SR-SB with LBBB and occasional PAC's. Low grade fever with Tmax 99.9F auxillary. Complaining of abdominal pain and moans during repositioning, Dilaudid PCA education and in place, pt lethargic and not using it frequently. Denies nausea. Clear Liquid diet. BG Q1H, changed to Q2H -140 all night on Alg 2, (1-2 units/h) currently @ 1 unit/h plan to recheck BG @ 0800. MIVF LR @ 125 mL/h with 1x 500 LR bolus for decreased UO. Urostomy in place, UO decreased (see flow sheet). PNT, 2x RIVKA drains in place, (see flow sheet for output). Midline incision draining, reinforced, plan to change dressing per order set (48 hr by surgery ). Coccyx with foam dressing in place. No BM, BS hypoactive pt not passing flatus. CT for AMS overnight, labs ordered, no concern as of now. Repositioned q2h with assist x2. Continue with plan of care.     I/O this shift:  In: 1176.69 [P.O.:20; I.V.:656.69; IV Piggyback:500]  Out: 230 [Urine:145; Drains:85]    Temp:  [98  F (36.7  C)-99.9  F (37.7  C)] 99.9  F (37.7  C)  Heart Rate:  [56-68] 65  Resp:  [11-20] 11  BP: ()/(34-91) 143/53  FiO2 (%):  [40 %-50 %] 45 %  SpO2:  [89 %-98 %] 91 %     R: Continue with POC. Notify primary team with changes.

## 2018-08-10 NOTE — PROGRESS NOTES
"Urology  Progress Note    Decreasing mental status overnight  Neurology consulted - Head CT negative for stroke, likely acute toxic/metabolic encephalopathy  On CPAP overnight (home med)  500cc bolus given for low UOP  Medicine wants to restart her on bipap this morning    Exam  /53 (BP Location: Right arm)  Temp 99.9  F (37.7  C) (Axillary)  Resp 11  Ht 1.56 m (5' 1.42\")  Wt 106.3 kg (234 lb 5.6 oz)  SpO2 91%  BMI 43.68 kg/m2  No acute distress  Unlabored breathing  Abdomen soft, nontender, nondistended. Incisions dressing removed, closed with staples. Stoma retracted but pink and well perfused, 2 stents and RRC in place.   RIVKA x 2 serosanguinous  Maddox with clear yellow urine in tubing  L PNT to gravity clear yellow urine    /145  LUQ RIVKA 95/45  LLQ RIVKA 55/40    Labs  AM labs pending    ABG 0310  Component      Latest Ref Rng & Units 8/10/2018   pH Arterial      7.35 - 7.45 pH 7.36   pCO2 Arterial      35 - 45 mm Hg 47 (H)   PO2 Arterial      80 - 105 mm Hg 58 (L)   Bicarbonate Arterial      21 - 28 mmol/L 27   Base Excess Art      mmol/L 0.9   FIO2       6L     Assessment/Plan  78 year old y/o female POD#2 s/p anterior pelvic exteneration, VRAM flap to pelvis (Plastics), BLPLND, creation of ileal conduit for MIBC. Medicine consulted for assistance managing multiple medical comorbidities. Ongoing issues include mental status and hypercarbia/hypoxia.     Neuro: tylenol, dilaudid PCA for pain control. Home escitalopram, pregabalin. Appreciate neuro recs.   CV: HDS. Home amlodipine, statin, metop. Hold home ASA, lasix and quinapril per medicine.   Pulm: incentive spirometry while awake. CXR today. Appreciate RT and medicine recs.  FEN/GI: CLD MIVF @ 125/hr.   Endo: Insulin gtt. Home synthroid.   : Maddox in place. L PNT uncapped. Monitor urine output, bolus judiciously given hx CHF.   Heme: on warfarin pre-op, bridged with Lovenox. Hgb stable.  ID: afebrile, no leukocystosis. Completed pre-op " abx.  Activity: up ad olya  PPx: LUIZs. SQH.     Seen and examined with the chief resident. Will discuss with Dr. Sharma.    Jayne Plaza MD  Urology PGY-2       Contacting the Urology Team     Please use the following job codes to reach the Urology Team. Note that you must use an in house phone and that job codes cannot receive text pages.     On weekdays, dial [phone] (or star-star-star [phone] on the new [employer] telephones) then [phone] to reach the Adult Urology resident or PA on call    On weekdays, dial [phone] (or star-star-star [phone] on the new [employer] telephones) then [phone] to reach the Pediatric Urology resident    On weeknights and weekends, dial [phone] (or star-star-star 777 on the new [employer] telephones) then [phone] to reach the Urology resident on call (for both Adult and Pediatrics)

## 2018-08-10 NOTE — PLAN OF CARE
Problem: Patient Care Overview  Goal: Plan of Care/Patient Progress Review  OT 6B: Cancel - pt not medically appropriate this AM due to somnolence and worsening respiratory status. Will initiate when appropriate.

## 2018-08-10 NOTE — PROGRESS NOTES
Brief Urology Note    Visited Ms Jacques this evening. Per evening RN, she has been confused and disoriented since around 11PM. She is able to tell me that she's in the hospital but it is very difficult to understand her speech. She falls asleep while attempting to answer a question. She is able to follow basic commands after many prompts. It is unclear where exactly her baseline is.    - neuro recs - stat head CT without contrast    Discussed with chief resident Dr Tristan Plaza MD  Urology PGY2

## 2018-08-10 NOTE — PROGRESS NOTES
[facility] - Internal Medicine Daily Note   Date of Service: 08/10/2018    Patient: Sunitha Jacques  MRN: [id number]  Admission Date: 8/8/2018  Hospital Day # 2    Assessment & Plan:   Sunitha Jacques is a 78 year old female with a complicated past medical history including DM, HTN, hypothyroidism, CVA (2017), obesity, CHF, CKD, AFib (on warfarin), and MIKKI admitted on 8/8/18 after total cystectomy, pelvic exenteration (removal of uterus, vaginal cuff, part of vagina), ileal conduit and flap for invasive bladder cancer. Now with AMS and hypoxia    Recommendations for today:  - CRP, procalcitonin, blood cultures (ordered)  - Consider broadening antibiotics  - TSH/FT4 (added on)  - levothyroxine 62.5 IV 8/10 due to inability to take PO today (ordered)  - agree with workup for possible PE. DVT US. Consider VQ scan   - Consider nephrology consult  - Monitor BUN/Cr closely  - Agree with plan for transfer to SICU for closer monitoring    Acute toxic/metabolic encephalopathy: marked change in mental status early am 8/10. CT head negative. Neuro consulted and feels more likely toxic/metabolic encephalopathy. I agree though unclear cause. Sepsis possibility--urine growing > 100K  LFGNRs. CXR largely unremarkable. Less likely primary CNS infection. CO2 slightly high despite CPAP initially then BiPAP but not enough to easily explain AMS. BUN rising but still only 52. I am concerned for sepsis as cause.   - Infectious workup as below  - Check TSH/FT4  - Monitor BUN/Cr closely  - Monitor blood gases  - Continue BiPAP    UTI, concern for Sepsis: Leukocytosis. No fever. However, with urine culture growing NLFGNRs and with acute encephalopathy, I am concerned for sepsis. Procal moderate risk for infection. CRP very high at 300. CXR without evidence of pneumonia. No other obvious infectious symptoms. Previously on cipro only. Changed to ceftriaxone  - blood cultures pending  - Agree with change to ceftriaxone.  consider broadening abx further if no improvement (no anaerobic, MRSA, or pseudomonal coverage currently)    RONNIE on CKD: Oliguric. Cr 3.1 this am. Stable from yesterday pm but up from 1.7 prior to surgery. Recent baseline creatinine 1.66-2.2, increased from 0.7-0.9 in 2017. Per urology notes she has atrophic right kidney and history of severe hydronephrosis of the left kidney in the setting of invasive bladder cancer. Volume status on exam/US suggests relative euvolemia at this point. FeNA 1.1 but collected after several boluses. Possible that she had prerenal which is now resolving.   - Continue MIVF  - Careful with future boluses  - Close monitoring of Is/Os and volume status  - Close monitoring of BUN/Cr  - Consider nephrology consult  - holding lasix and ACEi    Acute respiratory failure with hypoxia and hypercarbia: pO2 low on ABGs (52 and 37), though unclear if really arterial stick as they have been difficulty sticks. Requiring only 40% on bipap to maintain sats, so these may have been venous draws.   - Consider PE given possibly hypoxemia and AMS. Unable to get CTA, so DVT US is a good place to start. Consider VQ.   - Agree with plan to correlate with VBG    - Continue bipap    Possible HFrEF: LVEF 46% on MPI scan on 7/6/18. TTE 8/9 with normal EF, but limited study due to technical difficulty. Volume status on exam/US suggests relative euvolemia at this point. JVP 8-9 by US, IVC 2.1 cm with little respiratory variation (but on bipap), no B lines in lungs. CXR also without significant congestion.    - Strict intake & output   - Hold scheduled lasix  --> Can give lasix prn based on ongoing assessments of volume status   - continue metoprolol and hydralazine when able to take PO    Hypothyroidism: Last TSH was 1.88 on 4/13/18  - Rechecking TSH/FT4 due to AMS  - Continue levothyroxine 125mcg daily once able to take PO  - Giving 62.5 IV 8/10 due to inability to take PO today    Syncopal event: Code blue called  "8/9 due to LOC and non-palpable pulses. Woke up after a few chest compressions. Had NSR on tele and BP was normal immediately afterward. Very unlikely that she had true cardiac arrest. EKG shows NSR with left bundle (present since 2016 per care everywhere).  Possibly syncope due to volume depletion vs increased vagal tone due to pain/recent surgery. TTE difficult study but generally reassuring.   - continue to monitor on tele     Probable CAD: Recent nuclear stress test suggested small area of ischemia in distal LAD territory. Patient was evaluated in cardiology clinic 7/23/18, no further work-up (e.g. angiogram) recommended given no prior history of MI, previous cardiac catheterizations (per verbal report) without significant CAD, high risk for contrast-induced nephrotoxicity with CKD 3, and semi-urgent need for surgery.  - Continue atorvastatin 80mg HS  - Unclear if taking aspirin (not on medication list but recommended by cardiology; regardless, would hold in post-op period)  - Follow-up in cardiology clinic in Oct 2018     Hx of MIKKI: Per pre-op notes at [facility] patient does not use CPAP at home, however per OSH notes she does use CPAP. Currently she appears to be breathing comfortably with good waveform on capnography and PCO2 ~40. Does have some CO2 retention on gases.  - Continue bipap for now.      AFib (on LTAC): Currently in sinus. rate controlled (HR 60s post-op), stable BP. She was bridged with lovenox prior to surgery, no recent INR in our system.  - Hold warfarin per primary team. On heparin ppx  - Continue metoprolol 100mg BID     Hx of CVA: Patient had CVA in spring of 2017, reportedly had \"full recovery\" per OSH records. no focal deficits appreciated. Will need to monitor very closely for signs of stroke/TIA while off of anticoagulation.   - Neuro checks Q4hrs  - Continue atorvastatin, hold aspirin as above     PAD: Patient found to have elevated velocities in extrenal iliac arteries bilaterally and " "right common femoral artery suggesting stenosis (hemodynamically significant by velocity criteria) seen on pelvic arterial and venous duplex on 8/6/18, done in preparation for flap placement on 8/8.  - Monitor peripheral pulses closely  - Continue atorvastatin, hold aspirin as above     Hypertension: BP within normal range post-op (120-150/50-60s).   - Continue amlodipine 10mg daily  - Resume hydralazine 10mg BID   - Hold quinapril      Diabetes: Well-controlled diabetes, last HgbA1c 6.2 on 8/8/18. Patient does not take medication for DM at home. She was started on insulin drip intra-operatively due to elevated BG (194)  - Continue insulin gtt until stable. Then sliding scale insulin only     Hydronephrosis (s/p Nephrostomy Tube): Nursing noted malodorous urine from nephrostomy tube. UA dirty.   - Defer tx to urology       DVT Prophylaxis: SCDs + heparin 5000 units subcutaneous Q8 hrs (starting 8/9)      Pete Bentley MD   of Medicine  [facility] Hospitalist  Pager [phone]    Team: Medicine [facility] 9  Page Cross Cover after 5 pm: pager [phone]     ___________________________________________________________________    Subjective & Interval Hx:    Acute altered mental status change overnight. More somnolent. Unable to give history this am. Using very little dilaudid.     Last 24 hr care team notes reviewed.   ROS:  4 point ROS including Respiratory, CV, GI and , other than that noted in the HPI, is negative      Medications: Reviewed in EPIC. List below for reference    Physical Exam:    Blood pressure 137/46, temperature 98.7  F (37.1  C), temperature source Axillary, resp. rate 12, height 1.56 m (5' 1.42\"), weight 106.3 kg (234 lb 5.6 oz), SpO2 99 %.    General: sleeping with bipap. Awakens but not oriented and falls asleep quickly  Chest/Resp: difficult lung exam. Appears to be comfortable  Heart/CV: II/VI holosystolic murmur noted  Abdomen/GI: multiple incisions. Urostomy in place, drain in " place  Neuro/Psych: moves all ext    POCUS results discussed above    Lines/Tubes:   Peripheral IV 08/08/18 Right Lower forearm (Active)   Site Assessment WDL 8/8/2018 10:30 PM   Line Status Saline locked 8/8/2018 10:30 PM   Phlebitis Scale 0-->no symptoms 8/8/2018 10:30 PM   Infiltration Scale 0 8/8/2018 10:30 PM   Number of days:1       Port A Cath Single Right Chest wall (Active)   Access Date 08/08/18 8/9/2018  4:00 AM   Access Attempts 1 8/8/2018  7:00 AM   Gauge/Length 20 gauge;3/4 inch 8/8/2018  7:00 AM   Site Assessment WDL 8/8/2018 10:30 PM   Line status: Medial or Superior Lumen Infusing 8/9/2018  4:00 AM   Line status: Lateral or Inferior Lumen Saline locked 8/8/2018  7:00 AM   Line Status Blood return noted 8/8/2018  7:00 AM   Dressing Intervention Chlorhexadine sponge;Transparent 8/8/2018 10:30 PM   Dressing change due 08/15/18 8/8/2018 10:30 PM   Number of days:       CVC Triple Lumen 08/08/18 Internal jugular (Active)   Site Assessment WDL 8/9/2018  4:00 AM   Extravasation? No 8/8/2018 10:30 PM   Dressing Intervention Chlorhexidine sponge;Transparent 8/9/2018  4:00 AM   Dressing Change Due 08/15/18 8/8/2018 10:30 PM   CVC Lumen Assessment Blue;White;Brown 8/8/2018 10:30 PM   Number of days:1       Labs & Studies of Note:  I personally reviewed all labs and imaging.   Unresulted Labs Ordered in the Past 30 Days of this Admission     Date and Time Order Name Status Description    8/10/2018 0506 Procalcitonin In process     8/9/2018 1145 Sodium In process     8/9/2018 1145 Creatinine In process     8/9/2018 1140 Urine Culture Aerobic Bacterial Preliminary     8/8/2018 0955 Surgical pathology exam Preliminary           Medications list for Reference   Current Facility-Administered Medications   Medication     acetaminophen (TYLENOL) tablet 975 mg    Or     acetaminophen (TYLENOL) Suppository 650 mg     amLODIPine (NORVASC) tablet 10 mg     atorvastatin (LIPITOR) tablet 80 mg     bupivacaine liposome  "(EXPAREL) LONG ACTING injection was administered into the infiltration site to produce postsurgical analgesia. Duration of action is up to 72 hours, and other \"vidhya\" medications should not be given for 96 hours with the exception of the lidocaine 5% patch (LIDODERM) and the lidocaine 10mg in potassium infusions. This entry is for INFORMATION ONLY.     dextrose 10 % 1,000 mL infusion     glucose gel 15-30 g    Or     dextrose 50 % injection 25-50 mL    Or     glucagon injection 1 mg     docusate sodium (COLACE) capsule 100 mg     escitalopram (LEXAPRO) tablet 10 mg     heparin sodium PF injection 5,000 Units     HOLD: All Oral Medications     hydrALAZINE (APRESOLINE) tablet 10 mg     hypromellose-dextran (ARTIFICAL TEARS) 0.1-0.3 % ophthalmic solution 1 drop     insulin 1 unit/mL in saline (NovoLIN, HumuLIN Regular) drip - ADULT IV Infusion     lactated ringers infusion     levothyroxine (SYNTHROID/LEVOTHROID) injection 62.5 mcg     levothyroxine (SYNTHROID/LEVOTHROID) tablet 125 mcg     lidocaine (LMX4) cream     lidocaine 1 % 1 mL     magnesium sulfate 2 g in water intermittent infusion     magnesium sulfate 4 g in 100 mL sterile water (premade)     montelukast (SINGULAIR) tablet 10 mg     naloxone (NARCAN) injection 0.1-0.4 mg     No lozenges or gum should be given while patient on BIPAP/AVAPS/AVAPS AE     ondansetron (ZOFRAN-ODT) ODT tab 4 mg    Or     ondansetron (ZOFRAN) injection 4 mg     prochlorperazine (COMPAZINE) injection 5 mg    Or     prochlorperazine (COMPAZINE) tablet 5 mg     sodium chloride (PF) 0.9% PF flush 20 mL     sodium chloride (PF) 0.9% PF flush 3 mL     sodium chloride (PF) 0.9% PF flush 3 mL             "

## 2018-08-10 NOTE — PLAN OF CARE
Problem: Patient Care Overview  Goal: Plan of Care/Patient Progress Review  PT 6B: CANCEL; pt not medically appropriate for session this date. Will reschedule.

## 2018-08-10 NOTE — H&P
SURGICAL ICU H&P  August 10, 2018      CO-MORBIDITIES:   Malignant neoplasm of overlapping sites of bladder (H)    ASSESSMENT: Sunitha Jacques is a 78 year old female with a history of DMII, HTN, hypothyroidism, CVA (2017), obesity, CHF, CKD, AFib (on warfarin), and MIKKI who underwent total cystectomy, pelvic exenteration (removal of uterus, vaginal cuff, part of vagina), ileal conduit and flap for invasive bladder cancer on 8/8/18. She developed altered mental status and hypoxia the night of 8/9 and was transferred to the SICU 8/10 for stabilization.     PLAN:  Neuro/ pain/ sedation:  # Altered mental status  # Acute toxic/metabolic encephalopathy  # Acute surgical pain  # Hx of CVA  - Monitor neurological status. Notify the MD for any acute changes in exam.  - Tylenol PO/rectal PRN  - Post-surgical bupivacaine nerve block by anesthesia  - escitalopram 10 mg daily  - Head CT negative 8/10  - Minimize narcotics and sedating medications. No narcotic use today. Last PCA use cleared 2300 yesterday (only 0.3 mg dilaudid used).     Pulmonary care:   # Acute respiratory failure with Hypoxia and Hypercarbia  # Obstructive Sleep Apnea  - BiPAP 10-->12/5, 40%. SpO2 goal >90%. VBG 7.32/52/42/27  - PTA Singulair daily     Cardiovascular:    # HTN  # Possible HFrEF  # Coronary artery disease  # A-fib   - Monitor hemodynamic status.   - PTA Amlodipine, atorvastatin, hydralazine  - Lasix 40 mg IV given for diuresis today with poor response  - ECHO 8/9 EF 55-60%, normal LVF, R heart could not be assessed    GI care:   # GERD  - NPO   - Bowel regimen: docusate BID    Fluids/ Electrolytes/ Nutrition:   - Several fluid boluses given for AMS and possible sepsis, net positive I/O over last 24 hours  - IVF TKO this am.   - No indication for parenteral nutrition.    Renal/ Fluid Balance:    # CKD stage III  #Bladder cancer, s/p total cystectomy, pelvic exenteration, ileal conduit and flap   #L Hydronephrosis, s/p left  nephrostomy tube  - Will continue to monitor intake and output.  - Cr has been rising during hospitalization, BUN elevated  - Lasix 40 mg given x 1 this am with poor response  - check FeUrea      Endocrine:    # DM Type II  # Hypothyroidism  - Insulin gtt  - PTA Levothyroxine. Given IV dosing today given inability to take PO     ID/ Antibiotics:  # UTI  - UA/UC growing >100,000 colonies E. Coli, 10-50K corynebacterium, 10-50K aerococcus. All sensitive to Ceftriaxone. Will consider dc'ing Flagyl.   - ceftriaxone 2g Q24H for UTI  - Flagyl 500 mg Q8H for UTI  - Procal 1.45 8/10    Heme:     # Acute surgical blood loss anemia  - Hemoglobin stable over last 24 hours.   - Holding PTA warfarin  - DVT US bilateral lower extremities negative 8/10    MSK:  - PT/OT    Prophylaxis:    - Mechanical prophylaxis for DVT.  - subcutaneous heparin     Lines/ tubes/ drains:  - right implanted  Port  - R internal jugular CVC  - PIV    Disposition:  - Surgical ICU    Patient seen, findings and plan discussed with ICU staff Dr. Pelayo.    Gosia Valencia  CC time 40 minutes    - - - - - - - - - - - - - - - - - - - - - - - - - - - - - - - - - - - - - - - - - - - - - - - - - - - - - - - - -     PRIMARY TEAM: Urology  PRIMARY PHYSICIAN: Dr. Sharma    REASON FOR CRITICAL CARE ADMISSION: Acute respiratory failure  ADMITTING PHYSICIAN: Dr. Godwin Pelayo    HISTORY PRESENTING ILLNESS: Sunitha Jacques is a 78 year old female with a past medical history of DMII, HTN, hypothyroidism, CVA (2017), obesity, CHF, CKD, AFib (on warfarin), and MIKIK who is POD # 2 s/p anterior pelvic exenteration, bilateral lymphandectomy, ileal conduit diversion, left vertical rectus abdominis musculocutaneous flap to pelvis for invasive bladder cancer. The night prior to admission to the SICU she developed altered mental status and acute respiratory failure with hypoxia and hypercarbia. She was admitted to the SICU for respiratory care and stabilization.     REVIEW  OF SYSTEMS: As noted above. Denies dyspnea, chest pain, nausea, headache.     PAST MEDICAL HISTORY:   Past Medical History:   Diagnosis Date     Atrial fibrillation with rapid ventricular response (H) 2/11/2017     CAD (coronary artery disease)      Cerebrovascular accident (H) 3/21/2017     CHF (congestive heart failure) (H) 3/21/2017     CRD (chronic renal disease), stage III     Due to bladder cancer     Essential hypertension 4/9/2017     GERD (gastroesophageal reflux disease)      History of MRSA infection 2017    treated april 2017     Hypertension      Hypothyroidism, unspecified type 12/14/2017     Long term current use of anticoagulant 2/12/2017     Morbid obesity (H) 4/13/2017     MIKKI (obstructive sleep apnea)      T2DM (type 2 diabetes mellitus) (H)        SURGICAL HISTORY:   Past Surgical History:   Procedure Laterality Date     APPENDECTOMY  1975     AS TOTAL KNEE ARTHROPLASTY Bilateral 2015     CHOLECYSTECTOMY  1975     COLONOSCOPY       EXENTERATION ANTERIOR PELVIC N/A 8/8/2018    Procedure: EXENTERATION ANTERIOR PELVIC;  Anterior Pelvic Exenteration With Bilateral Lymphandectomy, Ileal Conduit Diversion, Left Vertical Rectus Abdominis Musculocutaneous Flap To Pelvis;  Surgeon: Girma Sharma MD;  Location: [facility] OR     GRAFT FLAP PEDICLE TRANSVERSE RECTUS ABDOMINIS MYOCUTANEOUS N/A 8/8/2018    Procedure: GRAFT FLAP PEDICLE TRANSVERSE RECTUS ABDOMINIS MYOCUTANEOUS;;  Surgeon: RAPHAEL Calderón MD;  Location: [facility] OR     LYMPHADENECTOMY ABDOMINAL Bilateral 8/8/2018    Procedure: LYMPHADENECTOMY ABDOMINAL;;  Surgeon: Girma Sharma MD;  Location: [facility] OR     SHOULDER SURGERY  2003     TONSILLECTOMY      as a child       SOCIAL HISTORY: unable to obtain given altered mentation    FAMILY HISTORY: No reported bleeding/clotting disorders nor problems with anesthesia.     ALLERGIES:      Allergies   Allergen Reactions     Keflex [Cephalexin] Other (See Comments)     Tolerated  therapy on 7/30/18     Metaproterenol      Penicillins      Prednisone      Sulfa Drugs        MEDICATIONS:    No current facility-administered medications on file prior to encounter.   Current Outpatient Prescriptions on File Prior to Encounter:  Acetaminophen (TYLENOL PO) Take 1,000 mg by mouth daily Reported on 4/26/2017   amLODIPine (NORVASC) 10 MG tablet Take 1 tablet (10 mg) by mouth daily   cetirizine (ZYRTEC) 10 MG tablet Take 10 mg by mouth every evening    escitalopram (LEXAPRO) 10 MG tablet Take 1 tablet (10 mg) by mouth daily   furosemide (LASIX) 40 MG tablet Take 1 tablet (40 mg) by mouth 2 times daily   hydrALAZINE (APRESOLINE) 10 MG tablet Take 20 mg by mouth 2 times daily    METOPROLOL TARTRATE PO Take 100 mg by mouth 2 times daily    multivitamin, therapeutic with minerals (MULTI-VITAMIN) TABS tablet Take 1 tablet by mouth daily   pregabalin (LYRICA) 50 MG capsule Take 1 capsule (50 mg) by mouth 2 times daily   quinapril (ACCUPRIL) 40 MG Tab Take 1 tablet (40 mg) by mouth daily   VITAMIN D, CHOLECALCIFEROL, PO Take 1,000 Units by mouth daily       PHYSICAL EXAMINATION:  Temp:  [98  F (36.7  C)-99.9  F (37.7  C)] 99  F (37.2  C)  Heart Rate:  [56-65] 62  Resp:  [11-31] 31  BP: (120-144)/(43-56) 136/56  FiO2 (%):  [40 %-50 %] 40 %  SpO2:  [89 %-100 %] 96 %    General: able to be aroused, oriented to situation and self.   Chest wall: Symmetric thorax. No masses or tenderness to palpation.   Respiratory: respirations assisted by BiPAP, clear throughout.   Cardiovascular: Regular rate and rhythm.   Gastrointestinal: Abdomen soft, non-distended, non-tender to palpation. Midline incision with intact staples, no bleeding or drainage.   : Stoma retracted, pink, yellow urine draining. Left percutaneous nephrostomy tube in place with yellow urine output.   Extremities: Moving all four extremities. No limb deformities. Moderate peripheral edema.   Skin: No rashes or lesions appreciated.    LABS: Reviewed.    Arterial Blood Gases     Recent Labs  Lab 08/10/18  0855 08/10/18  0310 08/09/18  1904 08/08/18  1730   PH 7.32* 7.36 7.34* 7.45   PCO2 53* 47* 48* 42   PO2 37* 58* 72* 150*   HCO3 27 27 26 29*     Complete Blood Count     Recent Labs  Lab 08/10/18  0506 08/09/18  1249 08/09/18  0509 08/08/18  1900   WBC 12.4* 13.4* 14.5* 10.7   HGB 7.6* 7.7* 8.4* 9.2*    165 173 194     Basic Metabolic Panel    Recent Labs  Lab 08/10/18  0506 08/09/18  1237 08/09/18  0509 08/08/18  1900    139 140 138   POTASSIUM 4.0 4.2 4.0 4.1   CHLORIDE 103 104 105 103   CO2 24 26 28 26   BUN 52* 44* 41* 38*   CR 3.13* 3.11* 2.63* 1.96*   * 140* 115* 148*     Liver Function Tests    Recent Labs  Lab 08/10/18  0506   AST 40   ALT 26   ALKPHOS 56   BILITOTAL 0.5   ALBUMIN 2.1*   INR 1.32*     Pancreatic Enzymes  No lab results found in last 7 days.  Coagulation Profile    Recent Labs  Lab 08/10/18  0506   INR 1.32*     Lactate  Invalid input(s): LACTATE    IMAGING:  Results for orders placed or performed during the hospital encounter of 08/08/18   XR Abdomen Port 1 View    Narrative    Abdominal radiograph one view  8/8/2018 7:06 PM      HISTORY: Status post cystectomy with ileal conduit, please evaluate  stent position    COMPARISON: PET scan 6/27/2018    FINDINGS: Postsurgical changes of cystectomy with ileal conduit.  Pelvic drains in place. Bilateral ureteral stents are in place.  Possible kinking of the superior aspect of the stents, though likely  projectional. Nonobstructive bowel gas pattern.      Impression    IMPRESSION: Bilateral ureteral stents are in good positioning.  Possible kinking of the superior aspect of the stents, though likely  projectional.     I have personally reviewed the examination and initial interpretation  and I agree with the findings.    CHAR CARL MD   XR Chest Port 1 View    Narrative    Chest radiograph one view  8/8/2018 7:12 PM      HISTORY: Status post central line  placement    COMPARISON: PET 6/27/2018    FINDINGS: Right-sided Port-A-Cath tip projects over the atriocaval  junction. Right IJ central venous line tip projects over the upper  SVC. Left basilar opacity and patchy perihilar opacities. Trace left  pleural effusion. No pneumothorax.      Impression    IMPRESSION:   1. Right IJ central venous line tip projects over the upper SVC.   2. Pulmonary vascular congestion with left basilar atelectasis or  infection.    I have personally reviewed the examination and initial interpretation  and I agree with the findings.    CHAR CARL MD   CT Head w/o Contrast    Narrative    CT HEAD W/O CONTRAST 8/10/2018 3:10 AM    Provided History: AMS, hx CVA     Comparison: Outside head CT 5/7/2018.    Technique: Using multidetector thin collimation helical acquisition  technique, axial, coronal and sagittal CT images from the skull base  to the vertex were obtained without intravenous contrast.     Findings:    No intracranial hemorrhage, mass effect, midline shift, or abnormal  extra-axial fluid collection. Ventricles are proportionate to the  cerebral sulci. Basal cisterns are patent. Gray-white matter  differentiation of the cerebral hemispheres is preserved.    Calvarium and visualized facial bones are intact. Minimal paranasal  sinus mucosal thickening.      Impression    Impression:   No acute intracranial pathology.     I have personally reviewed the examination and initial interpretation  and I agree with the findings.    VIRIDIANA GREENBERG MD   XR Chest Port 1 View    Narrative    Exam: XR CHEST PORT 1 VW, 8/10/2018 8:35 AM    Indication: Rule out pulmonary edema;     Comparison: 8/20/2018    Findings:   Single AP view the chest. Right IJ CVC with tip at the high SVC and  right chest wall Port-A-Cath with tip at the superior cavoatrial  junction. Stable borderline low lung volumes. Improving mixed  perihilar opacities. Small left-sided pleural effusion is stable.  Left  basilar opacity. No pneumothorax. Calcifications of the aortic arch.  Partially visualized ureteral stent in the right upper quadrant.      Impression    Impression:   1. Borderline low lung volumes with improving pulmonary vascular  congestion.  2. Stable small left-sided pleural effusion with overlying  atelectasis/infection.  3. Support devices are stable.    I have personally reviewed the examination and initial interpretation  and I agree with the findings.    PATY SAM MD   US Lower Extremity Venous Duplex Bilateral    Narrative    EXAMINATION: DOPPLER VENOUS ULTRASOUND OF BILATERAL LOWER EXTREMITIES,  8/10/2018 11:16 AM     COMPARISON: 7/5/2018.    HISTORY: Evaluate for DVT.    TECHNIQUE:  Gray-scale evaluation with compression, spectral flow and  color Doppler assessment of the deep venous system of both legs from  groin to knee, and then at the ankles.    FINDINGS:  In both lower extremities, the common femoral, femoral, popliteal and  posterior tibial veins demonstrate normal compressibility and blood  flow.        Impression    IMPRESSION:  1.  No evidence of deep venous thrombosis in either lower extremity.    MD Farhat XIE  Medical Student, MS4  [employer]    I was present with the medical student who participated in the service and in the   documentation of the note. I have verified the history and personally performed the   physical exam and medical decision making. I agree with the assessment and plan of   care as documented in the note.      Gosia Yuen La Mater

## 2018-08-10 NOTE — PROGRESS NOTES
Admitted/transferred from:   Reason for admission/transfer: Respiratory decline  Patient status upon admission/transfer: Stable  Interventions: Bipap  Plan: Continue bipap, CVP monitoring  2 RN skin assessment: completed by Yareli Rodriguez  Result of skin assessment and interventions/actions: Moisture in pannus (will apply intradry), mepilex to pressure injury on coccyx  Height, weight, drug calc weight: done  Patient belongings: With patient charted in belongings  MDRO education (if applicable): NA

## 2018-08-10 NOTE — PROGRESS NOTES
"  WO Nurse Inpatient [facility]   WO Nurse Inpatient Adult     Initial Assessment   Assessment of new Urostomy Stoma complication(s) none   Mucocutaneous junction; intact   Peristomal complication(s) none   Pouch wear time:24-48 hours  Following today's visit:Patient /  is  able to demonstrate;       1. How to empty their pouch? no      2. How to change their pouch?  no      3. How to read and record intake and output correctly? no    Objective data:  Patient history according to medical record: Sunitha Jacques is a 78 year old female with a complicated past medical history including DM, HTN, hypothyroidism, CVA (2017), obesity, CHF, CKD, AFib (on warfarin), and MIKKI admitted on 8/8/18 after total cystectomy, pelvic exenteration (removal of uterus, vaginal cuff, part of vagina), ileal conduit and flap for invasive bladder cancer. Now with AMS and hypoxia  Current Diet/Nutrition:   Active Diet Order      NPO for Medical/Clinical Reasons Except for: No Exceptions   TPN no   I/O last 3 completed shifts:  In: 3332.11 [P.O.:740; I.V.:2092.11; IV Piggyback:500]  Out: 510 [Urine:310; Drains:200]  Labs:    Recent Labs  Lab 08/10/18  0506  08/08/18  1900   ALBUMIN 2.1*  --   --    HGB 7.6*  < > 9.2*   INR 1.32*  --   --    WBC 12.4*  < > 10.7   A1C  --   --  6.2*   .0*  --   --    < > = values in this interval not displayed.     Physical Exam:  Current pouching system:Lequire   Reason for pouch change today: routine schedule  Stoma appearance: viable and healthy, lumen in center, stoma empties just above skin level and is inside a bowl of tissue.   Stoma size; 1 1/8\" ,  red jordan  Peristomal skin: intact  Stoma output :serosanguinous   Abdominal  Assessment  distended , NG still in place? Yes   Surgical Site: open to air and staples intact  Pain: unable to assess pt sedated  Is patient still on a PCA No    Interventions:  Patient's chart evaluated.  Focus of today's visit: initial fitting " "  Participant of teaching session today nurse  Orders: Written  Change made with ostomy management today: No  Patient/family: lethargic  Supplies:at bedside    Plan:  Learning needs: refitting of appliance, evaluate leakage issue, pouch change demonstration , pouch change return demonstration, verbal instruction , diet and hydration , pouch emptying, output measurement, odor/flatus management, lifestyle adjustments and discharge instructions  Preparation for discharge: No discharge preparations started  Recommend home care? by home WO nurse if possible    Discussed plan of care with Nurse  Nursing to notify the Provider(s) and re-consult the WOC Nurse if new ostomy concerns or discharge planned before next planned WO visit.    WOC Nurse will return: Monday  Face to face time: 30 minutes    Adi Liang      WO Nurse Inpatient Wound Assessment   Reason for consultation: Evaluate and treat Coccyx wound     Assessment  Coccyx wound due to Moisture Associated Skin Damage (MASD) with friction component possible Pilonidal cyst  Status: initial assessment    Treatment Plan  Coccyx wound: Every 3 days   1. Clean wound with MicroKlenz Spray, pat dry  2. Paint with No Sting Skin Prep (#[id number]) and allow to dry thoroughly  3. Press a Mepilex  Sacral Dressing (PS#[id number])  to the area, making sure to conform nicely to skin curvatures (begin placing the Mepilex at the most distal aspect first, smooth into place in an upward direction, then smooth side to side)   4. Time and date dressing change and edison with a \"T\" for treatment of a wound  5. Reposition pt every 1 to 2 hours when in bed and hourly when up to the chair to relieve pressure and promote perfusion to tissue  Orders Written    WO Nurse follow-up plan:weekly  Nursing to notify the Provider(s) and re-consult the WOC Nurse if wound(s) deteriorates or new skin concern.    Physical Exam  Skin inspection: focused Coccyx, Heels, back    Wound Location:  Coccyx  Date of " last photo 8/10/18        Wound History: Unknown, pt not alert for hx  Measurements (length x width x depth, in cm) 3 cm x 3 cm  x  0.2 cm   Wound Base:  100 % dermis and macerated epidermal bumpy/firm cyst like growths, one that is black  Tunneling N/A  Undermining N/A  Palpation of the wound bed: normal   Periwound skin: denuded  Color: normal and consistent with surrounding tissue  Temperature: normal   Drainage:, scant  Description of drainage: serosanguinous  Odor: none  Pain: unable to assess due to  sedation , none    Interventions  Current support surface: Standard  Isolibrium  Current off-loading measures: Foam padding and Pillows under calves  Visual inspection of wound(s) completed  Wound Care: done per plan of care  Supplies: placed at the bedside  Education provided: importance of repositioning, plan of care and wound progress  Discussed plan of care with Nurse    Adi Liang RN

## 2018-08-10 NOTE — PROGRESS NOTES
Transfer  Transferred to: 4A  Via: bed  Reason for transfer: Pt no longer appropriate for 6B-worsened patient condition  Family: Aware of transfer  Belongings: Packed and sent with pt  Chart: Delivered with pt to next unit  Medications: Meds sent to new unit with pt  Report given to: Hilda SLATER  Pt status: Stable

## 2018-08-11 NOTE — PLAN OF CARE
Problem: Patient Care Overview  Goal: Plan of Care/Patient Progress Review    Discharge Planner OT   Patient plan for discharge: not stated  Current status: Max A for B rolling in supine, dependent transfer bed>recliner with lift and sling. Did not attempt EOB sitting due to significant abdominal pain at rest and with any movement. Able to complete ADLs sitting in recliner with mod A, limited by UE weakness and attention. Pt lives alone and is quite independent at baseline, but uses a cane for ambulation.   Barriers to return to prior living situation: decreased ADL independence and activity tolerance  Recommendations for discharge: TCU  Rationale for recommendations: increase functional endurance and ADL independence at rehab       Entered by: Austin Martinez 08/11/2018 3:01 PM

## 2018-08-11 NOTE — PROGRESS NOTES
08/11/18 1448   Quick Adds   Type of Visit Initial Occupational Therapy Evaluation   Living Environment   Lives With alone   Living Arrangements house   Home Accessibility stairs to enter home  (walk in shower, all needs met on main floor)   Number of Stairs to Enter Home 3   Number of Stairs Within Home 0   Transportation Available car   Self-Care   Dominant Hand right   Usual Activity Tolerance good   Current Activity Tolerance poor   Regular Exercise no   Equipment Currently Used at Home cane, straight   Functional Level Prior   Ambulation 1-->assistive equipment   Transferring 1-->assistive equipment   Toileting 0-->independent   Bathing 0-->independent   Dressing 0-->independent   Eating 0-->independent   Cognition 0 - no cognition issues reported   Fall history within last six months yes   Number of times patient has fallen within last six months 1  (rolled out of bed once)   Prior Functional Level Comment Pt normally I with most ADL/IADLs. She completes all shopping, cooking, cleaning, and medication management. Has assist for yardwork.   General Information   Referring Physician Vivi Collier PA   Patient/Family Goals Statement Return to PLOF   Additional Occupational Profile Info/Pertinent History of Current Problem 78 year old female who was admitted on 8/8/2018 for malignant neoplasm of overlapping sites of the bladder.  She underwent a anterior pelvic exenteration, bilateral lymphadenectomy, ileal conduit diversion, left vertical rectus abdominis musculocutaneous flap to pelvis.  She was on the general care floor post operatively.  On 8/10 she was transferred to the SICU for altered mental status and hypoxia.     Precautions/Limitations fall precautions;abdominal precautions   Cognitive Status Examination   Cognitive Comment No issues at baseline, admitted with AMS, some confusion during eval, although groggy, will monitor further   Visual Perception   Visual Acuity Wears glasses for distance    Range of Motion (ROM)   ROM Comment AROM to approx 70-90 degrees shoulder flexion. L UE limited by baseline shouler pathology.   Strength   Strength Comments 3- to 3/5 grossly for B UEs   Hand Strength   Hand Strength Comments 3+/5 grossly for B hands   Transfer Skill: Bed to Chair/Chair to Bed   Level of [place]: Bed to Chair dependent (less than 25% patients effort)   Physical Assist/Nonphysical Assist: Bed to Chair set-up required;supervision;verbal cues;nonverbal cues (demo/gestures);1 person assist   Assistive Device - Transfer Skill Bed to Chair Chair to Bed Rehab Eval (lift)   Grooming   Level of [place]: Grooming moderate assist (50% patients effort)   Physical Assist/Nonphysical Assist: Grooming set-up required;supervision;verbal cues;nonverbal cues (demo/gestures);1 person assist   Clinical Impression   Criteria for Skilled Therapeutic Interventions Met yes, treatment indicated   OT Diagnosis Decreased I in ADLs due to deconditioning   Assessment of Occupational Performance 5 or more Performance Deficits   Identified Performance Deficits dressing, bathing, toileting, g/h, functional endurance, cognition   Clinical Decision Making (Complexity) Moderate complexity   Therapy Frequency daily   Predicted Duration of Therapy Intervention (days/wks) 2 weeks   Anticipated Discharge Disposition Transitional Care Facility   Risks and Benefits of Treatment have been explained. Yes   Patient, Family & other staff in agreement with plan of care Yes   Total Evaluation Time   Total Evaluation Time (Minutes) 10

## 2018-08-11 NOTE — PLAN OF CARE
Problem: Patient Care Overview  Goal: Plan of Care/Patient Progress Review  Outcome: Declining  Neuro: 101.2 F axillary T max, lethargic  Resp: Trending VBGs, bipap fi02 40%  Cardio: Sinus rhythm with left bundle branch block  GI: NPO  : Ileal conduit, nephrostomy  2 JPs, incision abdomen and pressure injury on coccyx  Access: CVC, port  Pt having intermittent pain with turns.  SICU wanting to hold narcotics with lethargy.  Drips: TKO, insulin stopped due to being NPO  P: Continue current cares.  Notify SICU with concerns.

## 2018-08-11 NOTE — PROGRESS NOTES
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  She underwent a anterior pelvic exenteration, bilateral lymphadenectomy, ileal conduit diversion, left vertical rectus abdominis musculocutaneous flap to pelvis.  She was on the general care floor post operatively.  On 8/10 she was transferred to the SICU for altered mental status and hypoxia.      PMH: HTN, HLD, heart failure, atrial fibrillation on chronic anticoagulation, CVA, MIKKI, CKD stage 3, DM type 2, hypothyroidism, obesity      MAIN PLAN FOR TODAY:  - start metoprolol, unclear if she was taking at home so will start 12.5 mg PO BID  - hold hydralazine for now  - start clears, ADAT  - calorie counts  - start Tylenol, Oxy PO  - pain consult  - BiPAP QHS  - ceftriaxone x 7 day course  - stop flagyl  - start home lasix 40 BID  - give 1 unit PRBC today  - additional lasix today with PRBC    PROBLEM LIST:  # s/p anterior pelvic exenteration, bilateral lymphadenectomy, ileal conduit diversion, left vertical rectus abdominis musculocutaneous flap to pelvis (8/8)  # altered mental status  # toxic metabolic encephalopathy  # acute respiratory failure, resolved  # RONNIE on CKD 3  # acute blood loss anemia  # HTN  # HLD  # heart failure  # afib on chronic anticoagulation  # CVA  # DM2  # CKD stage 3  # hypothyroidism    PLAN BY SYSTEMS:    HEMODYNAMICS:  - HR: 80; SBP: 110-150; MAP: > 70  - start metoprolol 12.5 mg PO BID (unsure of home dose/usage), lasix 40 BID  - continue amlodipine 10 mg PO daily, atorvastatin 80 mg PO QHS  - hold home warfarin, hydralazine  - ECHO 8/9 EF 55-60%, normal LVF, R heart could not be assessed    NEUROLOGIC:  - GCS: 15; RASS: 0; CAM ICU: negative  - pain medications:  Tylenol PRN, Oxycodone PRN; stop PRN fentanyl  - delirium management: no isssues  - sleep management: no issues  - will consult pain service  - continue home escitalopram 10 mg PO daily     PULMONARY:  - O2 sats appropriate on oxi plus mask, wean O2 as able  - continue aggressive pulmonary hygiene  - stop daily BiPAP, continue BiPAP QHS  - continue home singular 10 mg PO daily     RENAL:  - I/O: + 2.7 L since admission; negative 400 mL yesterda  - UOP: 1.7 L yesterday  - continue with diuresis today, start home lasix 40 mg PO BID, give additional lasix today with blood tranfusion  - continue to follow electrolytes, renal function, and UOP closely    ID:  - Tmax: 100.1  - WBC: 10.1  - cultures: 8/9 urine: E. Coli, 8/10 blood: NGTD  - ABX: ceftriaxone x 7 days for complicated UTI  - stop flagyl today    HEME:  - Hgb: 7.1  - Plts: 152  - will transfuse 1 unit PRBC today given cardiac history    VASCULAR ACCESS:  - RIJ CVC  - R PAC    GI/NUTRITION:  - start clears, ADAT  - ensure getting supplements TID  - calorie counts  - if unable to take nutrition today, will place NG and start TF tomorrow  - drains x 2, cares per nursing and primary surgical team    ENDOCRINE:  - glucose: stable  - med sliding scale insulin Q4H  - steroids: none  - continue home levothyroxine 125 mcg PO daily    MSK:  - PT/OT consulted  - activity: mobilize    SKIN:  - midline incision c/d/i    PROPHYLAXIS:   - DVT: heparin 5000 TID  - GI: pantoprazole     CODE STATUS:   - FULL CODE     DISPOSITION:  - SICU    GENERAL CARES  DVT Prophylaxis: Heparin SQ  GI Prophylaxis: PPI, home medication  Restraints: Restraints for medical healing needed: NO  Family update by me today: Yes       Galen Moreno. APRN, CNP    Time Spent on this Encounter   Billing:  I spent 35 minutes bedside and on the inpatient unit today managing the critical care of Sunitha Jacques in relation to the issues listed in this note.    Main Plans for Today   - see above    Interval  "History   - no acute events overnight  - patient seen and examined, chart reviewed  - discussed with Dr. Pelayo and SICU team on multidisciplinary rounds  - more awake today, conversant  - hypoxia improved, currently off BiPAP      Physical Exam   Temp: 99.6  F (37.6  C) Temp src: Axillary Temp  Min: 98.6  F (37  C)  Max: 101.2  F (38.4  C) BP: 125/43   Heart Rate: 80 Resp: 20 SpO2: 96 % O2 Device: Oxi Plus Oxygen Delivery: 3 LPM  Vitals:    08/09/18 0540 08/10/18 0141 08/11/18 0500   Weight: 105.3 kg (232 lb 2.3 oz) 106.3 kg (234 lb 5.6 oz) 108.7 kg (239 lb 10.2 oz)     I/O last 3 completed shifts:  In: 495.87 [I.V.:495.87]  Out: 2291 [Urine:2110; Drains:181]    GEN: no acute distress  EYES: PERRL, Anicteric sclera.    HEENT:  Normocephalic, atraumatic, trachea midline  CV: RRR, no gallops, rubs, or murmurs  PULM/CHEST: Clear breath sounds bilaterally without rhonchi, crackles or wheeze, symmetric chest rise  GI: normal bowel sounds, soft, non-tender, no rebound tenderness or guarding, no masses  : napier catheter in place, urine yellow and clear  EXTREMITIES: 2+ peripheral edema, moving all extremities, peripheral pulses intact  NEURO: Cranial nerves II-XII grossly intact, no motor-sensory deficits noted  SKIN: No rashes, sores or ulcerations, midline incision c/d/i.  PSYCH:  Affect: appropriate not anxious, mentation at baseline, not altered, no hallucinations  Imaging personally reviewed:  ECG    Medications     bupivacaine liposome (EXPAREL) LONG ACTING injection was administered into the infiltration site to produce postsurgical analgesia. Duration of action is up to 72 hours, and other \"vidhya\" medications should not be given for 96 hours with the exception of the lidocaine 5% patch (LIDODERM) and the lidocaine 10mg in potassium infusions. This entry is for INFORMATION ONLY.       - MEDICATION INSTRUCTIONS -         acetaminophen  975 mg Oral Q8H    Or     acetaminophen  650 mg Rectal Q8H     amLODIPine  10 mg " Oral QAM     atorvastatin  80 mg Oral QPM     cefTRIAXone  2 g Intravenous Q24H     docusate sodium  100 mg Oral BID     escitalopram  10 mg Oral Daily     furosemide  20 mg Intravenous Once     [START ON 8/12/2018] furosemide  40 mg Oral BID     heparin  5,000 Units Subcutaneous Q8H     insulin aspart  1-6 Units Subcutaneous Q4H     levothyroxine (SYNTHROID/LEVOTHROID) tablet 125 mcg  125 mcg Oral QAM AC     metoprolol tartrate  12.5 mg Oral BID     montelukast (SINGULAIR) tablet 10 mg  10 mg Oral QAM     [START ON 8/12/2018] pantoprazole  40 mg Oral QAM AC     sodium chloride (PF)  20 mL Intracatheter Once     sodium chloride (PF)  3 mL Intracatheter Q8H       Data     Recent Labs  Lab 08/11/18  0448 08/10/18  1403 08/10/18  0506   WBC 10.1 12.6* 12.4*   HGB 7.1* 8.0* 7.6*   MCV 87 88 90    144* 160   INR  --   --  1.32*    141 137   POTASSIUM 3.5 3.7 4.0   CHLORIDE 106 101 103   CO2 27 27 24   BUN 51* 53* 52*   CR 2.04* 2.72* 3.13*   ANIONGAP 9 13 10   ANNE 8.2* 8.4* 8.2*   * 118* 130*   ALBUMIN  --   --  2.1*   PROTTOTAL  --   --  5.2*   BILITOTAL  --   --  0.5   ALKPHOS  --   --  56   ALT  --   --  26   AST  --   --  40     No results found for this or any previous visit (from the past 24 hour(s)).

## 2018-08-11 NOTE — PLAN OF CARE
Problem: Patient Care Overview  Goal: Plan of Care/Patient Progress Review  4AB-PT: CXL: Per discussion with OT, pt with minimal tolerance for therapy/mobility today 2/2 pain. CXL PT today. Will continue to follow and attempt to initiate 8/12.

## 2018-08-11 NOTE — PLAN OF CARE
Problem: Patient Care Overview  Goal: Plan of Care/Patient Progress Review  Outcome: Improving  S: Beginning shift unable tolerate off bipap briefly for oral care (quickly dropped sats to low 80s). Moaning and c/o abd pain unrelieved by rectal tylenol. SICU resident notified, came examine pt. See orders. Stated pain relived by fentanyl. This morning was able transition to oxyplus mask. CO2 monitor reading mid-40's. No matter how explained IS, pt would blow into it instead of inhaling. Very poor cough effort. Has had short runs SVT in 160s. Has not been receiving home metoprolol. Resident notified. To call if sustained.  B: S/p OR.  A: Improving.  R: Monitor. Pain med as needed. Assess swallowing. Possible transition to po pain meds. Promote pulmonary hygiene. Consider acapella.

## 2018-08-11 NOTE — PROGRESS NOTES
"Urology  Progress Note    Transferred to SICU yesterday   Off BiPAP, currently on Oxi Plus   Improved UOP overnight  Tmax of 101.2   NPO     Exam  /43  Temp 98.6  F (37  C)  Resp 20  Ht 1.56 m (5' 1.42\")  Wt 108.7 kg (239 lb 10.2 oz)  SpO2 99%  BMI 44.67 kg/m2  No acute distress  Saturating well on 3 L Oxi Plus   Abdomen soft, nontender, nondistended. Incisions CDI with staples. Stoma retracted but pink and well perfused, 2 stents and RRC in place. Mepilex dressing in pannus fold.   RIVKA x 2 serosanguinous  Maddox with clear yellow urine in tubing  L PNT to gravity clear yellow urine    UOP 1725/530  LUQ RIVKA 113/23  LLQ RIVKA 111/19    Labs    Hgb 7.1 (8.0)   WBC 10.1 (12.6)   Cr 2 (2.7)     Assessment/Plan  78 year old y/o female POD#3 s/p anterior pelvic exteneration, VRAM flap to pelvis (Plastics), BLPLND, creation of ileal conduit for MIBC. Medicine consulted for assistance managing multiple medical comorbidities. Ongoing issues include mental status and hypercarbia/hypoxia. Transferred to the SICU for worsening respiratory status. Improving.     Neuro: tylenol, dilaudid PCA for pain control. Hold narcotics for lethargy. Home escitalopram, pregabalin. Appreciate neuro recs.   CV: HDS. Home amlodipine, statin. PRN hydralazine.   Pulm: Currently on Oxi Plus, transitioned off BiPaP. Continue to trend VBGs which are slowly improving.   FEN/GI: Currently NPO, SLIV   Endo: sliding scale insulin. Home synthroid.   : Maddox in place. L PNT uncapped. Monitor urine output  Heme: On SQH. Hgb stable.  ID: Tmax of 101.2 overnight, BCx NGTD, UCx with sensitivities resulted. Patient currently on Ceftiraxone (8/10-) and Flagyl (8/8-). Will consider DC Flgayl.   Activity: up ad olya  PPx: SCDs. SQH.     Seen and examined with the chief resident and Dr. Us. Will discuss with Dr. Sharma.    Girma Hall MD  Urology PGY-3       Contacting the Urology Team     Please use the following job codes to reach the Urology " Team. Note that you must use an in house phone and that job codes cannot receive text pages.     On weekdays, dial [phone] (or star-star-star 777 on the new [employer] telephones) then [phone] to reach the Adult Urology resident or PA on call    On weekdays, dial [phone] (or star-star-star 777 on the new [employer] telephones) then [phone] to reach the Pediatric Urology resident    On weeknights and weekends, dial [phone] (or star-star-star 777 on the new [employer] telephones) then [phone] to reach the Urology resident on call (for both Adult and Pediatrics)            I have seen and examined the patient with the team today. I agree with the above plan.     Arianna Us MD

## 2018-08-12 NOTE — PLAN OF CARE
Problem: Patient Care Overview  Goal: Plan of Care/Patient Progress Review  Outcome: Improving  Neuro: Confused to time, lethargic at times, more alert this evening  Resp: NC 3 L  Cardio: Sinus rhythm with left bundle branch block  GI: Carb controlled diet, not much of appetite, only ate 1 ice cream and a couple sips of boost breeze (did not like)  : Urostomy, nephrostomy tube  Access: Port de-accessed, CVC  1 unit of PRBCs given with lasix before and after.  Up to chair with lift.  Pain: PRN oxy and diluadid, scheduled tylenol.  P: Continue current cares.  Notify SICU with concerns.

## 2018-08-12 NOTE — PROVIDER NOTIFICATION
Dr. Hall with urology paged (at [phone]) for patient being hypertensive with systolic at 170.  10 mg iv hydralazine given around 1000 one time dose with very little effect.  Pt threw up her morning bp meds.    Pt continues to be nauseated.  No nausea medications currently due to give.    MD returned page at [phone].  P: Has to talk with attending and will get back to RN.

## 2018-08-12 NOTE — PLAN OF CARE
Problem: Patient Care Overview  Goal: Plan of Care/Patient Progress Review  Outcome: No Change  Neuro: Disoriented to time  Cardiac: SR c BBB. VSS.   Respiratory: Sating 96% on 3L NC.  GI/: Adequate urine output. BM X0 - not passing gas  Diet/appetite: Minimal intake.  Activity:  Assist of lift - turn q2  Pain: At acceptable level on current regimen.   Skin: See documentation  LDA's: Urostomy - 2 JPs - PNT capped - Triple Lumen IJ - Deaccessed Port Site    Plan: Continue with POC. Notify primary team with changes.

## 2018-08-12 NOTE — PLAN OF CARE
Problem: Patient Care Overview  Goal: Plan of Care/Patient Progress Review  Discharge Planner PT   Patient plan for discharge: not discussed   Current status: PT eval completed, tx indicated. Pt performed bed mobility, rolling R and L, with mod-max A. Dependently transferred bed>chair via OH lift. Limited by pain and nausea today. Completed seated AAROM>AROM.   Barriers to return to prior living situation: medical needs, current mobility, level of A   Recommendations for discharge: TCU   Rationale for recommendations: Pt would benefit from continued skilled PT to address deficits in strength and activity tolerance and promote improved functional mobility.        Entered by: Ana Delgadillo 08/12/2018 10:46 AM

## 2018-08-12 NOTE — PLAN OF CARE
Problem: Patient Care Overview  Goal: Plan of Care/Patient Progress Review    OT-4a: Cx- attempted to see pt this PM, but pt transferring off unit, will attempt OT on 8/13.

## 2018-08-12 NOTE — PROGRESS NOTES
Transfer  Transferred from: 4A  Via: bed  Reason for transfer: Pt appropriate for 6B- improved patient condition  Family: Aware of transfer  Belongings: Received with pt  Chart: Received with pt  Medications: Meds received from old unit with pt  2 RN Skin Assessment Completed By: Jacoby  Report received from: 4A RN  Pt status: Full - Stable - On 3L NC

## 2018-08-12 NOTE — PLAN OF CARE
Problem: Patient Care Overview  Goal: Plan of Care/Patient Progress Review  Outcome: No Change  S: 2200 urostomy and nephrostomy combined output 32cc. Pt had extra lasix with blood previously. Mucus membranes dry. Poor po fluid intake. SICU resident notified. Bolus given per order and continued encourage po fluid. Gradual increase total urine observed. States sched tylenol and prn oxy controlling pain at rest and with ceiling lift turns. With needing turn farther for posterior dressing and linen change, c/o intolerable pain with SBP>170. Improved with dilaudid x1. Has been able get IS up to 500 tonight. Weak cough effort. On bipap to sleep after bath. Tolerating well. States no appetite. Bowel sounds hypoactive. Still no BM despite sched colace.  B: S/p abd OR.  A: Low UO was likely related to decreased intravascular volume post diuresis.  R: Encourage po intake. Continue pulmonary hygiene. Consider adding laxative or motility aid. Increase activity as tolerated. Premedicate for physical therapy.

## 2018-08-12 NOTE — PLAN OF CARE
Problem: Patient Care Overview  Goal: Plan of Care/Patient Progress Review  Outcome: Improving  Neuro: Lethargic to alert, tmax 99.3 F, confused to time  Resp: 3-4 L NC  Cardio: Sinus rhythm with left bundle branch block, hypertensive 170-190 (urology aware)  GI: 200 ml emesis this am after pills, intermittent nausea, suppository given (1 formed smear resulted), sips of water  : Urostomy, nephrostomy tube capped off by urology MD, low urine output, iv fluids started  Access: CVC (dressing changed)  Drips:  ml/hr  Pain: prn oxy and dilaudid  Up to chair with lift.    2 jps, midline incision, pressure injury coccyx.  P: Start TPN tomorrow per urology MD.  Report given to RN on 6B.  Patient transferred with RN on cardiac monitoring.  Belongings sent with patient.

## 2018-08-12 NOTE — PROGRESS NOTES
"Urology  Progress Note    Pain moderately controlled with current regimen  On BiPAP overnight, currently saturating well on 3L nasal cn  Had low UOP which responded to fluid bolus  Afebrile overnight   Advanced to regular diet     Exam  /67  Temp 99.3  F (37.4  C)  Resp 13  Ht 1.56 m (5' 1.42\")  Wt 109.1 kg (240 lb 8.4 oz)  SpO2 97%  BMI 44.83 kg/m2  No acute distress  Saturating well on BiPaP with FiO2 40, currently on nasal cn  Abdomen soft, nontender, mildly distended. Incisions CDI with staples. Stoma retracted but pink and well perfused, 2 stents and RRC in place. Mepilex dressing in pannus fold.   RIVKA x 2 serosanguinous  Maddox with clear yellow urine in tubing  L PNT to gravity clear yellow urine    Urostomy 1466/205  L /165  LUQ RIVKA 39/18  LLQ RIVKA 59/8    Labs    Hgb 9.3  WBC 13.3 (10.1)   Cr 1.5 (2)     Assessment/Plan  78 year old y/o female POD#4 s/p anterior pelvic exteneration, VRAM flap to pelvis (Plastics), BLPLND, creation of ileal conduit for MIBC. Medicine consulted for assistance managing multiple medical comorbidities. Ongoing issues include mental status and hypercarbia/hypoxia. Transferred to the SICU for worsening respiratory status. Stable.     Neuro: tylenol, oxycodone, dilaudid  for pain control.   CV: HDS. Home amlodipine, statin. PRN hydralazine.   Pulm: Currently on BiPAP. Continue to monitor.  FEN/GI: Would step back diet to CLD, SLIV. Restarted on home lasix dose.   Endo: sliding scale insulin. Home synthroid.   : Maddox in place. L PNT uncapped. Monitor urine output  Heme: On SQH. Hgb stable.  ID: BCx NGTD, UCx with sensitivities resulted. Patient currently on Ceftiraxone (8/10-).   Activity: up ad olya  PPx: SCDs. SQH.     Seen and examined with the chief resident and Dr. Us. Will discuss with Dr. Sharma.    Girma Hall MD  Urology PGY-3       Contacting the Urology Team     Please use the following job codes to reach the Urology Team. Note that you must use " an in house phone and that job codes cannot receive text pages.     On weekdays, dial [phone] (or star-star-star 777 on the new [employer] telephones) then [phone] to reach the Adult Urology resident or PA on call    On weekdays, dial [phone] (or star-star-star 777 on the new [employer] telephones) then 0818 to reach the Pediatric Urology resident    On weeknights and weekends, dial [phone] (or star-star-star 777 on the new [place] telephones) then [phone] to reach the Urology resident on call (for both Adult and Pediatrics)              I have seen and examined the patient with the team today. I agree with the above plan.     Arianna Us MD

## 2018-08-12 NOTE — PROGRESS NOTES
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  She underwent a anterior pelvic exenteration, bilateral lymphadenectomy, ileal conduit diversion, left vertical rectus abdominis musculocutaneous flap to pelvis.  She was on the general care floor post operatively.  On 8/10 she was transferred to the SICU for altered mental status and hypoxia.       PMH: HTN, HLD, heart failure, atrial fibrillation on chronic anticoagulation, CVA, MIKKI, CKD stage 3, DM type 2, hypothyroidism, obesity     MAIN PLAN FOR TODAY:  - transfer to floor  - restart home hydralazine at different dose and schedule  - increase metoprolol  - f/u pain service recommendations      PROBLEM LIST:  # s/p anterior pelvic exenteration, bilateral lymphadenectomy, ileal conduit diversion, left vertical rectus abdominis musculocutaneous flap to pelvis (8/8)  # altered mental status  # toxic metabolic encephalopathy  # acute respiratory failure, resolved  # RONNIE on CKD 3  # acute blood loss anemia  # HTN  # HLD  # heart failure  # afib on chronic anticoagulation  # CVA  # DM2  # CKD stage 3  # hypothyroidism     PLAN BY SYSTEMS:     HEMODYNAMICS:  - HR: 80; SBP: 110-150; MAP: > 70  - metoprolol 25 mg PO BID, lasix 40 BID, hydralazine 10 mg PO TID, amlodipine 10 mg PO daily, atorvastatin 80 mg PO at bedtime  - hydralazine 10 mg Q8H  - hold home warfarin  - ECHO 8/9 EF 55-60%, normal LVF, R heart could not be assessed     NEUROLOGIC:  - GCS: 15; RASS: 0; CAM ICU: negative  - pain medications: Tylenol PRN, Oxycodone PRN; PRN Dilaudid  - delirium management: no isssues  - sleep management: no issues  - consulted pain service, not seen yet  - continue home escitalopram 10 mg PO daily      PULMONARY:  - O2 sats appropriate on NC  - continue aggressive pulmonary hygiene  - supplemental O2 during the day, continue  BiPAP QHS  - continue home singular 10 mg PO daily      RENAL:  - I/O: + 2.5 L since admission; negative 700 mL yesterday  - UOP: 2.2 L yesterday  - continue with diuresis today, start home lasix 40 mg PO BID  - Cr continues to improve  - continue to follow electrolytes, renal function, and UOP closely     ID:  - Tmax: afebrile  - WBC: 13.3  - cultures: 8/9 urine: E. Coli, 8/10 blood: NGTD  - ABX: ceftriaxone x 7 days for complicated UTI  - stop flagyl today     HEME:  - Hgb: 9.3  - Plts: 195  - given 1 unit PRBC 8/11     VASCULAR ACCESS:  - RIJ CVC, would recommend removing as no indication for central access, will defer to primary surgical team  - R PAC     GI/NUTRITION:  - clear liquid diet  - ensure getting supplements TID  - calorie counts  - drains x 2, cares per nursing and primary surgical team     ENDOCRINE:  - glucose: stable  - med sliding scale insulin Q4H  - steroids: none  - continue home levothyroxine 125 mcg PO daily     MSK:  - PT/OT consulted  - activity: mobilize     SKIN:  - midline incision c/d/i     PROPHYLAXIS:   - DVT: heparin 5000 TID  - GI: pantoprazole      CODE STATUS:   - FULL CODE      DISPOSITION:  - SICU    ====================================    SUBJECTIVE:   - No acute events overnight  - Received 500 NS bolus for urine output    OBJECTIVE:   1. VITAL SIGNS:   Temp:  [97.6  F (36.4  C)-100.4  F (38  C)] 99.3  F (37.4  C)  Heart Rate:  [68-89] 78  Resp:  [13-20] 17  BP: (125-184)/(43-90) 184/76  FiO2 (%):  [40 %] 40 %  SpO2:  [92 %-98 %] 96 %  FiO2 (%): 40 %  Resp: 17    2. INTAKE/ OUTPUT:   I/O last 3 completed shifts:  In: 1892 [P.O.:690; I.V.:402; IV Piggyback:500]  Out: 2130 [Urine:2048; Drains:82]    3. PHYSICAL EXAMINATION:   General: lying in bed, no acute distress  Neuro: sleepy, responds to questions appropriately, no gross motor-sensory deficits  Resp: Breathing non-labored on supplementary oxygen  CV: RRR  Abdomen: Soft, mildly tender around incisions sites  Incisions:  midline incision c/d/i  Extremities: warm and well perfused, pulses palpable    4. INVESTIGATIONS:   Arterial Blood Gases     Recent Labs  Lab 08/10/18  0855 08/10/18  0310 08/09/18  1904 08/08/18  1730   PH 7.32* 7.36 7.34* 7.45   PCO2 53* 47* 48* 42   PO2 37* 58* 72* 150*   HCO3 27 27 26 29*     Complete Blood Count     Recent Labs  Lab 08/12/18  0444 08/11/18  0448 08/10/18  1403 08/10/18  0506   WBC 13.3* 10.1 12.6* 12.4*   HGB 9.3* 7.1* 8.0* 7.6*    152 144* 160     Basic Metabolic Panel    Recent Labs  Lab 08/12/18  0444 08/11/18  0448 08/10/18  1403 08/10/18  0506    141 141 137   POTASSIUM 3.8 3.5 3.7 4.0   CHLORIDE 104 106 101 103   CO2 29 27 27 24   BUN 49* 51* 53* 52*   CR 1.48* 2.04* 2.72* 3.13*   * 135* 118* 130*     Liver Function Tests    Recent Labs  Lab 08/10/18  0506   AST 40   ALT 26   ALKPHOS 56   BILITOTAL 0.5   ALBUMIN 2.1*   INR 1.32*     Pancreatic Enzymes  No lab results found in last 7 days.  Coagulation Profile    Recent Labs  Lab 08/10/18  0506   INR 1.32*     Lactate  Invalid input(s): LACTATE    5. RADIOLOGY:   No results found for this or any previous visit (from the past 24 hour(s)).

## 2018-08-12 NOTE — PROVIDER NOTIFICATION
RN concerned patient has an ileus.      A: Very hypoactive bowel sounds, no gas, 200 ml emesis after am pills given, nausea meds given x2 and not giving patient relief.  In addition, patient has low urine output in fanny color.  Pt not drinking and does not have iv fluids running.    Dr. Hall with urology paged.    P: Ordered iv fluids.  Notify MD if patient vomits again.

## 2018-08-13 NOTE — PLAN OF CARE
Problem: Patient Care Overview  Goal: Plan of Care/Patient Progress Review  Outcome: No Change  Neuro: Disoriented to time. Arouses to voice.   Cardiac: A fib With RVR. -110.   Respiratory: Sating 94 on 6L NC.  GI/: Adequate urine output. No BM  Diet/appetite: Tolerating Clear liquid diet. Can take pills with applesauce.   Activity:  Assist of 3+, mechanical lift.   Pain: At acceptable level on current regimen.   Skin: No new deficits noted. Abdominal incision dressing changed x1.    LDA's: triple lumen infusing. Diltiazem at 15mg/hr.   Pt still in A fib, JAIME Bentley MD aware. Orders to stay on current regimen.     Plan: Son and daughter in law at bedside. Continue with POC. Notify primary team with changes.

## 2018-08-13 NOTE — PROVIDER NOTIFICATION
08/13/18 0600   Call Information   Date of Call 08/13/18   Time of Call 0430   Name of person requesting the team Raquel   Title of person requesting team RN   RRT Arrival time 0433   Time RRT ended 0515   Reason for call   Type of RRT Adult   Primary reason for call Cardiovascular   Cardiovascular HR greater than 160   Was patient transferred from the ED, ICU, or PACU within last 24 hours prior to RRT call? Yes   SBAR   Situation HR = 160-180's suddenly   Background admitted with bladder cancer.    Notable History/Conditions Cancer;Cardiac;Congestive heart failure;Diabetes;End-Stage disease;Hypertension;Neurological   Assessment Atrial Fib with RVR needing immediate intervention.   Interventions ECG;Fluid bolus;Labs;Meds;O2 per N/C or mask   Patient Outcome   Patient Outcome Stabilized on unit   RRT Team   Physician(s) Leighann Barrera MD   Lead RN Karrie Hernandez RN   RN Raquel Golden RN   RT Goldy Macias RT   Post RRT Intervention Assessment   Post RRT Assessment Other (see comment)  (Fluids & metoprolol reduced HR but not sustained )   Date Follow Up Done 08/13/18   Time Follow Up Done 0835   Comments Diltiazem drip has been ordered

## 2018-08-13 NOTE — PROGRESS NOTES
[facility], [place]    Internal Medicine Progress Note - Gold Service      Assessment & Plan   Sunitha Jacques is a 78 year old female with a pmh of DM, HTN, hypothyroidism, CVA (2017), obesity, CHF, CKD, Afib (on coumadin) and MIKKI admitted 8/8/18 after total cystectomy, pelvic exenteration (removal of uterus, vaginal cuff, part of vagina), ileal conduit and flap for invasive bladder cancer. Developed AMS and hypoxia and transferred to SICU 8/10. Stable and transferred back to medical floor 8/12.     # Afib w/ RVR, hx of Afib (rate controlled). Rapid response called 8/13 am. Pt with 's-170's. S/p Metroprolol 5 mg IV x 3, total of NS bolus 1000 mL with inadequate response. Cause unclear, may be secondary to pain vs dehydration. BP stable. Supp O2 needs unchanged.  - Start dilt gtt, titrating up   - Continue metoprolol PO BID if pt able to tolerate PO intake  - If HR does not improve despite metoprolol and Dilt gtt, will need to discuss with cardiology next steps    Acute toxic/metabolic encephalopathy. Marked changed in mental status 8/10. CT head 8/10 negative. Neuro consulted and felt more likely toxic/metabolic encephalopathy. Transferred to SICU given concern for sepsis. BCx NGTD. Broadened Cipro to Ceftriaxone. Transferred back to medical floor 8/13. Unclear mental status at time of transfer.  Continues to be lethargic. Repeat VBG 8/13 with mildly elevated PCO2 (52).  - Continue to monitor  - Neuro consult    S/p anterior pelvic exenteration, bilateral lymphadenectomy, ileal conduit diversion, left vertical rectus abdominis musculocutaneous flap to pelvis (8/8). Urology primary. Pain managed with roxicodone 5 mg q4h prn and IV dilaudid for breakthrough. Further management per primary team.     Ecoli UTI.  UCx from 8/9 growing >100,000 colonies/mL E coli.   - Continue Ceftriaxone (8/10-)    CKD. Recent BL Creatinine ~ 1.66-2.2, increased form 0.7-0.9 in 2017. Known atrophic  R kidney and hx of severe hydronephrosis of the L kidney in the setting of of invasive bladder cancer.   - Trend BMP    Hx of Hypothyroidism. On levothyroxine. Continue.     Hx of CVA (2017). No focal deficits appreciated.   - Continue statin    PAD. Noted to have elevated velocities in external iliac arteries bilaterally and R common femoral artery suggesting stenosis seen on pelvic arterial and venous duplex on 8/6, done in preparation for flap placement 8/8.  - Continue statin    HTN. Stable. Continue amlodipine 10 mg every day, hydralazine 10 mg BID.    DM. Diet controlled. Last HgbA1C 6.2 on 8/8/18. Continue sliding scale insulin.    Possible HFrEF. EF 46% on MPI scan 7/6/18. TTE 9/9 w/ normal EF, although limited study d/t technical difficulty. Appears euvolemia. CXR without significant congestion.   - Strict I/O's  - PRN lasix based on volume status  - Continue metoprolol and hydralazine      # Pain Assessment:  Current Pain Score 8/13/2018   Patient currently in pain? yes   Pain location Incision   Pain descriptors Discomfort   - Sunitha is experiencing pain due to post op pain. Pain management was discussed and the plan was created in a collaborative fashion.   - Please see the plan for pain management as documented above        Diet: Snacks/Supplements Adult: Boost Breeze; Between Meals  Calorie Counts  Clear Liquid Diet  Fluids: NS @ 100  DVT Prophylaxis: SCDs, SQH  Code Status: Full Code    Disposition Plan   Per primary team    The patient's care was discussed with the Attending Physician, Dr. Bentley.    Raquel Yanes  Internal Medicine Staff Hospitalist Service  [facility]  Pager: [phone]  Please see sticky note for cross cover information    Interval History   Transferred from SICU yesterday afternoon. Lethargic this morning. Converted into Afib with RBR this am, s/p metoprolol IV 5 mg x 3 without adequate response. Started on diltiazem gtt.       Data reviewed today: I  reviewed all medications, new labs and imaging results over the last 24 hours.    Physical Exam   Vital Signs: Temp: 98.4  F (36.9  C) Temp src: Oral BP: 113/78 Pulse: 130 Heart Rate: 144 Resp: 22 SpO2: 94 % O2 Device: Nasal cannula Oxygen Delivery: 4 LPM  Weight: 243 lbs 13.26 oz  General Appearance: Drowsy, oriented to self  Respiratory: Lungs CTAB, no wheezing or crackles  Cardiovascular: irregular rhythm, tachycardic, holosystolic murmur noted  GI: abdomen rotund, soft, non tender to palpation, drain CDI  Skin: warm and dry to touch, no rashes or excoriations  Other: no peripheral edema          Data   Data     Recent Labs  Lab 08/13/18  0812 08/13/18  0405 08/12/18  0444 08/11/18  0448  08/10/18  0506   WBC 12.6* 11.6* 13.3* 10.1  < > 12.4*   HGB 9.2* 9.3* 9.3* 7.1*  < > 7.6*   MCV 89 89 87 87  < > 90    205 195 152  < > 160   INR  --   --   --   --   --  1.32*   NA  --  142 140 141  < > 137   POTASSIUM  --  4.0 3.8 3.5  < > 4.0   CHLORIDE  --  108 104 106  < > 103   CO2  --  26 29 27  < > 24   BUN  --  47* 49* 51*  < > 52*   CR  --  1.19* 1.48* 2.04*  < > 3.13*   ANIONGAP  --  8 7 9  < > 10   ANNE  --  8.6 8.6 8.2*  < > 8.2*   GLC  --  137* 187* 135*  < > 130*   ALBUMIN  --   --   --   --   --  2.1*   PROTTOTAL  --   --   --   --   --  5.2*   BILITOTAL  --   --   --   --   --  0.5   ALKPHOS  --   --   --   --   --  56   ALT  --   --   --   --   --  26   AST  --   --   --   --   --  40   < > = values in this interval not displayed.

## 2018-08-13 NOTE — PLAN OF CARE
Problem: Patient Care Overview  Goal: Plan of Care/Patient Progress Review  OT 6B: cancel, per chart review and discussion with RN this AM, pt very lethargic and following minimal commands, not appropriate for therapy on this date. Will reschedule.

## 2018-08-13 NOTE — PROGRESS NOTES
"Urology  Progress Note    A.Fib with RVR this AM with    Metoprolol 5 mg x3 given- still in Afib   No nausea or vomiting     Exam  BP 96/60  Pulse 136  Temp 99.2  F (37.3  C) (Axillary)  Resp 22  Ht 1.56 m (5' 1.42\")  Wt 110.6 kg (243 lb 13.3 oz)  SpO2 94%  BMI 45.45 kg/m2  No acute distress  Saturating 5L   Abdomen soft, nontender, mildly distended. Incisions CDI with staples- serous leakage from bottom aspect of incision. Stoma retracted but pink and well perfused, 2 stents and RRC in place. Mepilex dressing in pannus fold.   RIVKA x 2 serosanguinous  Maddox with clear yellow urine in tubing  L PNT to gravity clear yellow urine    Urostomy 640/350  L /NR (was capped yesterday)   LUQ RIVKA 57/25  LLQ RIVKA 81/45    Labs  AM labs pending    Assessment/Plan  78 year old y/o female POD#5 s/p anterior pelvic exteneration, VRAM flap to pelvis (Plastics), BLPLND, creation of ileal conduit for MIBC. Medicine consulted for assistance managing multiple medical comorbidities. Ongoing issues include mental status and hypercarbia/hypoxia. Transferred to the SICU for worsening respiratory status. Stable now in step-down unit.     Neuro: tylenol, oxycodone, dilaudid for pain control. Dose increased as episode of Afib thought to be related to  inadequate pain control.    CV: Continues to be tachycardic. Home amlodipine, statin. PRN hydralazine, metop. AM dose of Metoprolol increased.Will discuss with medicine  Pulm: Currently on BiPAP. Continue to monitor.  FEN/GI: CLD. SLIV. Restarted on home lasix dose. Will discuss TPN plan  Endo: sliding scale insulin. Home synthroid.   : Maddox in place. L PNT uncapped. Monitor urine output  Heme: On SQH. Hgb stable.  ID: BCx NGTD, UCx with sensitivities resulted. Patient currently on Ceftiraxone (8/10-).   Activity: up ad olya  PPx: SCDs. SQH.     Seen and examined with the chief resident. Will discuss with Dr. Sharma.    Arianna Camejo  Urology PGY-4         Contacting the Urology " Team     Please use the following job codes to reach the Urology Team. Note that you must use an in house phone and that job codes cannot receive text pages.     On weekdays, dial [phone] (or star-star-star [phone] on the new [employer] telephones) then [phone] to reach the Adult Urology resident or PA on call    On weekdays, dial [phone] (or star-star-star [phone] on the new [employer] telephones) then [phone] to reach the Pediatric Urology resident    On weeknights and weekends, dial [phone] (or star-star-star [phone] on the new [employer] telephones) then [phone] to reach the Urology resident on call (for both Adult and Pediatrics)

## 2018-08-13 NOTE — PROGRESS NOTES
Calorie Counts  Intake recorded for: 8/12  Kcals: 0  Protein: 0g  # Meals Recorded: no meals ordered from kitchen, no intake recorded.   # Supplements Recorded: no intake recorded.

## 2018-08-13 NOTE — CONSULTS
[facility]: [place]    General Neurology Consult    Patient Name:  Sunitha Jacques  MRN:  [id number]    :  1940  Date of Admission:  2018  Date of Service:  2018  Primary care provider:  Pradeep Blanton    Consult: Neurology consulted for assistance with evaluation and management of AMS      History of Present Illness:   Pt is a 79 YO F w/ a complex PMH including CVA in 2017 w/o residual deficits, DM, HTN, A Fib and invasive bladder cancer admitted on  for cystectomy, pelvic exteneration, and ileal conduit now POD #5. Pt w/ waxing and waning AMS since her surgery on  and declining respiratory function over hospital course with transfer to ICU on 8/10. CT head on 8/10 w/o inctracranial pathology. AMS considered likely multifactorial at that time with factors including urosepsis. Pt transferred to floor on  with persistent somnolence and AMS, prompting reconsult of neurology. Pt has not had any acute decompensation or new onset of focal symptoms. Respiratory status continues to worsen somewhat, with pt now requiring 6 L O2 by NC. Pt is somnolent, is unable to provide a history, and intermittently follows commands.      ROS: A 10-point ROS is negative unless indicated above.      Allergies:  Allergies   Allergen Reactions     Keflex [Cephalexin] Other (See Comments)     Tolerated therapy on 18     Metaproterenol      Penicillins      Prednisone      Sulfa Drugs        Medications:    Prescriptions Prior to Admission   Medication Sig Dispense Refill Last Dose     Acetaminophen (TYLENOL PO) Take 1,000 mg by mouth daily Reported on 2017 at 0530     amLODIPine (NORVASC) 10 MG tablet Take 1 tablet (10 mg) by mouth daily 90 tablet 1 2018 at 0530     ATORVASTATIN CALCIUM PO Take 80 mg by mouth daily   2018 at PM     CALCITRIOL PO Take 0.5 mcg by mouth daily   2018 at 0530     cetirizine (ZYRTEC) 10 MG tablet Take 10 mg by mouth  every evening    8/7/2018 at pm     clidinium-chlordiazePOXIDE (LIBRAX) 5-2.5 MG CAPS per capsule Take 1 capsule by mouth every morning (before breakfast)   8/8/2018 at 0530     Enoxaparin Sodium (LOVENOX SC) Inject 80mg (0.8ml) twice daily on days 8/5 & 8/6, then take 1 injection of 80mg (0.8ml) on 8/7 prior to surgery   8/7/2018 at 1030     escitalopram (LEXAPRO) 10 MG tablet Take 1 tablet (10 mg) by mouth daily 90 tablet 1 8/8/2018 at 0530     furosemide (LASIX) 40 MG tablet Take 1 tablet (40 mg) by mouth 2 times daily 180 tablet 0 8/8/2018 at 0530     hydrALAZINE (APRESOLINE) 10 MG tablet Take 20 mg by mouth 2 times daily  90 tablet 1 8/8/2018 at 0530     LEVOTHYROXINE SODIUM PO Take 125 mcg by mouth daily   8/8/2018 at 0530     METOPROLOL TARTRATE PO Take 100 mg by mouth 2 times daily    8/8/2018 at 0530     Montelukast Sodium (SINGULAIR PO) Take 10 mg by mouth every morning    8/8/2018 at 0530     multivitamin, therapeutic with minerals (MULTI-VITAMIN) TABS tablet Take 1 tablet by mouth daily   8/8/2018 at 0530     PANTOPRAZOLE SODIUM PO Take 40 mg by mouth every morning (before breakfast)   8/8/2018 at 0530     pregabalin (LYRICA) 50 MG capsule Take 1 capsule (50 mg) by mouth 2 times daily 60 capsule 0 8/8/2018 at 0530     quinapril (ACCUPRIL) 40 MG Tab Take 1 tablet (40 mg) by mouth daily 90 tablet 1 8/8/2018 at 0530     VITAMIN D, CHOLECALCIFEROL, PO Take 1,000 Units by mouth daily   8/8/2018 at 0530     WARFARIN SODIUM PO Take 5mg by mouth for only one day each week and take 2.5mg once daily on all other days.   8/2/2018 at PM       PMH:  Past Medical History:   Diagnosis Date     Atrial fibrillation with rapid ventricular response (H) 2/11/2017     CAD (coronary artery disease)      Cerebrovascular accident (H) 3/21/2017     CHF (congestive heart failure) (H) 3/21/2017     CRD (chronic renal disease), stage III     Due to bladder cancer     Essential hypertension 4/9/2017     GERD (gastroesophageal reflux  disease)      History of MRSA infection 2017    treated april 2017     Hypertension      Hypothyroidism, unspecified type 12/14/2017     Long term current use of anticoagulant 2/12/2017     Morbid obesity (H) 4/13/2017     MIKKI (obstructive sleep apnea)      T2DM (type 2 diabetes mellitus) (H)      Past Surgical History:   Procedure Laterality Date     APPENDECTOMY  1975     AS TOTAL KNEE ARTHROPLASTY Bilateral 2015     CHOLECYSTECTOMY  1975     COLONOSCOPY       EXENTERATION ANTERIOR PELVIC N/A 8/8/2018    Procedure: EXENTERATION ANTERIOR PELVIC;  Anterior Pelvic Exenteration With Bilateral Lymphandectomy, Ileal Conduit Diversion, Left Vertical Rectus Abdominis Musculocutaneous Flap To Pelvis;  Surgeon: Girma Sharma MD;  Location: [facility] OR     GRAFT FLAP PEDICLE TRANSVERSE RECTUS ABDOMINIS MYOCUTANEOUS N/A 8/8/2018    Procedure: GRAFT FLAP PEDICLE TRANSVERSE RECTUS ABDOMINIS MYOCUTANEOUS;;  Surgeon: RAPHAEL Calderón MD;  Location: [facility] OR     LYMPHADENECTOMY ABDOMINAL Bilateral 8/8/2018    Procedure: LYMPHADENECTOMY ABDOMINAL;;  Surgeon: Girma Sharma MD;  Location: [facility] OR     SHOULDER SURGERY  2003     TONSILLECTOMY      as a child       Social History:  Social History   Substance Use Topics     Smoking status: Former Smoker     Packs/day: 0.50     Years: 25.00     Quit date: 7/5/1980     Smokeless tobacco: Never Used     Alcohol use No       Family History:    History reviewed. No pertinent family history.    Physical Examination:   Vitals:  B/P: 114/81, T: 98.4, P: 130, R: 22  General:  Somnolent elderly woman lying at an incline, rouses to voice and gently stimulation, NAD  HEENT:  NC/AT, no icterus, op pink and moist, no ear or nose drainage.   Cardiac:  Irregular rhythm, regular rate, no m/r/g  Chest:  CTAB bilaterally from the anterior without wheezes, crackles, or ronchi  Abdomen:  Obese abdomen with large midline surgical incision CDI, ostomy pouch in place, S/NT/ND,  hypoactive BS  Extremities:  No LE swelling.    Skin:  No rash or lesion.      Neuro Exam:  Mental status: Somnolent but rousable to voice and gentle stimulation, verbally responsive, but not following commands  Cranial nerves: PERRL, otherwise not assessed as patient not following commands  Motor: Normal tone, moves all extremities, withdraws from noxious stimuli  Reflexes: 3+ in bilateral UEs and LEs, no babinski or Brewster  Sensory: Withdraws from noxious stimuli  Coordination: Not assessed as patient not following commands  Gait: Not assessed as patient not following commands    Investigations:  Data     CMP   Recent Labs  Lab 08/13/18  0812 08/13/18  0405 08/12/18  0444 08/11/18  0448  08/10/18  0506    142 140 141  < > 137   POTASSIUM 4.1 4.0 3.8 3.5  < > 4.0   CHLORIDE 106 108 104 106  < > 103   CO2 26 26 29 27  < > 24   ANIONGAP 12 8 7 9  < > 10   * 137* 187* 135*  < > 130*   BUN 49* 47* 49* 51*  < > 52*   CR 1.36* 1.19* 1.48* 2.04*  < > 3.13*   GFRESTIMATED 38* 44* 34* 24*  < > 14*   GFRESTBLACK 46* 53* 41* 29*  < > 17*   ANNE 8.7 8.6 8.6 8.2*  < > 8.2*   MAG 2.4* 2.6* 2.0 2.3  < > 2.5*   PHOS 2.3* 1.9* 2.9 4.0  < > 4.5   PROTTOTAL  --   --   --   --   --  5.2*   ALBUMIN  --   --   --   --   --  2.1*   BILITOTAL  --   --   --   --   --  0.5   ALKPHOS  --   --   --   --   --  56   AST  --   --   --   --   --  40   ALT  --   --   --   --   --  26   < > = values in this interval not displayed.     CBC   Recent Labs  Lab 08/13/18 0812 08/13/18 0405 08/12/18 0444 08/11/18 0448   WBC 12.6* 11.6* 13.3* 10.1   RBC 3.31* 3.31* 3.26* 2.55*   HGB 9.2* 9.3* 9.3* 7.1*   HCT 29.5* 29.5* 28.3* 22.2*   MCV 89 89 87 87   MCH 27.8 28.1 28.5 27.8   MCHC 31.2* 31.5 32.9 32.0   RDW 15.7* 15.7* 15.5* 15.1*    205 195 152       INR, PTT   Recent Labs  Lab 08/10/18  0506   INR 1.32*        Arterial Blood Gas   Recent Labs  Lab 08/13/18  0821 08/11/18  0734 08/11/18  0448 08/10/18  1846  08/10/18  0855  08/10/18  0310 08/09/18  1904  08/08/18  1730 08/08/18  1630   PH  --   --   --   --   --  7.32* 7.36 7.34*  --  7.45 7.45   PCO2  --   --   --   --   --  53* 47* 48*  --  42 41   PO2  --   --   --   --   --  37* 58* 72*  --  150* 176*   HCO3  --   --   --   --   --  27 27 26  --  29* 29*   MARGUERITE  --   --   --   --   --  0.7 0.9  --   --  4.7 4.3   O2PER 4L 3L 40.0 40  < > 40 6L 40  < > 60 60   < > = values in this interval not displayed.    UA    Recent Labs  Lab 08/09/18  1140   COLOR Red   APPEARANCE Cloudy   URINEGLC Negative   URINEBILI Negative   URINEKETONE 5*   SG 1.015   UBLD Large*   URINEPH 6.5   PROTEIN 300*   NITRITE Negative   LEUKEST Large*   RBCU >182*   WBCU >182*       Micro   Recent Labs  Lab 08/10/18  1730 08/10/18  1047  08/09/18  1140   SDES Nares Blood Left Hand  < > Catheterized Urine   SREQ  --   --   --  Specimen received in preservative   CULT  --  No growth after 3 days  < > >100,000 colonies/mLEscherichia coli*   < > = values in this interval not displayed.       Radiological Data  Data   No results found for this or any previous visit (from the past 48 hour(s)).       ==========================================================    ASSESSMENT/PLAN:     Pt is a 79 YO F w/ a complex PMH including CVA in 2017 w/o residual deficits, DM, HTN, A Fib and invasive bladder cancer admitted on 8/8 for cystectomy, pelvic exteneration, and ileal conduit now POD #5. Pt w/ persistent waxing and waning AMS over hospitalization course. Neurology consulted for assistance with AMS.    # Encephalopathy  Pt with sustained waxing/waning altered mental status in post-operative course with respiratory decompensation requiring SICU transfer on 8/10. Altered mental status considered multifactorial with likely causes including urosepsis. Blood Cx negative w/ repeat blood Cxs pending. Initially on cipro w/ transition to ceftriaxone, now on ceftriaxone day 4. No new fever in the past 24 hours, WBC remains elevated.  Lactate 0.6. Pt reportedly endorsed a feeling that she had a stroke today, but was unable to elaborate concerns that prompted this sensation and there were no focal deficits present on exam. CT head on 8/8 w/o acute intracranial pathology. Exam is nonfocal without significant change from prior on 8/10. No repeat imaging is warranted at this time. Pt continues to have increased oxygen requirement w/o hypercapnea on VBG. Now requiring 6 L O2 by NC to maintain O2 saturations in the 90s with unclear cause. Encephalopathy is likely multifactorial with contributing factors including opioid analgesia in pt w/ hx of CVA and respiratory decline with increasing oxygen requirement. Recommend continuation of medical workup for causes of A Fib, respiratory decline, and infection. Notably, now in A Fib with RVR with likely multifactorial provocation including pain, dehydration, possible infection with anticoagulation currently held.    Recommendations:  - Minimize opiate use and sedating medications in setting of altered mental status  - No repeat imaging of head warranted    =========================================================    This patient was seen & discussed with my attending, Dr. Lopes, who agrees with my assessment and plan.     Katharine Tan  PGY-1, Internal Medicine  P: [phone]    ATTENDING 8/13/18: Patient seen and examined. Daughter in law Chase present. Agree with Dr Tan. Multifactorial toxic/metabolic encephalopathy. Non focal exam. Rena feels improved today. Rob Lopes MD

## 2018-08-13 NOTE — PLAN OF CARE
Problem: Patient Care Overview  Goal: Plan of Care/Patient Progress Review  Outcome: No Change  Neuro: Very Lethargic this morning. More alert in the afternoon. Oriented to self only at beginning of shift, improved as day went on.  Cardiac: Afib with RVR. HR in 100-140s. -140s/70-80s. Denied chest pain, lightheadness, headache, etc.   Respiratory:  Pt c/o of mild shortness of breath, Satting 91% on 4 L, increased to 94% on 6 L NC.   GI/: urostomy with borderline low UO, urology notified. No BM today. BS hypoactive.  Diet/appetite:  Clear liquid diet. C/o nausea after taking sips of water. PRN zofran and compazine given with relief.   Activity: Repositioned q2 hrs  Pain: Sched tylenol suppository given.  Skin: Abd incision with staples has serosanguinous, purulent drainage. Covered with abd dressing. Dressings replaced x5 this shift. Mepilex on coccyx CDI.   LDA's: R internal jugular infusing. Urostomy patent, L nephrostomy CDI/capped, 2 left RIVKA bulbs CDI with minimal serosanguinous drainage.  R port deaccessed/dressing CDI    Plan: Upon morning assessment, nurse was concerned about pt lethargy/responsiveness and Afib with RVR. Both medicine and urology at bedside, planned for dilt gtt, labs ordered including VBG, neuro consulted. Dilt gtt started with 2 boluses given. Pt remains in Afib/RVR. Pt more alert when daughter in law visited at bedside. Pt was then alert enough to take small sips of water.    [facility] nurse to see pt tomorrow AM regarding wound/growth on coccyx. Awaiting to hear about [place] placement. Phos replaced per protocol    Problem: Diabetes Comorbidity  Goal: Diabetes  Patient comorbidity will be monitored for signs and symptoms of hyperglycemia or hypoglycemia. Problems will be absent, minimized or managed by discharge/transition of care.   Outcome: No Change  Blood glucose monitoring

## 2018-08-13 NOTE — PROGRESS NOTES
Roman Cross-Cover  Date: 8/13/18    Rapid response called on patient around 5am for Afib with RVR (-170s).  Medicine consult team had been following this patient prior to transfer to SICU, was last seen 8/10/18 and therefore was contacted for Afib.  Upon arrival to patient's room she appeared fatigued but was awake and alert and able to answer questions.  She reported feeling pain in her abdomen and mild lightheadedness but was unable to say whether she had chest pain or palpitations. HR upon arrival to room was ~150 with SBP  and normal SpO2.  Patient appeared to be breathing comfortably, cardiac exam notable for tachycardia with irregular rhythm, extremities warm and well-perfused.     She was given metoprolol 5mg IV once and NS bolus 500mL was start with improvement in HR improved to 120-130s. She had also received 0.2mg of IV dilaudid approximately 15 minutes prior. She received another dose of metoprolol 5mg IV as well as dilaudid IV 0.3mg once and an additional 500mL NS was ordered to be given over 4 hrs and HR remained ~120 (range 110-130), with /67.    Cause of episode of AFib with RVR is likely multifactorial including pain, dehydration, and decreased oral dose of metoprolol.     Summary of Orders:  - Metoprolol 5mg IV Q10 min x 2  - NS bolus 500mL x 2   - Increase oral metoprolol to 50mg BID (although patient not taking oral meds as of yesterday evening)  - Dialudid 0.3mg once IV  - Draw morning labs now  - Hold lasix, dose prn pending reassessment of HR and volume status    [facility] medicine consult team will reevaluate her later this morning.    Leighann Barrera MD

## 2018-08-13 NOTE — SIGNIFICANT EVENT
Rapid response called at 0430 for pt  - 180. EKG 12 lead showed Afib RVR. Roman crosscover at bedside and order for IV metoprolol 5mg and 500mL NS bolus over 1 hr.     HR continues Afib with rates 150 - 160. Pt reports dizziness. Denies SOB. Denies chest pain.     0510: Order for IV metoprolol 5mg.     HR continues Afib with rates 140 - 160.     0530: IV metoprolol 5mg.     Pt with HR Afib with rates 115 - 130.

## 2018-08-13 NOTE — PLAN OF CARE
Problem: Patient Care Overview  Goal: Plan of Care/Patient Progress Review  PT 6B: Cancel- per RN patient not medically appropriate for therapy today, remains in afib with RVR and very lethargic. Will reschedule.

## 2018-08-13 NOTE — PLAN OF CARE
Problem: Patient Care Overview  Goal: Plan of Care/Patient Progress Review  Outcome: No Change  Neuro: Lethargic. Disoriented to time and situation.   Cardiac: Afib with  - 130. IV metoprolol 15mg total administered this AM for Afib RVR - refer to previous note. Other VSS. Tmax 100.0. PRN hydralazine administered for  - 180.   Respiratory: O2 sats stable on 3 - 4L NC during the day. BiPap 40% FiO2 at HS - tolerated 5 hours on BiPap overnight.   GI/: Urostomy intact with adequate UOP. No BM overnight.   Diet/appetite: Sips of water. Denies nausea overnight.   Activity:  Ax 2 - 3 with lift.   Pain: At acceptable level on current regimen. PRN dilaudid administered x1.   Skin: Intact, no new deficits noted. See flow sheet.     Left nephrostomy intact and capped. L RIVKA's x2 - intact with good output.     R: Continue with POC. Notify primary team with changes.    Problem: Diabetes Comorbidity  Goal: Diabetes  Patient comorbidity will be monitored for signs and symptoms of hyperglycemia or hypoglycemia. Problems will be absent, minimized or managed by discharge/transition of care.   Outcome: No Change  BG Q4hrs. sliding scale insulin administered per orders.     Problem: Renal Insufficiency Comorbidity  Goal: Renal Insufficiency  Patient comorbidity will be monitored for signs and symptoms of Renal Insufficiency (Chronic) condition.  Problems will be absent, minimized or managed by discharge/transition of care.   Outcome: No Change  Continue to monitor labs and UOP.     Problem: Cardiac Disease Comorbidity  Goal: Cardiac Disease  Patient comorbidity will be monitored for signs and symptoms of Cardiac Disease.  Problems will be absent, minimized or managed by discharge/transition of care.   Outcome: No Change  Afib with RVR overnight. Currently Afib with  - 130. Continue to monitor.

## 2018-08-14 NOTE — PROGRESS NOTES
Calorie Counts  Intake recorded for: 8/13  Kcals: 0  Protein: 0g  # Meals Recorded: no meals ordered from kitchen, no intake recorded.   # Supplements Recorded: no intake recorded.

## 2018-08-14 NOTE — PLAN OF CARE
Problem: Patient Care Overview  Goal: Plan of Care/Patient Progress Review  OT 6B: cancel, pt with multiple other providers today and minimally following commands, will reschedule.

## 2018-08-14 NOTE — PLAN OF CARE
"Problem: Patient Care Overview  Goal: Plan of Care/Patient Progress Review  Outcome: No Change  Neuro: A&Ox2. Disoriented to situation and time.   Cardiac:  Afib, frequent PVCs, murmur detected. High BP this shift. Dilt gtt for HR.    Respiratory: Sating 97% on 8-10L Oxi plus. BiPAP fiO2 50% used if not tolerating face mask or NC. Chest xray done today per MD.   GI/: Adequate urine output of urostomy. No BM this shift. IV lasix given x2 doses.    Diet/appetite: Clear liquid diet, needs pills crushed and placed in applesauce. 1 episode of emesis this shift, with frequent nausea, PRN zofran given.   Activity:  Assist of team lift for Q2H turns.   Pain: At acceptable level on current regimen. Denies.   Skin: No new deficits noted. Seen by [facility] nurse today. Mepilex changed on coccyx. Frequent ABD changes necessary for incision drainage.   LDA's: Urostomy w/ 2 stents & red robbinson. x2 RIVKA drains. L Neph capped. Central line (IJ), infusing.     Plan: Continue with POC. Notify primary team with changes.   BP (!) 153/94 (BP Location: Left arm)  Pulse 130  Temp 98.6  F (37  C) (Axillary)  Resp 18  Ht 1.56 m (5' 1.42\")  Wt 114.6 kg (252 lb 10.4 oz)  SpO2 96%  BMI 47.09 kg/m2  Perri Sotelo RN          Problem: Diabetes Comorbidity  Goal: Diabetes  Patient comorbidity will be monitored for signs and symptoms of hyperglycemia or hypoglycemia. Problems will be absent, minimized or managed by discharge/transition of care.   Outcome: No Change  Sliding scale insulin, q4H BS checks     Problem: Renal Insufficiency Comorbidity  Goal: Renal Insufficiency  Patient comorbidity will be monitored for signs and symptoms of Renal Insufficiency (Chronic) condition.  Problems will be absent, minimized or managed by discharge/transition of care.   Outcome: No Change  Continue to monitor labs and UO.     Problem: Cardiac Disease Comorbidity  Goal: Cardiac Disease  Patient comorbidity will be monitored for signs and symptoms of " Cardiac Disease.  Problems will be absent, minimized or managed by discharge/transition of care.   Outcome: No Change  Dilt gtt maintained, metroprolol dose increased per MD. On tele.

## 2018-08-14 NOTE — PLAN OF CARE
Problem: Patient Care Overview  Goal: Plan of Care/Patient Progress Review  Outcome: No Change  Neuro: Lethargic. Disoriented to time and situation.   Cardiac: Afib with HR 90 - 120. Other VSS. Tmax 99.7.                    Respiratory: O2 sats stable on BiPap 55% FiO2. Mits temporarily working to keep BiPap on.   GI/: Urostomy intact with adequate UOP. No BM overnight.   Diet/appetite: Clear liquid diet. Denies nausea overnight.   Activity:  Ax 2 - 3 with lift.   Pain: At acceptable level on current regimen. PRN dilaudid administered x1.   Skin: Intact, no new deficits noted. See flow sheet.      Dilt gtt at 15mL/hr. MIV at 100mL/hr. Left nephrostomy intact and capped. L RIVKA's x2 - intact with good output.      R: Continue with POC. Notify primary team with changes.    Problem: Diabetes Comorbidity  Goal: Diabetes  Patient comorbidity will be monitored for signs and symptoms of hyperglycemia or hypoglycemia. Problems will be absent, minimized or managed by discharge/transition of care.   Outcome: No Change  BG Q4hrs. sliding scale insulin administered per orders.     Problem: Renal Insufficiency Comorbidity  Goal: Renal Insufficiency  Patient comorbidity will be monitored for signs and symptoms of Renal Insufficiency (Chronic) condition.  Problems will be absent, minimized or managed by discharge/transition of care.   Outcome: No Change  Continue to monitor labs and UOP.     Problem: Cardiac Disease Comorbidity  Goal: Cardiac Disease  Patient comorbidity will be monitored for signs and symptoms of Cardiac Disease.  Problems will be absent, minimized or managed by discharge/transition of care.   Outcome: No Change  Afib with HR 90 - 120. Continue to monitor.

## 2018-08-14 NOTE — PROVIDER NOTIFICATION
Olinda CHAN notified that pt urine is pink tinged, large amount of drainage from incision site, and family concerned about nutrition status. Will come see pt and discuss plan of care with family.     Perri Sotelo RN

## 2018-08-14 NOTE — PROVIDER NOTIFICATION
Jayne Plaza MD notified pt with increased O2 needs. Refusing BiPap. Sating 91% on 6L NC. Fluid up. Edema +3. No new orders at this time. Will try BiPap with mits on pt - if unsuccessful will possibly need bedside attendant.

## 2018-08-14 NOTE — PROGRESS NOTES
Follow up WOC visit to assess Ileal Conduit and Coccyx wound. Pt in bed and appears very lethargic and gray with dtr in law at the bedside. Pt holding onto emisis bag and indicates she just does not feel well today. Pt not wanting to be touched or have ostomy cares done. WOC performed brief assessment. Urostomy pouch is intact and draining pale yellow urine. Incision line intact with staples. Lower edge of incision line appears reddened and is weeping purulant tan drainage, indurated edges and very painful to assess. WOC spoke to bedside nurse who paged Urology team for assessment. Nurse reports it has been a very difficult day for patient and this is improvement. Pt and family anticipate a discharge to rehab once she has recovered enough. Will follow up with patient tomorrow.    Adi VELASCO.

## 2018-08-14 NOTE — CONSULTS
Social Work: Assessment with Discharge Plan    Patient Name:  Sunitha Jacques  :  1940  Age:  78 year old  MRN:  [id number]  Risk/Complexity Score:  Filed Complexity Screen Score: 4  Completed assessment with: Chart review, bedside RN, patient, patient's son, two daughter-in-law's    Presenting Information   Reason for Referral:  Discharge plan  Date of Intake:  2018  Referral Source:  Physician  Decision Maker: Patient  Alternate Decision Maker: Son Galen Jacques (p) [phone]  Health Care Directive:  Will bring in copy  Living Situation:  House  Previous Functional Status:  Independent  Patient and family understanding of hospitalization: Surgeries needed due to invasive bladder cancer  Cultural/Language/Spiritual Considerations: Family reports that Sunitha is deeply connected to her Restorationist lucila, identifies as Adventist and benefits from prayer and spiritual support.  Adjustment to Illness: Curt appears significantly fatigued and painful.  Nauseated at times during our discussion. Family feels that she is having difficult emotional time coping with postsurgical recovery and may benefit from regular  visits.     Physical Health  Reason for Admission:    1. Malignant neoplasm of overlapping sites of bladder (H)      Services Needed/Recommended:  TCU and LTACH    Mental Health/Chemical Dependency  Diagnosis: History of depression symptoms following loss of her son  Support/Services in Place: None, notes she talks to family for support.  Services Needed/Recommended: Supportive services from  would be beneficial    Support System  Significant relationship at present time: Son and daughter-in-law's  Family of origin is available for support: Yes  Other support available: None   Gaps in support system:  prior to hospitalization lived alone in [place].  Patient is caregiver to:  None     Provider Information   Primary Care Physician:  [facility]   [phone]   Clinic:   [facility] [address]  / JUAN DO*      :  -    Financial   Income Source: Not discussed.  Financial Concerns: None reported today  Insurance:    Payor/Plan Subscriber Name Rel Member # Group #   MEDICARE - MEDICARE [address] [place]*  [id number]       ATTN CLAIMS, [address]   GEHA - [facility] MEDICARE * [address] [place]*  [id number]      [address]       Discharge Plan   Patient and family discharge goal: TCU placement  Provided education on discharge plan:  YES  Patient agreeable to discharge plan:  YES  A list of Medicare Certified Facilities was provided to the patient and/or family to encourage patient choice. Patient's choices for facility are:  [facility]  Will NH provide Skilled rehabilitation or complex medical:  YES  General information regarding anticipated insurance coverage and possible out of pocket cost was discussed. Patient and patient's family are aware patient may incur the cost of transportation to the facility, pending insurance payment: YES  Barriers to discharge:  TBD - Pending patient's progress and further recommendation.    Discharge Recommendations   Anticipated Disposition:  Facility:  [facility] to [facility].  Transportation Needs:  Medical:  Other:  To be determined at time of discharge but anticipate either wheelchair or stretcher  Name of Transportation Company and Phone:  -    Additional comments    consulted by medical team for discharge planning.   met with Mercedes along with her family.  Mercedes is alert and oriented but appears significantly fatigued, painful and nauseated.  She provided some details but verbally consented to family providing collateral information.    Mercedes is a 78-year-old  female who typically lives alone in her own home in [place].  It is a one level home and before her hospitalization she was independent, no services and was still driving.  She used CPAP at  night but otherwise was not on oxygen and did not have any other DME in the home.    Given the extensive surgery and Nico's rehabilitation needs, joint and her family anticipate she will need some type of placement post discharge from the hospital.  [facility], have already spoken to them.    Family shared that Nico has a strong lucila and would benefit from regular  visits during her hospitalization.  Discussed other ways for her family to provide support via phone if they are not visiting or for them to bring photographs of grandkids and great grandkids. garth-in-law noted Nico also responds well to music.     Plan: TBD - Pending patient's progress and further recommendation.  Anticipate discharge to TCU post hospitalization.  Unit  to follow for discharge planning.    Family contact  Galen Jacques (son) (p) [phone]    JERONIMO David, [facility]  Social Work Services, Emergency Dept [facility]  Pager: [phone] Mon-Sat 9 am - 9 pm, on-call/after hours pager [phone]

## 2018-08-14 NOTE — PROGRESS NOTES
Plastic Surgery Progress Note    Subjective/Interval History:  Continues to have serous drainage from lower midline incision.     Objective:  Temp:  [98.6  F (37  C)-99.7  F (37.6  C)] 99.4  F (37.4  C)  Pulse:  [102] 102  Heart Rate:  [] 90  Resp:  [16-20] 18  BP: (128-176)/() 128/74  FiO2 (%):  [50 %] 50 %  SpO2:  [90 %-97 %] 94 %    General appearance: in mild distress  Pulm: Saturating well on Oxi Plus mask, non-lavored.   CV: Hemodynamically stable  Abd: Soft, appropriately TTP, midline staples in place, some serosanguinous drainage from lower aspect of incision, no surrounding erythema.  RIVKA drains with minimal serous output, ostomy pink and viable, clear urine in the bag.   Skin: warm and well-perfused.     Assessment/Plan:   Sunitha Jacques is a 78 year old female with history of bladder cancer now s/p anterior pelvic exenteration, pelvic lymph node dissection, ileal conduit and VRAM without skin paddle to fill pelvic defect on 8/8/2018. No signs of infection at the midline incision. Drainage appears serous. No leukocytosis. Remains afebrile. Please continue drains for now.     Will discuss with Dr. Calderón.    Trent Freed, PGY4  [phone]

## 2018-08-14 NOTE — PROGRESS NOTES
CLINICAL NUTRITION SERVICES - BRIEF NOTE     Consult: Pt with persistent ileus. Central line in place- pharm/RD to start and manage TPN- Vivi Collier PA    INTERVENTIONS  Recommendations / Nutrition Prescription  Continue to monitor diet advancement/ TPN tolerance/lytes    RECOMMENDATIONS FOR MDs/PROVIDERS TO ORDER:  -Order lyte replacement protocols (Mg++/K+phos)  -Change IVF to non-dextrose containing prior to start of TPN to avoid lyte abnormalities with refeeding risk.   -Consider decreasing IVF with start of TPN as this provides additional 1320mL/day fluids  -If patient expected to DC on TPN please notify RD or pharmacy so TPN can be cycled prior to discharge. Note TPN can not be cycled until goal dextrose has been reached.    Recommendations already ordered by Registered Dietitian (RD):  --Use dosing weight 62kg  --Begin CPN, goal volume 1320 ml/day with initial 140g Dex daily (476 kcal, GIR 1.5), 90g AA daily (360 kcal), and 250 ml 20% IV lipids 5x weekly.    --ONLY once pt receives ~100% of initial continuous PN volume with K+/Mg++/Phos WNL, advance PN dex by 30 g every 1-2 days (pending lytes/Glu and Pharm D/RD discretion) to initial goal of 170g Dex (578 kcal) to increase provisions to 1295 kcals (21 kcal/kg/day), 1.5 g PRO/kg/day, GIR 1.9 with 27% kcals from Fat.    Future/Additional Recommendations:  -Monitor for electrolyte abnormalities patient at refeeding risk.       Maria Esther Mcarthur, [place], MS, LD  6B- Pager: [phone]

## 2018-08-14 NOTE — PROGRESS NOTES
[facility], [place]    Internal Medicine Progress Note - Gold Service      Assessment & Plan   Sunitha Jacques is a 78 year old female with a pmh of DM, HTN, hypothyroidism, CVA (2017), obesity, CHF, CKD, Afib (on coumadin) and MIKKI admitted 8/8/18 after total cystectomy, pelvic exenteration (removal of uterus, vaginal cuff, part of vagina), ileal conduit and flap for invasive bladder cancer. Developed AMS and hypoxia and transferred to SICU 8/10. Stable and transferred back to medical floor 8/12.     # Acute respiratory failure with hypoxia, increasing O2 needs. Likely 2/2 volume overload. Weight 114.6kg 8/14, up from 104.3 on admission. CXR 8/14 showing significant pulmonary edema. BNP 06159 . TTE 8/9 w/ normal EF. Is on lasix 40 mg PO every day PTA, had been getting 20 mg IV daily post-op. Dose held 8/13 due to concern for dehydration possibly contributing to Afib. 8/14 with increasing O2 needs up to 6L. Faint bibasilar crackles noted on exam. S/p aggressive diureses 8/14 with improvement in weight to 112.3 KG and O2 needs to 4L. Kidney function stable  - Will repeat 40 mg IV dose this am, repeat dose at 1400  - Further dosing pending response today, if not 1.0 L net out this afternoon can give subsequent dose this evening  - Strict I/O's  - Daily weights  - Daily BMP    Afib w/ RVR, hx of Afib (rate controlled). Rapid response called 8/13 am. Pt with 's-170's. S/p Metroprolol 5 mg IV x 3, total of NS bolus 1000 mL with inadequate response. Diltiazem drip started 8/14 and titrated to max dosing. Able to take PO metoprolol 8/14 as well. HR improved with dilt gtt and po meds, now in low 100's. Cause unclear, may be secondary to pain vs volume overload (see below). BP stable.   - Continue Dilt gtt, decrease to 10 mL/hr  - Metoprolol as above    DM. Diet controlled outpatient. Last HgbA1C 6.2 on 8/8/18. Uncontrolled BG since pt started TPN.   - Endocrine consult for  assistance    HTN.   - hydralazine 10 mg IV q6h  - Continue amlodipine, metoprolol  - Diuresis as above    Acute toxic/metabolic encephalopathy, improving. Marked changed in mental status 8/10. CT head 8/10 negative. Neuro consulted and felt more likely toxic/metabolic encephalopathy. Transferred to SICU given concern for sepsis. BCx NGTD. Broadened Cipro to Ceftriaxone. Transferred back to medical floor 8/13. Unclear mental status at time of transfer.  Continues to be lethargic. Repeat VBG 8/13 with mildly elevated PCO2 (52). Neurology re assessed patient 8/13, no further imaging indicated. AMS likely multifactorial w/ causes including pain, urosepsis, and delirium.   - Continue to monitor  - Minimize opiate use and sedating medications    S/p anterior pelvic exenteration, bilateral lymphadenectomy, ileal conduit diversion, left vertical rectus abdominis musculocutaneous flap to pelvis (8/8). Urology primary. Pain managed with roxicodone 5 mg q4h prn and IV dilaudid for breakthrough. Further management per primary team.     Ecoli UTI.  UCx from 8/9 growing >100,000 colonies/mL E coli.   - Continue Ceftriaxone (8/10-)  - Treatment duration per primary team    CKD. Recent BL Creatinine ~ 1.66-2.2, increased form 0.7-0.9 in 2017. Known atrophic R kidney and hx of severe hydronephrosis of the L kidney in the setting of of invasive bladder cancer.   - Trend BMP    Hx of Hypothyroidism. On levothyroxine. Continue.     Hx of CVA (2017). No focal deficits appreciated.   - Continue statin    PAD. Noted to have elevated velocities in external iliac arteries bilaterally and R common femoral artery suggesting stenosis seen on pelvic arterial and venous duplex on 8/6, done in preparation for flap placement 8/8.  - Continue statin    Possible HFrEF. EF 46% on MPI scan 7/6/18. TTE 8/9 w/ normal EF, although limited study d/t technical difficulty. Appears euvolemia. CXR without significant congestion.   - Strict I/O's  - PRN lasix  based on volume status  - Continue metoprolol and hydralazine      # Pain Assessment:  Current Pain Score 8/14/2018   Patient currently in pain? denies   Pain location -   Pain descriptors -   - Sunitha is experiencing pain due to post op pain. Pain management was discussed and the plan was created in a collaborative fashion.   - Please see the plan for pain management as documented above        Diet: Snacks/Supplements Adult: Boost Breeze; Between Meals  Calorie Counts  Clear Liquid Diet  Fluids: PO intake  DVT Prophylaxis: SCDs, SQH  Code Status: Full Code    Disposition Plan   Per primary team    The patient's care was discussed with the Attending Physician, Dr. Wheatley.     Raquel Yanes  Internal Medicine Staff Hospitalist Service  [facility]  Pager: [phone]  Please see sticky note for cross cover information    Interval History   Agitated overnight, tried to remove BiPAP. Afib controlled on dilt gtt with HR in low 100's. Nauseas this am, unable to tolerate PO meds. Complains of belly pain. No BM since procedure. Denies feeling constipated. Agrees to try PO bowel regimen, but refusing rectal intervention at this time. Poor PO intake given mental status and nausea.       Data reviewed today: I reviewed all medications, new labs and imaging results over the last 24 hours.    Physical Exam   Vital Signs: Temp: 99  F (37.2  C) Temp src: Axillary BP: 143/84   Heart Rate: 105 Resp: 18 SpO2: 94 % O2 Device: BiPAP/CPAP Oxygen Delivery: 6 LPM  Weight: 252 lbs 10.35 oz  General Appearance: Drowsy, oriented to self  Respiratory: Bibasilar crackles   Cardiovascular: irregular rhythm, tachycardic, holosystolic murmur noted  GI: abdomen rotund, soft, non tender to palpation, drain CDI  Skin: warm and dry to touch, no rashes or excoriations  Other: no peripheral edema          Data   Data     Recent Labs  Lab 08/14/18  0452 08/13/18  0812 08/13/18  0405  08/10/18  0506   WBC 10.8 12.6* 11.6*  < >  12.4*   HGB 8.8* 9.2* 9.3*  < > 7.6*   MCV 90 89 89  < > 90    224 205  < > 160   INR  --   --   --   --  1.32*    144 142  < > 137   POTASSIUM 4.1 4.1 4.0  < > 4.0   CHLORIDE 111* 106 108  < > 103   CO2 24 26 26  < > 24   BUN 49* 49* 47*  < > 52*   CR 1.33* 1.36* 1.19*  < > 3.13*   ANIONGAP 7 12 8  < > 10   ANNE 8.4* 8.7 8.6  < > 8.2*   * 154* 137*  < > 130*   ALBUMIN  --   --   --   --  2.1*   PROTTOTAL  --   --   --   --  5.2*   BILITOTAL  --   --   --   --  0.5   ALKPHOS  --   --   --   --  56   ALT  --   --   --   --  26   AST  --   --   --   --  40   TROPI  --  <0.015  --   --   --    < > = values in this interval not displayed.

## 2018-08-14 NOTE — PROGRESS NOTES
"WO Nurse Inpatient [facility]   WO Nurse Inpatient Adult      Initial Assessment   Assessment of new Urostomy Stoma complication(s) none   Mucocutaneous junction; intact   Peristomal complication(s) none   Pouch wear time:24-48 hours  Following today's visit:Patient /  is  able to demonstrate;       1. How to empty their pouch? no      2. How to change their pouch?  no      3. How to read and record intake and output correctly? no     Objective data:  Patient history according to medical record: Sunitha Jacques is a 78 year old female with a complicated past medical history including DM, HTN, hypothyroidism, CVA (2017), obesity, CHF, CKD, AFib (on warfarin), and MIKKI admitted on 8/8/18 after total cystectomy, pelvic exenteration (removal of uterus, vaginal cuff, part of vagina), ileal conduit and flap for invasive bladder cancer. Now with AMS and hypoxia. Developed AMS and hypoxia and transferred to SICU 8/10. Stable and transferred back to medical floor 8/12.   Current Diet/Nutrition:   Active Diet Order      NPO for Medical/Clinical Reasons Except for: No Exceptions   TPN no   I/O last 3 completed shifts:  In: 3332.11 [P.O.:740; I.V.:2092.11; IV Piggyback:500]  Out: 510 [Urine:310; Drains:200]  Labs:    Recent Labs  Lab 08/10/18  0506   08/08/18  1900   ALBUMIN 2.1*  --   --    HGB 7.6*  < > 9.2*   INR 1.32*  --   --    WBC 12.4*  < > 10.7   A1C  --   --  6.2*   .0*  --   --    < > = values in this interval not displayed.      Physical Exam:  Current pouching system:Santiago 1 pc urostomy  Reason for pouch change today: routine schedule  Stoma appearance: viable and healthy, lumen in center, stoma empties just above skin level and is inside a bowl of tissue.   Stoma size; 1\" x 1 1/8\" ,  red jordan and 2stents  Peristomal skin: intact, slight dehiscence of MCJ at 2 o'clock  Stoma output :serosanguinous in night draiange bag 200ml, after pouch changed clear pale urine collecting in ostomy " pouch.   Abdominal  Assessment  distended , NG still in place? Yes   Surgical Site: open to air and staples intact, lower end of wound with induration, draining copious amts of purulant tan/gray drainage, painful to assess, no odor. Nursing changing dressings every 1 to 2 hours with ABD. Urology to return and consult this afternoon with plastics.  Pain: pt denies  Is patient still on a PCA No     Interventions:  Patient's chart evaluated.  Focus of today's visit: refitting, pt not up for teaching yet due to nausea and confustion  Participant of teaching session today nurse  Orders: Written  Change made with ostomy management today: No  Patient/family: lethargic  Supplies:at bedside     Plan:  Learning needs: refitting of appliance, evaluate leakage issue, pouch change demonstration , pouch change return demonstration, verbal instruction , diet and hydration , pouch emptying, output measurement, odor/flatus management, lifestyle adjustments and discharge instructions  Preparation for discharge: No discharge preparations started  Recommend home care? by home WOC nurse if possible     Discussed plan of care with Nurse  Nursing to notify the Provider(s) and re-consult the WOC Nurse if new ostomy concerns or discharge planned before next planned WOC visit.                                           WOC Nurse will return: Monday  Face to face time: 30 minutes        WOC Nurse Inpatient Wound Assessment   Reason for consultation: Evaluate and treat Coccyx wound      Assessment  Coccyx wound due to Moisture Associated Skin Damage (MASD) with friction component possible Pilonidal cyst vs skin tags  Midline abdominal incision draining copious amts of drainage and collecting at coccyx, mepilex was saturated today upon removal  Status: follow up assessment     Treatment Plan  Coccyx wound: Every 3 days   1. Clean wound with MicroKlenz Spray, pat dry  2. Paint with No Sting Skin Prep (#[id number]) and allow to dry thoroughly  3.  "Press a Mepilex  Sacral Dressing (PS#[id number])  to the area, making sure to conform nicely to skin curvatures (begin placing the Mepilex at the most distal aspect first, smooth into place in an upward direction, then smooth side to side)   4. Time and date dressing change and edison with a \"T\" for treatment of a wound  5. Reposition pt every 1 to 2 hours when in bed and hourly when up to the chair to relieve pressure and promote perfusion to tissue  Orders Written     WO Nurse follow-up plan:weekly  Nursing to notify the Provider(s) and re-consult the WOC Nurse if wound(s) deteriorates or new skin concern.     Physical Exam  Skin inspection: focused Coccyx, Heels, back     Wound Location:  Coccyx  Date of last photo 8/10/18                Wound History: Unknown, pt not alert for hx  Measurements (length x width x depth, in cm) 3 cm x 3 cm  x  0.2 cm with 0.6cm x 1.1cm opening at distal aspect   Wound Base:  100 % pink, moist, nongranular dermis and macerated epidermal bumpy/firm cyst like growths, one that is black  Tunneling N/A  Undermining N/A  Palpation of the wound bed: normal   Periwound skin: denuded and macerated due to drainage from abdominal wound trapping in dressing  Color: normal and consistent with surrounding tissue  Temperature: normal   Drainage:, scant  Description of drainage: serosanguinous  Odor: none  Pain: denies     Interventions  Current support surface: Standard atmos air  Current off-loading measures: Foam padding and Pillows under calves, prevalon boots ordered  Visual inspection of wound(s) completed  Wound Care: done per plan of care  Supplies: placed at the bedside  Education provided: importance of repositioning, plan of care and wound progress  Discussed plan of care with Nurse     Adi Liang RN       "

## 2018-08-14 NOTE — PROGRESS NOTES
"Urology  Progress Note    Now on diltiazem gtt for afib with RVR  HR remains in low 100's  Neurology consulted by medicine, no new recs  Patient agitated overnight and made attempt to remove BiPAP, mitts ordered     Exam  /84 (BP Location: Left arm)  Pulse 130  Temp 99.7  F (37.6  C) (Axillary)  Resp 20  Ht 1.56 m (5' 1.42\")  Wt 114.6 kg (252 lb 10.4 oz)  SpO2 93%  BMI 47.09 kg/m2  No acute distress  Saturating 5L   Abdomen soft, nontender, mildly distended. Incisions CDI with staples - continued serous leakage from bottom aspect of incision. Stoma retracted but pink and well perfused, 2 stents and RRC in place. Mepilex dressing in pannus fold.   RIVKA x 2 serosanguinous  Maddox with clear yellow urine in tubing  L PNT capped    Urostomy 950/150  LUQ RIVKA 37/2  LLQ RIVKA 50/2    Labs  AM labs pending    Assessment/Plan  78 year old y/o female POD#6 s/p anterior pelvic exteneration, VRAM flap to pelvis (Plastics), BLPLND, creation of ileal conduit for MIBC. Medicine consulted for assistance managing multiple medical comorbidities. Ongoing issues include mental status and hypercarbia/hypoxia. Transferred to the SICU for worsening respiratory status. Stable now in step-down unit. Medicine co-managing.     Neuro: tylenol, oxycodone, dilaudid for pain control. Mitts ordered d/t agitation overnight.   CV: Dilt gtt, continues to be tachycardic. PRN hydralazine, metop. If continues to have afib while on dilt gtt, medicine will consult cardiology.   Pulm: Currently on BiPAP. Continue to monitor.  FEN/GI: CLD. SLIV. Restarted on home lasix dose. Will discuss TPN plan  Endo: sliding scale insulin. Home synthroid.   : Maddox in place. L PNT capped. Monitor urine output  Heme: On SQH. Hgb stable.  ID: BCx NGTD, UCx with sensitivities resulted. Patient currently on Ceftiraxone (8/10-).   Wound: Will evaluate in the afternoon and discuss with plastics team  Activity: up ad olya  PPx: SCDs. SQH.     Seen and examined with the " chief resident. Will discuss with Dr. Sharma.    Girma Hall MD  Urology PGY-3           Contacting the Urology Team     Please use the following job codes to reach the Urology Team. Note that you must use an in house phone and that job codes cannot receive text pages.     On weekdays, dial [phone] (or star-star-star [phone] on the new [employer] telephones) then [phone] to reach the Adult Urology resident or PA on call    On weekdays, dial [phone] (or star-star-star [phone] on the new [employer] telephones) then [phone] to reach the Pediatric Urology resident    On weeknights and weekends, dial [phone] (or star-star-star [phone] on the new [employer] telephones) then [phone] to reach the Urology resident on call (for both Adult and Pediatrics)

## 2018-08-15 NOTE — PROVIDER NOTIFICATION
Pts SBP elevated this -170's, pt had her scheduled dose of 10 mg PO norvasc and 5 mg IV metoprolol and remains elevated /70's.  Blood sugars also have been elevated mid to upper 200's, pt is NPO at this time, though does have TPN.  Dr. Yanes at bedside at this time rounding on patient and made aware of elevated BP's and blood sugars.  Pt has CVP's every 4 hrs, CVP this AM 11.    1330 BP's remain elevated 160-180's/70's.  Dr. Yanes made aware, 10 mg IV hydralazine every 6 hrs added and instructed to decrease dilt gtt to 10 mg/hr in a effort to wean dilt gtt and get pt toward home dose of BP meds.  Pt remains in afib rate controlled 's.  Edocrine was consulted for elevated blood sugars, sliding scale insulin was icreased to high dose protocol, once time dose 14 units of humulin N and insulin added to TPN for this evening.

## 2018-08-15 NOTE — PLAN OF CARE
Problem: Patient Care Overview  Goal: Plan of Care/Patient Progress Review  Outcome: No Change  Temp: 98.8  F (37.1  C) Temp src: Axillary BP: 157/77 Pulse: 102 Heart Rate: 81 Resp: 14 SpO2: 96 % O2 Device: Oxi Plus Oxygen Delivery: 6 LPM  Neuro: Alert, disorientated x2-3. Able to make needs known. Mitts in place for tubes.   Cardiac: Afib, rates 70-90s. BP elevated 140-160s/70-80. CVP 10-11.   Resp: Wore BiPAP for ~2hours , switched to Oxiplus at 6L with O2 sats >95%. Lung sounds clear/diminished in bases.   GI/: Urostomy with adequate UOP. No emesis HS. Hypoactive bowels sounds.   Diet/Appetite: Clear liquid diet.   Skin: Midline w/staples, oozing sero-sang drainage. Mepilex on coccyx.   Access: RIJ infusing Dilt at 15mg/hr, NS at 75cc/hr and TPN at 55cc/hr.   Drains: L-neph tube capped. L RIVKA x2 w/scant output. NG tube to LIS w/~250cc green output.   Activity: T/R q2h   Pain: No c/o pain this shift.     Will continue with plan of care and notify MD of any changes.       Problem: Diabetes Comorbidity  Goal: Diabetes  Patient comorbidity will be monitored for signs and symptoms of hyperglycemia or hypoglycemia. Problems will be absent, minimized or managed by discharge/transition of care.   Outcome: No Change  BG 200s, sliding scale insulin per MAR.     Problem: Cardiac Disease Comorbidity  Goal: Cardiac Disease  Patient comorbidity will be monitored for signs and symptoms of Cardiac Disease.  Problems will be absent, minimized or managed by discharge/transition of care.   Outcome: No Change  Remains Afib w/ rates 70-90s. BP elevated 140-150/70-80s. Hydralazine for SBP >160

## 2018-08-15 NOTE — PROVIDER NOTIFICATION
"[employer] cover paged at [phone]   Text paged at 0120: \"Does pt need to be on BiPap HS? Pt has NG to LIS and is very high aspiration risk.\"    Urology paged at 0200: Verified with MD that pt is ok to have BiPAP on while having NG to LIS and aspiration risk. Pt has mitts on for confusion.      Roman called this writer at 0205: MD notified about question if pt needs BiPAP, since she is such high aspiration risk. Pt is confused, with mitts on. NG to LIS, with green output. No emesis since evening shift.     MD up to assess pt:    Per MD, Remove BiPAP while pt has NG and aspiration risk. Continue Oxiplus at 6L, with continuous O2 monitoring. Will notify MD if any change in mentation and a ABG will be done.   "

## 2018-08-15 NOTE — PLAN OF CARE
Problem: Patient Care Overview  Goal: Plan of Care/Patient Progress Review  Outcome: No Change  Neuro: Alert to self.  Cardiac: AFIB. Temp 99.  Respiratory: Sating 95%+ on 6L oxymask.  GI/: Adequate urine output. No BM. Ng put out 100cc.  Diet/appetite: Clear liquid diet.  Activity:  Assist of 2-3/lift- bedrest  Pain: At acceptable level on current regimen.   Skin: No new deficits noted.  LDA's: Triple lumen CVC. NG. 2 RIVKA. Nephrostomy tube capped.  TPN started running 55cc/hr. Dilt drip 15ml/hr     Plan: Continue with POC. Notify primary team with changes.  MD notified: Patient was unable to receive oral medications. Low/little output in RIVKA. Moderate drainage from patients incision.     Problem: Diabetes Comorbidity  Goal: Diabetes  Patient comorbidity will be monitored for signs and symptoms of hyperglycemia or hypoglycemia. Problems will be absent, minimized or managed by discharge/transition of care.   Outcome: No Change  Blood glucose monitored Q4.

## 2018-08-15 NOTE — PROGRESS NOTES
[facility], [place]    Internal Medicine Progress Note - Gold Service      Assessment & Plan   Sunitha Jacques is a 78 year old female with a pmh of DM, HTN, hypothyroidism, CVA (2017), obesity, CHF, CKD, Afib (on coumadin) and MIKKI admitted 8/8/18 after total cystectomy, pelvic exenteration (removal of uterus, vaginal cuff, part of vagina), ileal conduit and flap for invasive bladder cancer. Developed AMS and hypoxia and transferred to SICU 8/10. Stable and transferred back to medical floor 8/12.     # Acute respiratory failure with hypoxia, increasing O2 needs. Likely 2/2 volume overload. Weight 114.6kg, up from 104.3 on admission. CXR 8/14 showing significant pulmonary edema. BNP 93787 . TTE 8/9 w/ normal EF. Is on lasix 40 mg PO every day PTA, had been getting 20 mg IV daily post-op. Dose held 8/13 due to concern for dehydration possibly contributing to Afib. 8/14 with increasing O2 needs up to 6L. Faint bibasilar crackles noted on exam. Initiated aggressive diuresis with good response. Kidney function stale. Weight 8/15 108.6.   - Will repeat 40 mg IV lasix dose this morning, 40 mg IV this dose this afternoon  - Further dosing pending response today, if not 1.0 L net out this afternoon can give subsequent dose this evening  - Strict I/O's  - Daily weights  - Daily BMP    DM. Diet controlled. Last HgbA1C 6.2 on 8/8/18. Uncontrolled BG since starting TPN.   - Endocrine consult to assist with management    N/V, concern for developing ileus. Pt with ongoing N/V. No BM since procedure. KUB obtained showing mildly dilated loops of bowel concerning for developing ileus. NG placed to intermittent suction with 1L bilious output.   - Continue NG to suction  - Bowel rest   - No BiPAP while NG in place given aspiration risk  - TPN given ongoing poor intake and current NPO statu    Afib w/ RVR, hx of Afib (rate controlled). Rapid response called 8/13 am. Pt with 's-170's. S/p  Metroprolol 5 mg IV x 3, total of NS bolus 1000 mL with inadequate response. Diltiazem drip started 8/14 and titrated to max dosing. Able to take PO metoprolol 8/14 as well. HR improved with dilt gtt and po meds, now in low 100's. Cause unclear, may be secondary to pain vs volume overload (see below). BP stable.   - Continue Dilt gtt  - Metoprolol 5 mg IV Q6H while NPO  - Plan to increase metoprolol to 75 mg BID (was on 100mg BID outpatient) when able to tolerate PO    HTN. BP Elevated.  - Continue amlodipine 10 mg every day  - Metoprolol as above  - Lasix as above  - Hydralazine 10 mg IV q6h    Acute toxic/metabolic encephalopathy, improving. Marked changed in mental status 8/10. CT head 8/10 negative. Neuro consulted and felt more likely toxic/metabolic encephalopathy. Transferred to SICU given concern for sepsis. BCx NGTD. Broadened Cipro to Ceftriaxone. Transferred back to medical floor 8/13. Unclear mental status at time of transfer.  Continues to be lethargic. Repeat VBG 8/13 with mildly elevated PCO2 (52). Neurology re assessed patient 8/13, no further imaging indicated. AMS likely multifactorial w/ causes including pain, urosepsis, and delirium.   - Continue to monitor  - Minimize opiate use and sedating medications    S/p anterior pelvic exenteration, bilateral lymphadenectomy, ileal conduit diversion, left vertical rectus abdominis musculocutaneous flap to pelvis (8/8). Urology primary. Pain managed with roxicodone 5 mg q4h prn and IV dilaudid for breakthrough. Further management per primary team.     Ecoli UTI.  UCx from 8/9 growing >100,000 colonies/mL E coli.   - Continue Ceftriaxone (8/10-)  - End date per primary team    CKD. Recent BL Creatinine ~ 1.66-2.2, increased form 0.7-0.9 in 2017. Known atrophic R kidney and hx of severe hydronephrosis of the L kidney in the setting of of invasive bladder cancer.   - Trend BMP    Hx of Hypothyroidism. On levothyroxine. Continue.     Hx of CVA (2017). No focal  deficits appreciated.   - Continue statin    PAD. Noted to have elevated velocities in external iliac arteries bilaterally and R common femoral artery suggesting stenosis seen on pelvic arterial and venous duplex on 8/6, done in preparation for flap placement 8/8.  - Continue statin    Possible HFrEF. EF 46% on MPI scan 7/6/18. TTE 8/9 w/ normal EF, although limited study d/t technical difficulty. Appears euvolemia. CXR without significant congestion.   - Strict I/O's  - PRN lasix based on volume status  - Continue metoprolol       # Pain Assessment:  Current Pain Score 8/15/2018   Patient currently in pain? denies   Pain location -   Pain descriptors -   - Sunitha is experiencing pain due to post op pain. Pain management was discussed and the plan was created in a collaborative fashion.   - Please see the plan for pain management as documented above        Diet: Clear Liquid Diet  parenteral nutrition - ADULT compounded formula  Fluids: PO intake  DVT Prophylaxis: SCDs, SQH  Code Status: Full Code    Disposition Plan   Per primary team    The patient's care was discussed with the Attending Physician, Dr. Wheatley.     Raquel Yanes  Internal Medicine Staff Hospitalist Service  [facility]  Pager: [phone]  Please see sticky note for cross cover information    Interval History   Good output yesterday with IV lasix with subsequent decrease in O2 needs. Ongoing nausea/vomiting overnight prompting KUB which showed mildly dilated loops of bowel concerning for developing ileus. NG placed to intermittent suction. Oriented today, feels overall improved. States interest in getting up and moving around. Nausea improved with NG in place. Did have small BM this morning.       Data reviewed today: I reviewed all medications, new labs and imaging results over the last 24 hours.    Physical Exam   Vital Signs: Temp: 97.9  F (36.6  C) Temp src: Oral BP: 174/77 Pulse: 102 Heart Rate: 82 Resp: 18 SpO2: 95  % O2 Device: None (Room air) Oxygen Delivery: 5 LPM  Weight: 247 lbs 5.7 oz  General Appearance: Alert and oriented x 3, NG in place  Respiratory: Bibasilar crackles   Cardiovascular: irregular rhythm, tachycardic, holosystolic murmur noted  GI: abdomen rotund, soft, non tender to palpation, drain CDI  Skin: warm and dry to touch, no rashes or excoriations  Other: no peripheral edema          Data   Data     Recent Labs  Lab 08/15/18  0512 08/14/18  0452 08/13/18  0812  08/10/18  0506   WBC 10.9 10.8 12.6*  < > 12.4*   HGB 8.8* 8.8* 9.2*  < > 7.6*   MCV 90 90 89  < > 90    226 224  < > 160   INR 1.50*  --   --   --  1.32*    143 144  < > 137   POTASSIUM 3.7 4.1 4.1  < > 4.0   CHLORIDE 112* 111* 106  < > 103   CO2 26 24 26  < > 24   BUN 50* 49* 49*  < > 52*   CR 1.30* 1.33* 1.36*  < > 3.13*   ANIONGAP 6 7 12  < > 10   ANNE 8.4* 8.4* 8.7  < > 8.2*   * 169* 154*  < > 130*   ALBUMIN 1.8*  --   --   --  2.1*   PROTTOTAL 5.8*  --   --   --  5.2*   BILITOTAL 0.2  --   --   --  0.5   ALKPHOS 72  --   --   --  56   ALT 50  --   --   --  26   AST 31  --   --   --  40   TROPI  --   --  <0.015  --   --    < > = values in this interval not displayed.

## 2018-08-15 NOTE — PLAN OF CARE
Problem: Patient Care Overview  Goal: Plan of Care/Patient Progress Review  6A PT  Discharge Planner PT   Patient plan for discharge: not discussed   Current status: Pt able to log roll and supine<>sit mod-maxAx2, balance seated at EOB SBA for 5 minutes, sit<>stand min-modAx2 and stand for 10s with UE support. Experiencing abdominal pain.  Barriers to return to prior living situation: deconditioned, decreased activity tolerance, medical status  Recommendations for discharge: TCU  Rationale for recommendations: Pt would benefit from skilled physical therapy to improve activity tolerance and gain endurance for functional mobility to return to PLOF.        Entered by: Cydney Son 08/15/2018 4:00 PM

## 2018-08-15 NOTE — PROGRESS NOTES
"SPIRITUAL HEALTH SERVICES  SPIRITUAL ASSESSMENT Progress Note  [facility]B   ON-CALL VISIT    REFERRAL SOURCE: Consult Order     I met with Sunitha Jacques to introduce spiritual health services. Sunitha says she has \"bladder cancer\" and is recovering from surgery. She is from [place], [place] and is feeling disconnected physically from her community. She has one son who lives in [place] who has been visiting regularly. Her other son  from cancer 2 years ago. Sunitha grew up Sikhism and draws comfort from her lucila, especially prayer. Today she is feeling \"overwhelmed\" and \"stressed\" from her surgery and hospital stay. I provided active listening, validation of feelings, affirmation of spiritual strengths and prayer. Sunitha would like on-going visits from the unit .    PLAN: I will update the unit  of this visit.    Amy Yeh  Oncology   Pager [phone]    "

## 2018-08-15 NOTE — PROGRESS NOTES
Called regarding continued nausea and vomiting.  Stat KUB ordered which showed distended loops of small bowel.  NG tube ordered.  Upon placement about 1 L of bilious output noted.  Continue NG tube to low intermittent suction.  Continue IV fluids.    We will continue to monitor    Arianna Camejo  Urology Resident

## 2018-08-15 NOTE — CONSULTS
Diabetes/Hyperglycemia Management Consult    Chief Complaint TPN-associated hyperglycemia  Consult requested by: Raquel Yanes PA-C  History of Present Illness Sunitha Jacques is a 78 year old woman who underwent total cystectomy, anterior pelvic exenteration with bilateral lymphadectomy, ileal conduit diversion, left vertical rectus abdominis musculocutaneous flap to pelvis on 8/8/2018 for invasive bladder cancer.  Her past medical history is significant for type 2 diabetes, morbid obesity, hypertension, hyperlipidemia, hypothyroidism, GERD, CKD III, CVA, osteoarthritis, CHF.  Her post-operative course has been complicated by altered mental status, hypoxia, potential ileus, and need for nutrition supplementation.    Glucose on admission was in the mid 100s, then managed with IV insulin infusion until 8/10.  On aspart correctoin alone the glucose was mostly mid 100s.  BGs began rising late on 8/11, apparently related to diet start.  She transferred from SICU to  on 8/12.  She had clear liquid diet (including Boost Breeze- 54 grams carb each) and non-dextrose IVF, BG up to low 200s yesterday.  BG climbing to high 200s this morning following start of TPN (140 grams dex initially w/ goal 170 grams) last evening.  After drinking Breeze on top of receiving TPN, glucose to 300s midday today.  She is now NPO and has her NGT to LIS.  Sunitha complains her glucose feels high and she has increased thirst and dry mouth.  Her analgesic is effective in helping her abdominal pain, but makes her quite sleepy for a while.  She plans to get up with therapy this afternoon.  She complains of fatigue and generally feeling overwhelmed.      Recent Labs  Lab 08/15/18  1128 08/15/18  0811 08/15/18  0512 08/15/18  0319 08/14/18  2341 08/14/18  2021 08/14/18  1618  08/14/18  0452  08/13/18  0812  08/13/18  0405  08/12/18  0444  08/11/18  0448   GLC  --   --  252*  --   --   --   --   --  169*  --  154*  --  137*  --  187*  --  135*    * 277*  --  247* 260* 230* 216*  < >  --   < >  --   < >  --   < >  --   < >  --    < > = values in this interval not displayed.      Diabetes Type: type 2  Diabetes Duration: 15 yars  Usual Diabetes Regimen:   1-2 x/day BG monitoring frequency  Only diet controlled the last year (prior was on glipizide 7.5 mg daily)  Ability to Skipwith Prescribed Regimen: fair to good  Diabetes Control:   Lab Results   Component Value Date    A1C 6.2 08/08/2018    A1C 6.2 07/05/2018    A1C 5.8 04/13/2017     Diabetes Complications: denies retinopathy, endorses peripheral neuropathy, has CKD III but of unclear origin  History of DKA: no  Able to Detect Hypoglycemia: never recalls experiencing  Usual Diabetes Care Provider: PCP Dr. Pradeep Blanton in [place], [place]  Factors Impacting Glucose Control: acute illness, TPN      Review of Systems  10 point ROS completed with pertinent positives and negatives noted in the HPI    Past medical, family and social histories are reviewed and updated.    Past Medical History  Past Medical History:   Diagnosis Date     Atrial fibrillation with rapid ventricular response (H) 2/11/2017     CAD (coronary artery disease)      Cerebrovascular accident (H) 3/21/2017     CHF (congestive heart failure) (H) 3/21/2017     CRD (chronic renal disease), stage III     Due to bladder cancer     Essential hypertension 4/9/2017     GERD (gastroesophageal reflux disease)      History of MRSA infection 2017    treated april 2017     Hypertension      Hypothyroidism, unspecified type 12/14/2017     Long term current use of anticoagulant 2/12/2017     Morbid obesity (H) 4/13/2017     MIKKI (obstructive sleep apnea)      T2DM (type 2 diabetes mellitus) (H)        Family History  Type 2 diabetes in mother (and extended maternal relatives) and siblings    Social History  Social History     Social History     Marital status:      Spouse name: N/A     Number of children: N/A     Years of education: N/A  "    Social History Main Topics     Smoking status: Former Smoker     Packs/day: 0.50     Years: 25.00     Quit date: 7/5/1980     Smokeless tobacco: Never Used     Alcohol use No     Drug use: No     Sexual activity: Not Asked     Social History Narrative   Lived alone prior to surgery.  Has support from her children and spiritual community. Expecting TCU stay      Physical Exam  Vital signs:  Temp: 98.5  F (36.9  C) Temp src: Oral BP: 173/75 Pulse: 102 Heart Rate: 110 Resp: 16 SpO2: 94 % O2 Device: Nasal cannula Oxygen Delivery: 4 LPM Height: 156 cm (5' 1.42\") Weight: 108.6 kg (239 lb 6.4 oz) (standing)  Estimated body mass index is 44.62 kg/(m^2) as calculated from the following:    Height as of this encounter: 1.56 m (5' 1.42\").    Weight as of this encounter: 108.6 kg (239 lb 6.4 oz).      General:  pleasant woman resting in bed, in no distress.   HEENT: NC/AT, PER and anicteric, non-injected, oral mucous membranes slightly dry, upper plate absent  Lungs: unlabored respiration at rest, on supp O2 via NC, no cough observed  ABD: central obesity  Skin: warm and dry, no obvious lesions  MSK:  fluid movement of upper extremities observed  Lymp: traceLE edema   Mental status: difficult to awaken on first attempt, then aroused to voice and maintained alert, communicating clearly, able to relay details of diabetes hx  Psych:  calm, even mood    Laboratory  Recent Labs   Lab Test  08/15/18   0512  08/14/18   0452   NA  144  143   POTASSIUM  3.7  4.1   CHLORIDE  112*  111*   CO2  26  24   ANIONGAP  6  7   GLC  252*  169*   BUN  50*  49*   CR  1.30*  1.33*   ANNE  8.4*  8.4*     CBC RESULTS:   Recent Labs   Lab Test  08/15/18   0512   WBC  10.9   RBC  3.16*   HGB  8.8*   HCT  28.4*   MCV  90   MCH  27.8   MCHC  31.0*   RDW  15.6*   PLT  255       Liver Function Studies -   Recent Labs   Lab Test  08/15/18   0512   PROTTOTAL  5.8*   ALBUMIN  1.8*   BILITOTAL  0.2   ALKPHOS  72   AST  31   ALT  50       Active " Medications  Current Facility-Administered Medications   Medication     acetaminophen (TYLENOL) tablet 975 mg    Or     acetaminophen (TYLENOL) Suppository 650 mg     amLODIPine (NORVASC) tablet 10 mg     atorvastatin (LIPITOR) tablet 80 mg     cefTRIAXone (ROCEPHIN) 2 g vial to attach to  ml bag for ADULTS or NS 50 ml bag for PEDS     dextrose 10 % 1,000 mL infusion     glucose gel 15-30 g    Or     dextrose 50 % injection 25-50 mL    Or     glucagon injection 1 mg     diltiazem (CARDIZEM) 125 mg in sodium chloride 0.9 % 125 mL infusion     escitalopram (LEXAPRO) tablet 10 mg     heparin sodium PF injection 5,000 Units     HOLD: All Oral Medications     hydrALAZINE (APRESOLINE) injection 10 mg     HYDROmorphone (DILAUDID) injection 0.2 mg     hypromellose-dextran (ARTIFICAL TEARS) 0.1-0.3 % ophthalmic solution 1 drop     insulin aspart (NovoLOG) inj (RAPID ACTING)     levothyroxine (SYNTHROID/LEVOTHROID) injection 62.5 mcg     Lidocaine (LIDOCARE) 4 % Patch 1-2 patch     lidocaine (LMX4) cream     lidocaine 1 % 1 mL     lidocaine patch in PLACE     lidocaine patch REMOVAL     lipids (INTRALIPID) 20 % infusion 250 mL     magnesium sulfate 2 g in NS intermittent infusion (PharMEDium or [facility] Cmpd)     magnesium sulfate 4 g in 100 mL sterile water (premade)     metoprolol (LOPRESSOR) injection 5 mg     [Rx hold ] metoprolol tartrate (LOPRESSOR) tablet 50 mg     montelukast (SINGULAIR) chewable tablet 10 mg     naloxone (NARCAN) injection 0.1-0.4 mg     No lozenges or gum should be given while patient on BIPAP/AVAPS/AVAPS AE     ondansetron (ZOFRAN-ODT) ODT tab 4-8 mg    Or     ondansetron (ZOFRAN) injection 4 mg     oxyCODONE IR (ROXICODONE) tablet 5 mg     pantoprazole (PROTONIX) 40 mg IV push injection     parenteral nutrition - ADULT compounded formula     parenteral nutrition - ADULT compounded formula     polyethylene glycol (MIRALAX/GLYCOLAX) Packet 17 g     potassium chloride 10 mEq in 100 mL intermittent  infusion with 10 mg lidocaine     potassium chloride 10 mEq in 100 mL sterile water intermittent infusion (premix)     potassium chloride 20 mEq in 50 mL intermittent infusion     potassium phosphate 15 mmol in D5W 250 mL intermittent infusion     potassium phosphate 20 mmol in D5W 250 mL intermittent infusion     potassium phosphate 20 mmol in D5W 500 mL intermittent infusion     potassium phosphate 25 mmol in D5W 500 mL intermittent infusion     prochlorperazine (COMPAZINE) injection 5 mg    Or     prochlorperazine (COMPAZINE) tablet 5 mg     senna-docusate (SENOKOT-S;PERICOLACE) 8.6-50 MG per tablet 2 tablet     simethicone (MYLICON) chewable tablet 80 mg     sodium chloride (PF) 0.9% PF flush 3 mL       Current Diet    Active Diet Order      NPO for Medical/Clinical Reasons Except for: Ice Chips      Assessment  Sunitha Jacques is a 78 year old woman with diet-controlled type 2 diabetes complicated by peripheral neuropathy who underwent total cystectomy, anterior pelvic exenteration with bilateral lymphadectomy, ileal conduit diversion, left vertical rectus abdominis musculocutaneous flap to pelvis on 8/8/2018 for invasive bladder cancer, experiencing post-operative and TPN-associated hyperglycemia.      Plan  -NPH 14 units x one now  -aspart correction increased from medium to high (1 per 25 mg/dL glucose) q4h  -add regular insulin 20 units to TPN containing 140 grams dextrose  -will need aspart carbohydrate coverage once eating again    Discussed w/ pt, RN, pharmD, primary team.   We will follow.    Rachana Juan APRN -[id number]    Diabetes Management Team job code: [id number]  I spent a total of 80 minutes bedside and on the inpatient unit managing the glycemic care of Sunitha Jacques. Over 50% of my time on the unit was spent counseling the patient and/or coordinating care regarding acute hyperglycemia management.  See note for details.

## 2018-08-15 NOTE — PROGRESS NOTES
Follow up WOC visit to assess Ileal Conduit. Pt oriented x1.    1 piece urostomy system remains intact.   I:  WOC performed brief assessment. Urostomy pouch is intact and draining pale yellow urine.   A:  Pt and family anticipate a discharge to rehab once she has recovered enough.   P:  WOC f/u for ostomy and wounds on Friday

## 2018-08-15 NOTE — PROGRESS NOTES
"Urology  Progress Note    NGT placed for N/V, high output initially  KUB with distended loops of small bowel  BiPAP removed for aspiration risk  No BM    Exam  /77  Pulse 102  Temp 98.8  F (37.1  C) (Axillary)  Resp 14  Ht 1.56 m (5' 1.42\")  Wt 114.6 kg (252 lb 10.4 oz)  SpO2 96%  BMI 47.09 kg/m2  No acute distress  Saturating 5L   Abdomen soft, nontender, mildly distended. Incisions CDI with staples - continued serous leakage from bottom aspect of incision. Stoma retracted but pink and well perfused, 2 stents and RRC in place. Mepilex dressing in pannus fold.   RIVKA x 2 serous  Maddox with clear yellow urine in tubing  L PNT capped    Urostomy 2125/285  LUQ RIVKA 7/0  LLQ RIVKA 2/5  NGT 1000/250    Labs  AM labs pending    Assessment/Plan  78 year old y/o female POD#7 s/p anterior pelvic exteneration, VRAM flap to pelvis (Plastics), BLPLND, creation of ileal conduit for MIBC. Medicine consulted for assistance managing multiple medical comorbidities. Ongoing issues include mental status and hypercarbia/hypoxia. Transferred to the SICU for worsening respiratory status. Stable now in step-down unit. Medicine co-managing.     Neuro: tylenol, oxycodone, dilaudid for pain control. Occasionally agitated and pulls on lines, mitts ordered.  CV: Dilt gtt, continues to be tachycardic. PRN hydralazine, metop. If continues to have afib while on dilt gtt, medicine will consult cardiology.    Pulm: Currently on BiPAP. Continue to monitor.  FEN/GI: NGT to LIS. NPO. SLIV. Restarted on home lasix dose. TPN running @ 55cc/hr.  Endo: sliding scale insulin. Home synthroid.   : Maddox in place. L PNT capped. Monitor urine output  Heme: On SQH. Hgb stable.  ID: BCx NGTD, UCx with sensitivities resulted. Patient currently on Ceftiraxone (8/10-).   Activity: up ad olya  PPx: SCDs. SQH.     Seen and examined with the chief resident. Will discuss with Dr. Sharma.    Arianna Camejo MD  Urology PGY-4           Contacting the Urology Team  "    Please use the following job codes to reach the Urology Team. Note that you must use an in house phone and that job codes cannot receive text pages.     On weekdays, dial [phone] (or star-star-star [phone] on the new [employer] telephones) then [phone] to reach the Adult Urology resident or PA on call    On weekdays, dial [phone] (or star-star-star 777 on the new [employer] telephones) then [phone] to reach the Pediatric Urology resident    On weeknights and weekends, dial [phone] (or star-star-star 777 on the new [employer] telephones) then [phone] to reach the Urology resident on call (for both Adult and Pediatrics)

## 2018-08-16 NOTE — PROVIDER NOTIFICATION
Gold updated at 2330: Pts BP now 200/80s, -110: Dilt gtt at 10mg/hr and was turned down earlier today d/t Metoprolol and Hydralazine scheduled. Pt remains asymptomatic.     Per MD increase Dilt gtt to 15mg/hr and notify if BP and HR remain elevated.     Spoke with gold cross cover at 0130: verified that MD wanted this writer to give scheduled Hydralazine and Metoprolol (since dilt gtt was increased). Per MD ok to go ahead and continue these meds.

## 2018-08-16 NOTE — PROGRESS NOTES
CLINICAL NUTRITION SERVICES - REASSESSMENT NOTE     Nutrition Prescription    RECOMMENDATIONS FOR MDs/PROVIDERS TO ORDER:  Continue to monitor ongoing ileus/ ability to advance diet as able    Malnutrition Status:    Non-severe malnutrition in the context of acute on chronic illness    Recommendations already ordered by Registered Dietitian (RD):  Continue TPN to goal as ordered    Future/Additional Recommendations:  Monitor for diet advancement and ability to wean TPN as appropriate.   (Note: If ordering supplements upon diet adv, pt dislikes Boost Breeze)     EVALUATION OF THE PROGRESS TOWARD GOALS   Diet: NPO  Nutrition Support: CPN, goal volume 1320ml/day with (goal dex) 170g Dex daily (578kcal), 90g AA daily (360kcal) and 250 ml of 20% IV lipids 5x weekly = 1295 kcals/day (21 kcal/kg/day), 1.5 g PRO/kg/day, GIR 1.9 with 27% kcals from Fat.   Dosing weight: 62 kg  Intake: Pt advancing to goal dextrose today 8/16. Poor PO intakes since surgery ~ 1 week ago.        NEW FINDINGS   Weight: Weight up since admit, suspect fluid fluctuations  Labs: TAGS: WNL, Lyts: WNL  Meds: levothyroxine, insulin, senna  GI: N/V, concern for developing ileus. Pt with ongoing N/V. No BM since procedure. KUB obtained showing mildly dilated loops of bowel concerning for developing ileus. NG placed to intermittent suction with 1L bilious output 8/15. Pt passing gas 8/16.  - NG clamp trial today (8/16), if increased nausea/distention can place back to suction  - Continue bowel meds  - No BiPAP while NG in place given aspiration risk  - TPN given ongoing poor intake and current NPO status  - Endocrine following to assist with management    MALNUTRITION  % Intake: </= 50% for >/= 5 days (severe)  % Weight Loss: None noted  Subcutaneous Fat Loss: None observed  Muscle Loss: Temporal:  mild  Fluid Accumulation/Edema: Mild  Malnutrition Diagnosis: Non-severe malnutrition in the context of acute on chronic illness    Previous Goals   Diet adv v  nutrition support within 2-3 days.  Patient to consume % of nutritionally adequate meal trays TID, or the equivalent with supplements/snacks.  Evaluation: Not met    Previous Nutrition Diagnosis  Inadequate oral intake related to slow advancement of diet 2/2 postop surgery AEB clear liquid diet order x 1 day and pt report of abdominal pain on CL.  Evaluation: No change    CURRENT NUTRITION DIAGNOSIS  Inadequate protein-energy intake related to ileus and poor PO intakes as evidenced by CL/NPO x 7 days, poor calorie counts and now need for TPN.    INTERVENTIONS  Implementation  Collaboration with other providers    Goals  Total avg nutritional intake to meet a minimum of 20 kcal/kg and 1.2 g PRO/kg daily (per dosing wt 62 kg).    Monitoring/Evaluation  Progress toward goals will be monitored and evaluated per protocol.      Maria Esther Mcrathur, MERCEDES, MS, LD  6B- Pager: [phone]

## 2018-08-16 NOTE — PLAN OF CARE
Problem: Patient Care Overview  Goal: Plan of Care/Patient Progress Review  Discharge Planner OT   Patient plan for discharge: not addressed this session   Current status: Significant time needed for environmental setup to ensure safety with mobility. Pt rolls from side to side with maxA x1, dependent in lift transfer from supine to sitting upright in recliner chair. Pt lethargic at start of session but was much more alert and interactive at end of session. Tolerated BUE strengthening exercises while seated in chair.   Barriers to return to prior living situation: deconditioning, fatigue, pain, post-surgical precautions  Recommendations for discharge: TCU  Rationale for recommendations: Pt would benefit from additional therapy to increase safety and independence with ADLs/IADLs and functional mobility.        Entered by: Judy Macias 08/16/2018 3:51 PM

## 2018-08-16 NOTE — PROGRESS NOTES
[facility], [place]    Internal Medicine Progress Note - Gold Service      Assessment & Plan   Sunitha Jacques is a 78 year old female with a pmh of DM, HTN, hypothyroidism, CVA (2017), obesity, CHF, CKD, Afib (on coumadin) and MIKKI admitted 8/8/18 after total cystectomy, pelvic exenteration (removal of uterus, vaginal cuff, part of vagina), ileal conduit and flap for invasive bladder cancer. Developed AMS and hypoxia and transferred to SICU 8/10. Stable and transferred back to medical floor 8/12.     # Acute respiratory failure with hypoxia, increasing O2 needs. Likely 2/2 volume overload. Weight 114.6kg, up from 104.3 on admission. CXR 8/14 showing significant pulmonary edema. BNP 38655 . TTE 8/9 w/ normal EF. Is on lasix 40 mg PO every day PTA, had been getting 20 mg IV daily post-op. Dose held 8/13 due to concern for dehydration possibly contributing to Afib. 8/14 with increasing O2 needs up to 6L. Faint bibasilar crackles noted on exam. Initiated aggressive diuresis with good response. Kidney function stale. Weight 8/16 11.1.   - Will repeat 40 mg IV lasix dose this morning, 40 mg IV this dose this afternoon  - Strict I/O's  - Daily weights  - Daily BMP    DM. Diet controlled. Last HgbA1C 6.2 on 8/8/18. Uncontrolled BG since starting TPN.   - Endocrine consult to assist with management    N/V, concern for developing ileus. Pt with ongoing N/V. No BM since procedure. KUB obtained showing mildly dilated loops of bowel concerning for developing ileus. NG placed to intermittent suction with 1L bilious output 8/15. Pt passing gas 8/16.  - NG clamp trial today, if increased nausea/distention can place back to suction  - Continue bowel meds  - No BiPAP while NG in place given aspiration risk  - TPN given ongoing poor intake and current NPO status    Afib w/ RVR, hx of Afib (rate controlled). Rapid response called 8/13 am. Pt with 's-170's. S/p Metroprolol 5 mg IV x 3, total of NS  bolus 1000 mL with inadequate response. Diltiazem drip started 8/14 and titrated to max dosing. Able to take PO metoprolol 8/14 as well. HR improved with dilt gtt and po meds, now in low 100's. Cause unclear, may be secondary to pain vs volume overload (see below). BP stable.   - Continue Dilt gtt  - Transition to metoprolol 75 mg BID (was on 100mg BID outpatient) when able to tolerate PO    HTN. BP Elevated.  - Continue amlodipine 10 mg every day  - Metoprolol as above  - Lasix as above  - Switch to home dose of hydralazine 20 mg PO BID    Acute toxic/metabolic encephalopathy, improving. Marked changed in mental status 8/10. CT head 8/10 negative. Neuro consulted and felt more likely toxic/metabolic encephalopathy. Transferred to SICU given concern for sepsis. BCx NGTD. Broadened Cipro to Ceftriaxone. Transferred back to medical floor 8/13. Unclear mental status at time of transfer.  Continues to be lethargic. Repeat VBG 8/13 with mildly elevated PCO2 (52). Neurology re assessed patient 8/13, no further imaging indicated. AMS likely multifactorial w/ causes including pain, urosepsis, and delirium.   - Continue to monitor  - Minimize opiate use and sedating medications    S/p anterior pelvic exenteration, bilateral lymphadenectomy, ileal conduit diversion, left vertical rectus abdominis musculocutaneous flap to pelvis (8/8). Urology primary. Pain managed with roxicodone 5 mg q4h prn and IV dilaudid for breakthrough. Further management per primary team.     Ecoli UTI.  UCx from 8/9 growing >100,000 colonies/mL E coli. Completed treatment with Ceftriaxone (8/10-8/15).    CKD. Recent BL Creatinine ~ 1.66-2.2, increased form 0.7-0.9 in 2017. Known atrophic R kidney and hx of severe hydronephrosis of the L kidney in the setting of of invasive bladder cancer.   - Trend BMP    Hx of Hypothyroidism. On levothyroxine. Continue.     Hx of CVA (2017). No focal deficits appreciated.   - Continue statin    PAD. Noted to have  elevated velocities in external iliac arteries bilaterally and R common femoral artery suggesting stenosis seen on pelvic arterial and venous duplex on 8/6, done in preparation for flap placement 8/8.  - Continue statin    Possible HFrEF. EF 46% on MPI scan 7/6/18. TTE 8/9 w/ normal EF, although limited study d/t technical difficulty. Appears euvolemia. CXR without significant congestion.   - Strict I/O's  - PRN lasix based on volume status  - Continue metoprolol       # Pain Assessment:  Current Pain Score 8/16/2018   Patient currently in pain? denies   Pain location -   Pain descriptors -   - Sunitha is experiencing pain due to post op pain. Pain management was discussed and the plan was created in a collaborative fashion.   - Please see the plan for pain management as documented above        Diet: NPO for Medical/Clinical Reasons Except for: Ice Chips  parenteral nutrition - ADULT compounded formula  Fluids: PO intake  DVT Prophylaxis: SCDs, SQH  Code Status: Full Code    Disposition Plan   Per primary team    The patient's care was discussed with the Attending Physician, Dr. Wheatley.     Raquel Yanes  Internal Medicine Staff Hospitalist Service  [facility]  Pager: [phone]  Please see sticky note for cross cover information    Interval History   O2 needs continue to improve with ongoing diuresis. Complains of abdominal distention today, but passing gas. No BM today. NG with ongoing output, although eating a lot of ice chips. No further vomiting. Mild nausea. Note white count this morning (13.6) but no new localizing s/s of infection. Also noted pin sized dark mole on top of forehead, which patient notes has been there for some time and has not changed. Discussed f/u with dermatology outpatient for further evaluation.       Data reviewed today: I reviewed all medications, new labs and imaging results over the last 24 hours.    Physical Exam   Vital Signs: Temp: 97.5  F (36.4  C) Temp  src: Oral BP: 172/77   Heart Rate: 101 Resp: 18 SpO2: 93 % O2 Device: Nasal cannula Oxygen Delivery: 3 LPM  Weight: 244 lbs 14.9 oz  General Appearance: Alert and oriented x 3, NG in place  Respiratory: Bibasilar crackles   Cardiovascular: irregular rhythm, tachycardic, holosystolic murmur noted  GI: abdomen rotund, soft, non tender to palpation, BS hypoactive  Skin: warm and dry to touch, no rashes or excoriations  Other: no peripheral edema          Data   Data     Recent Labs  Lab 08/16/18  0512 08/15/18  0512 08/14/18  0452 08/13/18  0812  08/10/18  0506   WBC 13.6* 10.9 10.8 12.6*  < > 12.4*   HGB 9.2* 8.8* 8.8* 9.2*  < > 7.6*   MCV 89 90 90 89  < > 90    255 226 224  < > 160   INR  --  1.50*  --   --   --  1.32*    144 143 144  < > 137   POTASSIUM 3.4 3.7 4.1 4.1  < > 4.0   CHLORIDE 110* 112* 111* 106  < > 103   CO2 26 26 24 26  < > 24   BUN 52* 50* 49* 49*  < > 52*   CR 1.15* 1.30* 1.33* 1.36*  < > 3.13*   ANIONGAP 7 6 7 12  < > 10   ANNE 8.4* 8.4* 8.4* 8.7  < > 8.2*   * 252* 169* 154*  < > 130*   ALBUMIN  --  1.8*  --   --   --  2.1*   PROTTOTAL  --  5.8*  --   --   --  5.2*   BILITOTAL  --  0.2  --   --   --  0.5   ALKPHOS  --  72  --   --   --  56   ALT  --  50  --   --   --  26   AST  --  31  --   --   --  40   TROPI  --   --   --  <0.015  --   --    < > = values in this interval not displayed.

## 2018-08-16 NOTE — PROGRESS NOTES
"Urology  Progress Note    Hypertensive overnight with associated tachycardia, Dilt gtt increased to 15 mg/hr, scheduled hydralazine added   No emetic events overnight, NGT remains in place  Slowly weaning O2   Patient reports +flatus       Exam  /69  Pulse 102  Temp 97.5  F (36.4  C) (Axillary)  Resp 14  Ht 1.56 m (5' 1.42\")  Wt 111.1 kg (244 lb 14.9 oz)  SpO2 93%  BMI 45.65 kg/m2  No acute distress,, sitting up in bed   Non-labored breathing on 3L supplemental O2   Abdomen soft, nontender, mildly distended. Incisions CDI with staples - continued serous leakage from bottom aspect of incision. Stoma retracted but pink and well perfused, 2 stents and RRC in place.   RIVKA x 2 serous  Maddox with clear yellow urine in tubing  L PNT capped    Urostomy 2360/200  LUQ RIVKA 10/0  LLQ RIVKA 140/50  NGT 94318/250    Labs  AM labs pending    Assessment/Plan  78 year old y/o female POD#8 s/p anterior pelvic exteneration, VRAM flap to pelvis (Plastics), BLPLND, creation of ileal conduit for MIBC. Medicine consulted for assistance managing multiple medical comorbidities. Ongoing issues include mental status and hypercarbia/hypoxia. Transferred to the SICU for worsening respiratory status. Stable now in step-down unit. Medicine co-managing.     Neuro: tylenol, oxycodone, dilaudid for pain control. Lidocaine patch.   CV: Dilt gtt, continues to be tachycardic/hypertensive. Scheduled hydralazine, metop. Appreciate management by medicine.     Pulm: Currently on 3L O2, continue to wean as tolerated   FEN/GI: NGT to LIS. NPO. SLIV.  TPN running @ 55cc/hr.  Endo: Endocrine consulted, see note for recs. Continue home synthroid.   :  L PNT capped. Monitor urine output  Heme: On SQH. Hgb stable.  ID: BCx NGTD, UCx with sensitivities resulted. Patient currently on Ceftiraxone (8/10-).   Activity: up ad olya  PPx: SCDs. SQH.     Seen and examined with the chief resident. Will discuss with Dr. Sharma.    Arianna Camejo MD  Urology " PGY-4           Contacting the Urology Team     Please use the following job codes to reach the Urology Team. Note that you must use an in house phone and that job codes cannot receive text pages.     On weekdays, dial [phone] (or star-star-star [phone] on the new [employer] telephones) then [phone] to reach the Adult Urology resident or PA on call    On weekdays, dial [phone] (or star-star-star [phone] on the new [employer] telephones) then [phone] to reach the Pediatric Urology resident    On weeknights and weekends, dial [phone] (or star-star-star [phone] on the new [employer] telephones) then [phone] to reach the Urology resident on call (for both Adult and Pediatrics)

## 2018-08-16 NOTE — PROVIDER NOTIFICATION
"Gold cross cover texted paged: \"FYI gave Hydralazine and Metoprolol per MAR, now -190/90s, MAP 120s. Pt Asymptomatic\"     Spoke with MD, will continue to monitor right now and RN to notify MD if BP continue to be >180/90  "

## 2018-08-16 NOTE — PLAN OF CARE
Problem: Patient Care Overview  Goal: Plan of Care/Patient Progress Review  Outcome: Improving  Temp: 97.5  F (36.4  C) Temp src: Axillary BP: 159/63   Heart Rate: 79 Resp: 14 SpO2: 93 % O2 Device: Nasal cannula Oxygen Delivery: 3 LPM  Neuro: Alert, disorientated x1-2 at times but easily re-orientated. Afebrile.   Cardiac: Afib, rates 80-115s. BP elevated 150-160s/80-90s. CVP 12-13.   Resp: On NC at 3L with O2 sats >95%. Lung sounds clear/diminished in bases.   GI/: Urostomy with adequate UOP. Hypoactive bowels sounds, passing flatus.   Diet/Appetite: NPO. BG remain 200s.   Skin: Midline w/staples, with sero-sang drainage, ABDs in place. Mepilex on coccyx.   Access: RIJ infusing Dilt at 15mg/hr, TPN at 55cc/hr and Lipids.   Drains: L-neph tube capped. L RIVKA x2, drain #2 w/~ 180cc output. NG tube to LIS w/ green output.   Activity: T/R q2h   Pain: No c/o pain this shift.      Will continue with plan of care and notify MD of any changes.     Problem: Diabetes Comorbidity  Goal: Diabetes  Patient comorbidity will be monitored for signs and symptoms of hyperglycemia or hypoglycemia. Problems will be absent, minimized or managed by discharge/transition of care.   Outcome: No Change  BG remains >200s, sliding scale insulin per MAR. +20units of insulin added to TPN bag.     Problem: Cardiac Disease Comorbidity  Goal: Cardiac Disease  Patient comorbidity will be monitored for signs and symptoms of Cardiac Disease.  Problems will be absent, minimized or managed by discharge/transition of care.   Outcome: No Change  Remains Afib, rates . Dilt gtt increased to 15mg/hr. Scheduled IV Hydralazine and Metoprolol given.

## 2018-08-17 NOTE — PLAN OF CARE
"Problem: Patient Care Overview  Goal: Plan of Care/Patient Progress Review  Outcome: Improving  Temp: 97.5  F (36.4  C) Temp src: Axillary BP: 159/63   Heart Rate: 79 Resp: 14 SpO2: 93 % O2 Device: Nasal cannula Oxygen Delivery: 3 LPM  Neuro: Disoriented to time this morning, A&Ox4 this afternoon/evening.  Cardiac: Afib, rates 80-110s. BP elevated 160-170s/70s, changes made to scheduled BP meds. CVP 7.   Resp: O2 sats upper 90s on 3L NC Lung sounds clear/diminished bases.   GI/: Urostomy with good UOP. Hypoactive bowels sounds, passing flatus. No BM today.  Diet/Appetite: NPO, ice chips. NG clamped for approximately 6 hours this morning, during which patient took in moderately large amount of ice chips with no c/o nausea. Returned to LIS to check residual, which was 450mL. Diet changed to clears, patient tried a popsicle. Clamped again tonight for 2.5 hours; returned to LIS when pt c/o feeling \"distended\"; large output. BG remain 200s; changes made to insulin orders, and insulin increased in upcoming TPN bag.   Skin: Midline w/staples, with sero-sang drainage, ABDs in place. Mepilex on coccyx.   Access: RIJ infusing Dilt at 15mg/hr, TPN at 55cc/hr.   Drains: L-neph tube removed by Urology this afternoon. L RIVKA x2, drain #2 with moderate outputt. NG tube currently to LIS w/ green output.   Activity: Up in chair for several hours this morning, ceiling lift to and from chair.   Pain: Scheduled Tylenol, oxycodone x1 for abdominal pain.  "

## 2018-08-17 NOTE — PROGRESS NOTES
"WO Nurse Inpatient [facility]   WO Nurse Inpatient Adult       Initial Assessment   Assessment of new Urostomy Stoma complication(s) none   Mucocutaneous junction; intact   Peristomal complication(s) none   Pouch wear time:24-48 hours  Following today's visit:Patient /  is  able to demonstrate;       1. How to empty their pouch? no      2. How to change their pouch?  no      3. How to read and record intake and output correctly? no      Objective data:  Patient history according to medical record: Sunitha Jacques is a 78 year old female with a complicated past medical history including DM, HTN, hypothyroidism, CVA (2017), obesity, CHF, CKD, AFib (on warfarin), and MIKKI admitted on 8/8/18 after total cystectomy, pelvic exenteration (removal of uterus, vaginal cuff, part of vagina), ileal conduit and flap for invasive bladder cancer. Now with AMS and hypoxia. Developed AMS and hypoxia and transferred to SICU 8/10. Stable and transferred back to medical floor 8/12.   Current Diet/Nutrition:   Active Diet Order      NPO for Medical/Clinical Reasons Except for: No Exceptions   TPN no   I/O last 3 completed shifts:  In: 3332.11 [P.O.:740; I.V.:2092.11; IV Piggyback:500]  Out: 510 [Urine:310; Drains:200]  Labs:    Recent Labs  Lab 08/10/18  0506    08/08/18  1900   ALBUMIN 2.1*  --   --    HGB 7.6*  < > 9.2*   INR 1.32*  --   --    WBC 12.4*  < > 10.7   A1C  --   --  6.2*   .0*  --   --    < > = values in this interval not displayed.       Physical Exam:  Current pouching system:Santiago 2 pc Flat urostomy  Reason for pouch change today: routine schedule  Stoma appearance: viable and healthy, lumen in center, stoma empties just above skin level and is inside a bowl of tissue.   Stoma size; 1\" x 1 1/8\" ,  red jordan removed today and 2stents remain  Peristomal skin: intact, slight dehiscence of MCJ at 2 o'clock  Stoma output :pale yellow urine in night draiange bag 300ml, after pouch changed clear " pale urine collecting in ostomy pouch.   Abdominal  Assessment  distended , NG still in place? Yes   Surgical Site: Covered with ABD and staples intact, lower end of wound with induration has improved slightly, draining has decreased slightly, provider continues to monitor-assessed today at bedside with PA.   Pain: pt denies  Is patient still on a PCA No      Interventions:  Patient's chart evaluated.  Focus of today's visit: refitting, pt a little less confused and able to converse and talk through pouch change today. Pt is not ready for hands on care and has difficulty seeing site in bed. Able to discuss hydration needs.  Participant of teaching session today nurse  Orders: reviewed  Change made with ostomy management today: No  Patient/family: lethargic  Supplies:at bedside      Plan:  Learning needs: refitting of appliance, evaluate leakage issue, pouch change demonstration , pouch change return demonstration, verbal instruction , diet and hydration , pouch emptying, output measurement, odor/flatus management, lifestyle adjustments and discharge instructions  Preparation for discharge: No discharge preparations started  Recommend home care? by home WOC nurse if possible      Discussed plan of care with Nurse  Nursing to notify the Provider(s) and re-consult the WOC Nurse if new ostomy concerns or discharge planned before next planned WOC visit.                                           WOC Nurse will return: Monday  Face to face time: 40 minutes          WOC Nurse Inpatient Wound Assessment   Reason for consultation: Evaluate and treat Coccyx wound       Assessment  Coccyx wound due to Moisture Associated Skin Damage (MASD) with friction/pressure component,  possible Pilonidal cyst vs skin tags. Pt reports she has had cysts removed from area before.  Midline abdominal incision draining copious amts of drainage and collecting at coccyx, mepilex was slightly wet when removed today.  Status: follow up  "assessment      Treatment Plan  Coccyx wound: Every 3 days   1. Clean wound with MicroKlenz Spray, pat dry  2. Paint with No Sting Skin Prep (#[id number]) and allow to dry thoroughly  3. Press a Mepilex  Sacral Dressing (PS#[id number])  to the area, making sure to conform nicely to skin curvatures (begin placing the Mepilex at the most distal aspect first, smooth into place in an upward direction, then smooth side to side)   4. Time and date dressing change and edison with a \"T\" for treatment of a wound  5. Reposition pt every 1 to 2 hours when in bed and hourly when up to the chair to relieve pressure and promote perfusion to tissue  Orders Written      WO Nurse follow-up plan:weekly  Nursing to notify the Provider(s) and re-consult the WOC Nurse if wound(s) deteriorates or new skin concern.      Physical Exam  Skin inspection: focused Coccyx, Heels, back      Wound Location:  Coccyx  Date of last photo 8/10/18                     Wound History: Unknown, pt not alert for hx  Measurements (length x width x depth, in cm) 3 cm x 3 cm  x  0.2 cm with 0.6cm x 0.5cm opening at distal aspect   Wound Base:  100 % pink, moist, granular dermis and macerated epidermal bumpy/firm cyst like growths, white appearing from moisture  Tunneling N/A  Undermining N/A  Palpation of the wound bed: normal   Periwound skin: denuded and macerated due to drainage from abdominal wound trapping in dressing  Color: normal and consistent with surrounding tissue  Temperature: normal   Drainage:, scant  Description of drainage: serosanguinous  Odor: none  Pain: denies      Interventions  Current support surface: Standard low air loss ordered  Current off-loading measures: Foam padding and Pillows under calves, prevalon boots   Visual inspection of wound(s) completed  Wound Care: done per plan of care  Supplies: placed at the bedside  Education provided: importance of repositioning, plan of care and wound progress  Discussed plan of care with " Nurse      Aid Liang, RN

## 2018-08-17 NOTE — PROGRESS NOTES
"Urology  Progress Note    No acute events overnight  NGT to be clamped for 24 hours total prior to removal per medicine recs     Exam  /68  Pulse 102  Temp 97.8  F (36.6  C) (Oral)  Resp 16  Ht 1.56 m (5' 1.42\")  Wt 112.5 kg (248 lb 0.3 oz)  SpO2 98%  BMI 46.23 kg/m2  No acute distress,, sitting up in bed   Non-labored breathing on 3L supplemental O2   Abdomen soft, nontender, mildly distended. Incisions CDI with staples - continued serous leakage from bottom aspect of incision, decreasing. Stoma retracted but pink and well perfused, 2 stents in place, will remove RRC in the afternoon (patient has one piece ostomy appliance)   RIVKA x 2 serous  [place] with clear yellow urine in tubing  L PNT removed 8/16    Urostomy 1.8L/450  LUQ RIVKA 5/5  LLQ RIVKA 340/110  NGT clamped    Labs  AM labs pending    Assessment/Plan  78 year old y/o female POD#9 s/p anterior pelvic exteneration, VRAM flap to pelvis (Plastics), BLPLND, creation of ileal conduit for MIBC. Medicine consulted for assistance managing multiple medical comorbidities. Ongoing issues include mental status and hypercarbia/hypoxia. Transferred to the SICU for worsening respiratory status. Stable now in step-down unit. Medicine co-managing.     Neuro: tylenol, oxycodone, dilaudid for pain control. Lidocaine patch.   CV: Dilt gtt, continues to be tachycardic/hypertensive. Scheduled hydralazine, metop. Appreciate management by medicine.     Pulm: Currently on 2L O2, continue to wean as tolerated   FEN/GI: NGT clamped. CLD. SLIV.  TPN running @ 55cc/hr.  Endo: Endocrine consulted for hyperglycemia, see note for recs. Continue home synthroid.   :  L PNT removed 8/16. Monitor urine output  Heme: On SQH. Hgb stable.  ID: BCx NGTD, UCx with sensitivities resulted. Ceftriaxone completed 8/10-8/15  Activity: up ad olya  PPx: SCDs. SQH.     Seen and examined with the chief resident. Will discuss with Dr. Sharma.    Girma Hall MD  Urology PGY-3           " Contacting the Urology Team     Please use the following job codes to reach the Urology Team. Note that you must use an in house phone and that job codes cannot receive text pages.     On weekdays, dial [phone] (or star-star-star 777 on the new [employer] telephones) then [phone] to reach the Adult Urology resident or PA on call    On weekdays, dial [phone] (or star-star-star 777 on the new [employer] telephones) then [phone] to reach the Pediatric Urology resident    On weeknights and weekends, dial [phone] (or star-star-star 777 on the new [employer] telephones) then [phone] to reach the Urology resident on call (for both Adult and Pediatrics)

## 2018-08-17 NOTE — PROGRESS NOTES
Diabetes Consult Daily  Progress Note          Assessment/Plan:   Sunitha Jacques is a 78 year old woman with diet-controlled type 2 diabetes complicated by peripheral neuropathy who underwent total cystectomy, anterior pelvic exenteration with bilateral lymphadectomy, ileal conduit diversion, left vertical rectus abdominis musculocutaneous flap to pelvis on 8/8/2018 for invasive bladder cancer, experiencing post-operative and TPN-associated hyperglycemia.       Glucoses improved to the mid 100s yesterday and today with redistribution of insulin (increased in TPN yesterday).     Plan  -NPH decreased to 17 units qAM, continue 15 units qPM.  -regular insulin in TPN: 38 units for 170 grams dextrose (goal dex)  -aspart for meals/snacks (if when started): 1unit/6g CHO  -aspart for correction: continue high intensity q4h  -monitor glucose q4h         Outpatient diabetes follow up: TBD  Plan discussed with patient, bedside RN, and R.Ph.           Interval History:     The last 24 hours progress and nursing notes reviewed.  Sunitha continues to want something other than ice chips- apparently she was allowed some popsicles yesterday (if these were not sugar free then she missed aspart for them)- but NPO again today.  Plan was to clamp NG tube this morning- no complaints of nausea when I visited.    She remains on continuous TPN with 170g dextrose (goal), 38 units of insulin currently.    Creatinine trending down: 1.05 today.  WBC continue to trend higher: 15 today.    PTA Regimen: diet controlled last year or so (previous to that glipizide 7.5 mg daily)        Recent Labs  Lab 08/17/18  1244 08/17/18  0807 08/17/18  0608 08/17/18  0318 08/17/18  0008 08/16/18  2005 08/16/18  1757  08/16/18  0512  08/15/18  0512  08/14/18  0452  08/13/18  0812  08/13/18  0405   GLC  --   --  166*  --   --   --   --   --  253*  --  252*  --  169*  --  154*  --  137*   * 160*  --  191* 222* 268* 281*  < >  --  "  < >  --   < >  --   < >  --   < >  --    < > = values in this interval not displayed.            Review of Systems:   See interval hx          Medications:       Active Diet Order      NPO for Medical/Clinical Reasons Except for: Meds  Supplements: had Boost Breeze previously  Physical Exam:  Gen: Alert,resting in bed, in NAD   HEENT: NC/AT, mucous membranes are moist  Resp: Unlabored  Neuro:oriented x3, communicating clearly  /73  Pulse 102  Temp 97  F (36.1  C) (Axillary)  Resp 18  Ht 1.56 m (5' 1.42\")  Wt 112.5 kg (248 lb 0.3 oz)  SpO2 95%  BMI 46.23 kg/m2           Data:     Lab Results   Component Value Date    A1C 6.2 08/08/2018    A1C 6.2 07/05/2018    A1C 5.8 04/13/2017            Recent Labs   Lab Test  08/17/18   0608  08/16/18   0512   NA  144  144   POTASSIUM  3.2*  3.4   CHLORIDE  106  110*   CO2  30  26   ANIONGAP  7  7   GLC  166*  253*   BUN  53*  52*   CR  1.03  1.15*   ANNE  9.1  8.4*     CBC RESULTS:   Recent Labs   Lab Test  08/17/18   0608   WBC  15.1*   RBC  3.43*   HGB  9.5*   HCT  30.1*   MCV  88   MCH  27.7   MCHC  31.6   RDW  15.6*   PLT  294     Connie Tolbert PA-C [phone]  Diabetes Management job code [id number]                "

## 2018-08-17 NOTE — PROGRESS NOTES
[facility], [place]    Internal Medicine Progress Note - Gold Service      Assessment & Plan   Sunitha Jacques is a 78 year old female with a pmh of DM, HTN, hypothyroidism, CVA (2017), obesity, CHF, CKD, Afib (on coumadin) and MIKKI admitted 8/8/18 after total cystectomy, pelvic exenteration (removal of uterus, vaginal cuff, part of vagina), ileal conduit and flap for invasive bladder cancer. Developed AMS and hypoxia and transferred to SICU 8/10. Stable and transferred back to medical floor 8/12.     # Acute respiratory failure with hypoxia, increasing O2 needs. Likely 2/2 volume overload. Weight 114.6kg, up from 104.3 on admission. CXR 8/14 showing significant pulmonary edema. BNP 97006 . TTE 8/9 w/ normal EF. Is on lasix 40 mg PO every day PTA, had been getting 20 mg IV daily post-op. Dose held 8/13 due to concern for dehydration possibly contributing to Afib. 8/14 with increasing O2 needs up to 6L. Faint bibasilar crackles noted on exam. Initiated aggressive diuresis with good response. Kidney function stale. Stable on 2L O2 via NC.  - Lasix 40 mg BID, will continue to assess   - Strict I/O's  - Daily weights  - Daily BMP  - Wean O2 as able    DM. Diet controlled. Last HgbA1C 6.2 on 8/8/18. Uncontrolled BG since starting TPN.   - Endocrine following to assist with management    N/V, concern for developing ileus. Pt with ongoing N/V. No BM since procedure. KUB obtained showing mildly dilated loops of bowel concerning for developing ileus. NG placed to intermittent suction with 1L bilious output 8/15. Clamping trial 8/16 with increased abdominal distention. LIS overnight 8/16 with >1L output. No BM.  - NG clamp trial this morning  - Repeat AXR  - Continue bowel meds  - No BiPAP while NG in place given aspiration risk  - TPN given ongoing poor intake and current NPO status    Afib w/ RVR, hx of Afib (rate controlled). Rapid response called 8/13 am. Pt with 's-170's. S/p  Metroprolol 5 mg IV x 3, total of NS bolus 1000 mL with inadequate response. Diltiazem drip started 8/14 and titrated to max dosing. Able to take PO metoprolol 8/14 as well. HR improved with dilt gtt and po meds, now in low 100's. Cause unclear, may be secondary to pain vs volume overload (see below). BP stable.   - Continue Dilt gtt, wean as able  - Increase metoprolol to 100 mg BID    HTN. BP improving.  - Continue amlodipine 10 mg every day  - Metoprolol as above  - Lasix as above  - Continue hydralazine 20 mg PO BID    S/p anterior pelvic exenteration, bilateral lymphadenectomy, ileal conduit diversion, left vertical rectus abdominis musculocutaneous flap to pelvis (8/8). Urology primary. Pain managed with roxicodone 5 mg q4h prn and IV dilaudid for breakthrough. Further management per primary team.     CKD. Recent BL Creatinine ~ 1.66-2.2, increased form 0.7-0.9 in 2017. Known atrophic R kidney and hx of severe hydronephrosis of the L kidney in the setting of of invasive bladder cancer. Most recent Cr 1.03 with ongoing diuresis.   - Trend BMP    Hx of Hypothyroidism. On levothyroxine. Continue.     Hx of CVA (2017). No focal deficits appreciated.   - Continue statin    PAD. Noted to have elevated velocities in external iliac arteries bilaterally and R common femoral artery suggesting stenosis seen on pelvic arterial and venous duplex on 8/6, done in preparation for flap placement 8/8.  - Continue statin    Possible HFrEF. EF 46% on MPI scan 7/6/18. TTE 8/9 w/ normal EF, although limited study d/t technical difficulty. Appears euvolemia. CXR without significant congestion.   - Strict I/O's  - PRN lasix based on volume status  - Continue metoprolol     Acute toxic/metabolic encephalopathy, resolved. Marked changed in mental status 8/10. CT head 8/10 negative. Neuro consulted and felt more likely toxic/metabolic encephalopathy. Transferred to SICU given concern for sepsis. BCx NGTD. Broadened Cipro to Ceftriaxone.  Transferred back to medical floor 8/13. Unclear mental status at time of transfer.  Continues to be lethargic. Repeat VBG 8/13 with mildly elevated PCO2 (52). Neurology re assessed patient 8/13, no further imaging indicated. AMS likely multifactorial w/ causes including pain, urosepsis, and delirium.   - Continue to monitor  - Minimize opiate use and sedating medications    Ecoli UTI.  UCx from 8/9 growing >100,000 colonies/mL E coli. Completed treatment with Ceftriaxone (8/10-8/15).      # Pain Assessment:  Current Pain Score 8/17/2018   Patient currently in pain? denies   Pain location -   Pain descriptors -   - Sunitha is experiencing pain due to post op pain. Pain management was discussed and the plan was created in a collaborative fashion.   - Please see the plan for pain management as documented above        Diet: parenteral nutrition - ADULT compounded formula  NPO for Medical/Clinical Reasons Except for: Meds  parenteral nutrition - ADULT compounded formula  Fluids: PO intake  DVT Prophylaxis: SCDs, SQH  Code Status: Full Code    Disposition Plan   Per primary team    The patient's care was discussed with the Attending Physician, Dr. Wheatley.     Raquel Yanes  Internal Medicine Staff Hospitalist Service  [facility]  Pager: [phone]  Please see sticky note for cross cover information    Interval History   NG placed to suction overnight due to increased abdominal discomfort and feeling of distention with >1L output. NG clamped again this morning and pt now with increasing bloating and distention. Does note that she is passing flatus, no BM. Able to tolerate bowel meds PO but refusing rectal. Very thirsty and anxious       Data reviewed today: I reviewed all medications, new labs and imaging results over the last 24 hours.    Physical Exam   Vital Signs: Temp: 97.5  F (36.4  C) Temp src: Oral BP: (!) 178/101   Heart Rate: 101 Resp: 18 SpO2: 96 % O2 Device: Nasal cannula Oxygen  Delivery: 2 LPM  Weight: 248 lbs .28 oz  General Appearance: Alert and oriented x 3, NG in place  Respiratory: Bibasilar crackles   Cardiovascular: irregular rhythm, tachycardic, holosystolic murmur noted  GI: abdomen rotund, soft, non tender to palpation, BS hypoactive  Skin: warm and dry to touch, no rashes or excoriations  Other: no peripheral edema          Data   Data     Recent Labs  Lab 08/17/18  0608 08/16/18  0512 08/15/18  0512  08/13/18  0812   WBC 15.1* 13.6* 10.9  < > 12.6*   HGB 9.5* 9.2* 8.8*  < > 9.2*   MCV 88 89 90  < > 89    292 255  < > 224   INR  --   --  1.50*  --   --     144 144  < > 144   POTASSIUM 3.2* 3.4 3.7  < > 4.1   CHLORIDE 106 110* 112*  < > 106   CO2 30 26 26  < > 26   BUN 53* 52* 50*  < > 49*   CR 1.03 1.15* 1.30*  < > 1.36*   ANIONGAP 7 7 6  < > 12   ANNE 9.1 8.4* 8.4*  < > 8.7   * 253* 252*  < > 154*   ALBUMIN 1.8*  --  1.8*  --   --    PROTTOTAL 5.7*  --  5.8*  --   --    BILITOTAL 0.4  --  0.2  --   --    ALKPHOS 70  --  72  --   --    ALT 37  --  50  --   --    AST 21  --  31  --   --    TROPI  --   --   --   --  <0.015   < > = values in this interval not displayed.     Isaw this patient with the above MOIZ, examined independently, and participated in management and plan formation.    Key History and/or exam findings:  78f with DM, HTN, CHF (last echo reviewed), CKD, afib, MIKKI, here after complicated urologic procedure including total cstectomy, pelvic exenteration, ileal conduit/ flap with complicated course including ICU course with acute respiratory failure, increasing 02 needs.   AFib with RVR (now tapering with improvement of volume status), rapid on 8/13, DM, HTN, acute metabolic enceph (improving signfiicantly/ resolved), E coli UTI, volume overloaded (resolving) ileus (starting appx 8/14), now resolving.  Down to 1L.  Some nausea with clamping this AM  Sitting up in bed; alert, oriented.    Soft abdomen (capped NG tube).  Few bowel sounds.  Key  decision making included  Pt very volume up on 8/13, much improved with diuresis, but continues to show daily improvement.  Cr 1  Lasix bid again today (40 IV)  Check Cr in AM  Taper dilt drip.  NG mgmt per urology.  Give oral meds as tolerates.  Monitor BP closely    Chaim Wheatley MD  Med-Peds Hospitalist

## 2018-08-17 NOTE — PLAN OF CARE
"Problem: Patient Care Overview  Goal: Plan of Care/Patient Progress Review  Outcome: Improving  Neuro: A&Ox4.   Cardiac: A-fib, HR 60-80's, Dilt gtt decreased to 5 ml. -170's. CVP 9.    Respiratory: Sating >98% on 2 lpm via NC, denies SOB. Clear/diminished.   GI/: Adequate urine output via urostomy. No BM this shift, hypoactive bowel sounds, pt states shes passing flatus.   Diet/appetite: Clear liquid diet, with cont. TPN. Tolerated popsicles/ice chips. NG tube to LIS with large amts of output. Pt denies N/V/abd discomfort. Tolerates NG when clamped for meds.   Activity:  Assist of 2 with repositioning.   Pain: Denies.   Skin: Midline incision with staples, dressing changed due to sero-sang drainage. Coccyx mepilex dressing changed. Old L nephrostomy site C/D/I.   LDA's: RIJ with CVP, TPN/Lipids and dilt 5 ml/hr.   L RIVKA x2.   Drips: Diltiazem was decreased to 5 ml/hr due to HR in the 60's.     Plan: Pt rested comfortably without any complications. Continue with POC. Notify primary team with changes.  /68  Pulse 102  Temp 97.8  F (36.6  C) (Oral)  Resp 16  Ht 1.56 m (5' 1.42\")  Wt 112.5 kg (248 lb 0.3 oz)  SpO2 98%  BMI 46.23 kg/m2        "

## 2018-08-18 NOTE — PROGRESS NOTES
Plastic Surgery Progress Note    Subjective/Interval History:  Serous drainage from lower aspect of incision. WBC climbing.    Objective:  Temp:  [97  F (36.1  C)-99.1  F (37.3  C)] 99.1  F (37.3  C)  Heart Rate:  [] 90  Resp:  [18-20] 18  BP: (122-165)/() 122/65  SpO2:  [93 %-97 %] 93 %    General appearance: NAD  Pulm: Saturating well on RA  CV: Hemodynamically stable  Abd: Soft, appropriately TTP, midline staples in place, serous drainage from lower aspect of incision, mild surrounding erythema, ostomy pink and viable, clear urine in the bag.     Subcutaneous drain 15fr: 250ml serous  Pelvic drain 19fr: 15ml serosanguinous    Assessment/Plan:   Sunitha Jacques is a 78 year old female with history of bladder cancer now s/p anterior pelvic exenteration, pelvic lymph node dissection, ileal conduit and inferiorly pedicled VRAM to fill pelvic defect on 8/8/2018. Ongoing workup for source of leukocytosis.     - Removed a few staples from inferior aspect of the midline incision and encountered a significant amount of serous fluid. No purulence. Gently packed with Kerlix. TID changes of the packing (nursing order placed).  - Continue subcutaneous drain please.     Trent Freed, PGY4  [phone]

## 2018-08-18 NOTE — PLAN OF CARE
Problem: Patient Care Overview  Goal: Plan of Care/Patient Progress Review  Discharge Planner OT   Patient plan for discharge: Rehab.  Current status: Assistance required for functional movement. Abdominal restrictions required reinforcement, patient fearful of movement due to pain.   Barriers to return to prior living situation: Decreased strength, endurance, balance.  Recommendations for discharge: TCU  Rationale for recommendations: To progress functional independence.       Entered by: Deyanira Ennis 08/18/2018 11:06 AM

## 2018-08-18 NOTE — PROGRESS NOTES
"Urology  Progress Note    NGT removed yesterday   Tolerating CLD   BM x2, +flatus     Exam  /77  Pulse 102  Temp 98.4  F (36.9  C) (Oral)  Resp 18  Ht 1.56 m (5' 1.42\")  Wt 111.3 kg (245 lb 6 oz)  SpO2 96%  BMI 45.74 kg/m2  No acute distress,, sitting up in bed   Non-labored breathing on RA  Abdomen soft, nontender, mildly distended. Incisions CDI with staples - with surrounding erythema, continued serous leakage from bottom aspect of incision, decreasing. Stoma retracted but pink and well perfused, 2 stents in place  RIVKA x 2 serous  Urostomy bag with clear yellow urine in tubing  L PNT removed 8/16    Urostomy 2.1L/525  LUQ RIVKA 15/17  LLQ RIVKA 250/230    Labs  AM labs pending    Assessment/Plan  78 year old y/o female POD#10 s/p anterior pelvic exteneration, VRAM flap to pelvis (Plastics), BLPLND, creation of ileal conduit for MIBC. Medicine consulted for assistance managing multiple medical comorbidities. Ongoing issues include mental status and hypercarbia/hypoxia. Transferred to the SICU for worsening respiratory status which has since resolved. Currently on 6B. Stable now in step-down unit. Medicine co-managing.     Neuro: tylenol, oxycodone, dilaudid for pain control. Lidocaine patch.   CV: Dilt gtt weaned, continues to be tachycardic/hypertensive. Scheduled hydralazine, metop. Appreciate management by medicine.     Pulm: Currently on RA, aggressive pulmonary toilet  FEN/GI:  Will advanced diet as tolerated.  TPN running @ 55cc/hr.  Endo: Endocrine consulted for hyperglycemia, see note for recs. Continue home synthroid.   :  L PNT removed 8/16. Monitor urine output  Heme: On SQH. Hgb stable. Home warfarin held   ID: BCx NGTD, UCx with sensitivities resulted. Ceftriaxone completed 8/10-8/15  Activity: up ad olya  PPx: SCDs. SQH.   Dispo: Transfer to    Seen and examined with the chief resident and Dr. Clifford. Will discuss with Dr. Sharma.    Arianna Camejo MD  Urology Resident PGY-4         " Contacting the Urology Team     Please use the following job codes to reach the Urology Team. Note that you must use an in house phone and that job codes cannot receive text pages.     On weekdays, dial [phone] (or star-star-star 777 on the new [employer] telephones) then [phone] to reach the Adult Urology resident or PA on call    On weekdays, dial [phone] (or star-star-star 777 on the new [employer] telephones) then [phone] to reach the Pediatric Urology resident    On weeknights and weekends, dial [phone] (or star-star-star 777 on the new [employer] telephones) then [phone] to reach the Urology resident on call (for both Adult and Pediatrics)

## 2018-08-18 NOTE — PLAN OF CARE
Problem: Fall Risk (Adult)  Goal: Identify Related Risk Factors and Signs and Symptoms  Related risk factors and signs and symptoms are identified upon initiation of Human Response Clinical Practice Guideline (CPG).   Outcome: No Change  Neuro: A&Ox4.   Cardiac: A-Fib - rate controlled - dilt gtt shut off at 1600 per order - pt's heart rate sustaining in mid 80s - VSS  Respiratory: Sating 96% on 2L NC.  GI/: Adequate urine output. BM X2  Diet/appetite: Tolerating diet. Eating well. TPN  Activity:  Assist of lift, up to chair and in halls.  Pain: At acceptable level on current regimen.   Skin: See documentation  LDA's: Triple Lumen IJ - Urostomy - 2 JPs - NG dc'd    Plan: Continue with POC. Notify primary team with changes.

## 2018-08-18 NOTE — PROGRESS NOTES
[facility], [place]    Internal Medicine Progress Note - Gold Service      Assessment & Plan     Sunitha Jacques is a 78 year old female with a pmh of DM, HTN, hypothyroidism, CVA (2017), obesity, CHF, CKD, Afib (on coumadin) and MIKKI admitted 8/8/18 after total cystectomy, pelvic exenteration (removal of uterus, vaginal cuff, part of vagina), ileal conduit and flap for invasive bladder cancer. Developed AMS and hypoxia and transferred to SICU 8/10. Stable and transferred back to medical floor 8/12.      # Acute respiratory failure with hypoxia, increasing O2 needs. Likely 2/2 volume overload. Weight 114.6kg, up from 104.3 on admission. CXR 8/14 showing significant pulmonary edema. BNP 45257 . TTE 8/9 w/ normal EF. Is on lasix 40 mg PO every day PTA, had been getting 20 mg IV daily post-op. Dose held 8/13 due to concern for dehydration possibly contributing to Afib. 8/14 with increasing O2 needs up to 6L. Faint bibasilar crackles noted on exam. Initiated aggressive diuresis with good response. Kidney function stale. Currently satting well on room air.   - Repeat CXR to assess status of pulmonary edema  - Lasix 40 mg BID, will continue to assess   - Strict I/O's  - Daily weights  - Daily BMP  - Wean O2 as able    Leukocytosis. Daily increase in white count since 8/16 (13.6-->15.2-->16.3). No localizing s/s of infection. No fevers. Unclear source. Differential includes pulmonary source (no cough, fevers or increased sputum production) vs UTI vs intraabdominal source associated with extensive surgery vs Cdiff given multiple loose stools in the past 24 hours.   - Will obtain CXR to r/o pulmonary source  - Would consider obtaining stool sample for C diff testing  - Consideration of urinary source per primary team     DM. Diet controlled. Last HgbA1C 6.2 on 8/8/18. Uncontrolled BG since starting TPN.   - Endocrine following to assist with management     Adynamic Ileus, resolving. Pt with  ongoing N/V. No BM since procedure. KUB obtained showing mildly dilated loops of bowel concerning for developing ileus. NG placed to intermittent suction with 1L bilious output 8/15. Clamping trial 8/16 with increased abdominal distention. LIS overnight 8/16 with >1L output. No BM. NG removed 8/17. Able to tolerate CLD.   - Would recommend advancing diet as tolerated  - Continue bowel meds  - No BiPAP while NG in place given aspiration risk  - TPN given ongoing poor intake and current NPO status, would recommend discontinuing in the next few days with improved PO intake     Afib w/ RVR, hx of Afib (rate controlled). Rapid response called 8/13 am. Pt with 's-170's. S/p Metroprolol 5 mg IV x 3, total of NS bolus 1000 mL with inadequate response. Diltiazem drip started 8/14 and titrated to max dosing. Able to take PO metoprolol 8/14 as well. HR improved with dilt gtt and po meds, now in low 100's. Cause unclear, may be secondary to pain vs volume overload (see below). BP stable. Dilt gtt weaned 8/17. HR currently controlled.   - Continue metoprolol to 100 mg BID     HTN. BP improving.  - Continue amlodipine 10 mg every day  - Metoprolol as above  - Lasix as above  - Continue hydralazine 20 mg PO BID     S/p anterior pelvic exenteration, bilateral lymphadenectomy, ileal conduit diversion, left vertical rectus abdominis musculocutaneous flap to pelvis (8/8). Urology primary. Pain managed with roxicodone 5 mg q4h prn and IV dilaudid for breakthrough. Further management per primary team.      CKD. Recent BL Creatinine ~ 1.66-2.2, increased form 0.7-0.9 in 2017. Known atrophic R kidney and hx of severe hydronephrosis of the L kidney in the setting of of invasive bladder cancer. Most recent Cr 1.03 with ongoing diuresis.   - Trend BMP     Hx of Hypothyroidism. On levothyroxine. Continue.      Hx of CVA (2017). No focal deficits appreciated.   - Continue statin     PAD. Noted to have elevated velocities in external  iliac arteries bilaterally and R common femoral artery suggesting stenosis seen on pelvic arterial and venous duplex on 8/6, done in preparation for flap placement 8/8.  - Continue statin     Possible HFrEF. EF 46% on MPI scan 7/6/18. TTE 8/9 w/ normal EF, although limited study d/t technical difficulty. Appears euvolemia. CXR without significant congestion.   - Strict I/O's  - PRN lasix based on volume status  - Continue metoprolol      Acute toxic/metabolic encephalopathy, resolved. Marked changed in mental status 8/10. CT head 8/10 negative. Neuro consulted and felt more likely toxic/metabolic encephalopathy. Transferred to SICU given concern for sepsis. BCx NGTD. Broadened Cipro to Ceftriaxone. Transferred back to medical floor 8/13. Unclear mental status at time of transfer.  Continues to be lethargic. Repeat VBG 8/13 with mildly elevated PCO2 (52). Neurology re assessed patient 8/13, no further imaging indicated. AMS likely multifactorial w/ causes including pain, urosepsis, and delirium.   - Continue to monitor  - Minimize opiate use and sedating medications     Ecoli UTI.  UCx from 8/9 growing >100,000 colonies/mL E coli. Completed treatment with Ceftriaxone (8/10-8/15).        # Pain Assessment:  Current Pain Score 8/17/2018   Patient currently in pain? denies   Pain location -   Pain descriptors -   - Sunitha is experiencing pain due to post op pain. Pain management was discussed and the plan was created in a collaborative fashion.   - Please see the plan for pain management as documented above           Diet: parenteral nutrition - ADULT compounded formula  CLD  Fluids: PO intake  DVT Prophylaxis: SCDs, SQH  Code Status: Full Code        Disposition Plan      Per primary team     The patient's care was discussed with the Attending Physician, Dr. Wheatley.      Raquel Yanes  Internal Medicine Staff Hospitalist Service  [facility]  Pager: [phone]  Please see sticky note for  cross cover information        Interval History      NG clamping trial yesterday and subsequently removed, currently tolerating CLD. Has had multiple large loose stools overnight. Continues to complain of abdominal distention/discomfort. Pain in abdomen with coughing. Denies nausea or vomiting. Overall feeling weak. States that she is very hungry.         Data reviewed today: I reviewed all medications, new labs and imaging results over the last 24 hours.        Physical Exam     Vital Signs: Temp: 98.4 Temp src: Oral BP: 145/77 Heart Rate: 93 Resp: 18 SpO2: 96 % on room air  Weight: 245 lbs 6 oz  General Appearance:  Alert and oriented x 3, NG in place  Respiratory: Faint Bibasilar crackles   Cardiovascular: irregular rhythm, tachycardic, holosystolic murmur noted  GI: abdomen rotund, soft, non tender to palpation, BS hypoactive  Skin: warm and dry to touch, no rashes or excoriations  Other: no peripheral edema                            Data   Data     Recent Labs  Lab 08/18/18  0606 08/17/18  1714 08/17/18  0608 08/16/18  0512 08/15/18  0512  08/13/18  0812   WBC 16.2*  --  15.1* 13.6* 10.9  < > 12.6*   HGB 9.1*  --  9.5* 9.2* 8.8*  < > 9.2*   MCV 88  --  88 89 90  < > 89     --  294 292 255  < > 224   INR  --   --   --   --  1.50*  --   --      --  144 144 144  < > 144   POTASSIUM 3.6 3.6 3.2* 3.4 3.7  < > 4.1   CHLORIDE 104  --  106 110* 112*  < > 106   CO2 29  --  30 26 26  < > 26   BUN 53*  --  53* 52* 50*  < > 49*   CR 1.02  --  1.03 1.15* 1.30*  < > 1.36*   ANIONGAP 8  --  7 7 6  < > 12   ANNE 8.6  --  9.1 8.4* 8.4*  < > 8.7   *  --  166* 253* 252*  < > 154*   ALBUMIN  --   --  1.8*  --  1.8*  --   --    PROTTOTAL  --   --  5.7*  --  5.8*  --   --    BILITOTAL  --   --  0.4  --  0.2  --   --    ALKPHOS  --   --  70  --  72  --   --    ALT  --   --  37  --  50  --   --    AST  --   --  21  --  31  --   --    TROPI  --   --   --   --   --   --  <0.015   < > = values in this interval not  displayed.

## 2018-08-18 NOTE — PROGRESS NOTES
"                          Diabetes Consult Daily Progress Note          Assessment/Plan:   Sunitha Jacques is a 78 year old female with PMHx of type 2 diabetes complicated by peripheral neuropathy who underwent total cystectomy, anterior pelvic exenteration with bilateral lymphadectomy, ileal conduit diversion, left vertical rectus abdominis musculocutaneous flap to pelvis on 8/8/2018 for invasive bladder cancer, experiencing post-operative and TPN-associated hyperglycemia.    # Type 2 DM  # On TPN  - Continue NPH, 17 units qAM, 15 units qPM  - Continue regular insulin 38 units in TPN bag (170 grams dextrose)  - Continue novolog 1 unit per 6 grams CHO  - Continue novolog high correction q4h   - Glucose checks q4h     Patient was seen and discussed with attending Dr. Judi Maradiaga MD  PGY4, Endocrinology Fellow  Pager: [phone]     I have seen and examined the patient, reviewed and edited the fellow's note, and agree with the plan of care.  GRIFFIN Goyal          Interval History:   Nursing notes and chart reviewed. Tolerating clear liquid diet, appetite is good    Blood glucose trend:  135 - 193    Current Diabetes Regimen:  - NPH 15 qPM, 17 units qAM.  Novolog 1 units per 6 grams CHO. Novolog high correction q4h.  Regular insulin 38 units in TPN bag    Previous day insulin needs:  - 32 units NPH, 16 units aspart, also regular insulin in TPN bag    Other Pertinent Medications/Considerations:   - on continuous TPN (has 170 grams dextrose), advancing diet    PTA Regimen: diet controlled last year or so (previous to that glipizide 7.5 mg daily)      Active Diet Order      Clear Liquid Diet            Exam:    Blood pressure 145/77, pulse 102, temperature 98.4  F (36.9  C), temperature source Oral, resp. rate 18, height 1.56 m (5' 1.42\"), weight 111.3 kg (245 lb 6 oz), SpO2 96 %.    Chart reviewed         Data:     Lab Results   Component Value Date    A1C 6.2 08/08/2018    A1C 6.2 07/05/2018    A1C 5.8 " 04/13/2017         Recent Labs  Lab 08/18/18  0816 08/18/18  0606 08/18/18  0514 08/18/18  0022 08/17/18  2114 08/17/18  1543 08/17/18  1244  08/17/18  0608  08/16/18  0512  08/15/18  0512  08/14/18  0452  08/13/18  0812   GLC  --  135*  --   --   --   --   --   --  166*  --  253*  --  252*  --  169*  --  154*   *  --  160* 172* 193* 139* 160*  < >  --   < >  --   < >  --   < >  --   < >  --    < > = values in this interval not displayed.

## 2018-08-18 NOTE — PLAN OF CARE
Problem: Fall Risk (Adult)  Goal: Identify Related Risk Factors and Signs and Symptoms  Related risk factors and signs and symptoms are identified upon initiation of Human Response Clinical Practice Guideline (CPG).   Outcome: No Change  Neuro: A&Ox4.   Cardiac: AFib. VSS.   Respiratory: Sating 96% on RA.  GI/: Adequate urine output. BM X3  Diet/appetite: Tolerating full liquid diet. Eating well.  Activity:  Assist of lift, declined getting up to chair.  Pain: At acceptable level on current regimen.   Skin: See documentation.  LDA's: Triple Lumen IJ - TPN @ 55/hr - Urostomy - 2 JPs    Plan: Continue with POC. Notify primary team with changes.

## 2018-08-18 NOTE — PLAN OF CARE
"Problem: Patient Care Overview  Goal: Plan of Care/Patient Progress Review  Outcome: Therapy, progress towards functional goals is fair  /77  Pulse 102  Temp 98.4  F (36.9  C) (Oral)  Resp 18  Ht 1.56 m (5' 1.42\")  Wt 111.3 kg (245 lb 6 oz)  SpO2 96%  BMI 45.74 kg/m2  HR  Afib, on 2 LPM O2 via N/C. Maintaining 95% and above O2 SATs.  Pt A&Ox4, resting. Using call light appropriately. Pt back pain. Using Tylenol for pain relief. Clear Liquid diet orders. Skin assessment done and charted. LDA documented on . Voiding ileostomy tube, BM 4 large loose over night. Plan for to monitor pt and condition. Nursing will continue to follow the POC and update the MD team with concerns.      Problem: Cardiac Disease Comorbidity  Goal: Cardiac Disease  Patient comorbidity will be monitored for signs and symptoms of Cardiac Disease.  Problems will be absent, minimized or managed by discharge/transition of care.   Outcome: Therapy, progress towards functional goals is fair  Pt HR over night was  Afib but with more rate controled.      "

## 2018-08-19 NOTE — PLAN OF CARE
"Problem: Patient Care Overview  Goal: Plan of Care/Patient Progress Review  Outcome: Therapy, progress towards functional goals is fair  /78 (BP Location: Left arm)  Pulse 102  Temp 97.9  F (36.6  C) (Oral)  Resp 18  Ht 1.56 m (5' 1.42\")  Wt 112.8 kg (248 lb 10.9 oz)  SpO2 94%  BMI 46.35 kg/m2, RA on  O2. Maintaining 94% O2 sats.  Pt A&Ox4, resting. Using call light 2 twice over night. Pt is having abdomen pain. Using Oxycodone and tylenol for pain relief. Clear Liquid and TPN diet orders. Skin assessed and charted on, BM once over night. Nursing will continue to follow the POC and update the MD team with concerns.    Problem: Cardiac Disease Comorbidity  Goal: Cardiac Disease  Patient comorbidity will be monitored for signs and symptoms of Cardiac Disease.  Problems will be absent, minimized or managed by discharge/transition of care.   Outcome: Therapy, progress towards functional goals is fair  Pt has been Afib rate controlled all night 80-90's.      "

## 2018-08-19 NOTE — PROGRESS NOTES
[facility] [place]    Internal Medicine Progress Note - Gold Service      Assessment & Plan     Sunitha Jacques is a 78 year old female with a pmh of DM, HTN, hypothyroidism, CVA (2017), obesity, CHF, CKD, Afib (on coumadin) and MIKKI admitted 8/8/18 after total cystectomy, pelvic exenteration (removal of uterus, vaginal cuff, part of vagina), ileal conduit and flap for invasive bladder cancer. Developed AMS and hypoxia and transferred to SICU 8/10. Stable and transferred back to medical floor 8/12.      # Acute respiratory failure with hypoxia, increasing O2 needs. Likely 2/2 volume overload. Weight 114.6kg, up from 104.3 on admission. CXR 8/14 showing significant pulmonary edema. BNP 03547 . TTE 8/9 w/ normal EF. Is on lasix 40 mg PO every day PTA, had been getting 20 mg IV daily post-op. Dose held 8/13 due to concern for dehydration possibly contributing to Afib. 8/14 with increasing O2 needs up to 6L. Faint bibasilar crackles noted on exam. Initiated aggressive diuresis with good response. Kidney function stale. Currently satting well on room air.   - Repeat CXR to assess status of pulmonary edema  - Lasix 40 mg BID, will continue to assess   - Strict I/O's  - Daily weights  - Daily BMP  - Wean O2 as able    Leukocytosis. Daily increase in white count since 8/16 (13.6-->15.2-->16.3-->17.9). No localizing s/s of infection. No fevers although could be masking with scheduled tylenol. Unclear source. Differential includes pulmonary source (no cough, fevers or increased sputum production) vs UTI vs intraabdominal source associated with extensive surgery vs line infection.    - Consider CT abdomen to evaluate for abdominal source  - Would consider line culture prior to removal  - Consideration of urinary source per primary team  - If spike temp would obtain STAT BCx     DM. Diet controlled. Last HgbA1C 6.2 on 8/8/18. Uncontrolled BG since starting TPN.   - Endocrine following to assist  with management     Adynamic Ileus, resolving. Pt with ongoing N/V. No BM since procedure. KUB obtained showing mildly dilated loops of bowel concerning for developing ileus. NG placed to intermittent suction with 1L bilious output 8/15. Clamping trial 8/16 with increased abdominal distention. LIS overnight 8/16 with >1L output. No BM. NG removed 8/17. Able to tolerate CLD.   - Would recommend advancing diet as tolerated  - Continue bowel meds  - No BiPAP while NG in place given aspiration risk  - TPN given ongoing poor intake and current NPO status, would recommend discontinuing in the next few days with improved PO intake     Afib w/ RVR, hx of Afib (rate controlled). Rapid response called 8/13 am. Pt with 's-170's. S/p Metroprolol 5 mg IV x 3, total of NS bolus 1000 mL with inadequate response. Diltiazem drip started 8/14 and titrated to max dosing. Able to take PO metoprolol 8/14 as well. HR improved with dilt gtt and po meds, now in low 100's. Cause unclear, may be secondary to pain vs volume overload (see below). BP stable. Dilt gtt weaned 8/17. HR currently controlled.   - Continue metoprolol to 100 mg BID     HTN. BP stable.  - Continue amlodipine 10 mg every day  - Metoprolol as above  - Lasix as above  - Continue hydralazine 20 mg PO BID     S/p anterior pelvic exenteration, bilateral lymphadenectomy, ileal conduit diversion, left vertical rectus abdominis musculocutaneous flap to pelvis (8/8). Urology primary. Pain managed with roxicodone 5 mg q4h prn and IV dilaudid for breakthrough. Further management per primary team.      CKD. Recent BL Creatinine ~ 1.66-2.2, increased form 0.7-0.9 in 2017. Known atrophic R kidney and hx of severe hydronephrosis of the L kidney in the setting of of invasive bladder cancer. Most recent Cr 1.03 with ongoing diuresis.   - Trend BMP     Hx of Hypothyroidism. On levothyroxine. Continue.      Hx of CVA (2017). No focal deficits appreciated.   - Continue  statin     PAD. Noted to have elevated velocities in external iliac arteries bilaterally and R common femoral artery suggesting stenosis seen on pelvic arterial and venous duplex on 8/6, done in preparation for flap placement 8/8.  - Continue statin     Possible HFrEF. EF 46% on MPI scan 7/6/18. TTE 8/9 w/ normal EF, although limited study d/t technical difficulty. Appears euvolemia. CXR without significant congestion.   - Strict I/O's  - PRN lasix based on volume status  - Continue metoprolol      Acute toxic/metabolic encephalopathy, resolved. Marked changed in mental status 8/10. CT head 8/10 negative. Neuro consulted and felt more likely toxic/metabolic encephalopathy. Transferred to SICU given concern for sepsis. BCx NGTD. Broadened Cipro to Ceftriaxone. Transferred back to medical floor 8/13. Unclear mental status at time of transfer.  Continues to be lethargic. Repeat VBG 8/13 with mildly elevated PCO2 (52). Neurology re assessed patient 8/13, no further imaging indicated. AMS likely multifactorial w/ causes including pain, urosepsis, and delirium.   - Continue to monitor  - Minimize opiate use and sedating medications     Ecoli UTI.  UCx from 8/9 growing >100,000 colonies/mL E coli. Completed treatment with Ceftriaxone (8/10-8/15).      Recommendations communicated to primary team via phone discussion.        # Pain Assessment:  Current Pain Score 8/17/2018   Patient currently in pain? denies   Pain location -   Pain descriptors -   - Sunitha is experiencing pain due to post op pain. Pain management was discussed and the plan was created in a collaborative fashion.   - Please see the plan for pain management as documented above           Diet: parenteral nutrition - ADULT compounded formula  ADAT  Fluids: PO intake  DVT Prophylaxis: SCDs, SQH  Code Status: Full Code        Disposition Plan      Per primary team     The patient's care was discussed with the Attending Physician, Dr. Wheatley.      Raquel BRUNO  Far[place]  Internal Medicine Staff Hospitalist Service  [facility]  Pager: [phone]  Please see sticky note for cross cover information        Interval History      Tolerating diet. Having good stool output. Cdiff negative. Continues to complain of ongoing crampy, diffuse abdominal pain. Overall feeling unwell still. Unable to participate in therapies yesterday. No new chest pain, cough, fevers, chills or body aches. Feeling frustrated with her progress.      Data reviewed today: I reviewed all medications, new labs and imaging results over the last 24 hours.        Physical Exam     Vital Signs: Temp: 98.1 Temp src: Oral BP: 143/75 Heart Rate: 101 Resp: 18 SpO2: 96 % on room air  Weight: 248 lbs 6 oz    General Appearance:  Alert and oriented x 3, NG in place  Respiratory: Faint Bibasilar crackles   Cardiovascular: irregular rhythm, tachycardic, holosystolic murmur noted  GI: abdomen rotund, soft, non tender to palpation, BS hypoactive  Skin: warm and dry to touch, no rashes or excoriations  Other: no peripheral edema                            Data   Data     Recent Labs  Lab 08/19/18  0547 08/18/18  0606 08/17/18  1714 08/17/18  0608  08/15/18  0512  08/13/18  0812   WBC 17.9* 16.2*  --  15.1*  < > 10.9  < > 12.6*   HGB 8.7* 9.1*  --  9.5*  < > 8.8*  < > 9.2*   MCV 85 88  --  88  < > 90  < > 89    272  --  294  < > 255  < > 224   INR  --   --   --   --   --  1.50*  --   --     141  --  144  < > 144  < > 144   POTASSIUM 3.9 3.6 3.6 3.2*  < > 3.7  < > 4.1   CHLORIDE 101 104  --  106  < > 112*  < > 106   CO2 28 29  --  30  < > 26  < > 26   BUN 55* 53*  --  53*  < > 50*  < > 49*   CR 1.06* 1.02  --  1.03  < > 1.30*  < > 1.36*   ANIONGAP 6 8  --  7  < > 6  < > 12   ANNE 8.1* 8.6  --  9.1  < > 8.4*  < > 8.7   * 135*  --  166*  < > 252*  < > 154*   ALBUMIN  --   --   --  1.8*  --  1.8*  --   --    PROTTOTAL  --   --   --  5.7*  --  5.8*  --   --    BILITOTAL  --   --   --  0.4  --   0.2  --   --    ALKPHOS  --   --   --  70  --  72  --   --    ALT  --   --   --  37  --  50  --   --    AST  --   --   --  21  --  31  --   --    TROPI  --   --   --   --   --   --   --  <0.015   < > = values in this interval not displayed.

## 2018-08-19 NOTE — PROGRESS NOTES
Plastic Surgery Progress Note    Subjective/Interval History:  Worsening leukocytosis, now at 17.9. C diff negative.     Objective:  Temp:  [97.9  F (36.6  C)-99.1  F (37.3  C)] 98.1  F (36.7  C)  Heart Rate:  [] 101  Resp:  [18] 18  BP: (105-149)/(58-85) 143/75  FiO2 (%):  [94 %-96 %] 94 %  SpO2:  [93 %-96 %] 96 %    General appearance: NAD  Pulm: Saturating well on RA  CV: Hemodynamically stable  Abd: Soft, appropriately TTP, lower aspect of the incision opened and packed, serous drainage continues, mild surrounding erythema.    Subcutaneous drain 15fr: 320ml serous  Pelvic drain 19fr: 32ml serosanguinous    Assessment/Plan:   Sunitha Jacques is a 78 year old female with history of bladder cancer now s/p anterior pelvic exenteration, pelvic lymph node dissection, ileal conduit and inferiorly pedicled VRAM to fill pelvic defect on 8/8/2018. Ongoing workup for source of leukocytosis.     - Even though fascia appears intact beneath the opened areas, the amount of serous drainage from the wound and the subcutaneous drain raises the suspicion of a fascial opening, albeit small.    - Continue gentle packing of opened sites..   - Defer to Urology on the question of imaging and initiation of antibiotics.     Patient discussed with Dr. Calderón.     Trent Freed, PGY4  [phone]

## 2018-08-19 NOTE — PLAN OF CARE
Problem: Patient Care Overview  Goal: Plan of Care/Patient Progress Review  PT / 6B -    Discharge Planner PT   Patient plan for discharge: TCU  Current status: Needing max encouragement for continued participation throughout session; session limited by pain.  Patient able to move B LE towards EOB, needing Mod A x 2 for lying->sitting transfer and Max A x 2 for sitting->lying transfer.  Extended time sitting at EOB.   Barriers to return to prior living situation: pain, strength, activity tolerance, level of assist  Recommendations for discharge: TCU  Rationale for recommendations: Continued skilled PT required to improve LE strength to improve safety and independence with bed mobility, transfers and gait.        Entered by: Rachana Cheung 08/19/2018 2:46 PM

## 2018-08-19 NOTE — PROGRESS NOTES
"Urology  Progress Note  - No acute events overnight   - Tolerating FLD   -     Exam  /75 (BP Location: Left arm)  Pulse 102  Temp 98.1  F (36.7  C) (Oral)  Resp 18  Ht 1.56 m (5' 1.42\")  Wt 112.8 kg (248 lb 10.9 oz)  SpO2 96%  BMI 46.35 kg/m2  No acute distress,, sitting up in bed   Non-labored breathing on RA  Abdomen soft, nontender, mildly distended. Inferior aspect of the wound opened by plastics, with improving erythema. Fascia intact. Stoma retracted but pink and well perfused, 2 stents in place  RIVKA x 2 serous  Urostomy bag with clear yellow urine in tubing  L PNT removed 8/16    Urostomy 1.7L /NR  LUQ RIVKA 32/ NR  LLQ RIVKA 352/25    Labs  AM labs pending    Assessment/Plan  78 year old y/o female POD#11 s/p anterior pelvic exteneration, VRAM flap to pelvis (Plastics), BLPLND, creation of ileal conduit for MIBC. Medicine consulted for assistance managing multiple medical comorbidities. Ongoing issues include mental status and hypercarbia/hypoxia. Transferred to the SICU for worsening respiratory status which has since resolved. Currently on 6B. Stable now in step-down unit. Medicine co-managing.     Neuro: tylenol, oxycodone, dilaudid for pain control. Lidocaine patch.   CV: Dilt gtt weaned, continues to be tachycardic/hypertensive. Scheduled hydralazine, metop. Appreciate management by medicine.     Pulm: Currently on RA, aggressive pulmonary toilet  FEN/GI:  Will advanced diet as tolerated.  TPN running @ 55cc/hr.  Endo: Endocrine consulted for hyperglycemia, see note for recs. Continue home synthroid.   :  L PNT removed 8/16. Monitor urine output  Heme: On SQH. Hgb stable. Home warfarin held   ID: Leukocytosis increasing:   -BCx NGTD, UCx 8/9 E. Coli treated with Ceftriaxone completed 8/10-8/15.   - C. Diff negative 8/18.   - Urine culture 8/17 - Candida   Activity: up ad olya, encourage ambulation   PPx: SCDs. SQH.   Dispo: Transfer to    Seen and examined with the chief resident and Dr." Darrin. Will discuss with Dr. Sharma.    Arianna Camejo MD  Urology Resident PGY-4         Contacting the Urology Team     Please use the following job codes to reach the Urology Team. Note that you must use an in house phone and that job codes cannot receive text pages.     On weekdays, dial [phone] (or star-star-star [phone] on the new [employer] telephones) then [phone] to reach the Adult Urology resident or PA on call    On weekdays, dial [phone] (or star-star-star [phone] on the new [place] telephones) then [phone] to reach the Pediatric Urology resident    On weeknights and weekends, dial [phone] (or star-star-star [phone] on the new [place] telephones) then [phone] to reach the Urology resident on call (for both Adult and Pediatrics)

## 2018-08-19 NOTE — PROGRESS NOTES
"                          Diabetes Consult Daily Progress Note          Assessment/Plan:   Sunitha Jacques is a 78 year old female with PMHx of type 2 diabetes complicated by peripheral neuropathy who underwent total cystectomy, anterior pelvic exenteration with bilateral lymphadectomy, ileal conduit diversion, left vertical rectus abdominis musculocutaneous flap to pelvis on 8/8/2018 for invasive bladder cancer, experiencing post-operative and TPN-associated hyperglycemia.    # Type 2 DM  # On continuous TPN  Blood sugars overnight high, will increase PM NPH dose.   - Continue NPH, 17 units qAM, increase PM dose to 17 units (ordered)  - Continue regular insulin 38 units in TPN bag (170 grams dextrose)  - Continue novolog 1 unit per 6 grams CHO  - Continue novolog high correction q4h   - Glucose checks q4h     Patient was discussed with attending Dr. Judi Maradiaga MD  PGY4, Endocrinology Fellow  Pager: [phone]    I have reviewed the fellow's note, and agree with the plan of care.  GRIFFIN Goyal        Interval History:   Nursing notes and chart reviewed. Tolerating clear liquid diet, appetite is good    Blood glucose trend:  100-261. 261 was around midnight    Current Diabetes Regimen:  - NPH 15 qPM, 17 units qAM.  Novolog 1 units per 6 grams CHO. Novolog high correction q4h.  Regular insulin 38 units in TPN bag    Previous day insulin needs:  - 32 units NPH, 12 units aspart, 38 units regular insulin in TPN bag    Other Pertinent Medications/Considerations:   - on continuous TPN (has 170 grams dextrose), advancing diet    PTA Regimen: diet controlled last year or so (previous to that glipizide 7.5 mg daily)      Active Diet Order      Full Liquid Diet            Exam:    Blood pressure 143/75, pulse 102, temperature 98.1  F (36.7  C), temperature source Oral, resp. rate 18, height 1.56 m (5' 1.42\"), weight 112.8 kg (248 lb 10.9 oz), SpO2 96 %.    Chart reviewed         Data:     Lab Results "   Component Value Date    A1C 6.2 08/08/2018    A1C 6.2 07/05/2018    A1C 5.8 04/13/2017         Recent Labs  Lab 08/19/18  0858 08/19/18  0547 08/19/18  0444 08/19/18  0015 08/18/18  1920 08/18/18  1648 08/18/18  1140  08/18/18  0606  08/17/18  0608  08/16/18  0512  08/15/18  0512  08/14/18  0452   GLC  --  172*  --   --   --   --   --   --  135*  --  166*  --  253*  --  252*  --  169*   *  --  184* 261* 156* 153* 100*  < >  --   < >  --   < >  --   < >  --   < >  --    < > = values in this interval not displayed.

## 2018-08-20 NOTE — PROGRESS NOTES
WO Nurse Inpatient [facility]   WO Nurse Inpatient Adult       Initial Assessment   Assessment of new Urostomy Stoma complication(s) none   Mucocutaneous junction; intact   Peristomal complication(s) none   Pouch wear time:24-48 hours  Following today's visit:Patient /  is  able to demonstrate;       1. How to empty their pouch? no      2. How to change their pouch?  no      3. How to read and record intake and output correctly? No    Pt is very lethargic today and unable to participate in care. Pt indicates generalized not feeling well.       Objective data:  Patient history according to medical record: Sunitha Jacques is a 78 year old female with a complicated past medical history including DM, HTN, hypothyroidism, CVA (2017), obesity, CHF, CKD, AFib (on warfarin), and MIKIK admitted on 8/8/18 after total cystectomy, pelvic exenteration (removal of uterus, vaginal cuff, part of vagina), ileal conduit and flap for invasive bladder cancer. Now with AMS and hypoxia. Developed AMS and hypoxia and transferred to SICU 8/10. Stable and transferred back to medical floor 8/12.   Current Diet/Nutrition:   Active Diet Order      NPO for Medical/Clinical Reasons Except for: No Exceptions   TPN no   I/O last 3 completed shifts:  In: 1894.75   Out: 2057 [Urine:1975; Drains:82]    Labs:    Recent Labs   Lab Test  08/20/18   0449   08/10/18   0506   08/08/18   1900   ALBUMIN  1.6*   < >  2.1*   --    --    HGB  8.7*   < >  7.6*   < >  9.2*   INR  1.09   < >  1.32*   --    --    WBC  16.9*   < >  12.4*   < >  10.7   A1C   --    --    --    --   6.2*   CRP   --    --   300.0*   --    --     < > = values in this interval not displayed.           Physical Exam:  Current pouching system:Santiago 2 pc Flat urostomy  Reason for pouch change today: routine schedule  Stoma appearance: viable and healthy, lumen in center, stoma empties just above skin level and is inside a bowl of tissue. Slight puckered/divot areas at 2  "and 8 O'clock   Stoma size; 1\" x 1 1/8\" ,   2stents remain intact  Peristomal skin: intact, slight dehiscence of MCJ at 2 o'clock  Stoma output :pale yellow urine in night draiange bag  Abdominal  Assessment  distended , NG still in place? No  Surgical Site: Covered with ABD and staples intact, lower end of wound staples removed and packed with gauze, Plastics team is managing.   Pain: pt unable to rate pain but states her abdomen hurts.   Is patient still on a PCA No      Interventions:  Patient's chart evaluated.  Focus of today's visit: refitting, pt unable to participate in care today she is lethargic and indicates she is not feeling well.   Participant of teaching session today nurse  Orders: reviewed  Change made with ostomy management today: No  Patient/family: lethargic  Supplies:at bedside      Plan:  Learning needs: refitting of appliance, evaluate leakage issue, pouch change demonstration , pouch change return demonstration, verbal instruction , diet and hydration , pouch emptying, output measurement, odor/flatus management, lifestyle adjustments and discharge instructions  Preparation for discharge: No discharge preparations started  Recommend home care? by home WOC nurse if possible      Discussed plan of care with Nurse  Nursing to notify the Provider(s) and re-consult the WOC Nurse if new ostomy concerns or discharge planned before next planned WOC visit.                                           WOC Nurse will return: Monday  Face to face time: 15 minutes          WOC Nurse Inpatient Wound Assessment   Reason for consultation: Evaluate and treat Coccyx wound       Assessment  Coccyx wound due to Moisture Associated Skin Damage (MASD) with friction/pressure component,  possible Pilonidal cyst vs skin tags. Pt reports she has had cysts removed from area before.  Midline abdominal incision continues to drain and collect down in sacral region. Wound is currently being packed which may decrease leakage. " "Nurse reports patient is refusing to be turned or repositioned. Pt allowed repositioning today with lift and Zflow pillow placed under left side. Wound appears to have regressed today, it is larger and slightly deeper. Will add silver alginate to pack down into wound bed.   Status: follow up assessment      Treatment Plan  Coccyx wound: Every other day  1. Clean wound with MicroKlenz Spray, pat dry  2. Paint with No Sting Skin Prep (#494200) and allow to dry thoroughly  3. Gently place aquacel ag (#217374) into wound bed   4. Press a Mepilex  Sacral Dressing (PS#[id number])  to the area, making sure to conform nicely to skin curvatures (begin placing the Mepilex at the most distal aspect first, smooth into place in an upward direction, then smooth side to side)   5. Time and date dressing change and edison with a \"T\" for treatment of a wound  6. Reposition pt every 1 to 2 hours when in bed and hourly when up to the chair to relieve pressure and promote perfusion to tissue    PIP ACTIVITY MEASURES:  If pt is refusing to turn or reposition they must be educated on the  potential injury from not off loading pressure.  Then this \"educated refusal\" needs to be documented as an \"educated refusal to turn/ reposition\" and document if alert, etc.    CHAIR: Pt should sit on a chair cushion ([id number]) when up to the chair and not sit for more than one hour at a time before fully offloading backside (either stand for a couple of minutes and/or return to bed, positioning on a side) to relieve pressure and re-perfuse tissue.  Additionally, encourage pt to shift side to side every 15 minutes, too.    NOTE: the Z-Flow Pad is NOT a cushion, pt should NOT sit on the Z-Flow pads    BED:  Reposition side to side every 1-2 hours when awake.   ? Consider Z-Willy Pillows to help keep pt on side (#[id number]-medium or #[id number]- large). *Z-Flows are for positioning, DO NOT SIT ON!  ? Keep heels elevated  ? As able keep HOB below 30 degrees  ? Evaluate for " "specialty mattress  NOTE: If pt is refusing to turn or reposition they must be educated on the potential injury from not offloading pressure.  Then this \"educated refusal\" needs to be documented as an \"educated refusal to turn/ reposition\" and document if alert, etc.  Additionally please notify MD and charge nurse of refusal(s).    Orders Written      WOC Nurse follow-up plan:weekly  Nursing to notify the Provider(s) and re-consult the WOC Nurse if wound(s) deteriorates or new skin concern.      Physical Exam  Skin inspection: focused Coccyx, midline abd.      Wound Location:  Coccyx  Date of last photo 8/20/18                            Wound History: Unknown, pt not alert for hx  Measurements (length x width x depth, in cm) 3 cm x 3 cm  x  0.2 cm with 0.6cm x 0.5cm opening at distal aspect   Wound Base: V shaped pink, moist, non granular dermis with erosion of epidermis from 3 to 5 O'clock that has tan fibrin tissue in wound bed. Entire area continues to be macerated with epidermal bumpy/firm cyst like growths, white appearing from moisture  Tunneling N/A  Undermining N/A  Palpation of the wound bed: normal   Periwound skin: denuded and macerated due to drainage from abdominal wound trapping in dressing  Color: normal and consistent with surrounding tissue  Temperature: normal   Drainage:, scant  Description of drainage: serosanguinous  Odor: none  Pain: denies      Interventions  Current support surface: Standard low air loss   Current off-loading measures: Foam padding and Pillows under calves, prevalon boots, Zflow   Visual inspection of wound(s) completed  Wound Care: New mepilex applied awaiting Aquacel, educated nurse on application.   Supplies: Ordered Aquacel AG  Education provided: wound care/dressing change,  importance of repositioning, plan of care and wound progress, keeping HOB less than 30.  Discussed plan of care with Nurse      Adi Liang RN       "

## 2018-08-20 NOTE — PLAN OF CARE
Problem: Patient Care Overview  Goal: Plan of Care/Patient Progress Review  Outcome: No Change  Neuro: A&Ox4.   Cardiac: SR. VSS.   Respiratory: Sating 98% on RA.  GI/: Adequate urine output. BM X2  Diet/appetite: Tolerating * diet. Eating well.  Activity:  Sat on edge of bed - turn q2.  Pain: At acceptable level on current regimen.   Skin: See documentation.  LDA's: Triple Lumen IJ c TPN - Urostomy - 2 JPs    Plan: Continue with POC. Notify primary team with changes.

## 2018-08-20 NOTE — PROGRESS NOTES
Calorie Counts  Intake recorded for: 8/19  Kcals: 512  Protein: 12g  # Meals Recorded: 100% 7 up soda, broth, 2 milks, coffee, ice cream, milk, tomato soup, 50% oatmeal, white toast w/ butter & jelly  # Supplements Recorded: 0

## 2018-08-20 NOTE — PROGRESS NOTES
Diabetes Consult Daily  Progress Note          Assessment/Plan:    Sunitha Jacques is a 78 year old female with PMHx of type 2 diabetes complicated by peripheral neuropathy who underwent total cystectomy, anterior pelvic exenteration with bilateral lymphadectomy, ileal conduit diversion, left vertical rectus abdominis musculocutaneous flap to pelvis on 8/8/2018 for invasive bladder cancer, experiencing post-operative and TPN-associated hyperglycemia.     # Type 2 DM  # On continuous TPN   -  NPH 17 units am  -  NPH 17 units pm  - Continue regular insulin 38 units in TPN bag (170 grams dextrose), continuous TPN  - Continue novolog 1 unit per 6 grams CHO  - Continue novolog high correction q4h   - Glucose checks q4h   -please contact Diabetes Team with changes to TPN                           Outpatient diabetes follow up: TBD  Plan discussed with patient and bedside nurse           Interval History:   The last 24 hours progress and nursing notes reviewed.  hyperglycemia most likely 2/2 continuous TPN, infection, and sipping on liquids containing sugar.  Glucose range from 140 to 217  Does not have an appetite, denies nausea and or vomiting    Nutrition:  Continuous TPN dextrose 170 grams, regular insulin 38 units  Regular diet      Recent Labs  Lab 08/20/18  1124 08/20/18  0750 08/20/18  0449 08/20/18  0347 08/19/18  2352 08/19/18  2008 08/19/18  1558  08/19/18  0547  08/18/18  0606  08/17/18  0608  08/16/18  0512  08/15/18  0512   GLC  --   --  147*  --   --   --   --   --  172*  --  135*  --  166*  --  253*  --  252*   * 191*  --  161* 149* 140* 206*  < >  --   < >  --   < >  --   < >  --   < >  --    < > = values in this interval not displayed.            Review of Systems:   See interval hx          Medications:       Active Diet Order      Regular Diet Adult     Physical Exam:  Gen: Alert, resting in bed, in NAD   HEENT:  mucous membranes are moist  Resp: non-labored  Ext: moving  "all extremites  Neuro:oriented x3, communicating clearly  BP (!) 128/101 (BP Location: Left arm)  Pulse 93  Temp 98.9  F (37.2  C) (Oral)  Resp 18  Ht 1.56 m (5' 1.42\")  Wt 114.2 kg (251 lb 12.3 oz)  SpO2 94%  BMI 46.93 kg/m2           Data:     Lab Results   Component Value Date    A1C 6.2 08/08/2018    A1C 6.2 07/05/2018    A1C 5.8 04/13/2017              CBC RESULTS:   Recent Labs   Lab Test  08/20/18 0449   WBC  16.9*   RBC  3.17*   HGB  8.7*   HCT  27.2*   MCV  86   MCH  27.4   MCHC  32.0   RDW  15.5*   PLT  203     Recent Labs   Lab Test  08/20/18 0449 08/19/18   0547   NA  132*  136   POTASSIUM  4.4  3.9   CHLORIDE  97  101   CO2  28  28   ANIONGAP  8  6   GLC  147*  172*   BUN  57*  55*   CR  1.06*  1.06*   ANNE  8.4*  8.1*     Liver Function Studies -   Recent Labs   Lab Test  08/20/18 0449   PROTTOTAL  5.5*   ALBUMIN  1.6*   BILITOTAL  0.3   ALKPHOS  70   AST  59*   ALT  79*     Lab Results   Component Value Date    INR 1.09 08/20/2018    INR 1.50 08/15/2018    INR 1.32 08/10/2018    INR 2.1 05/12/2017    INR 2.4 05/08/2017    INR 1.8 05/03/2017    INR 4.6 04/28/2017    INR 5.7 04/26/2017    INR 2.6 04/21/2017    INR 3.1 04/17/2017    INR 1.8 04/13/2017    INR 1.7 04/10/2017    INR 1.81 04/03/2017    INR 1.80 03/31/2017    INR 2.27 03/29/2017    INR 1.89 03/27/2017    INR 2.34 03/20/2017           Antonio Dowd -[phone]  Diabetes Management job code [id number]            "

## 2018-08-20 NOTE — PROGRESS NOTES
[facility], [place]    Internal Medicine Progress Note - Gold Service      Assessment & Plan   Sunitha Jacques is a 78 year old female with history of DM, HTN, hypothyroidism, CVA (2017), obesity, CHF, CKD, Afib (on coumadin) and MIKKI admitted to urology service 8/8/18 after total cystectomy, pelvic exenteration (removal of uterus, vaginal cuff, part of vagina), ileal conduit and flap for invasive bladder cancer. Developed AMS and hypoxia and transferred to SICU 8/10. Stable and transferred back to medical floor 8/12.         S/p anterior pelvic exenteration, bilateral lymphadenectomy, ileal conduit diversion, left vertical rectus abdominis musculocutaneous flap to pelvis 8/8: Invasive bladder cancer. Urology primary, management per their team     Chronic A fib with new RVR: First onset 8/13, was symptomatic with HR 150s-170s (thought 2/2 hypervolemia). Inadequate response with IV metoprolol x3 and IVF requiring dilt gtt 8/13 to 8/17. [id number] 9, making risk of CVA 12.2% and risk of CVA/TIA/embolism 17.4%. HR stable until 8/20 with HR 110s-140s, asymptotic, prior to am meds. Now concern for intravascular hypovolemia despite gross hypervolemia. Albumin 1.6  - Give am metoprolol  - Albumin x1 today  - Suggest moving Tylenol to prn to better trend fever curve  - Given high [id number], would resume anticoagulation when able, will defer to primary  - Will reconsider dilt gtt if uncontrolled despite the above    Leukocytosis: WBC peak 17.9 8/19 (13.6 --> 15.2 --> 16.3 --> 17.9) without localizing source. Afebrile, although is on scheduled tylenol. UC 8/17 with candida. CXR 8/18 stable bilateral opacities, pulmonary edema vs consolidation. C diff negative 8/18. CTAP 8/19 Collection in L hemipelvis measuring 4 cm with air-fluid, raising concern for developing abscess without well defined drainable collection, new/increased L hydronephrosis, despite a well positioned nephroureteral  stent, midline ventral hernia containing loops of small bowel without evidence of obstruction. WBC improved to 16.9. No recent procal. Not currently on abx. Ddx includes PNA (although no fevers, cough, and on room air) vs developing abscess vs ureteral obstruction or pyelo vs line infection  - Interpretation of CTAP results per urology   - Would obtain procal, CBC with diff, fluid/blood cultures, line culture, and lactic acid if worsening WBC or decompensation      Gross hypervolemia: With acute hypoxic respiratory failure as below. CXR 8/18 stable bilateral perihilar and bibasilar  opacities, pulmonary edema vs consolidation. Now on room air, afebrile. Grossly hypervolemic but concern for intravascular hypovolemia. Albumin 1.6   - Strict I&Os, daily weights  - Give albumin x1 today  - Check CVP  - Decrease IV lasix to 40 mg this am, then 20 mg bid    Adynamic Ileus, resolving: Developed post-op, KUB mildly dilated loops of bowel concerning for developing ileus. NG placed to intermittent suction with 1L bilious output 8/15. Clamping trial 8/16 with increased abdominal distention. LIS overnight 8/16 with >1L output. NG removed 8/17. Ongoing abdominal pain, N/V resolving. Minimally passing gas. Last BM 8/18. Tolerating FLD  - ADAT  - Suggest consistent bowel regiment     Depression, flat affect: PTA on Lexapro. Worsening affect in the setting of acute on chronic illness. Discussed increasing Lexapro/making antidepressant changes 8/20, she would like to think it over    DM: Diet controlled PTA. A1C 6.2 8/8/18. Uncontrolled BG due TPN. Glucose now stable  - Endocrine following to assist with management    Severe protein-calorie malnutrition: In the setting of extensive surgery, decompensation requiring ICU stay, ileus, and chronic illness. Currently on TPN and calorie counts and diet advances.   - Nutrition following  - Suggest weaning TPN as able to oral intake improves    Hyponatremia: Na mildly low to 132 (136) in  the setting of poor oral intake. Will trend and workup if worsening     HTN: PTA on metoprolol, Norvasc, hydralazine. BP stable  - Continue PTA meds with hold parameters       CKD: Recent BL Cr 1.6 to 1.7, increased form 0.7-0.9 in 2017. Known atrophic R kidney and h/o severe hydronephrosis of L kidney in the setting of of invasive bladder cancer. Cr now stable ongoing diuresis.   - Trend       Resolved Hospital Problems:  Acute respiratory failure with hypoxia: Thought 2/2 volume overload. Weight peak 114.6kg, up from 104.3 on admission. CXR 8/14 significant pulmonary edema. BNP 16416. TTE 8/9 normal EF. PTA on lasix 40 mg every other day PTA, was treated with 20 mg IV daily post-op. 8/14 with increasing O2 needs up to 6L. Initiated aggressive diuresis with good response. Kidney function stale. Lasix as above. Currently satting well on room air  Acute toxic/metabolic encephalopathy: Marked AMS 8/10. HCT 8/10 negative. Neuro consulted and felt likely toxic/metabolic encephalopathy. Transferred to SICU given concern for sepsis. BCx NGTD. Broadened Cipro to Ceftriaxone. Transferred back to medical floor 8/13. Continues to be lethargic. VBG 8/13 PCO2 52. Neurology reassessed patient 8/13, no further imaging. AMS likely multifactorial 2/2 pain, urosepsis, and delirium.   - Minimize opiate use and sedating medications  Possible HFrEF: EF 46% on MPI 7/6/18. TTE 8/9 normal EF, although limited study d/t technical difficulty. CXR without significant congestion. Strict I&Os. Continue metoprolol. Lasix as above  Hypothyroidism: TSH, T4 1.08 8/13. Continue PTA levothyroxine  H/o CVA (2017): No focal deficits. Continue statin  PAD: Elevated velocities in external iliac arteries bilaterally and R common femoral artery suggesting stenosis seen on pelvic arterial and venous duplex on 8/6, done in preparation for flap placement 8/8. Continue statin  Ecoli UTI:  UCx 8/9 growing >100,000 colonies/mL E coli. Completed treatment with  Ceftriaxone 8/15. Repeat UC with candida as above       Pain Assessment:  Current Pain Score 8/20/2018   Patient currently in pain? yes   Pain location Abdomen   Pain descriptors Cramping   - Per primary team    Diet: Calorie Counts  Regular Diet Adult  parenteral nutrition - ADULT compounded formula  parenteral nutrition - ADULT compounded formula  Fluids: None  DVT Prophylaxis: Defer to primary service  Code Status: Full Code    Disposition Plan   Expected discharge: per primary team     Entered: Tori Louise 08/20/2018, 9:48 AM   Information in the above section will display in the discharge planner report.      The patient's care was discussed with the patient, nursing, urology, and attending physician, Dr. Corry Louise  Internal Medicine Staff Hospitalist Service  [facility]  Pager: [phone]  Please see sticky note for cross cover information    Interval History   Frustrated, does not feel like she is getting better. Ongoing abdominal pain. Unchanged over the past few days. No N/V. Flatus slowing down. No BM today or yesterday. Tolerating CLD without increase pain. No fever or chills. Denies chest pain, dyspnea, racing HR, palpitations. Reports increased depression due to illnesses. Does not endorse SI or HI    Data reviewed today: I reviewed all medications, new labs and imaging results over the last 24 hours. I personally reviewed CXR, CTAP report, urology and plastics notes, etc     Physical Exam   Vital Signs: Temp: 98.5  F (36.9  C) Temp src: Oral BP: 123/90 Pulse: 93 Heart Rate: 123 Resp: 20 SpO2: 94 % O2 Device: None (Room air)    Weight: 251 lbs 12.25 oz  General Appearance: Alert and oriented x3, flat affect. Appears depressed  Respiratory: CTAB without wheezing or rales. On room air  Cardiovascular: Rapidly irregularly irregular  GI: Abdomen soft, tender with light palpation. Large midline incision CDI. Drain in lower abdomen with serous anginous  drainage. RLQ urostomy   Skin: No rash or jaundice   Other: No peripheral edema, distal pulses palpable         Data   Data     Recent Labs  Lab 08/20/18  0449 08/19/18  0547 08/18/18  0606  08/17/18  0608  08/15/18  0512   WBC 16.9* 17.9* 16.2*  --  15.1*  < > 10.9   HGB 8.7* 8.7* 9.1*  --  9.5*  < > 8.8*   MCV 86 85 88  --  88  < > 90    232 272  --  294  < > 255   INR 1.09  --   --   --   --   --  1.50*   * 136 141  --  144  < > 144   POTASSIUM 4.4 3.9 3.6  < > 3.2*  < > 3.7   CHLORIDE 97 101 104  --  106  < > 112*   CO2 28 28 29  --  30  < > 26   BUN 57* 55* 53*  --  53*  < > 50*   CR 1.06* 1.06* 1.02  --  1.03  < > 1.30*   ANIONGAP 8 6 8  --  7  < > 6   ANNE 8.4* 8.1* 8.6  --  9.1  < > 8.4*   * 172* 135*  --  166*  < > 252*   ALBUMIN 1.6*  --   --   --  1.8*  --  1.8*   PROTTOTAL 5.5*  --   --   --  5.7*  --  5.8*   BILITOTAL 0.3  --   --   --  0.4  --  0.2   ALKPHOS 70  --   --   --  70  --  72   ALT 79*  --   --   --  37  --  50   AST 59*  --   --   --  21  --  31   < > = values in this interval not displayed.  Recent Results (from the past 24 hour(s))   CT Abdomen Pelvis w Contrast    Narrative    EXAMINATION: CT ABDOMEN PELVIS W CONTRAST, 8/19/2018 7:49 PM    TECHNIQUE:  Helical CT images from the lung bases through the  symphysis pubis were obtained with IV contrast. Contrast dose: 135cc  of Isovue 370    COMPARISON: PET CT 6/27/2018, CT abdomen pelvis 5/23/2018    HISTORY: Please evaluat for intrabdominal process, POD #11 cystectomy  with rising leukocytosis;     FINDINGS:    Abdomen and pelvis: Postoperative changes of cystectomy with ileal  conduit. Bilateral nephroureteral stents with the pigtails in the  renal collecting systems. Right upper quadrant surgical drain with the  tip in the central pelvis, without a surrounding fluid collection. In  the left hemipelvis there is a fluid collection with multiple foci of  air measuring up to 4.7 x 2.4 x 4.0 cm (series 3 image 322).    There  is a linear tract of attenuation extending from the distal  sigmoid colon to the area of the ileal conduit where there is a small  amount of fluid (series 3 image 308) but without peripheral rim  enhancement of the fluid. There is a midline ventral hernia associated  with the patient's abdominal incision which contains loops of small  bowel and mesenteric fat, but without evidence of obstruction.The  large and small bowel are normal caliber.      The liver, spleen, pancreas, and left adrenal gland are normal. There  is a 1.0 x 0.9 cm right adrenal nodule (series 3 image 82). Left-sided  hydronephrosis. Unchanged right simple renal cysts, with additional  bilateral smaller renal cortical lesions which are too small to  definitively characterize.    No enlarged inguinal, pelvic, or retroperitoneal lymph nodes by CT  short axis size criteria. Trace free fluid. No free air.    Lung bases: Small bilateral pleural effusions with overlying  atelectasis/consolidations.    Bones and soft tissues: Anasarca. Multilevel degenerative changes of  the spine. Small amount of fluid adjacent to the patient's ileal  conduit stoma without significant peripheral enhancement      Impression    IMPRESSION:   1. Postoperative changes of cystectomy with ileal conduit formation  and bilateral nephroureteral stents. Small simple fluid attenuation  collection adjacent to the right lower quadrant ileal conduit stoma.  2. Collection in the left hemipelvis measuring up to 4 cm with  air-fluid, raising concern for developing abscess, though a  well-defined drainable collection is not present.  3. Linear tract from the distal sigmoid colon to just inferior to the  ileal conduit, which appears represent postsurgical changes.  4. New/increased left hydronephrosis, despite an apparently well  positioned nephroureteral stent.  5. Midline periincisional ventral hernia containing loops of small  bowel, but without evidence of obstruction.  6. Small  bilateral pleural effusions with overlying  atelectasis/consolidations.  7. Small right adrenal nodule measuring up to 1 cm, which has  attenuation on the prior CT compatible with a benign adenoma.      I have personally reviewed the examination and initial interpretation  and I agree with the findings.    MELISSA LOPEZ MD

## 2018-08-20 NOTE — PLAN OF CARE
Problem: Cardiac Disease Comorbidity  Goal: Cardiac Disease  Patient comorbidity will be monitored for signs and symptoms of Cardiac Disease.  Problems will be absent, minimized or managed by discharge/transition of care.   Outcome: No Change  Telemetry maintained. afib

## 2018-08-20 NOTE — PLAN OF CARE
"Problem: Surgery Nonspecified (Adult)  Goal: Signs and Symptoms of Listed Potential Problems Will be Absent, Minimized or Managed (Surgery Nonspecified)  Signs and symptoms of listed potential problems will be absent, minimized or managed by discharge/transition of care (reference Surgery Nonspecified (Adult) CPG).   Outcome: No Change  AVSS with exception of intermittetnt tachycardia. Telemetry maintained- chronic afib. Sepsis protocol triggered- lactic 0.9. Pain maintained at tolerable level with IV dilaudid x2. Pt stated \"oxycodone doesnt help\". Sticky note left for MD team requesting order for po hydromorphone. Declined full tylenol order. Simethicone admin for \"cramping\" which pt said \"yes\" when asked if felt better with flatus. BS hypoactive. Dressings CDI. RIVKA's x2 to bulb suction: small amt of serosang output. Urostomy patent: UOP wdl- small clots present in urine. Emotional support provided. Activity encouraged. Pt open about feelings of frustration, despair and hopelessness. Psych consult may be helpful. Repositioned x3- high levels of encouragement needed. Education on pressure ulcers discussed. Mepilex on coccyx CDI. 2+ BLE edema present. BG monitored q4hrs: 149, 164- ssi admin per order. Rested btwn cares. Continue POC.   08/20/18 0598   Surgery Nonspecified   Problems Assessed (Surgery) all   Problems Present (Surgery) bowel motility decreased;pain;situational response;wound healing impaired         "

## 2018-08-20 NOTE — CONSULTS
[facility] Surgery Consultation    Sunitha Jacques MRN# [id number]   Age: 78 year old YOB: 1940     Date of Admission:  8/8/2018    Date of Consult:   8/08/18    Reason for consult: Fascial dehiscence        Requesting service: Dr. Chen                    Assessment and Plan:   Assessment:   79 yo female with history of cervical ca s/p radiation, urinary bladder cancer s/p pelvic exenteration , bilateral lymphadenectomy, ileal conduit diversion, left vertical rectus abdominus musculocutaneous flap on 8/8/18, now with newly diagnosed fascial dehiscence contains bowel without symptoms or signs of bowel obstruction         Plan:   - Since the patient is clinically stable and she is now POD 12 with likely post op adhesions that would increase the likelihood of enterotomies, without evisceration, would consider managing non op with close monitoring. If the primary team decided to intervene operatively, general surgery team would be available to assist       Seen with chief Dr. Augustin who discussed with staff Dr. Escobar             Chief Complaint:   Fascial dehiscence          History of Present Illness:   79 yo female with history of cervical ca s/p radiation, urinary bladder cancer s/p pelvic exenteration , bilateral lymphadenectomy, ileal conduit diversion, left vertical rectus abdominus musculocutaneous flap on 8/8/18. The patient underwent imaging yesterday for abdominal pain and elevated wbc, evaluating for intra-abdominal abscess, she was found to have fascial dehiscence it included loops of bowel without signs of obstruction. Per patient she started feeling sick the day following her surgery, passing flatus, having BM ,last BM was yesterday, complains of nausea but denies emesis. The inferior portion of the midline incision was opened for seroma now with minimal serous fluid drainage.          Past Medical History:     Past Medical History:   Diagnosis Date     Atrial fibrillation  with rapid ventricular response (H) 2/11/2017     CAD (coronary artery disease)      Cerebrovascular accident (H) 3/21/2017     CHF (congestive heart failure) (H) 3/21/2017     CRD (chronic renal disease), stage III     Due to bladder cancer     Essential hypertension 4/9/2017     GERD (gastroesophageal reflux disease)      History of MRSA infection 2017    treated april 2017     Hypertension      Hypothyroidism, unspecified type 12/14/2017     Long term current use of anticoagulant 2/12/2017     Morbid obesity (H) 4/13/2017     MIKKI (obstructive sleep apnea)      T2DM (type 2 diabetes mellitus) (H)              Past Surgical History:     Past Surgical History:   Procedure Laterality Date     APPENDECTOMY  1975     AS TOTAL KNEE ARTHROPLASTY Bilateral 2015     CHOLECYSTECTOMY  1975     COLONOSCOPY       EXENTERATION ANTERIOR PELVIC N/A 8/8/2018    Procedure: EXENTERATION ANTERIOR PELVIC;  Anterior Pelvic Exenteration With Bilateral Lymphandectomy, Ileal Conduit Diversion, Left Vertical Rectus Abdominis Musculocutaneous Flap To Pelvis;  Surgeon: Girma Sharma MD;  Location: [facility] OR     GRAFT FLAP PEDICLE TRANSVERSE RECTUS ABDOMINIS MYOCUTANEOUS N/A 8/8/2018    Procedure: GRAFT FLAP PEDICLE TRANSVERSE RECTUS ABDOMINIS MYOCUTANEOUS;;  Surgeon: RAPHAEL Calderón MD;  Location: [facility] OR     LYMPHADENECTOMY ABDOMINAL Bilateral 8/8/2018    Procedure: LYMPHADENECTOMY ABDOMINAL;;  Surgeon: Girma Sharma MD;  Location: [facility] OR     SHOULDER SURGERY  2003     TONSILLECTOMY      as a child             Social History:     Social History   Substance Use Topics     Smoking status: Former Smoker     Packs/day: 0.50     Years: 25.00     Quit date: 7/5/1980     Smokeless tobacco: Never Used     Alcohol use No             Family History:   No family history of bleeding or reaction to anasthesia            Allergies:     Allergies   Allergen Reactions     Keflex [Cephalexin] Other (See Comments)      Tolerated therapy on 7/30/18     Metaproterenol      Penicillins      Prednisone      Sulfa Drugs              Medications:     Current Facility-Administered Medications   Medication     acetaminophen (TYLENOL) tablet 650 mg    Or     acetaminophen (TYLENOL) Suppository 650 mg     amLODIPine (NORVASC) tablet 10 mg     atorvastatin (LIPITOR) tablet 80 mg     dextrose 10 % 1,000 mL infusion     glucose gel 15-30 g    Or     dextrose 50 % injection 25-50 mL    Or     glucagon injection 1 mg     escitalopram (LEXAPRO) tablet 10 mg     furosemide (LASIX) injection 20 mg     heparin sodium PF injection 5,000 Units     hydrALAZINE (APRESOLINE) tablet 20 mg     HYDROmorphone (DILAUDID) injection 0.2 mg     hypromellose-dextran (ARTIFICAL TEARS) 0.1-0.3 % ophthalmic solution 1 drop     insulin aspart (NovoLOG) inj (RAPID ACTING)     insulin aspart (NovoLOG) inj (RAPID ACTING)     insulin isophane human (HumuLIN N PEN) injection 17 Units     insulin isophane human (HumuLIN N PEN) injection 17 Units     levothyroxine (SYNTHROID/LEVOTHROID) injection 62.5 mcg     Lidocaine (LIDOCARE) 4 % Patch 1-2 patch     lidocaine (LMX4) cream     lidocaine 1 % 1 mL     lidocaine patch in PLACE     lidocaine patch REMOVAL     lipids (INTRALIPID) 20 % infusion 250 mL     magnesium sulfate 2 g in NS intermittent infusion ([facility]ar[facility] or [facility] Cmpd)     magnesium sulfate 4 g in 100 mL sterile water (premade)     melatonin tablet 3 mg     metoprolol (LOPRESSOR) injection 5 mg     metoprolol tartrate (LOPRESSOR) tablet 100 mg     montelukast (SINGULAIR) chewable tablet 10 mg     naloxone (NARCAN) injection 0.1-0.4 mg     No lozenges or gum should be given while patient on BIPAP/AVAPS/AVAPS AE     ondansetron (ZOFRAN-ODT) ODT tab 4-8 mg    Or     ondansetron (ZOFRAN) injection 4 mg     oxyCODONE IR (ROXICODONE) tablet 5 mg     pantoprazole (PROTONIX) 40 mg IV push injection     parenteral nutrition - ADULT compounded formula     polyethylene glycol  (MIRALAX/GLYCOLAX) Packet 17 g     potassium chloride 10 mEq in 100 mL intermittent infusion with 10 mg lidocaine     potassium chloride 10 mEq in 100 mL sterile water intermittent infusion (premix)     potassium chloride 20 mEq in 50 mL intermittent infusion     potassium phosphate 15 mmol in sodium chloride 0.9 % 250 mL intermittent infusion     potassium phosphate 20 mmol in sodium chloride 0.9 % 250 mL intermittent infusion     potassium phosphate 20 mmol in sodium chloride 0.9 % 500 mL intermittent infusion     potassium phosphate 25 mmol in sodium chloride 0.9 % 500 mL intermittent infusion     prochlorperazine (COMPAZINE) injection 5 mg    Or     prochlorperazine (COMPAZINE) tablet 5 mg     senna-docusate (SENOKOT-S;PERICOLACE) 8.6-50 MG per tablet 2 tablet     simethicone (MYLICON) chewable tablet 80 mg     sodium chloride (PF) 0.9% PF flush 3 mL               Review of Systems:   RESP: NEGATIVE for significant cough or SOB  CV: NEGATIVE for chest pain, palpitations or peripheral edema  C: NEGATIVE for fever, chills, change in weight  E/M: NEGATIVE for ear, mouth and throat problems  R: NEGATIVE for significant cough or SOB          Physical Exam:   All vitals have been reviewed  Temp:  [98.1  F (36.7  C)-98.9  F (37.2  C)] 98.5  F (36.9  C)  Pulse:  [93] 93  Heart Rate:  [] 110  Resp:  [17-20] 18  BP: (101-134)/() 134/82  SpO2:  [93 %-98 %] 94 %    Intake/Output Summary (Last 24 hours) at 08/20/18 2209  Last data filed at 08/20/18 2129   Gross per 24 hour   Intake          1465.75 ml   Output             1945 ml   Net          -479.25 ml     Physical Exam:  Alert and oriented, seems uncomfortable   Non labored breathing  Non cyanotic   Soft abdomen, non distended, appropriately tender, midline incision with staples in place inferior portion is open was examined thoroughly no fascial defect at that area, ABD pads with some serous fluid, no cellulitis, ileal conduit            Data:   All  laboratory data reviewed    Results:  BMP  Recent Labs  Lab 08/20/18  0449 08/19/18  0547 08/18/18  0606 08/17/18  1714 08/17/18  0608   * 136 141  --  144   POTASSIUM 4.4 3.9 3.6 3.6 3.2*   CHLORIDE 97 101 104  --  106   CO2 28 28 29  --  30   BUN 57* 55* 53*  --  53*   CR 1.06* 1.06* 1.02  --  1.03   * 172* 135*  --  166*     CBC  Recent Labs  Lab 08/20/18  0449 08/19/18  0547 08/18/18  0606 08/17/18  0608   WBC 16.9* 17.9* 16.2* 15.1*   HGB 8.7* 8.7* 9.1* 9.5*    232 272 294     LFT  Recent Labs  Lab 08/20/18  0449 08/17/18  0608 08/15/18  0512   AST 59* 21 31   ALT 79* 37 50   ALKPHOS 70 70 72   BILITOTAL 0.3 0.4 0.2   ALBUMIN 1.6* 1.8* 1.8*   INR 1.09  --  1.50*       Recent Labs  Lab 08/20/18  1912 08/20/18  1543 08/20/18  1124 08/20/18  0750 08/20/18  0449 08/20/18  0347 08/19/18  2352  08/19/18  0547  08/18/18  0606  08/17/18  0608  08/16/18  0512  08/15/18  0512   GLC  --   --   --   --  147*  --   --   --  172*  --  135*  --  166*  --  253*  --  252*   * 202* 217* 191*  --  161* 149*  < >  --   < >  --   < >  --   < >  --   < >  --    < > = values in this interval not displayed.    Imaging:  CT abd/pelvis 8/19:         IMPRESSION:   1. Postoperative changes of cystectomy with ileal conduit formation  and bilateral nephroureteral stents. Small simple fluid attenuation  collection adjacent to the right lower quadrant ileal conduit stoma.  2. Collection in the left hemipelvis measuring up to 4 cm with  air-fluid, raising concern for developing abscess, though a  well-defined drainable collection is not present.  3. Linear tract from the distal sigmoid colon to just inferior to the  ileal conduit, which appears represent postsurgical changes.  4. New/increased left hydronephrosis, despite an apparently well  positioned nephroureteral stent.  5. Midline periincisional ventral hernia containing loops of small  bowel, but without evidence of obstruction.  6. Small bilateral pleural  effusions with overlying  atelectasis/consolidations.  7. Small right adrenal nodule measuring up to 1 cm, which has  attenuation on the prior CT compatible with a benign adenoma.       I have personally reviewed the examination and initial interpretation  and I agree with the findings.         Monse Enamorado MD

## 2018-08-20 NOTE — PROGRESS NOTES
"Urology  Progress Note    No acute events overnight  Some nausea since drinking PO contrast yesterday    CT scan 8/19  IMPRESSION:   1. Postoperative changes of cystectomy with ileal conduit formation and bilateral nephroureteral stents. Small simple fluid attenuation collection adjacent to the right lower quadrant ileal conduit stoma.  2. Collection in the left hemipelvis measuring up to 4 cm with air-fluid, raising concern for developing abscess, though a well-defined drainable collection is not present.  3. Linear tract from the distal sigmoid colon to just inferior to the ileal conduit, which appears represent postsurgical changes.  4. New/increased left hydronephrosis, despite an apparently well positioned nephroureteral stent.  5. Midline periincisional ventral hernia containing loops of small bowel, but without evidence of obstruction.  6. Small bilateral pleural effusions with overlying atelectasis/consolidations.  7. Small right adrenal nodule measuring up to 1 cm, which has attenuation on the prior CT compatible with a benign adenoma.      Exam  /84  Pulse 93  Temp 98.5  F (36.9  C) (Oral)  Resp 20  Ht 1.56 m (5' 1.42\")  Wt 114.2 kg (251 lb 12.3 oz)  SpO2 98%  BMI 46.93 kg/m2  No acute distress,, sitting up in bed   Non-labored breathing on RA  Abdomen soft, nontender, mildly distended. Inferior aspect of the wound opened by plastics, with improving erythema. Fascia intact. Stoma retracted but pink and well perfused, 2 stents in place  RIVKA x 2 serous  Urostomy bag with clear yellow urine in tubing  L PNT removed 8/16    Urostomy 1725/250  LUQ RIVKA 12/0  LLQ RIVKA 85/10    Labs  AM labs pending    Assessment/Plan  78 year old y/o female POD#12 s/p anterior pelvic exteneration, VRAM flap to pelvis (Plastics), BLPLND, creation of ileal conduit for MIBC. Medicine consulted for assistance managing multiple medical comorbidities. Ongoing issues include mental status and hypercarbia/hypoxia. Transferred " to the SICU for worsening respiratory status which has since resolved. Currently on 6B. Stable now in step-down unit. Medicine co-managing.     Neuro: tylenol, oxycodone, dilaudid for pain control. Lidocaine patch.   CV: Dilt gtt weaned, continues to be tachycardic/hypertensive. Scheduled hydralazine, metop. Appreciate management by medicine.     Pulm: Currently on RA, aggressive pulmonary toilet  FEN/GI: Regular diet. TPN running @ 55cc/hr. Diuresis per medicine team.   Endo: Endocrine consulted for hyperglycemia, see note for recs. Continue home synthroid.   :  L PNT removed 8/16. Monitor urine output  Heme: On SQH. Hgb stable. Home warfarin held   ID: Persistent leukocytosis. Afebrile. Not on abx.   - BCx 8/9 NGTD  - UCx 8/9 E. Coli - t/w ceftriaxone (8/10-8/15)  - UCx 8/17 - Candida   - C. Diff negative 8/18  Activity: up ad olya, encourage ambulation   PPx: SCDs. SQH.   Dispo: Transfer to  (with tele)    Seen and examined with the chief resident who will discuss with Dr. Sharma.    Jayne Plaza MD  Urology PGY-2         Contacting the Urology Team     Please use the following job codes to reach the Urology Team. Note that you must use an in house phone and that job codes cannot receive text pages.     On weekdays, dial [phone] (or star-star-star [phone] on the new [employer] telephones) then [phone] to reach the Adult Urology resident or PA on call    On weekdays, dial [phone] (or star-star-star [phone] on the new [employer] telephones) then [phone] to reach the Pediatric Urology resident    On weeknights and weekends, dial [phone] (or star-star-star [phone] on the new [employer] telephones) then [phone] to reach the Urology resident on call (for both Adult and Pediatrics)

## 2018-08-20 NOTE — PLAN OF CARE
Problem: Patient Care Overview  Goal: Plan of Care/Patient Progress Review  PT 6B: Cancel- per discussion with OT, patient declining participation in any therapy today due to abdominal pain. Will reschedule.

## 2018-08-20 NOTE — PLAN OF CARE
Problem: Renal Insufficiency Comorbidity  Goal: Renal Insufficiency  Patient comorbidity will be monitored for signs and symptoms of Renal Insufficiency (Chronic) condition.  Problems will be absent, minimized or managed by discharge/transition of care.   Outcome: Improving  I/O's monitored

## 2018-08-20 NOTE — PROGRESS NOTES
Plastic Surgery Progress Note    Subjective/Interval History:  CT demonstrating left pelvic fluid collection and bowel herniation in the lower abdomen without obstruction.     Objective:  Temp:  [98.3  F (36.8  C)-98.5  F (36.9  C)] 98.5  F (36.9  C)  Pulse:  [64-93] 93  Heart Rate:  [] 123  Resp:  [18-20] 20  BP: (101-153)/(48-90) 123/90  SpO2:  [91 %-98 %] 94 %    General appearance: NAD  Pulm: Saturating well on RA  CV: Hemodynamically stable  Abd: Soft, appropriately TTP, lower aspect of the incision opened and packed, serous drainage continues, mild surrounding erythema.    Subcutaneous drain 15fr: 85ml serous  Pelvic drain 19fr: 12ml serosanguinous    Assessment/Plan:   Sunitha Jacques is a 78 year old female with history of bladder cancer now s/p anterior pelvic exenteration, pelvic lymph node dissection, ileal conduit and inferiorly pedicled VRAM to fill pelvic defect on 8/8/2018. Ongoing workup for source of leukocytosis.     - Even though fascia appears intact beneath the opened areas, the amount of serous drainage from the wound and the subcutaneous drain raises the suspicion of a fascial opening, albeit small.    - Continue gentle packing of opened sites..   - If return to OR is planned by Urology, will discuss timing with staff.     Trent Freed, PGY4  [phone]

## 2018-08-20 NOTE — PLAN OF CARE
Problem: Diabetes Comorbidity  Goal: Diabetes  Patient comorbidity will be monitored for signs and symptoms of hyperglycemia or hypoglycemia. Problems will be absent, minimized or managed by discharge/transition of care.   Outcome: No Change  BG monitored q4hrs: ssi admin per order

## 2018-08-20 NOTE — PLAN OF CARE
Problem: Patient Care Overview  Goal: Plan of Care/Patient Progress Review  OT 6B: Cancel - pt declined participation in therapies, pt endorsing abdominal pain and overall disinterest in ADLs or mobility. Will check back or reschedule.

## 2018-08-21 NOTE — PLAN OF CARE
Problem: Patient Care Overview  Goal: Plan of Care/Patient Progress Review  Outcome: No Change  Neuro: A&Ox4, very depressed mood, afebrile  Cardiac: Afib. HR 120s-140s in AM before meds, improved with scheduled BB, BP meds. Now has an order for metoprolol if HR > 120, not required during shift. OVSS.                 Respiratory: Sating 98% on 2L NC  GI/: Adequate urine output. BM X 0  Diet/appetite: Had bites of sandwich at lunch, declined all other food. TPN maintained  Activity:  Declined activity OOB d/t abd pain - turn q2.  Pain: Dilaudid x 2 for abd pain, tylenol x 1 for headache  Skin: Urostomy, midline incision staples and  small opening at distal end with packing per order and abd pads in abd fold, JPs x 2, mepliplex to coccyx wound changed by WOC   LD's: Triple Lumen IJ c TPN - Urostomy - 2 JPs     Plan: Continue with POC. Notify primary team with changes. Poss surgery consult

## 2018-08-21 NOTE — PROGRESS NOTES
[facility], [place]    Internal Medicine Progress Note - Gold Service      Assessment & Plan   Sunitha Jacques is a 78 year old female with history of DM, HTN, hypothyroidism, CVA (2017), obesity, CHF, CKD, Afib (on coumadin) and MIKKI admitted to urology service 8/8/18 after total cystectomy, pelvic exenteration (removal of uterus, vaginal cuff, part of vagina), ileal conduit and flap for invasive bladder cancer. Developed AMS and hypoxia and transferred to SICU 8/10. Stable and transferred back to medical floor 8/12.         Acute decompensation: Called to bedside around 3:45 pm due to HTN, temp 100.3, worsening respiratory status. O2 requirements up to 5L with dyspnea and discomfort. Per family, episode of coughing after swallowing this afternoon. Oropharynx pink 2/2 Popscile, no exudates noted on soft palate, no obstruction noted. Alert, oriented to person and family in room. HR RRR. Lungs diminished in the bases with upper airway congestion. Stridor. Abdomen soft with ongoing drainage from surgical site, foul smelling. Bowel sounds present. Concern for aspiration vs CO2 retention vs flash pulmonary edema vs worsening CHF   - Check ABG, BCx x2, BMP, CBC with diff, lactic acid, BNP, procal  - Stat CXR  - Will start empiric antibiotics follow BCx (Cefepime and Vanc)  - Epi neb now and prn     S/p anterior pelvic exenteration, bilateral lymphadenectomy, ileal conduit diversion, left vertical rectus abdominis musculocutaneous flap to pelvis 8/8: Invasive bladder cancer. Urology primary, management per their team     Acute toxic/metabolic encephalopathy: Marked AMS 8/10 with negative HCT. Neuro consulted. Transferred to SICU given concern for sepsis. BCx NGTD. Broadened Cipro to Ceftriaxone. Transferred back to medical floor 8/13. AMS initially likely multifactorial 2/2 pain, urosepsis, and delirium. A&O x3 8/20, alert and disoriented x3 8/21, timeline coincides with increased. Other  etiology could include infection. No focal neuro deficits  - Please minimize opiate use and sedating medications    Leukocytosis: WBC 15.9 (peak 17.9 8/19) with concern for surgical source. CXR 8/18 stable bilateral opacities, pulmonary edema vs consolidation. C diff negative 8/18. CTAP 8/19 Collection in L hemipelvis measuring 4 cm with air-fluid, raising concern for developing abscess without well defined drainable collection, new/increased L hydronephrosis, midline ventral hernia without evidence of obstruction. Not currently on abx. Tmax 99.6  - Interpretation of CTAP results per urology   - Would obtain procal, CBC with diff, fluid/blood cultures, line culture, and lactic acid if worsening WBC or decompensation    Acute hypoxic respiratory failure: Likely 2/2 volume overload. Weight 115 kg, up from 104.3 on admit. CXR 8/14 significant pulmonary edema. BNP 05621. TTE 8/9 normal EF. CXR 8/18 stable bilateral perihilar and bibasilar opacities, pulmonary edema vs consolidation. PTA on lasix 40 mg qod, was treated with 20 mg IV daily post-op. 8/14 with increasing O2 needs up to 6L. Initiated aggressive diuresis with good response. Kidney function stale. CVP 15. Net +1L over 24 hours. Requiring 1-2L O2 (also possible respiratory depression with opiates)  - Strict I&Os, daily weights  - Increase IV lasix back to 40 mg bid  - Repeat albumin today    Chronic A fib with new RVR: RVR onset 8/13, was symptomatic with HR 150s-170s (2/2 hypervolemia). Inadequate response with IV metoprolol x3 requiring dilt gtt 8/13 to 8/17. Flipped back into RVR 8/20, improved with scheduled metoprolol. [id number] 9, making risk of CVA 12.2% and risk of CVA/TIA/embolism 17.4%. EKG 8/21 NSR with PACs. HR stable  - Continue metoprolol  - Given high [id number], would resume anticoagulation when able, will defer to primary    Hyponatremia: Na 131 (132) in the setting of hypervolemia, poor oral intake. Slight bump in Cr and BUN with  diuresis. Most likely etiology hypervolemia, however, given BMP 8/21 etiology not completely clear  - Urine Na, urine osm, serum osm, and FEUrea    CKD III: Recent BL Cr 1.6-1.7, increased form 0.7-0.9 in 2017. Known atrophic R kidney and h/o severe hydronephrosis of L kidney in the setting of of invasive bladder cancer. Cr now stable ongoing diuresis.   - Trend    Depression, flat affect: PTA on Lexapro. Worsening affect 8/20 in the setting of acute on chronic illness. Discussed increasing Lexapro/making antidepressant changes, no decision made at the time    DM: Diet controlled PTA. A1C 6.2 8/8/18. Uncontrolled BG due TPN. Glucose now stable  - Endocrine following to assist with management    Severe protein-calorie malnutrition: In the setting of extensive surgery, decompensation requiring ICU stay, ileus, and chronic illness. Currently on TPN and calorie counts and diet advances.   - Nutrition following  - Suggest weaning TPN as able to oral intake improves    HTN: PTA on metoprolol, Norvasc, hydralazine. BP stable  - Continue PTA meds with hold parameters           Resolved Hospital Problems:  Adynamic Ileus: Developed post-op, KUB mildly dilated loops of bowel concerning for developing ileus. NG placed to intermittent suction with 1L bilious output 8/15. Clamping trial 8/16 with increased abdominal distention. LIS overnight 8/16 with >1L output. NG removed 8/17. Ongoing abdominal pain, N/V resolving. Having regular BMs. Suggest consistent bowel regiment   Possible HFrEF: EF 46% on MPI 7/6/18. TTE 8/9 normal EF, although limited study d/t technical difficulty. CXR without significant congestion. Strict I&Os. Continue metoprolol. Lasix as above  Hypothyroidism: TSH, T4 1.08 8/13. Continue PTA levothyroxine  H/o CVA (2017): No focal deficits. Continue statin  PAD: Elevated velocities in external iliac arteries bilaterally and R common femoral artery suggesting stenosis seen on pelvic arterial and venous duplex on  "8/6, done in preparation for flap placement 8/8. Continue statin  Ecoli UTI:  UCx 8/9 growing >100,000 colonies/mL E coli. Completed treatment with Ceftriaxone 8/15. Repeat UC with candida as above       Pain Assessment:  Current Pain Score 8/21/2018   Patient currently in pain? denies   Pain location -   Pain descriptors -   - Per primary team    Diet: Calorie Counts  parenteral nutrition - ADULT compounded formula  NPO per Anesthesia Guidelines for Procedure/Surgery Except for: Meds  parenteral nutrition - ADULT compounded formula  Fluids: None  DVT Prophylaxis: Defer to primary service  Code Status: Full Code    Disposition Plan   Expected discharge: per primary team     Entered: Tori Louise 08/21/2018, 11:48 AM   Information in the above section will display in the discharge planner report.      The patient's care was discussed with the patient, nursing, urology, and attending physician, Dr. Maria Alejandra Louise  Internal Medicine Staff Hospitalist Service  [facility]  Pager: [phone]  Please see sticky note for cross cover information    Interval History    Feels \"good\" right now. Denies pain. Was unaware of move to the  side of her room. Unable to state her full name, year or location. Per nursing, was A&O x3 this am. Has been getting increase pain meds over the past 12-18 hours. Denies frustration or depression. No dyspnea or chest pain. Denies abdominal pain. Remainder of HPI limited due to AMS      Data reviewed today: I reviewed all medications, new labs and imaging results over the last 24 hours.    Physical Exam   Vital Signs: Temp: 99.8  F (37.7  C) Temp src: Oral BP: 130/54   Heart Rate: 76 Resp: 20 SpO2: 91 % O2 Device: Nasal cannula Oxygen Delivery: 2 LPM  Weight: 253 lbs 8.46 oz  General Appearance: Alert, disoriented x3. Appears to be responding to internal stimuli  HEENT: Pupils pinpoint  Respiratory: CTAB without wheezing or rales. On 2L " O2  Cardiovascular: RRR, S1, S2 with intermittent ectopic beats  GI: Abdomen soft, tender with light palpation. Large midline incision with weeping. Drain in lower abdomen with serous anginous drainage. RLQ urostomy   Skin: No rash or jaundice   Other: No peripheral edema, distal pulses palpable         Data   Data     Recent Labs  Lab 08/21/18  0455 08/20/18  0449 08/19/18  0547  08/17/18  0608  08/15/18  0512   WBC 15.9* 16.9* 17.9*  < > 15.1*  < > 10.9   HGB 8.7* 8.7* 8.7*  < > 9.5*  < > 8.8*   MCV 86 86 85  < > 88  < > 90    203 232  < > 294  < > 255   INR  --  1.09  --   --   --   --  1.50*   * 132* 136  < > 144  < > 144   POTASSIUM 5.2 4.4 3.9  < > 3.2*  < > 3.7   CHLORIDE 96 97 101  < > 106  < > 112*   CO2 27 28 28  < > 30  < > 26   BUN 63* 57* 55*  < > 53*  < > 50*   CR 1.17* 1.06* 1.06*  < > 1.03  < > 1.30*   ANIONGAP 9 8 6  < > 7  < > 6   ANNE 8.4* 8.4* 8.1*  < > 9.1  < > 8.4*   * 147* 172*  < > 166*  < > 252*   ALBUMIN  --  1.6*  --   --  1.8*  --  1.8*   PROTTOTAL  --  5.5*  --   --  5.7*  --  5.8*   BILITOTAL  --  0.3  --   --  0.4  --  0.2   ALKPHOS  --  70  --   --  70  --  72   ALT  --  79*  --   --  37  --  50   AST  --  59*  --   --  21  --  31   < > = values in this interval not displayed.  No results found for this or any previous visit (from the past 24 hour(s)).

## 2018-08-21 NOTE — PLAN OF CARE
Problem: Surgery Nonspecified (Adult)  Goal: Signs and Symptoms of Listed Potential Problems Will be Absent, Minimized or Managed (Surgery Nonspecified)  Signs and symptoms of listed potential problems will be absent, minimized or managed by discharge/transition of care (reference Surgery Nonspecified (Adult) CPG).   Outcome: No Change  2L NC, afib 's. BP stable. Midline Abdominal wound excessively leaking reinforced throughout night.     Problem: Patient Care Overview  Goal: Plan of Care/Patient Progress Review  Outcome: No Change  Care provided from 9703-5271.  I/A: Currently on 2L NC, afib 90-110s. Hemodynamically stable. Flat affect, withdrawn. A & O x4. Pain in abdominal region treated with PRNs throughout night. No BM (last on evening shift), adequate output via urostomy, RIVKA x2 with minimal output. TPN at 55ml/hr. BG stable. CVP checked overnight because was never checked 8/20, CVP reading of 15.  P: Continue to monitor and notify care team of any changes. NPO currently. Possible ex-lap in OR today.     Problem: Diabetes Comorbidity  Goal: Diabetes  Patient comorbidity will be monitored for signs and symptoms of hyperglycemia or hypoglycemia. Problems will be absent, minimized or managed by discharge/transition of care.   Outcome: No Change  BS stable, covered with scheduled rapid acting insulin

## 2018-08-21 NOTE — PROGRESS NOTES
Surgery Progress Note  8/21/2018     Subjective:  - NAEON.  - This morning, pain well controlled. Tolerating diet, denies N/V. Passing flatus.     Objective:  Temp:  [98.1  F (36.7  C)-99.8  F (37.7  C)] 99.8  F (37.7  C)  Heart Rate:  [] 76  Resp:  [17-20] 20  BP: (116-159)/(54-85) 130/54  SpO2:  [91 %-95 %] 91 %  I/O last 3 completed shifts:  In: 2838.28 [P.O.:360; I.V.:280]  Out: 1970 [Urine:1900; Drains:70]    General: Alert and oriented, seems uncomfortable   Pulm: Non labored breathing  CV: Irregularly irregular   Abd: Soft abdomen, non distended, appropriately tender, midline incision with staples in place inferior portion is open was examined thoroughly no bowel visible     BMP  Recent Labs  Lab 08/21/18 0455 08/20/18 0449 08/19/18  0547 08/18/18  0606  08/17/18  0608   * 132* 136 141  --  144   POTASSIUM 5.2 4.4 3.9 3.6  < > 3.2*   CHLORIDE 96 97 101 104  --  106   ANNE 8.4* 8.4* 8.1* 8.6  --  9.1   CO2 27 28 28 29  --  30   BUN 63* 57* 55* 53*  --  53*   CR 1.17* 1.06* 1.06* 1.02  --  1.03   * 147* 172* 135*  --  166*   MAG  --  2.4* 2.0 1.9  --  2.2   PHOS  --  3.3 3.0 2.9  --  2.2*   < > = values in this interval not displayed.  CBC  Recent Labs  Lab 08/21/18 0455 08/20/18 0449 08/19/18  0547 08/18/18  0606   WBC 15.9* 16.9* 17.9* 16.2*   RBC 3.12* 3.17* 3.19* 3.32*   HGB 8.7* 8.7* 8.7* 9.1*   HCT 26.8* 27.2* 27.0* 29.3*   MCV 86 86 85 88   MCH 27.9 27.4 27.3 27.4   MCHC 32.5 32.0 32.2 31.1*   RDW 15.8* 15.5* 15.4* 15.5*    203 232 272     INR  Recent Labs  Lab 08/20/18  0449 08/15/18  0512   INR 1.09 1.50*      AST/ALT & Alk Phos  Recent Labs  Lab 08/20/18  0449 08/17/18  0608 08/15/18  0512   AST 59* 21 31   ALT 79* 37 50   ALKPHOS 70 70 72     Bili  Recent Labs   Lab Test  08/20/18   0449  08/17/18   0608  08/15/18   0512  08/10/18   0506   BILITOTAL  0.3  0.4  0.2  0.5     Lipase/AmlyaseNo lab results found in last 7 days.    A/P: Sunitha Jacques is a 77 yo female  with history of cervical ca s/p radiation, urinary bladder cancer s/p pelvic exenteration , bilateral lymphadenectomy, ileal conduit diversion, left vertical rectus abdominus musculocutaneous flap on 8/8/18, now with newly diagnosed fascial dehiscence.    - The patient was discussed in general surgery indications conference, there are no indication for an operative intervention at this time     Seen w/ Dr. Ruggiero who will staff with Dr. Escobar.     Lyndon Xavier, MS4  Pg [phone]     RESIDENT ADDENDUM 8/21/18  I agree with the edited note above     Discussed with staff Dr. Luz Enamorado MD  General Surgery Resident

## 2018-08-21 NOTE — PROGRESS NOTES
Plastic Surgery Progress Note    Subjective/Interval History:  Urology planning take back to OR today.     Objective:  Temp:  [98.1  F (36.7  C)-99.7  F (37.6  C)] 99.7  F (37.6  C)  Heart Rate:  [] 112  Resp:  [17-20] 20  BP: (116-134)/() 127/85  SpO2:  [93 %-95 %] 95 %    General appearance: NAD  Pulm: Saturating well on RA  CV: Hemodynamically stable  Abd: Soft, appropriately TTP, lower aspect of the incision opened and packed, serous drainage continues, mild surrounding erythema.    Subcutaneous drain 45fr: 85ml serous  Pelvic drain 19fr: 15ml serosanguinous    Assessment/Plan:   Sunitha Jacques is a 78 year old female with history of bladder cancer now s/p anterior pelvic exenteration, pelvic lymph node dissection, ileal conduit and inferiorly pedicled VRAM to fill pelvic defect on 8/8/2018.     Will discuss with Dr. Calderón about take back today.     Trent Freed, PGY4  [phone]

## 2018-08-21 NOTE — PROGRESS NOTES
Social Work Services Progress Note    Hospital Day: 14  Date of Initial Social Work Evaluation:  8/14/18  Collaborated with:  Pt and pt's son Galen (P: [phone]), Dr. Agee with Urology.    Data:  Sunitha Jacques is a 79 yo female admitted to [facility] 8/8/18.    Intervention:  SW following for DC plans.    Assessment: Pt typically lives alone in her own home in [place] and was independent prior to admission. TCU is recommended at dc and pt is in agreement. Pt's son Galen lives in [place] and pt/family's first choice for TCU is [facility] . Referral had been initiated on 8/14. Per Urology, pt may be able to dc in 3-4 days and pt will be weaned off TPN and IV meds. Pt may have one RIVKA remaining at dc, and will have an abdominal binder. Pt currently requiring 2L NC.    Referrals sent via DC on Demand to the following facilities. Listed in order of preference based on proximity to pt's son's home:  1. [facility] on [place] (P: [phone], F: [phone])  2. [facility] (P: [phone], F: [phone])    Plan:    Anticipated Disposition:  Per Dr. Agee with Urology, possible DC 3-4 days. Referrals are out to TCUs.    Barriers to d/c plan:  Medical clearance and TCU placement.    Follow Up:  SW will continue to follow and assist as needed.    GLORIA Yeboah, [facility]  6B Intermediate Care Unit  Phone: [phone]  Pager: [phone]

## 2018-08-21 NOTE — PROGRESS NOTES
Calorie Count  Intake recorded for: 8/20  Kcals: 85  Protein: 5g  # Meals Recorded: 25% grilled cheese, fresh fruit cup   # Supplements Recorded: 0

## 2018-08-21 NOTE — PLAN OF CARE
"Problem: Fall Risk (Adult)  Goal: Identify Related Risk Factors and Signs and Symptoms  Related risk factors and signs and symptoms are identified upon initiation of Human Response Clinical Practice Guideline (CPG).   Outcome: No Change  /81 (BP Location: Left arm)  Pulse 93  Temp 98.6  F (37  C) (Oral)  Resp 18  Ht 1.56 m (5' 1.42\")  Wt 114.2 kg (251 lb 12.3 oz)  SpO2 93%  BMI 46.93 kg/m2     2379-0961  Neuro: A/Ox4. Withdrawn. Pts mood improved throughout the shift.  Cardiac: A fib with HR 90-110s. VSS. Afebrile  Respiratory: On 2L oxygen via NC. LS clear/dim  GI/: Large loose BM. Urostomy patent with adequate UOP.   Diet/Appetite: Regular diet. On bernardino counts. , covered with sliding scale insulin  Skin: Midline incision packing changed per orders. Mepilex in place on bottom.   LDA: JPx2 with serosanguinous output. TPN/Lipids infusing through RIJ. Urostomy with adequate UOP.   Activity: Assist of 2 to turn   Pain: IV dilaudid and oxycodone given for c/o abdominal pain with turning and cleaning.   Plan: NPO after midnight for possible surgery tomorrow. On the add on schedule with Dr. Sharma. Continue to monitor and follow POC. Notify MD with any changes or concerns.         "

## 2018-08-21 NOTE — PLAN OF CARE
Problem: Patient Care Overview  Goal: Plan of Care/Patient Progress Review  OT 6B: Cancel - pt not medically appropriate, pt planned to return to the OR to repair dehiscence and worsening respiratory status. Will reschedule per POC.

## 2018-08-21 NOTE — PROGRESS NOTES
Diabetes Consult Daily  Progress Note          Assessment/Plan:    Sunitha Jacques is a 78 year old female with PMHx of type 2 diabetes complicated by peripheral neuropathy who underwent total cystectomy, anterior pelvic exenteration with bilateral lymphadectomy, ileal conduit diversion, left vertical rectus abdominis musculocutaneous flap to pelvis on 8/8/2018 for invasive bladder cancer, experiencing post-operative and TPN-associated hyperglycemia.     # Type 2 DM  # On continuous TPN  With 38 units regular insulin, requiring 20 units correction,  Taking in small amount of po ( on CHO insulin coverage )  Will consider increase of insulin in TPN if hyperglycemia today  -134 overnight with correction insulin given  Po intake very low.  Will continue to monitor and wait until tomorrow before adjusting insulin in TPN     PLAN  -  NPH 17 units am  -  NPH 17 units pm  - Continue regular insulin 38 units in TPN bag (170 grams dextrose), continuous TPN (may increase with next bag  - Continue novolog 1 unit per 6 grams CHO  - Continue novolog high correction q4h   - Glucose checks q4h   -please contact Diabetes Team with changes to TPN                           Outpatient diabetes follow up: TBD  Plan discussed with patient and bedside nurse           Interval History:   The last 24 hours progress and nursing notes reviewed.  hyperglycemia most likely 2/2 continuous TPN, infection, and sipping on liquids containing sugar.  Glucose range from 175-132 since midnight very little po intake.  Just not hungry  Family member @ bedside  Does not have an appetite, denies nausea and or vomiting    Nutrition:  Continuous TPN dextrose 170 grams, regular insulin 38 units  Regular diet      Recent Labs  Lab 08/21/18  0828 08/21/18  0455 08/21/18  0434 08/21/18  0006 08/20/18  1912 08/20/18  1543 08/20/18  1124  08/20/18  0449  08/19/18  0547  08/18/18  0606  08/17/18  0608  08/16/18  0512   GLC  --  135*   "--   --   --   --   --   --  147*  --  172*  --  135*  --  166*  --  253*   *  --  175* 146* 206* 202* 217*  < >  --   < >  --   < >  --   < >  --   < >  --    < > = values in this interval not displayed.            Review of Systems:   See interval hx          Medications:       Active Diet Order      NPO per Anesthesia Guidelines for Procedure/Surgery Except for: Meds     Physical Exam:  Gen: Alert, resting in bed, in NAD   HEENT:  mucous membranes are moist  Resp: non-labored  Ext: moving all extremiites, mild edema  Neuro:oriented x3, communicating clearly  /73 (BP Location: Left arm)  Pulse 93  Temp 99.5  F (37.5  C) (Oral)  Resp 20  Ht 1.56 m (5' 1.42\")  Wt 115 kg (253 lb 8.5 oz)  SpO2 93%  BMI 47.26 kg/m2           Data:     Lab Results   Component Value Date    A1C 6.2 08/08/2018    A1C 6.2 07/05/2018    A1C 5.8 04/13/2017              CBC RESULTS:   Recent Labs   Lab Test  08/20/18 0449   WBC  16.9*   RBC  3.17*   HGB  8.7*   HCT  27.2*   MCV  86   MCH  27.4   MCHC  32.0   RDW  15.5*   PLT  203     Recent Labs   Lab Test  08/20/18   0449  08/19/18   0547   NA  132*  136   POTASSIUM  4.4  3.9   CHLORIDE  97  101   CO2  28  28   ANIONGAP  8  6   GLC  147*  172*   BUN  57*  55*   CR  1.06*  1.06*   ANNE  8.4*  8.1*     Liver Function Studies -   Recent Labs   Lab Test  08/20/18   0449   PROTTOTAL  5.5*   ALBUMIN  1.6*   BILITOTAL  0.3   ALKPHOS  70   AST  59*   ALT  79*     Lab Results   Component Value Date    INR 1.09 08/20/2018    INR 1.50 08/15/2018    INR 1.32 08/10/2018    INR 2.1 05/12/2017    INR 2.4 05/08/2017    INR 1.8 05/03/2017    INR 4.6 04/28/2017    INR 5.7 04/26/2017    INR 2.6 04/21/2017    INR 3.1 04/17/2017    INR 1.8 04/13/2017    INR 1.7 04/10/2017    INR 1.81 04/03/2017    INR 1.80 03/31/2017    INR 2.27 03/29/2017    INR 1.89 03/27/2017    INR 2.34 03/20/2017     I spent a total of 35 minutes bedside and on the inpatient unit managing the glycemic care of Sunitha " Deandra Jacques. Over 50% of my time on the unit was spent counseling the patient  and/or coordinating care regarding glycemic management.    See note for details.      Deisy Martinez -[phone]  Diabetes Management job code [id number]

## 2018-08-21 NOTE — PROGRESS NOTES
"Urology  Progress Note    No acute events overnight  General surgery consulted for fascial dehiscence, recommend observation  Doing well overall    Exam  /85 (BP Location: Left arm)  Pulse 93  Temp 99.7  F (37.6  C) (Oral)  Resp 20  Ht 1.56 m (5' 1.42\")  Wt 114.2 kg (251 lb 12.3 oz)  SpO2 95%  BMI 46.93 kg/m2  No acute distress,, sitting up in bed   Non-labored breathing on RA  Abdomen soft, nontender, mildly distended. Inferior aspect of the wound re-packed with improving erythema. Fascia intact. Stoma retracted but pink and well perfused, 2 stents in place. No RRC.  RIVKA x 2 serous  Urostomy bag with clear yellow urine in tubing  L PNT removed 8/16    Urostomy 1625/525  LUQ RIVKA 15/5  LLQ RIVKA 45/15    Labs  AM labs pending    Assessment/Plan  78 year old y/o female POD#13 s/p anterior pelvic exteneration, VRAM flap to pelvis (Plastics), BLPLND, creation of ileal conduit for MIBC. Medicine consulted for assistance managing multiple medical comorbidities. Ongoing issues include mental status and hypercarbia/hypoxia. Transferred to the SICU for worsening respiratory status which has since resolved. Currently stable now in step-down unit. Medicine co-managing.     Potential RTOR tonight 8/21 for wound exploration, possible mesh placement. Decision pending discussion with Plastics and General Surgery.    Neuro: tylenol, oxycodone, dilaudid for pain control. Lidocaine patch.   CV: Dilt gtt weaned, continues to be tachycardic/hypertensive. Scheduled hydralazine, metop. Appreciate management by medicine. Continue tele.   Pulm: Currently on RA, aggressive pulmonary toilet  FEN/GI: NPO for potential OR today. TPN running @ 55cc/hr. Diuresis per medicine team.   Endo: Endocrine consulted for hyperglycemia, see note for recs. Continue home synthroid.   :  L PNT removed 8/16. Monitor urine output  Heme: On SQH. Hgb stable. Home warfarin held   ID: Persistent leukocytosis. Afebrile. Not on abx.   - BCx 8/9 NGTD  - " UCx 8/9 E. Coli - t/w ceftriaxone (8/10-8/15)  - UCx 8/17 - Candida   - C. Diff negative 8/18  Activity: up ad olya, encourage ambulation   PPx: SCDs. SQH.   Dispo: 6B    Seen and examined with the chief resident who will discuss with Dr. Sharma.    Jayne Plaza MD  Urology PGY-2         Contacting the Urology Team     Please use the following job codes to reach the Urology Team. Note that you must use an in house phone and that job codes cannot receive text pages.     On weekdays, dial [phone] (or star-star-star 777 on the new Robert Applebaum MD telephones) then [phone] to reach the Adult Urology resident or PA on call    On weekdays, dial [phone] (or star-star-star 777 on the new [place] telephones) then [phone] to reach the Pediatric Urology resident    On weeknights and weekends, dial [phone] (or star-star-star 777 on the new [place] telephones) then [phone] to reach the Urology resident on call (for both Adult and Pediatrics)

## 2018-08-22 NOTE — PLAN OF CARE
Problem: Patient Care Overview  Goal: Plan of Care/Patient Progress Review  PT / 6B - Cancel. Per chart review and discussion with RN, patient not medically appropriate for therapy this day. Patient requiring increased respiratory needs, therefore transferring to a higher level of care.

## 2018-08-22 NOTE — PROGRESS NOTES
"  Internal Medicine Gold Service Crosscover Note      Sunitha Jacques MRN# [id number]   YOB: 1940 Age: 78 year old           Interval History:     I was notified by nursing regarding fever 103.1 at 1941.  Earlier in the day, the patient developed acute onset of shortness of breath and respiratory distress following ingestion of water and a popsicle.  She was started on BIpap and an infectious workup commenced.  I went to the bedside to evaluate the patient at time of fever.     S:  Currently, the patient is on bipap and is resting comfortably.  She denies chest pain, shortness of breath, abdominal pain, nausea or vomiting.  She nods yes that her breathing is much more comfortable.           Physical Exam:   Vitals were reviewed   Blood pressure 152/62, pulse 93, temperature 102.4  F (39.1  C), temperature source Axillary, resp. rate 19, height 1.56 m (5' 1.42\"), weight 115 kg (253 lb 8.5 oz), SpO2 94 %.  General: Alert, nodding yes and no appropriately to questions.    HEENT: AT/NC, sclera anicteric, PERRL, EOMI, Bipap mask in place, no leak.   Neck: Supple, no JVD or cervical LAD.  R internal jugular CVC in place.   Chest/Resp: Normal effort on Bipap.  No wheezing, rales or rhonchi.  Decreased breath sounds bilateral bases.    Heart/CV: regular rate and rhythm, no murmur  Abdomen/GI: Soft, tenderness to light palpation.  Large midline incision with foul smelling discharge.  Pelvic drain with serosanguinous output and subcutaneous drain with serous output.          Data:   All laboratory and imaging data in the past 24 hours reviewed          Assessment and Plan:   Sunitha Jacques is a 78 year old female with history of DM, HTN, hypothyroidism, CVA (2017), obesity, CHF, CKD, Afib (on coumadin) and MIKKI admitted to urology service 8/8/18 after total cystectomy, pelvic exenteration (removal of uterus, vaginal cuff, part of vagina), ileal conduit and flap for invasive bladder cancer. Developed AMS " and hypoxia and transferred to SICU 8/10. Stable and transferred back to medical floor 8/12.     Sepsis 2/2 HCAP vs intraabdominal infection - Febrile starting this afternoonwith associated respiratory distress and hypoxia (up to 5L NC and increased to BiPap).  This evening was febrile 103.1 but defervesced with Tylenol suppository.   Per family, occurred after ingestion of water and popsicle.  WBC 15 (15.9), procal 0.86 (1.45 from 8/10).  CXR with worsening perihilar and basilar opacities with likely small pleural effusions.  UA with WBC 22, large LE, negative nitrates.  Blood cx NGTD.  Etiology of sepsis unclear, may be 2/2 HCAP vs aspiration PNA vs intra-abdominal abscess.  Of note, CT A/P on 8/19 shows fluid collection in L hemipelvis with concern for developing abscess.  Seen by general surgery this evening for possible operative intervention, however, per their note, no operative intervention indicated at this time.   - Continue IV Vancomycin and IV Cefepime  - Trend fever curve   - Appreciate ongoing input from primary team, Urology and consulting teams General surgery and PRS   - Will following urine and blood cx   - Continue tylenol suppository PRN for fever   - Check CBC with diff in AM   - Nebs PRB    Acute hypoxic respiratory failure - Likely multifactorial including hypervolemia vs PNA (HCAP vs aspiration).  Acute decompensation this afternoon requiring bipap.  CXR with perihilar and basilar opacities which may be 2/2 infection vs pulmonary edema.  Currently receiving lasix 40 mg BID and was given an additional dose of 40 mg IV this afternoon.  Current I/O -894mL.  Weight 115 kg up from 103 kg on admission.  BNP mildly 3064.  - Would hold off on further lasix tonight given septic picture, readdress in AM  - Strict I/O with daily weights   - Wean off bipap as able to maintain sp02 >92%  - STAT CXR for any further decompensation   - Antibiotics as above     All other plan of care per note by DUSTIN Johnson  and Dr. Bess earlier in the day.     Patient discussed with, night hospitalist Dr. Kennedy , who will be assuming care for this patient after 11pm.    Stephanie Almodovar, [facility]  Hospitalist Service  Pager [phone]

## 2018-08-22 NOTE — PROGRESS NOTES
Spoke with family and reviewed her advance directives.  The form contradicts itself in some places (stating she would want all measures taken if felt to be beneficial, in other parts stating she would not want certain interventions).  Her decision maker (son) feels she would want measures such as intubation performed if there was reasonable hope of recovery.      Currently patient is having quickly escalating work of breathing, O2 sats in the low 90s and appears increasingly somnolent.  Attempted to draw VBG but central line having issues with blood draws.    Will plan to intubate now.  Anesthesia notified.  Family is at bedside and aware.

## 2018-08-22 NOTE — ANESTHESIA PROCEDURE NOTES
ANESTHESIOLOGY RESIDENT/CRNA INTUBATION NOTE  Indication for intubation: respiratory insufficiency, airway protection.  Provider Ordering Intubation: LASHONDA MCGRAW  History regarding the most recent potassium obtained: Yes  History regarding renal failure obtained: Yes  History of presence or absence of CVA/stroke was obtained: Yes  History of presence or absence of NM disorder obtained: Yes  Post Intubation:  ETT secured, Vent settings by primary/ICU team, Primary/ICU team to review CXR, Report given to primary nurse and/or team, No apparent complications and Sedation to be ordered by primary/ICU team  Grade I view with C-MAC 3 blade, ICU team to order CXR and sedation.

## 2018-08-22 NOTE — PROGRESS NOTES
"Urology  Progress Note    Had to go back on bipap yesterday in setting of acute respiratory distress  Tmax 101.3, started on vanc and cefepime per medicine, infectious workup pending    Exam  /74 (BP Location: Left arm)  Pulse 93  Temp 101.2  F (38.4  C) (Axillary)  Resp 22  Ht 1.56 m (5' 1.42\")  Wt 115 kg (253 lb 8.5 oz)  SpO2 94%  BMI 47.26 kg/m2  No acute distress,, sitting up in bed   Non-labored breathing on RA  Abdomen soft, nontender, mildly distended. Inferior aspect of the wound re-packed with improving erythema. Fascia intact. Stoma retracted but pink and well perfused, 2 stents in place. No RRC.  RIVKA x 2 serous  Urostomy bag with clear yellow urine in tubing  L PNT removed 8/16    Urostomy 2575/375  LUQ RIVKA 20/5  LLQ RIVKA 45/15    Labs  Overnight:  WBC 15 (15.9)  Hgb 8.3 (8.7)  Crt 125 (1.17)    AM labs pending    Assessment/Plan  78 year old y/o female POD#15 s/p anterior pelvic exteneration, VRAM flap to pelvis (Plastics), BLPLND, creation of ileal conduit for MIBC. Medicine consulted for assistance managing multiple medical comorbidities. Ongoing issues include mental status and hypercarbia/hypoxia. Transferred to the SICU for worsening respiratory status, which improved enough to allow transfer to step down unit. Medicine co-managing. Currently with respiratory distress on bipap and fever.     Neuro: tylenol, oxycodone, dilaudid for pain control. Lidocaine patch.   CV: Dilt gtt weaned, continues to be tachycardic/hypertensive. Scheduled hydralazine, metop. Appreciate management by medicine. Continue tele.   Pulm: Aggressive pulmonary toilet. Bipap as indicated.   FEN/GI: NPO when on bipap. TPN running @ 55cc/hr. Diuresis per medicine team.   Endo: Endocrine consulted for hyperglycemia, see note for recs. Continue home synthroid.   :  L PNT removed 8/16. Monitor urine output  Heme: On SQH. Hgb stable. Home warfarin held   ID: Persistent leukocytosis. Afebrile. Vanc and cefepime started 8/21 " for presumed aspiration pna.   - BCx 8/9 NGTD  - UCx 8/9 E. Coli - t/w ceftriaxone (8/10-8/15)  - UCx 8/17 - Candida   - C. Diff negative 8/18  - repeat UCx, BCx, and WCx sent 8/21, results pending  Activity: up ad olya, encourage ambulation   PPx: SCDs. SQH.   Dispo: 6B    Seen and examined with the chief resident who will discuss with Dr. Sharma.    Jayne Plaza MD  Urology PGY-2         Contacting the Urology Team     Please use the following job codes to reach the Urology Team. Note that you must use an in house phone and that job codes cannot receive text pages.     On weekdays, dial [phone] (or star-star-star 777 on the new [employer] telephones) then [phone] to reach the Adult Urology resident or PA on call    On weekdays, dial [phone] (or star-star-star 777 on the new [employer] telephones) then [phone] to reach the Pediatric Urology resident    On weeknights and weekends, dial [phone] (or star-star-star 777 on the new [employer] telephones) then [phone] to reach the Urology resident on call (for both Adult and Pediatrics)

## 2018-08-22 NOTE — PROGRESS NOTES
DUSTIN Louise notified pt in AFib with RVR - HR in 160s - and unable to ween off BiPAP - O2 needs now increasing - requested at bedside

## 2018-08-22 NOTE — PROGRESS NOTES
Diabetes Consult Daily  Progress Note          Assessment/Plan:    Sunitha Jacques is a 78 year old female with PMHx of type 2 diabetes complicated by peripheral neuropathy who underwent total cystectomy, anterior pelvic exenteration with bilateral lymphadectomy, ileal conduit diversion, left vertical rectus abdominis musculocutaneous flap to pelvis on 8/8/2018 for invasive bladder cancer, experiencing post-operative and TPN-associated hyperglycemia.     # Type 2 DM  # On continuous TPN  With 38 units regular insulin, Taking in small amount of po ( on CHO insulin coverage )  - 143  with 3 units correction insulin given yesterday  Po intake very low.  Will continue to monitor for need to adjust  insulin in TPN or reduce NPH doses. Not taking in po at this time    PLAN  -  NPH 17 units am  -  NPH 17 units pm  - Continue regular insulin 38 units in TPN bag (170 grams dextrose), continuous TPN (may increase with next bag  - Continue novolog 1 unit per 6 grams CHO  - Continue novolog high correction q4h   - Glucose checks q4h   -please contact Diabetes Team with changes to TPN                           Outpatient diabetes follow up: TBD  Plan discussed with patient and bedside nurse           Interval History:   The last 24 hours progress and nursing notes reviewed.  hyperglycemia most likely 2/2 continuous TPN, infection, and sipping on liquids containing sugar.  08/22  # On continuous TPN  With 38 units regular insulin, Taking in small amount of po ( on CHO insulin coverage )  - 143  with 3 units correction insulin given yesterday  Po intake very low.  Will continue to monitor for need to adjust  insulin in TPN or reduce NPH doses  Is now on bipap and there is question of aspiration.  Currently not eating     08/21 Glucose range from 175-132 since midnight very little po intake.  Just not hungry  Family member @ bedside  Does not have an appetite, denies nausea and or  "vomiting    Nutrition:  Continuous TPN dextrose 170 grams, regular insulin 38 units  Regular diet      Recent Labs  Lab 08/22/18  0751 08/22/18  0526 08/22/18  0328 08/21/18  2339 08/21/18  1939 08/21/18  1711 08/21/18  1536 08/21/18  1147  08/21/18  0455  08/20/18  0449  08/19/18  0547  08/18/18  0606   GLC  --  144*  --   --   --  126*  --   --   --  135*  --  147*  --  172*  --  135*   *  --  125* 126* 152*  --  143* 134*  < >  --   < >  --   < >  --   < >  --    < > = values in this interval not displayed.            Review of Systems:   See interval hx          Medications:       Active Diet Order      NPO for Medical/Clinical Reasons Except for: No Exceptions     Physical Exam:  Gen: Alert, resting in bed, in NAD   HEENT:  mucous membranes are moist  Resp: non-labored, on bipap  Ext: moving all extremiites, mild edema  Neuro:oriented x3, communicating clearly  /68  Pulse 100  Temp 100.4  F (38  C) (Axillary)  Resp 22  Ht 1.56 m (5' 1.42\")  Wt 115 kg (253 lb 8.5 oz)  SpO2 91%  BMI 47.26 kg/m2           Data:     Lab Results   Component Value Date    A1C 6.2 08/08/2018    A1C 6.2 07/05/2018    A1C 5.8 04/13/2017              CBC RESULTS:   Recent Labs   Lab Test  08/20/18 0449   WBC  16.9*   RBC  3.17*   HGB  8.7*   HCT  27.2*   MCV  86   MCH  27.4   MCHC  32.0   RDW  15.5*   PLT  203     Recent Labs   Lab Test  08/20/18   0449  08/19/18   0547   NA  132*  136   POTASSIUM  4.4  3.9   CHLORIDE  97  101   CO2  28  28   ANIONGAP  8  6   GLC  147*  172*   BUN  57*  55*   CR  1.06*  1.06*   ANNE  8.4*  8.1*     Liver Function Studies -   Recent Labs   Lab Test  08/20/18 0449   PROTTOTAL  5.5*   ALBUMIN  1.6*   BILITOTAL  0.3   ALKPHOS  70   AST  59*   ALT  79*     Lab Results   Component Value Date    INR 1.09 08/20/2018    INR 1.50 08/15/2018    INR 1.32 08/10/2018    INR 2.1 05/12/2017    INR 2.4 05/08/2017    INR 1.8 05/03/2017    INR 4.6 04/28/2017    INR 5.7 04/26/2017    INR 2.6 " 04/21/2017    INR 3.1 04/17/2017    INR 1.8 04/13/2017    INR 1.7 04/10/2017    INR 1.81 04/03/2017    INR 1.80 03/31/2017    INR 2.27 03/29/2017    INR 1.89 03/27/2017    INR 2.34 03/20/2017     I spent a total of 35 minutes bedside and on the inpatient unit managing the glycemic care of Sunitha Jacques. Over 50% of my time on the unit was spent counseling the patient  and/or coordinating care regarding glycemic management.    See note for details.      Deisy Martinez [facility] [phone]  Diabetes Management job code [id number]

## 2018-08-22 NOTE — H&P
SURGICAL ICU H&P  August 22, 2018      CO-MORBIDITIES:   Change in mole  (primary encounter diagnosis)  Malignant neoplasm of overlapping sites of bladder (H)    ASSESSMENT: Sunitha Jacques is a 78 year old female with a history of DMII, HTN, hypothyroidism, CVA (2017), obesity, CHF, CKD, AFib (on warfarin), and MIKKI who underwent total cystectomy, pelvic exenteration (removal of uterus, vaginal cuff, part of vagina), ileal conduit and flap for invasive bladder cancer on 8/8/18. She developed altered mental status and hypoxia the night of 8/9 and was transferred to the SICU 8/10 for stabilization. After return of baseline mental status and stabilization she was transferred to the floor 8/12. During the same hospital admission, on 8/21 she experienced acute hypoxic respiratory failure due to a presumed aspiration event requiring BiPAP with escalating FiO2 needs. The following day on 8/22 she converted to afib with RVR and unstable pressures. She was transferred to the SICU for respiratory support and hemodynamic stabilization.     PLAN:  Neuro/ pain/ sedation:  # Hx of CVA  - Monitor neurological status. Notify the MD for any acute changes in exam.  - Tylenol PO/rectal PRN  - PRN Dilaudid and oxycodone  - Lidoderm patches  - Holding escitalopram 10 mg daily  - Head CT negative 8/10    Pulmonary care:   # Acute respiratory failure with Hypoxia   # Obstructive Sleep Apnea  - BiPAP 12/6, 60%. VBG 7.47/35/55/25  - Will attempt HFNC for oxygenation, if she fails, may require intubation later tonight  - Discussed intubation with patient and family, they are OK with intubation, but do not want chest compressions  - SpO2 goal >90%  - Xopenex neb Q6H, racepinephrine neb PRN  - PTA Singulair daily     Cardiovascular:    # HTN  # Possible HFrEF  # Coronary artery disease  # A-fib with RVR  - Monitor hemodynamic status.   - Amiodarone gtt  - PTA atorvastatin, hydralazine  - ECHO 8/9 EF 55-60%, normal LVF, R heart could  not be assessed     GI care:   # GERD  - NPO, OK for ice chips  - Bowel regimen: Miralax daily  - Simethicone PRN     Fluids/ Electrolytes/ Nutrition:   - IVF TKO  - TPN at 55 ml/hr    Renal/ Fluid Balance:    # CKD stage III  #Bladder cancer, s/p total cystectomy, pelvic exenteration, ileal conduit and flap   #L Hydronephrosis, s/p left nephrostomy tube  - Lasix 10 mg IV one time now  - Will continue to monitor intake and output     Endocrine:    # DM Type II  # Hypothyroidism  - ssi   - PTA Levothyroxine     ID/ Antibiotics:  # Aspiration pneumonia  - meropenem (8/22- ) for aspiration pneumonia  - vancomycin, 1 time dose (8/21)     Heme:     - Hemoglobin stable     MSK:  - PT/OT     Prophylaxis:    - Mechanical and subcutaneous heparin prophylaxis for DVT.  - Protonix     Lines/ tubes/ drains:   - PIV, RIVKA x2, RIJ central line     Disposition:  - Surgical ICU    Patient seen, findings and plan discussed with ICU staff Dr. Phillips.    I, Peter Ponce MD, agree with the above documentation by student doctor Farhat Horton and have made any necessary edits to the note.    Peter Ponce, PGY2  General Surgery  [phone]    - - - - - - - - - - - - - - - - - - - - - - - - - - - - - - - - - - - - - - - - - - - - - - - - - - - - - - - - -     PRIMARY TEAM: Urology  PRIMARY PHYSICIAN: Dr. Sharma    REASON FOR CRITICAL CARE ADMISSION: acute respiratory failure and hemodynamic instability   ADMITTING PHYSICIAN: Dr. Lashay Phillips    HISTORY PRESENTING ILLNESS: Sunitha Jacques is a 78 year old female with a past medical history of DMII, HTN, hypothyroidism, CVA (2017), obesity, CHF, CKD, AFib (on warfarin), and MIKKI who is s/p anterior pelvic exenteration, bilateral lymphandectomy, ileal conduit diversion, left vertical rectus abdominis musculocutaneous flap to pelvis for invasive bladder cancer. She developed altered mental status and hypoxia the night of 8/9 and was transferred to the SICU 8/10 for  stabilization. After return of baseline mental status and stabilization she was transferred to the floor 8/12. During the same hospital admission, on 8/21 she experienced acute hypoxic respiratory failure due to a presumed aspiration event requiring BiPAP with escalating FiO2 needs. The following day on 8/22 she converted to afib with RVR and unstable pressures. She was unable to be converted back to sinus rhythm despite IV metoprolol x3 and was placed on amiodarone gtt. She was transferred to the SICU for respiratory support and hemodynamic stabilization.     The family came with her advance directive that states she would like to receive life prolonging measures including intubation. However, she does wish to be DNR and not receive chest compressions.    REVIEW OF SYSTEMS: As noted above.     PAST MEDICAL HISTORY:   Past Medical History:   Diagnosis Date     Atrial fibrillation with rapid ventricular response (H) 2/11/2017     CAD (coronary artery disease)      Cerebrovascular accident (H) 3/21/2017     CHF (congestive heart failure) (H) 3/21/2017     CRD (chronic renal disease), stage III     Due to bladder cancer     Essential hypertension 4/9/2017     GERD (gastroesophageal reflux disease)      History of MRSA infection 2017    treated april 2017     Hypertension      Hypothyroidism, unspecified type 12/14/2017     Long term current use of anticoagulant 2/12/2017     Morbid obesity (H) 4/13/2017     MIKKI (obstructive sleep apnea)      T2DM (type 2 diabetes mellitus) (H)        SURGICAL HISTORY:   Past Surgical History:   Procedure Laterality Date     APPENDECTOMY  1975     AS TOTAL KNEE ARTHROPLASTY Bilateral 2015     CHOLECYSTECTOMY  1975     COLONOSCOPY       EXENTERATION ANTERIOR PELVIC N/A 8/8/2018    Procedure: EXENTERATION ANTERIOR PELVIC;  Anterior Pelvic Exenteration With Bilateral Lymphandectomy, Ileal Conduit Diversion, Left Vertical Rectus Abdominis Musculocutaneous Flap To Pelvis;  Surgeon: Edith  Girma Stoddard MD;  Location: [facility] OR     GRAFT FLAP PEDICLE TRANSVERSE RECTUS ABDOMINIS MYOCUTANEOUS N/A 8/8/2018    Procedure: GRAFT FLAP PEDICLE TRANSVERSE RECTUS ABDOMINIS MYOCUTANEOUS;;  Surgeon: RAPHAEL Calderón MD;  Location: [facility] OR     LYMPHADENECTOMY ABDOMINAL Bilateral 8/8/2018    Procedure: LYMPHADENECTOMY ABDOMINAL;;  Surgeon: Girma Sharma MD;  Location: [facility] OR     SHOULDER SURGERY  2003     TONSILLECTOMY      as a child       SOCIAL HISTORY: Tobacco - 25 pack year smoking history. Etoh - none.     FAMILY HISTORY: No reported bleeding/clotting disorders nor problems with anesthesia.     ALLERGIES:      Allergies   Allergen Reactions     Keflex [Cephalexin] Other (See Comments)     Tolerated therapy on 7/30/18     Metaproterenol      Penicillins      Prednisone      Sulfa Drugs        MEDICATIONS:    No current facility-administered medications on file prior to encounter.   Current Outpatient Prescriptions on File Prior to Encounter:  Acetaminophen (TYLENOL PO) Take 1,000 mg by mouth daily Reported on 4/26/2017   amLODIPine (NORVASC) 10 MG tablet Take 1 tablet (10 mg) by mouth daily   cetirizine (ZYRTEC) 10 MG tablet Take 10 mg by mouth every evening    escitalopram (LEXAPRO) 10 MG tablet Take 1 tablet (10 mg) by mouth daily   furosemide (LASIX) 40 MG tablet Take 1 tablet (40 mg) by mouth 2 times daily   hydrALAZINE (APRESOLINE) 10 MG tablet Take 20 mg by mouth 2 times daily    METOPROLOL TARTRATE PO Take 100 mg by mouth 2 times daily    multivitamin, therapeutic with minerals (MULTI-VITAMIN) TABS tablet Take 1 tablet by mouth daily   pregabalin (LYRICA) 50 MG capsule Take 1 capsule (50 mg) by mouth 2 times daily   quinapril (ACCUPRIL) 40 MG Tab Take 1 tablet (40 mg) by mouth daily   VITAMIN D, CHOLECALCIFEROL, PO Take 1,000 Units by mouth daily       PHYSICAL EXAMINATION:  Temp:  [98  F (36.7  C)-103.1  F (39.5  C)] 98  F (36.7  C)  Pulse:  [100] 100  Heart Rate:  []  126  Resp:  [19-30] 20  BP: ()/(45-95) 128/85  FiO2 (%):  [35 %-75 %] 60 %  SpO2:  [89 %-99 %] 94 %    General: Anxious, appears uncomfortable, trying to remove BiPAP  Respiratory: Increased effort of breathing with tachypnea on BiPAP  Cardiovascular: Tachycardic, irregular rhythm on monitor  Gastrointestinal: Abdomen soft, obese, tender to palpation. Midline wound open along inferior aspect, packed with Kerlix. No signs of infection  Extremities: moving all four extremities, no obvious limb deformities  Skin: no rashes or lesions appreciated.    LABS: Reviewed.   Arterial Blood Gases     Recent Labs  Lab 08/21/18  1613   PH 7.38   PCO2 46*   PO2 59*   HCO3 27     Complete Blood Count     Recent Labs  Lab 08/22/18  0526 08/21/18  1711 08/21/18 0455 08/20/18  0449   WBC 14.8* 15.0* 15.9* 16.9*   HGB 8.0* 8.3* 8.7* 8.7*    158 213 203     Basic Metabolic Panel    Recent Labs  Lab 08/22/18  0526 08/21/18  1711 08/21/18  0455 08/20/18  0449    130* 131* 132*   POTASSIUM 4.5 5.2 5.2 4.4   CHLORIDE 100 97 96 97   CO2 27 24 27 28   BUN 67* 66* 63* 57*   CR 1.22* 1.25* 1.17* 1.06*   * 126* 135* 147*     Liver Function Tests    Recent Labs  Lab 08/22/18  0526 08/20/18  0449 08/17/18  0608   AST 52* 59* 21   ALT 87* 79* 37   ALKPHOS 81 70 70   BILITOTAL 0.5 0.3 0.4   ALBUMIN 2.6* 1.6* 1.8*   INR  --  1.09  --      Pancreatic Enzymes  No lab results found in last 7 days.  Coagulation Profile    Recent Labs  Lab 08/20/18  0449   INR 1.09     Lactate  Invalid input(s): LACTATE    IMAGING:  Results for orders placed or performed during the hospital encounter of 08/08/18   XR Abdomen Port 1 View    Narrative    Abdominal radiograph one view  8/8/2018 7:06 PM      HISTORY: Status post cystectomy with ileal conduit, please evaluate  stent position    COMPARISON: PET scan 6/27/2018    FINDINGS: Postsurgical changes of cystectomy with ileal conduit.  Pelvic drains in place. Bilateral ureteral stents are in  place.  Possible kinking of the superior aspect of the stents, though likely  projectional. Nonobstructive bowel gas pattern.      Impression    IMPRESSION: Bilateral ureteral stents are in good positioning.  Possible kinking of the superior aspect of the stents, though likely  projectional.     I have personally reviewed the examination and initial interpretation  and I agree with the findings.    CHAR CARL MD   XR Chest Port 1 View    Narrative    Chest radiograph one view  8/8/2018 7:12 PM      HISTORY: Status post central line placement    COMPARISON: PET 6/27/2018    FINDINGS: Right-sided Port-A-Cath tip projects over the atriocaval  junction. Right IJ central venous line tip projects over the upper  SVC. Left basilar opacity and patchy perihilar opacities. Trace left  pleural effusion. No pneumothorax.      Impression    IMPRESSION:   1. Right IJ central venous line tip projects over the upper SVC.   2. Pulmonary vascular congestion with left basilar atelectasis or  infection.    I have personally reviewed the examination and initial interpretation  and I agree with the findings.    CHAR CARL MD   CT Head w/o Contrast    Narrative    CT HEAD W/O CONTRAST 8/10/2018 3:10 AM    Provided History: AMS, hx CVA     Comparison: Outside head CT 5/7/2018.    Technique: Using multidetector thin collimation helical acquisition  technique, axial, coronal and sagittal CT images from the skull base  to the vertex were obtained without intravenous contrast.     Findings:    No intracranial hemorrhage, mass effect, midline shift, or abnormal  extra-axial fluid collection. Ventricles are proportionate to the  cerebral sulci. Basal cisterns are patent. Gray-white matter  differentiation of the cerebral hemispheres is preserved.    Calvarium and visualized facial bones are intact. Minimal paranasal  sinus mucosal thickening.      Impression    Impression:   No acute intracranial pathology.     I have  personally reviewed the examination and initial interpretation  and I agree with the findings.    VIRIDIANA GREENBERG MD   XR Chest Port 1 View    Narrative    Exam: XR CHEST PORT 1 VW, 8/10/2018 8:35 AM    Indication: Rule out pulmonary edema;     Comparison: 8/20/2018    Findings:   Single AP view the chest. Right IJ CVC with tip at the high SVC and  right chest wall Port-A-Cath with tip at the superior cavoatrial  junction. Stable borderline low lung volumes. Improving mixed  perihilar opacities. Small left-sided pleural effusion is stable. Left  basilar opacity. No pneumothorax. Calcifications of the aortic arch.  Partially visualized ureteral stent in the right upper quadrant.      Impression    Impression:   1. Borderline low lung volumes with improving pulmonary vascular  congestion.  2. Stable small left-sided pleural effusion with overlying  atelectasis/infection.  3. Support devices are stable.    I have personally reviewed the examination and initial interpretation  and I agree with the findings.    PATY SAM MD   US Lower Extremity Venous Duplex Bilateral    Narrative    EXAMINATION: DOPPLER VENOUS ULTRASOUND OF BILATERAL LOWER EXTREMITIES,  8/10/2018 11:16 AM     COMPARISON: 7/5/2018.    HISTORY: Evaluate for DVT.    TECHNIQUE:  Gray-scale evaluation with compression, spectral flow and  color Doppler assessment of the deep venous system of both legs from  groin to knee, and then at the ankles.    FINDINGS:  In both lower extremities, the common femoral, femoral, popliteal and  posterior tibial veins demonstrate normal compressibility and blood  flow.        Impression    IMPRESSION:  1.  No evidence of deep venous thrombosis in either lower extremity.    RAUDEL BAKER MD   XR Chest Port 1 View    Narrative    Exam: XR CHEST PORT 1 VW, 8/14/2018 10:00 AM    Indication: 6 days post-op, increasing 02 requirements.   Crackles on  left, some on right;     Comparison: Chest x-ray 8/10/2018    Findings:    Portable AP radiograph of the chest at 40 degrees. Right chest wall  Port-A-Cath tip projecting over right atrium. Right IJ central venous  catheter with tip projecting over the high SVC. Stable enlargement of  the cardiac silhouette. The pulmonary vasculature is indistinct. Lung  volumes are low. Small bilateral pleural effusions, slightly  increased. No pneumothorax. Increased perihilar and bibasilar  opacities. The visualized upper abdomen appears unremarkable.      Impression    Impression:   Stable enlargement of the cardiac silhouette with increased perihilar  and bibasilar opacities, which may represent worsening pulmonary edema  and/or atelectasis/consolidation in the setting of increased small  bilateral pleural effusions.    I have personally reviewed the examination and initial interpretation  and I agree with the findings.    TARAN NORIEGA, DO   XR Abdomen Port 1 View    Narrative    XR ABDOMEN PORT 1 VW  8/14/2018 7:14 PM      HISTORY: Nausea and vomiting, POD 6 cystectomy. Evaluate for ileus;     COMPARISON: 8/8/2018 and chest x-ray of the same date    FINDINGS: Supine radiographs of the abdomen were obtained.  Postsurgical changes of cystectomy with ileal conduit again  demonstrated. Bilateral ureteral stents and surgical drains noted.    New dilated small bowel loops are seen in the central abdomen with  scattered gas projecting over the colon. No pneumatosis or portal  venous gas appreciated. Vascular calcifications.    Small bilateral pleural effusions and basilar opacities partially  visualized.      Impression    IMPRESSION:  Postsurgical changes with multiple new mildly dilated loops of small  bowel. Findings may be related to adynamic ileus and/or developing  small bowel obstruction.    KENNETH COX MD   XR Abdomen Port 1 View    Narrative    Exam: XR ABDOMEN PORT 1 VW, 8/14/2018 10:13 PM    Indication: To be performed to confirm NGT placement;     Comparison: X-ray Abdomen  8/14/2018    Findings:     Supine radiograph of the abdomen. Enteric tube with tip and sidehole  projecting over the stomach. Ureteric stents are partially visualized.  Postsurgical changes of abdominal surgery as evidenced by multiple  staples. Central venous catheter tip projecting over the right atrium.  Left abdominal pigtail catheter present, unchanged from previous  study.    Persistent dilated central abdominal bowel loops. No pneumatosis or  portal venous gas.      Impression    Impression:     1. Enteric tube with tip and sidehole projecting over the stomach  body.  2. Postsurgical changes with persistent mildly dilated loops of small  bowel in the central abdomen, findings may represent adynamic ileus  and/or developing small bowel obstruction    I have personally reviewed the examination and initial interpretation  and I agree with the findings.    HARRY HERNANDEZ MD   XR Abdomen Port 1 View    Narrative    Examination:  XR ABDOMEN PORT 1 VW 8/17/2018 11:04 AM     Comparison: 8/14/2018.    History: monitor progress of previously developing SBO;     Findings: Supine view of the abdomen.  Nasogastric tube tip and  sidehole projects over the body of the stomach. Left and right  abdominal pigtail catheter is present, unchanged from prior. Ureteral  stent is partially visualized. There are numerous surgical clips in  the pelvis. Superficial skin staples are also seen along the abdomen.  Air-filled loops of small and large bowel with decreased distention  compared to prior evaluation. Nonspecific bowel gas pattern. Air is  seen in the rectum making obstruction unlikely.       Impression    Impression: Decreased distention of bowel loops with air seen in the  rectum, making obstruction unlikely. May have degree of adynamic  ileus.    I have personally reviewed the examination and initial interpretation  and I agree with the findings.    DA OSPINA MD   XR Chest 2 Views    Narrative    XR CHEST 2 VW   8/18/2018 3:30 PM      HISTORY: evaluate for pulmonary edema and/pr poss consolidation that  may be contributing to elevated WBC;     COMPARISON: 8/14/2018    FINDINGS: AP and lateral views of the chest. There is suboptimal and  limited evaluation of the lateral projection given overlapping  materials external to the patient. Stable right IJ Port-A-Cath tip at  cavoatrial junction. Right IJ central venous catheter tip at mid SVC,  unchanged. Unchanged cardiomegaly. Mild pulmonary vascular  engorgement. No significant change in bibasilar and perihilar  opacities. No substantial pleural effusion. No pneumothorax.      Impression    IMPRESSION:   1. No significant change in bilateral perihilar and bibasilar  opacities, likely pulmonary edema and/or consolidation.  2. Stable lines and tubes as above.    I have personally reviewed the examination and initial interpretation  and I agree with the findings.    MELISSA LOPEZ MD   CT Abdomen Pelvis w Contrast    Narrative    EXAMINATION: CT ABDOMEN PELVIS W CONTRAST, 8/19/2018 7:49 PM    TECHNIQUE:  Helical CT images from the lung bases through the  symphysis pubis were obtained with IV contrast. Contrast dose: 135cc  of Isovue 370    COMPARISON: PET CT 6/27/2018, CT abdomen pelvis 5/23/2018    HISTORY: Please evaluat for intrabdominal process, POD #11 cystectomy  with rising leukocytosis;     FINDINGS:    Abdomen and pelvis: Postoperative changes of cystectomy with ileal  conduit. Bilateral nephroureteral stents with the pigtails in the  renal collecting systems. Right upper quadrant surgical drain with the  tip in the central pelvis, without a surrounding fluid collection. In  the left hemipelvis there is a fluid collection with multiple foci of  air measuring up to 4.7 x 2.4 x 4.0 cm (series 3 image 322).    There is a linear tract of attenuation extending from the distal  sigmoid colon to the area of the ileal conduit where there is a small  amount of fluid (series 3  image 308) but without peripheral rim  enhancement of the fluid. There is a midline ventral hernia associated  with the patient's abdominal incision which contains loops of small  bowel and mesenteric fat, but without evidence of obstruction.The  large and small bowel are normal caliber.      The liver, spleen, pancreas, and left adrenal gland are normal. There  is a 1.0 x 0.9 cm right adrenal nodule (series 3 image 82). Left-sided  hydronephrosis. Unchanged right simple renal cysts, with additional  bilateral smaller renal cortical lesions which are too small to  definitively characterize.    No enlarged inguinal, pelvic, or retroperitoneal lymph nodes by CT  short axis size criteria. Trace free fluid. No free air.    Lung bases: Small bilateral pleural effusions with overlying  atelectasis/consolidations.    Bones and soft tissues: Anasarca. Multilevel degenerative changes of  the spine. Small amount of fluid adjacent to the patient's ileal  conduit stoma without significant peripheral enhancement      Impression    IMPRESSION:   1. Postoperative changes of cystectomy with ileal conduit formation  and bilateral nephroureteral stents. Small simple fluid attenuation  collection adjacent to the right lower quadrant ileal conduit stoma.  2. Collection in the left hemipelvis measuring up to 4 cm with  air-fluid, raising concern for developing abscess, though a  well-defined drainable collection is not present.  3. Linear tract from the distal sigmoid colon to just inferior to the  ileal conduit, which appears represent postsurgical changes.  4. New/increased left hydronephrosis, despite an apparently well  positioned nephroureteral stent.  5. Midline periincisional ventral hernia containing loops of small  bowel, but without evidence of obstruction.  6. Small bilateral pleural effusions with overlying  atelectasis/consolidations.  7. Small right adrenal nodule measuring up to 1 cm, which has  attenuation on the prior  CT compatible with a benign adenoma.      I have personally reviewed the examination and initial interpretation  and I agree with the findings.    MELISSA LOPEZ MD   XR Chest Port 1 View    Narrative    EXAMINATION: XR CHEST PORT 1 VW, 8/21/2018 5:23 PM    COMPARISON: 8/18/2018    HISTORY: Acute hypoxic respiratory failure, flash pulmonary edema vs  evolving PNA;     FINDINGS: Right IJ Port-A-Cath tip in the low SVC. Cardiac silhouette  is enlarged and unchanged. Increased hazy basilar predominant  opacities increased perihilar opacities. Indistinct pulmonary  vasculature.      Impression    IMPRESSION:   1. Worsening perihilar and basilar opacities with probable small  pleural effusions. Appearance favors pulmonary edema although  infection could also be considered.    MELISSA LOPEZ MD   CT Chest Pulmonary Embolism w Contrast    Narrative    PRELIM:  1. Right internal jugular approach chest wall Port-A-Cath and a second  intravenous catheter. Suspect nonocclusive thrombus in the right  innominate vein around the catheters. Recommend duplex for  confirmation.  2. No pulmonary embolism. Chronic dilated main pulmonary artery,  suggestive of pulmonary hypertension.  3. Increased bilateral small pleural effusions, right more than left.  New consolidative opacities in the bilateral upper and lower lobes,  right more than left. Differential infection and/or aspiration.     CT Abdomen Pelvis w Contrast    Narrative    PRELIM:  Status post cystectomy for bladder cancer:  1. Right lower quadrant ileal conduit with unchanged small amount of  abdominal wall fluid adjacent to the stoma.  2. Bilateral ureteral stents in place. Near resolved left  hydronephrosis.  3. Suspect  three developing abscess in the pelvis.  4. Ascending med midline periincisional ventral hernia containing  loops of small and large bowel, without evidence of obstruction.  5. Increased bilateral pleural effusions and  overlying  atelectasis/consolidation, varix as on the CT chest

## 2018-08-22 NOTE — PLAN OF CARE
Problem: Patient Care Overview  Goal: Plan of Care/Patient Progress Review  Outcome: No Change  Neuro: A&Ox4.   Cardiac: SR. VSS.   Respiratory: Sating 98% on 60% FiO2 via BiPap.  GI/: Adequate urine output. BM X0  Diet/appetite: NPO.  Activity: Up with lift - turn q2.  Pain: At acceptable level on current regimen.   Skin: See documentation  LDA's: Urostomy - 2 JPs - Triple Lumen IJ c TPN @ 55/hr    Plan: Continue with POC. Notify primary team with changes.

## 2018-08-22 NOTE — PROGRESS NOTES
Calorie Counts  Intake recorded for: 8/21  Kcals: 0  Protein: 0g  # Meals Recorded: no meals ordered from kitchen, no intake recorded.   # Supplements Recorded: no intake recorded.

## 2018-08-22 NOTE — PLAN OF CARE
Problem: Patient Care Overview  Goal: Plan of Care/Patient Progress Review    PT 6B: Cancel- Patient not appropriate for PT this afternoon due to worsening respiratory status, now on BIPAP. Will reschedule.

## 2018-08-22 NOTE — PROGRESS NOTES
Transfer  Transferred to: 4C  Via: bed  Reason for transfer: Pt no longer appropriate for 6B- worsened patient condition  Family: Aware of transfer  Belongings: Packed and sent with pt  Chart: Delivered with pt to next unit  Medications: Meds sent to new unit with pt  Report given to: Admitting RN  Pt status: Full - 95% on 50% FiO2 BiPAP - Amiodarone gtt at 10ml/hr - A-Fib c RVR -  - BP Stable

## 2018-08-22 NOTE — PROGRESS NOTES
[facility], [place]    Internal Medicine Progress Note - Gold Service      Assessment & Plan   Sunitha Jacques is a 78 year old female with history of DM, HTN, hypothyroidism, CVA (2017), obesity, CHF, CKD, Afib (on coumadin) and MIKKI admitted to urology service 8/8/18 after total cystectomy, pelvic exenteration (removal of uterus, vaginal cuff, part of vagina), ileal conduit and flap for invasive bladder cancer. Developed AMS and hypoxia and transferred to SICU 8/10. Stable and transferred back to medical floor 8/12.         Unstable a fib with RVR, trop elevation: Chronic a fib with new RVR this admission, previously required dilt gtt but stopped 8/17. Flipped back into RVR 8/22 with HR 130s-140s despite IV metoprolol x3. BP unstable on esmolol gtt. Transitioned to amiodarone gtt. Has been off anticoagulation due to recent surgery. [id number], making risk of CVA 12.2% and risk of CVA/TIA/embolism 17.4%. Trop 0.108, no chest pain. Likely right heart strain in the setting of critical illness  - Transfer to ICU for ongoing care. Please re-consult medicine upon transfer back to the floor for ongoing care  - Given high [id number], would resume anticoagulation when able, will defer to primary  - Repeat trop 1600 and 2200    Acute hypoxic respiratory failure: Hypoxic earlier in admission which resolved with aggressive diuresis (see note 8/21 for details). Acute decompensation following aspiration event 8/21 requiring BiPap. TTE 8/9 normal EF. CXR 8/21 as below. BNP 3064 (21K 8/14). VBG 7.47/35/55/25. FiO2 needs on BiPap escalating. Etiology likely due to aspiration (chemical vs infectious) as well as ongoing hypervolemia  - Transfer to ICU as above  - CT angio to rule out PE  - Given soft pressures, need to hold afternoon lasix  - Continue BiPap  - Ok to resume lipids tonight   - Strict I&Os, daily weights     Sepsis 2/2 intraabdominal infection vs aspiration PNA: CT AP 8/19 with  developing fluid collection, no OR plans per primary and GS. Acute respiratory decompensation with possible aspiration event 8/21 failure as above. Started on Vanc/Cefepime 8/21. WBC 14.8 (15), procal 0.86 (1.45 8/10). LA normal 8/21. CXR worsening perihilar and basilar opacities and small effusions. UA difficult to interpret given surgery. BCx NGTD. Febrile to 103.1 with unstable pressure and HR   - Continue Vanc, stop Cefepime and start Meropenem   - Lactic acid  - CT CAP  - Follow cultures   - Get blood culture from PICC    S/p anterior pelvic exenteration, bilateral lymphadenectomy, ileal conduit diversion, left vertical rectus abdominis musculocutaneous flap to pelvis 8/8: Invasive bladder cancer. Urology primary, management per their team     CKD III: Recent BL Cr 1.6-1.7, increased form 0.7-0.9 in 2017. Known atrophic R kidney and h/o severe hydronephrosis of L kidney in the setting of of invasive bladder cancer. Cr slightly increasing, however, better than recent BL  - Closely monitor after IV dye load  - Holding PM lasix given hypotension     Depression, flat affect: PTA on Lexapro. Worsening affect 8/20 in the setting of acute on chronic illness. Discussed increasing Lexapro/making antidepressant changes, no decision made at the time    DM: Diet controlled PTA. A1C 6.2 8/8/18. Uncontrolled BG due TPN. Glucose now stable  - Endocrine following to assist with management    Severe protein-calorie malnutrition: In the setting of extensive surgery, decompensation requiring ICU stay, ileus, and chronic illness. Currently on TPN   - Nutrition following  - NPO, TPN    HTN: PTA on metoprolol, Norvasc, hydralazine. BP unstable as above  - Holding BP meds          Resolved Hospital Problems and Stable Conditions:  Acute toxic/metabolic encephalopathy: Marked AMS 8/10 with negative HCT. Neuro consulted. Transferred to SICU given concern for sepsis. BCx NGTD. Broadened Cipro to Ceftriaxone. Transferred back to medical  floor 8/13. AMS initially likely multifactorial 2/2 pain, urosepsis, and delirium. Alert and disoriented x3 8/21, 2/2 opiates. Resolved by afternoon on 8/21  Adynamic Ileus: Developed post-op, KUB mildly dilated loops of bowel concerning for developing ileus. NG placed to intermittent suction with 1L bilious output 8/15. Clamping trial 8/16 with increased abdominal distention. LIS overnight 8/16 with >1L output. NG removed 8/17. Ongoing abdominal pain, N/V resolving. Having regular BMs. Suggest consistent bowel regiment   Possible HFrEF: EF 46% on MPI 7/6/18. TTE 8/9 normal EF, although limited study d/t technical difficulty. CXR without significant congestion. Strict I&Os. Continue metoprolol. Lasix as above  Hypothyroidism: TSH, T4 1.08 8/13. Continue PTA levothyroxine  H/o CVA (2017): No focal deficits. Continue statin  PAD: Elevated velocities in external iliac arteries bilaterally and R common femoral artery suggesting stenosis seen on pelvic arterial and venous duplex on 8/6, done in preparation for flap placement 8/8. Continue statin  Ecoli UTI:  UCx 8/9 growing >100,000 colonies/mL E coli. Completed treatment with Ceftriaxone 8/15. Repeat UC with candida as above  Hyponatremia: Na 131 8/21 in the setting of hypervolemia, poor oral intake. Slight bump in Cr and BUN with diuresis. Most likely etiology hypervolemia. Normalized 8/22       Pain Assessment:  Current Pain Score 8/22/2018   Patient currently in pain? yes   Pain location Head   Pain descriptors Aching   - Per primary team    Diet: parenteral nutrition - ADULT compounded formula  NPO for Medical/Clinical Reasons Except for: No Exceptions  parenteral nutrition - ADULT compounded formula  Fluids: None  DVT Prophylaxis: Defer to primary service  Code Status: Full Code    Disposition Plan   Expected discharge: per primary team     Entered: Tori Louise 08/22/2018, 12:25 PM   Information in the above section will display in the discharge planner  report.      The patient's care was discussed with the patient, nursing, urology, pharmacy, and attending physician, Dr. Maria Alejandra Louise  Internal Medicine Staff Hospitalist Service  [facility]  Pager: [phone]  Please see sticky note for cross cover information    Interval History    Tolerating BiPap. Denies confusion. No chest pain, dyspnea, palpitations, fluttering. No fever or chills. Denies abdominal pain. Intermittently passing gas, no recent BM. No cough.      Data reviewed today: I reviewed all medications, new labs and imaging results over the last 24 hours.    Physical Exam   Vital Signs: Temp: 98  F (36.7  C) Temp src: Axillary BP: 113/62 Pulse: 100 Heart Rate: 119 Resp: 20 SpO2: 94 % O2 Device: BiPAP/CPAP Oxygen Delivery: 15 LPM  Weight: 253 lbs 8.46 oz  General Appearance: Alert and oriented x3. BiPap on   Respiratory: Right sided crackles. On BiPap   Cardiovascular: Rapid, irregular   GI: Abdomen soft, tender with light palpation. Large midline incision with weeping. Drain in lower abdomen with serous anginous drainage. RLQ urostomy   Skin: No rash or jaundice   Other: No peripheral edema, distal pulses palpable         Data   Data     Recent Labs  Lab 08/22/18  0950 08/22/18  0526 08/21/18  1711 08/21/18  0455 08/20/18  0449   WBC  --  14.8* 15.0* 15.9* 16.9*   HGB  --  8.0* 8.3* 8.7* 8.7*   MCV  --  88 86 86 86   PLT  --  161 158 213 203   INR  --   --   --   --  1.09   NA  --  133 130* 131* 132*   POTASSIUM  --  4.5 5.2 5.2 4.4   CHLORIDE  --  100 97 96 97   CO2  --  27 24 27 28   BUN  --  67* 66* 63* 57*   CR  --  1.22* 1.25* 1.17* 1.06*   ANIONGAP  --  6 10 9 8   ANNE  --  8.2* 8.3* 8.4* 8.4*   GLC  --  144* 126* 135* 147*   ALBUMIN  --  2.6*  --   --  1.6*   PROTTOTAL  --  6.1*  --   --  5.5*   BILITOTAL  --  0.5  --   --  0.3   ALKPHOS  --  81  --   --  70   ALT  --  87*  --   --  79*   AST  --  52*  --   --  59*   TROPI 0.108*  --   --   --   --      Recent  Results (from the past 24 hour(s))   XR Chest Port 1 View    Narrative    EXAMINATION: XR CHEST PORT 1 VW, 8/21/2018 5:23 PM    COMPARISON: 8/18/2018    HISTORY: Acute hypoxic respiratory failure, flash pulmonary edema vs  evolving PNA;     FINDINGS: Right IJ Port-A-Cath tip in the low SVC. Cardiac silhouette  is enlarged and unchanged. Increased hazy basilar predominant  opacities increased perihilar opacities. Indistinct pulmonary  vasculature.      Impression    IMPRESSION:   1. Worsening perihilar and basilar opacities with probable small  pleural effusions. Appearance favors pulmonary edema although  infection could also be considered.    MELISSA LOPEZ MD

## 2018-08-22 NOTE — PHARMACY-VANCOMYCIN DOSING SERVICE
Pharmacy Vancomycin Initial Note  Date of Service 2018  Patient's  1940  78 year old, female    Indication: Aspiration Pneumonia    Current estimated CrCl = Estimated Creatinine Clearance: 45.5 mL/min (based on Cr of 1.25).    Creatinine for last 3 days  2018:  5:47 AM Creatinine 1.06 mg/dL  2018:  4:49 AM Creatinine 1.06 mg/dL  2018:  4:55 AM Creatinine 1.17 mg/dL;  5:11 PM Creatinine 1.25 mg/dL    Recent Vancomycin Level(s) for last 3 days  No results found for requested labs within last 72 hours.      Vancomycin IV Administrations (past 72 hours)                   vancomycin (VANCOCIN) 2,000 mg in sodium chloride 0.9 % 500 mL intermittent infusion (mg) 2,000 mg New Bag 18 1754                Nephrotoxins and other renal medications (Future)    Start     Dose/Rate Route Frequency Ordered Stop    18 2100  vancomycin place can - receiving intermittent dosing      1 each Does not apply SEE ADMIN INSTRUCTIONS 18 2101      18 1400  furosemide (LASIX) injection 40 mg      40 mg  over 1-2 Minutes Intravenous 2 TIMES DAILY 18 1204            Contrast Orders - past 72 hours (72h ago through future)    Start     Dose/Rate Route Frequency Ordered Stop    18 1845  iopamidol (ISOVUE-370) solution 135 mL      135 mL Intravenous ONCE 18 1831 18 1936    18 1630  iohexol (OMNIPAQUE) solution 50 mL      50 mL Oral ONCE 18 1601 18 1626                Plan:  1.  Give vancomycin  2000 mg IV x1 (17.4mg/kg using ABW = 115kg).  Pharmacy will reassess renal function in the AM to determine further dosing.   2.  Goal Trough Level: 15-20 mg/L   3.  Pharmacy will check trough levels as appropriate in 1-3 Days.    4. Serum creatinine levels will be ordered daily for the first week of therapy and at least twice weekly for subsequent weeks.    5. [place] method utilized to dose vancomycin therapy: Method 2    Talisha Dougherty, PharmD

## 2018-08-22 NOTE — PLAN OF CARE
Problem: Patient Care Overview  Goal: Plan of Care/Patient Progress Review  OT/6B:  Cancel - Pt not appropriate for therapy, transferring to ICU due to worsening respiratory status. Will reschedule for 8/23 and reassess appropriateness at that time.

## 2018-08-22 NOTE — PLAN OF CARE
Problem: Patient Care Overview  Goal: Plan of Care/Patient Progress Review  Outcome: Declining  Neuro: Disoriented to time. Withdrawn and flat. Have to prod patient to answer questions and participate in cares.   Cardiac: SR with frequent PVC/PACs. Runs of 4-10 beats SVT per telemetry monitor tech. HR between 90s-110s. BPs between  140-160s/60-70s. T-max 103.1 axillary. Improved with PRN tylenol suppositories however back to 102 axillary this AM.   Respiratory: Started shift on BiPAP 60%, weaned off back to NC 5L. Gave pt a few ice chips while on NC which resulted in coughing and ultimately increased O2 requirements. Placed pt back on BiPAP at 0200 and has remained on since. Sating 93-95% on 40% FiO2. Endorses some SOB.   GI/: Urostomy with adequate output. No BM this shift. Hypoactive BS but passing gas.   Diet/Appetite: NPO while on BiPAP. Repeated requests for water. BS q4hour.   Activity: AST of 2 for q2hour turns.   Pain: Denies but is visibly uncomfortable (moaning and grimacing). PRN dilaudid 0.2mg x1 and PRN tylenol 650mg suppository x2.   Skin: Abdominal incision dehiscence dressing changed x2. Large amounts of serous drainage. Coccyx wound with mepilex in place.   LDAs: L RIVKA x 2. R triple lumen I.J. Saline locked and TPN at 55cc/hour. Urostomy to napier bag.     Continue with POC. Notify primary team with changes.   Anna Jo RN            Problem: Diabetes Comorbidity  Goal: Diabetes  Patient comorbidity will be monitored for signs and symptoms of hyperglycemia or hypoglycemia. Problems will be absent, minimized or managed by discharge/transition of care.   Outcome: No Change  BS q4hour with sliding scale.     Problem: Renal Insufficiency Comorbidity  Goal: Renal Insufficiency  Patient comorbidity will be monitored for signs and symptoms of Renal Insufficiency (Chronic) condition.  Problems will be absent, minimized or managed by discharge/transition of care.   Outcome: No Change  Urostomy with  good amounts of output.     Problem: Cardiac Disease Comorbidity  Goal: Cardiac Disease  Patient comorbidity will be monitored for signs and symptoms of Cardiac Disease.  Problems will be absent, minimized or managed by discharge/transition of care.   Outcome: No Change  SR with HR in 90-100s with runs of SVT. BPs 130s-160s/60-70s. No PRNs given per Gold cross cover recommendation.

## 2018-08-22 NOTE — PROVIDER NOTIFICATION
MD Plaza and MD Bess notified of increased SOB, increased O2 needs to 5L NC, temp 100.3, RR 30, Hypertension to 180s SBP, labored breathing. RT called to evaluate pt.

## 2018-08-22 NOTE — PROGRESS NOTES
Spoke with patient regarding her respiratory status (RR of ~24-26 with TV of 350-400 on BiPAP) and possible need for intubation.  She states she would not want a breathing tube even as a life saving measure.  She is listed as a full code in our computer.  I spoke with her son with her permission- he states he has a copy of her health care directive and will be bringing it to the hospital in the next ~1 hour.  He would like us to try and not intubate her in the meantime until he or his wife arrives.      Plan for now is to support with BiPAP, not change code status and re-discuss with family and patient when they arrive.

## 2018-08-23 NOTE — PROGRESS NOTES
SURGICAL ICU PROGRESS NOTE  08/23/2018      CO-MORBIDITIES:   Change in mole  (primary encounter diagnosis)  Malignant neoplasm of overlapping sites of bladder (H)    ASSESSMENT: Sunitha Jacques is a 78 year old female with a history of DMII, HTN, hypothyroidism, CVA (2017), obesity, CHF, CKD, AFib (on warfarin), and MIKKI who underwent total cystectomy, pelvic exenteration (removal of uterus, vaginal cuff, part of vagina), ileal conduit and flap for invasive bladder cancer on 8/8/18. She developed altered mental status and hypoxia the night of 8/9 and was transferred to the SICU 8/10 for stabilization. After return of baseline mental status and stabilization she was transferred to the floor 8/12. During the same hospital admission, on 8/21 she experienced acute hypoxic respiratory failure due to a presumed aspiration event requiring BiPAP with escalating FiO2 needs. The following day on 8/22 she converted to afib with RVR and unstable pressures. She was transferred to the SICU for respiratory support and hemodynamic stabilization. She was intubated the evening of 8/22 for worsening respiratory distress.    TODAY'S PROGRESS/PLANS:   - PST today  - wean FiO2 as tolerated   - PEEP to 7  - Switch antibiotics to ceftriaxone and flagyl   - Initiate micafungin for fungemia  - Switch TPN to PPN while increasing TFs  - Remove CVC given likely candidemia  - WOC following for abdominal wound  - appreciate  Plastics recommendations   - appreciate Urology recommendations   - Initiate feeds through OG   - PTA meds via OG  - discontinue amiodarone gtt, start metoprolol    PLAN:  Neuro/ pain/ sedation:  # H/o CVA  - Monitor neurological status. Notify the MD for any acute changes in exam.  - Propofol gtt for sedation  - Fentanyl gtt for pain  - Lidoderm patches   - Holding escitalopram 10 mg daily     Pulmonary care:   # H/o MIKKI   # Acute respiratory failure with hypoxia   - intubated 8/22 for progressive respiratory distress  while on HFNC  - requiring increasing amounts of FiO2 overnight, increase PEEP to 7 today  - Xopenex neb Q6H, racepinephrine neb PRN  - PTA Singulair daily      Cardiovascular:    # H/o HTN  #H/o CAD  # H/o possible HFrEF  # A-fib with RVR  - Monitor hemodynamic status.   - Discontinue amiodarone gtt, start metoprolol 50 mg BID  - Mildly elevated troponin, possible leak from afib with RVR, stable  - PTA atorvastatin, hydralazine  - ECHO 8/9 EF 55-60%, normal LVF, R heart could not be assessed      GI care:   #H/o GERD  - NPO, OK for ice chips  - Initiate TFs this AM through OG  - Bowel regimen: Miralax daily  - Simethicone PRN      Fluids/ Electrolytes/ Nutrition:   - IVF TKO  - TPN at 55 ml/hr  - Initiate TFs this AM through OG    Renal/ Fluid Balance:    # H/o CKD stage III  #H/o bladder cancer, s/p total cystectomy, pelvic exenteration, ileal conduit and flap   #H/o L Hydronephrosis, s/p left nephrostomy tube  - Will continue to monitor intake and output      Endocrine:    # H/o Type II DM  # H/o hypothyroidism  - ssi   - PTA Levothyroxine      ID/ Antibiotics:  # Aspiration pneumonia  - Febrile overnight to 101 F  - Switch to Ceftriaxone and Flagyl for 7 day course  - Initiate micafungin for fungemia      Heme:     - Hemoglobin stable      MSK:  - PT/OT      Prophylaxis:    - Mechanical and subcutaneous heparin prophylaxis for DVT.  - Protonix      Lines/ tubes/ drains:   - PIV, RIVKA x2, RIJ central line, OG      Disposition:  - Surgical ICU  ====================================    SUBJECTIVE:   Transferred to the SICU last night for worsening respiratory distress, intubated shortly after. No additional events overnight. Requiring increasing levels of FiO2 overnight. Febrile to 101 F.    OBJECTIVE:   1. VITAL SIGNS:   Temp:  [98  F (36.7  C)-101  F (38.3  C)] 98.9  F (37.2  C)  Heart Rate:  [] 99  Resp:  [8-28] 18  BP: ()/() 120/97  FiO2 (%):  [50 %-80 %] 80 %  SpO2:  [90 %-100 %] 94  %  Ventilation Mode: CMV/AC  (Continuous Mandatory Ventilation/ Assist Control)  FiO2 (%): 80 %  Rate Set (breaths/minute): 18 breaths/min  Tidal Volume Set (mL): 350 mL  PEEP (cm H2O): 5 cmH2O  Oxygen Concentration (%): 70 %  Resp: 18    2. INTAKE/ OUTPUT:   I/O last 3 completed shifts:  In: 2873.52 [I.V.:987.94]  Out: 2000 [Urine:1960; Drains:40]    3. PHYSICAL EXAMINATION:   General: Intubated, appears comfortable  Neuro: Intubated and sedated  Resp: Intubated  CV: Irregular on montior, rate controlled  Gastrointestinal: Abdomen soft, obese, tender to palpation. Midline wound open along inferior aspect, packed with Kerlix. No signs of infection  Extremities: moving all four extremities, no obvious limb deformities  Skin: no rashes or lesions appreciated.    4. INVESTIGATIONS:   Arterial Blood Gases     Recent Labs  Lab 08/21/18  1613   PH 7.38   PCO2 46*   PO2 59*   HCO3 27     Complete Blood Count     Recent Labs  Lab 08/23/18  0445 08/22/18  1450 08/22/18  0526 08/21/18  1711   WBC 12.9* 12.6* 14.8* 15.0*   HGB 7.7* 8.0* 8.0* 8.3*    157 161 158     Basic Metabolic Panel    Recent Labs  Lab 08/23/18  0445 08/22/18  1450 08/22/18  0526 08/21/18  1711    133 133 130*   POTASSIUM 4.4 4.5 4.5 5.2   CHLORIDE 101 100 100 97   CO2 26 23 27 24   BUN 76* 69* 67* 66*   CR 1.42* 1.26* 1.22* 1.25*   * 228* 144* 126*     Liver Function Tests    Recent Labs  Lab 08/22/18  0526 08/20/18  0449 08/17/18  0608   AST 52* 59* 21   ALT 87* 79* 37   ALKPHOS 81 70 70   BILITOTAL 0.5 0.3 0.4   ALBUMIN 2.6* 1.6* 1.8*   INR  --  1.09  --      Pancreatic Enzymes  No lab results found in last 7 days.  Coagulation Profile    Recent Labs  Lab 08/20/18  0449   INR 1.09     Lactate  Invalid input(s): LACTATE    5. RADIOLOGY:   Recent Results (from the past 24 hour(s))   CT Abdomen Pelvis w Contrast    Narrative    EXAMINATION: CT ABDOMEN PELVIS W CONTRAST, 8/22/2018 11:52 AM    TECHNIQUE:  Helical CT images from the lung  bases through the  symphysis pubis were obtained  with contrast.    CONTRAST DOSE: 135 cc of Omnipaque 370    COMPARISON: CT  8/19/2018, PET 6/27/2018    HISTORY: 1) Acute respiratory failure - rule out PE , 2) sepsis - rule  out abdominal abscess, obstruction, other abd pathology;     FINDINGS:    Chest:   Partially seen bilateral small to moderate pleural effusions with  overlying atelectasis, right more than left. No superimposed  consolidative opacities. These findings are better characterized on  the CT chest.    Abdomen and pelvis:    Left lower quadrant pelvic drain. A second subcutaneous drain goes up  to the upper mid abdomen.    Postoperative changes of cystectomy with right lower quadrant conduit.  Bilateral ureteral stents. Unchanged bilateral renal cysts and  hypodensities. Interval resolution of the left hydronephrosis. Slight  increased size of the 4.2 x 2.9 cm fluid collection in the abdominal  wall adjacent to the ileal conduit series 5 image 270), compared to 4  x 2.6 cm on the CT 8/19/2018.    Continued loculated 3.5 x 3.5 cm right anterior hemipelvic collection  (series 5 image 308). Slightly decreased air within the 3.6 x 2.8 cm  left hemipelvic collection (series 5 image 285). Other scattered fluid  in the pelvis with some loculated components. No thick rims of  enhancement to clearly indicate abscess. Decrease punctate foci of  intraperitoneal air in the pelvis. Continued and not substantially  changed soft tissue stranding in the pelvis.    Again seen midline wound dehiscence with large rectal diastases and  anterior eventration containing loops of colon and small bowel,  without evidence of bowel obstruction. Node clear communicating  fistula. No well-defined abscess. Air and packing material is seen in  the skin incision. Mild prominent fluid-filled loops of small bowel,  likely reactive ileus. Small amount of fluid in the hernial sac.    Unremarkable liver and gallbladder. The spleen is  within normal  limits. Unchanged right adrenal nodule.     No portal venous gas or free air. No abdominopelvic lymphadenopathy.    Atherosclerotic calcifications of the coronary aorta and the major  arteries.    Bones and soft tissues:  No suspicious osseous findings. Mild multilevel degenerative changes  of thoracolumbar spine.      Impression    IMPRESSION:   Status post cystectomy for bladder cancer:  1. Right lower quadrant ileal conduit with unchanged small amount of  abdominal wall fluid adjacent to the stoma.  2. Bilateral ureteral stents in place. Near resolved left  hydronephrosis.  3. Multiple loculated fluid collections in the pelvis. Overall, sizes  the collections is similar. The scattered areas of air have decreased.  These may be postoperative although developing abscesses are also  possible.  4. Again seen midline incision dehiscence with packing material and  air in the region but without fluid containing abscess identified.  5. Large anterior abdominal wall eventration containing loops of small  and large bowel, without evidence of obstruction or fistulization.  Dilated loops of bowel within the abdomen are likely reactive ileus.  6. Increased bilateral pleural effusions and overlying  atelectasis/consolidation, better charecterized on the CT chest    I have personally reviewed the examination and initial interpretation  and I agree with the findings.    MELISSA LOPEZ MD   CT Chest Pulmonary Embolism w Contrast    Narrative    EXAMINATION: CT CHEST PULMONARY EMBOLISM W CONTRAST, 8/22/2018 12:03  PM     COMPARISON: PET 6/27/2018, radiograph 8/21/2018, 8/17/2018    HISTORY: Acute respiratory failure    TECHNIQUE: Volumetric helical acquisition of CT images of the chest  from the lung apices to the kidneys were acquired after the  administration of 135 mL of Isovue-370 IV contrast. Three-dimensional  (3D) post-processed angiographic images were reconstructed, archived  to PACS and used in  interpretation of this study.    Contrast: iopamidol (ISOVUE-370) solution 135 mL    FINDINGS:     Right chest wall Port-A-Cath tip near the superior anterior caval  junction.  Additional right IJ central venous catheter. Flow artifact around the  lines in the right innominate vein (series 9 image 55, series 6 image  42).    There is adequate opacification of the pulmonary vasculature. No  pulmonary embolism, Persistent mild cardiomegaly. Coronary  calcification. Aortic valve calcifications. No signs of right heart  strain. Chronic dilated main pulmonary artery measures up to 3.7 cm.  Scattered calcified plaque of the thoracic aorta. No pericardial  effusion. New  thoracic lymphadenopathy, for example a 2 cm subcarinal  lymph node (series 9 image 131). Is present.    The central tracheobronchial tree is patent. Bilateral small pleural  effusions, right more than left with overlying  atelectasis/consolidation. Adjacent consolidative opacity in the  bilateral upper and lower lobes, right more than left. No  pneumothorax.    Visualized upper abdomen: Partially seen bilateral ureteral stents.  Bones and soft tissues: No suspicious osseous findings.      Impression    IMPRESSION:   1. Right internal jugular approach chest wall Port-A-Cath and a second  intravenous catheter. Possible nonocclusive thrombus in the right  innominate vein around the catheters vs flow artifact. Recommend  duplex for confirmation.  2. No pulmonary embolism. Chronic dilated main pulmonary artery,  suggestive pulmonary hypertension.  3. Increased bilateral small pleural effusions, right more than left.  New consolidative opacities in the bilateral upper and lower lobes,  right more than left. Differential infection or aspiration.  4. Dilated main pulmonary artery possibly pulmonary hypertension.    [Result: Question of right IJ Intravenous thrombus]    Finding was identified on 8/22/2018 12:04 PM.     [facility]  was contacted by Jessica SOLORIO at  8/22/2018 12:37 PM and  verbalized understanding of the urgent finding.     I have personally reviewed the examination and initial interpretation  and I agree with the findings.    PATY SAM MD   XR Chest Port 1 View    Narrative    Exam: XR CHEST PORT 1 VW, 8/22/2018 6:50 PM    Indication: eval ET tube placement;     Comparison: Chest x-ray 8/21/2018    Findings:   Frontal chest x-ray. Endotracheal tube projects 3.2 cm above the  annabella. Cardiac silhouette is enlarged and unchanged. No pneumothorax.  Right IJ catheter and right chest wall ovi catheter are unchanged in  position. Persistent perihilar and basilar opacities with increased  right mid zone opacity. Bilateral pleural effusion.      Impression    Impression:   1. Endotracheal tube projects 3.0 cm above the annabella.  2. Persistent perihilar and basilar opacities, representing pulmonary  edema versus infection.  3. Small bilateral pleural effusions.    I have personally reviewed the examination and initial interpretation  and I agree with the findings.    CHAR CARL MD   XR Abdomen Port 1 View    Narrative    Exam: XR ABDOMEN PORT 1 VW, 8/22/2018 6:59 PM    Indication: OG placement;     Comparison: X-ray 8/7/2018    Findings:   Supine frontal view of the abdomen. Enteric tube projects over the GE  junction and tip projects over the proximal stomach. Postsurgical  changes of pelvic surgery. Pelvic drain projecting over the pelvis.  Presumed ureteric stents. Surgical staples projecting over the central  abdomen. Nonspecific bowel gas pattern with few gaseous distended  bowel loops.       Impression    Impression:   1. Enteric tube sidehole projects over the gastroesophageal junction  and tip projects over the proximal stomach.  2. Postsurgical changes of pelvic surgery.  3. Nonspecific bowel gas pattern with few gas distended bowel loops  projecting over the central abdomen.     I have personally reviewed the examination and initial  interpretation  and I agree with the findings.    CHAR CARL MD       =========================================  Patient seen and discussed with ICU staff, Dr. Phillips    - - - - - - - - - - - - - - - - - -  Peter Ponce, PGY2  General Surgery  [phone]

## 2018-08-23 NOTE — PROGRESS NOTES
"Urology  Progress Note    Transferred to SICU for respiratory decompensation  Intubated yesterday evening    Exam  /69  Pulse 100  Temp (P) 101  F (38.3  C) (Axillary)  Resp 18  Ht 1.56 m (5' 1.42\")  Wt 112.9 kg (248 lb 14.4 oz)  SpO2 95%  BMI 46.39 kg/m2  No acute distress,, sitting up in bed   Mechanical ventilation  Abdomen soft, mildly distended. Inferior aspect of the wound re-packed. Fascia intact. Stoma pink and well perfused, 2 stents in place.   RIVKA x 2 serous  Urostomy bag with clear yellow urine in tubing    Urostomy 1935/350  LUQ RIVKA 25/5  LLQ RIVKA 25/5    Labs  WBC 12.9 912.6)  Hgb 7.7 (8.0)  Crt 1.42 (1.26)    Assessment/Plan  78 year old y/o female POD#16 s/p anterior pelvic exteneration, VRAM flap to pelvis (Plastics), BLPLND, creation of ileal conduit for MIBC. Medicine consulted for assistance managing multiple medical comorbidities. Ongoing issues include mental status and hypercarbia/hypoxia. Transferred to the SICU for worsening respiratory status, which improved enough to allow transfer to step down unit. Medicine co-managing. Again transferred to SICU on POD#15 for respiratory decompensation, currently intubated.    Neuro: Fentanyl and propofol for sedation while intubated  CV:  Afib with RVR. Amiodarone gtt.  Pulm: Mechanical ventilation. Wean trial per SICU.  FEN/GI: NPO. TPN running @ 55cc/hr. Diuresis per medicine team.   Endo: ISS. Continue home synthroid.   :  L PNT removed 8/16. Monitor urine output  Heme: On SQH. Hgb stable. Home warfarin held   ID: Persistent leukocytosis. Afebrile. Meropenem and vancomycin (8/21-) for presumed aspiration pna.   Activity: up ad olya, encourage ambulation   PPx: SCDs. SQH.   Dispo: SICU    Appreciate excellent SICU cares    Seen and examined with the chief resident who will discuss with Dr. Sharma and SICU team.    Jayne Plaza MD  Urology PGY-2         Contacting the Urology Team     Please use the following job codes to reach the " Urology Team. Note that you must use an in house phone and that job codes cannot receive text pages.     On weekdays, dial [phone] (or star-star-star [phone] on the new [employer] telephones) then [phone] to reach the Adult Urology resident or PA on call    On weekdays, dial [phone] (or star-star-star [phone] on the new [employer] telephones) then [phone] to reach the Pediatric Urology resident    On weeknights and weekends, dial [phone] (or star-star-star [phone] on the new [employer] telephones) then [phone] to reach the Urology resident on call (for both Adult and Pediatrics)

## 2018-08-23 NOTE — PROGRESS NOTES
CLINICAL NUTRITION SERVICES - REASSESSMENT NOTE     Nutrition Prescription    RECOMMENDATIONS FOR MDs/PROVIDERS TO ORDER:  None at this time     Malnutrition Status:    Non-severe malnutrition in the context of acute illness     Recommendations already ordered by Registered Dietitian (RD):  PharmD to order PPN regimen (see regimen below)   - PPN, 1920 ml/day with 110 g Dex (374 kcal), 90 g AA (360 kcal) to provide 734 kcal/day (12 kcal/day) with GIR 1.2 mg/kg/min. This will provide 60% minimum energy and 100% minimum protein needs, but will also be starting TF.     Ordered TF via OGT Impact Peptide @ goal 45 ml/hr (1080 ml/day) to provide 1620 kcals (26 kcal/kg/day), 102 g PRO (1.6 g/kg/day), 832 ml free H2O, 69 g Fat (50% from MCTs), 151 g CHO and no Fiber daily. This will provide 100% minimum energy and protein needs.   - Ensure HOB > 30 degrees with gastric feeds   - Initiate @ 15 ml/hr and advance by 10 ml q8hr as tolerated  - Do not advance until lytes (Mg++,K+) WNL and phos>1.9   - 30 ml q4hr fluid flushes for tube patency   - Multivitamin/mineral ordered (15 ml/day via FT)     Future/Additional Recommendations:  - If propofol discontinued, resume 250 ml IV lipids 5x/week (M-F)  - If/when able to tolerate Impact Peptide @ 35 ml/hr, may discontinue PPN  - If renal formula indicated, would recommend Nepro @ goal 35 ml/hr (840 ml/day) to provide 1512 kcals (24 kcal/kg), 68 g PRO (1.1 g/kg), 613 ml free H2O, 135 g CHO and 11 g Fiber daily + 2 pkts PROsource daily. Total provision = 1592 kcal (26 kcal/kg) and 90 g PRO (1.5 g/kg), 109 g CHO, 1210 ml free H2O, 6 g Fiber daily.   - If appropriate for less concentrated/higher volume formula pending renal status, would recommend Peptamen Intense VHP @ 60 ml/hr (1440 ml/day) to provide 1440 kcal/day (23 kcal/kg), 132 g PRO (2.1 g/kg),        EVALUATION OF THE PROGRESS TOWARD GOALS   Diet: NPO (intubated)   Nutrition Support: TPN - 1320 ml w/ 90 g AA, 170 g Dex + 250 ml  20% IV lipids 5x/wk (Mon-Fri).  Intake: Prior to intubation, pt was on a Regular diet eating 100-500 kcal/day per calorie counts. Has been receiving TPN without documented interruptions, though lipids have been held since 8/21 d/t starting propofol. Overall, has met >75% minimum energy and 100% minimum protein needs over the past week via TPN.      NEW FINDINGS   GI/Nutrition: Removal of CVC d/t likely candidemia, thus discontinue TPN and switch to PPN today. Also plan to start TF today via OGT, with eventual plan to transition off PN pending tolerance to enteral nutrition.     *Updated estimated protein needs to be 93 - 124 g PRO/day (1.5 - 2 g PRO/kg) given obesity guidelines in critically ill patients.     MALNUTRITION  % Intake: Decreased intake does not meet criteria   % Weight Loss: None noted -- Remains > admission wt.   Subcutaneous Fat Loss: None observed  Muscle Loss: Temporal: and Facial & jaw region: Mild, though difficult to assess with body habitus and edema   Fluid Accumulation/Edema: Mild anasarca  Malnutrition Diagnosis: Non-severe malnutrition in the context of acute illness     Previous Goals   Total avg nutritional intake to meet a minimum of 20 kcal/kg and 1.2 g PRO/kg daily (per dosing wt 62 kg).  Evaluation: Met protein goal, did not meet energy goal consistently d/t holding lipids with propofol infusions     Previous Nutrition Diagnosis  Inadequate protein-energy intake related to ileus and poor PO intakes as evidenced by CL/NPO x 7 days, poor calorie counts and now need for TPN.  Evaluation: No change, updated     CURRENT NUTRITION DIAGNOSIS  Predicted inadequate nutrient intake (kcal/protien) related to NPO status 2/2 intubation, now dependent on PPN and TF to meet entire nutrition needs with potential for interruption or intolerance pending medical course.       INTERVENTIONS  Implementation  Collaboration with other providers - Nutrition plan of care, switch from TPN to PPN, TF via  OGT  Enteral Nutrition - Initiate  Parenteral Nutrition/IV Fluids - Modify composition    Goals  Total avg nutritional intake to meet a minimum of 20 kcal/kg and 1.5 g PRO/kg daily (per dosing wt 62 kg).    Monitoring/Evaluation  Progress toward goals will be monitored and evaluated per protocol.    Keren Hung RD, LD  Pager: [phone]

## 2018-08-23 NOTE — PHARMACY-VANCOMYCIN DOSING SERVICE
"Pharmacy Vancomycin Consult Note    Date of Service 2018  Patient's  1940   78 year old, female    Indication: Aspiration Pneumonia  Goal Trough Level: 15-20 mg/L  Day of Therapy: 2  Current Vancomycin regimen: intermittent vancomycin    Current estimated CrCl = Estimated Creatinine Clearance: 45.2 mL/min (based on Cr of 1.26).    Creatinine for last 3 days  2018:  4:49 AM Creatinine 1.06 mg/dL  2018:  4:55 AM Creatinine 1.17 mg/dL;  5:11 PM Creatinine 1.25 mg/dL  2018:  5:26 AM Creatinine 1.22 mg/dL;  2:50 PM Creatinine 1.26 mg/dL    Recent Vancomycin Levels (past 3 days)  2018:  9:36 PM Vancomycin Level 10.6 mg/L    Body mass index is 47.26 kg/(m^2).  Height is 5' 1.417\".   Wt Readings from Last 2 Encounters:   18 115 kg (253 lb 8.5 oz)   18 105.4 kg (232 lb 4.8 oz)       Vancomycin IV Administrations (past 72 hours)                   vancomycin (VANCOCIN) 2,000 mg in sodium chloride 0.9 % 500 mL intermittent infusion (mg) 2,000 mg New Bag 18 1754              Nephrotoxins and other renal medications (Future)    Start     Dose/Rate Route Frequency Ordered Stop    18 0000  vancomycin (VANCOCIN) 1,500 mg in sodium chloride 0.9 % 250 mL intermittent infusion      20 mg/kg × 75.3 kg (Adjusted)  over 90 Minutes Intravenous EVERY 24 HOURS 18 2300           Contrast Orders - past 72 hours (72h ago through future)    Start     Dose/Rate Route Frequency Ordered Stop    18 1130  iopamidol (ISOVUE-370) solution 135 mL      135 mL Intravenous ONCE 18 1118 18 1151      Interpretation of levels and current regimen:  Random level is Subtherapeutic (27.5 hrs post dose)  Has serum creatinine changed > 50% in last 72 hours: No  Urine output: good urine output  Renal Function: Stable    Plan:  1.  Start scheduled vancomycin 1500 mg IV every 24 hours. Dosing 20 mg/kg based on AdjBW=75.3 kg given BMI=47.26 kg/m2. Would check next trough level prior " to 4 vancomycin dose.  2.  Pharmacy will check trough levels as appropriate in 1-3 Days.    3.  Serum creatinine levels will be ordered daily for the first week of therapy and at least twice weekly for subsequent weeks.      Zhanna Russ, PharmD  August 22, 2018              .

## 2018-08-23 NOTE — PLAN OF CARE
Problem: Patient Care Overview  Goal: Plan of Care/Patient Progress Review  Outcome: Declining  Pt arrived from 6B on bipap 50%. SICU at bedside and decision made to electively intubate w/ team and family. Anesthesia intubated at 1800. Sedated on propofol 30mcg. VSS with RVR ranging from 100s to 130s, MD diaz with amio to continue at 1mg/min. BPs stable. See chart for notable labs. Continue to notify SICU for changes, continue POC.

## 2018-08-23 NOTE — PROGRESS NOTES
Diabetes Consult Daily  Progress Note          Assessment/Plan:    Sunitha Jacques is a 78 year old female with PMHx of type 2 diabetes complicated by peripheral neuropathy who underwent total cystectomy, anterior pelvic exenteration with bilateral lymphadectomy, ileal conduit diversion, left vertical rectus abdominis musculocutaneous flap to pelvis on 8/8/2018 for invasive bladder cancer, experiencing post-operative and TPN-associated hyperglycemia.     Fair glucose control, but trend rising in the wake of clinical deterioration/increased stress.    PLAN  -  Increased NPH to 20 units AM and PM  - Continue regular insulin 38 units in TPN bag (170 grams dextrose)---> (ADDM 1330, notified by pharmD that pt changing to peripheral PN, only 110 grams dextrose.  Reduce to approx 0.175 units/gram dex or 20 units)  - If improves to eating status again, restart aspart 1 per 6 grams carb.  - Continue novolog high correction q4h   - Glucose checks q4h     If glucose control deteriorates, IV insulin may be most appropriate.                       Outpatient diabetes follow up: TBD  Plan discussed with SICU teamAdalberto.  Diabetes management team will sign off from daily visits.  If pt's needs change, please page and we'll be happy to re-visit.           Interval History:   The last 24 hours progress and nursing notes reviewed.  Respiratory decompensation yesterday.  Intubated in the evening.  Plans for PST today but overall status not improving much.      Nutrition:  Continuous TPN dextrose 170 grams, regular insulin 38 units  NPO      Recent Labs  Lab 08/23/18  0835 08/23/18  0445 08/23/18  0425 08/23/18  0035 08/22/18  2103 08/22/18  1726 08/22/18  1652 08/22/18  1450  08/22/18  0526  08/21/18  1711  08/21/18  0455  08/20/18  0449   GLC  --  192*  --   --   --   --   --  228*  --  144*  --  126*  --  135*  --  147*   *  --  175* 166* 174* 184* 195*  --   < >  --   < >  --   < >  --   < >  --    < >  "= values in this interval not displayed.            Review of Systems:   See interval hx          Medications:       Active Diet Order      NPO for Medical/Clinical Reasons Except for: Ice Chips, Meds     Physical Exam:  Gen: resting in bed, in NAD, family at bedside  HEENT:  mucous membranes are moist  Resp: intubated  Neuro: sedated  BP (!) 120/97  Pulse 100  Temp 101  F (38.3  C) (Axillary)  Resp 18  Ht 1.56 m (5' 1.42\")  Wt 112.9 kg (248 lb 14.4 oz)  SpO2 94%  BMI 46.39 kg/m2           Data:     Lab Results   Component Value Date    A1C 6.2 08/08/2018    A1C 6.2 07/05/2018    A1C 5.8 04/13/2017            Recent Labs   Lab Test  08/23/18   0445  08/22/18   1450   NA  136  133   POTASSIUM  4.4  4.5   CHLORIDE  101  100   CO2  26  23   ANIONGAP  9  10   GLC  192*  228*   BUN  76*  69*   CR  1.42*  1.26*   ANNE  8.4*  8.2*     CBC RESULTS:   Recent Labs   Lab Test  08/23/18   0445   WBC  12.9*   RBC  2.81*   HGB  7.7*   HCT  24.2*   MCV  86   MCH  27.4   MCHC  31.8   RDW  15.9*   PLT  160     Rachana Hewittpton APRN [facility] [phone]    Diabetes Management job code [id number]            "

## 2018-08-23 NOTE — PLAN OF CARE
"Problem: Surgery Nonspecified (Adult)  Goal: Signs and Symptoms of Listed Potential Problems Will be Absent, Minimized or Managed (Surgery Nonspecified)  Signs and symptoms of listed potential problems will be absent, minimized or managed by discharge/transition of care (reference Surgery Nonspecified (Adult) CPG).   Outcome: Declining  Wound healing impaired.     Problem: Infection, Risk/Actual (Adult)  Goal: Identify Related Risk Factors and Signs and Symptoms  Related risk factors and signs and symptoms are identified upon initiation of Human Response Clinical Practice Guideline (CPG).   Outcome: Declining  Tmax 101.0, abscesses seen on CT, excessive drainage from surgical site.     Problem: Patient Care Overview  Goal: Plan of Care/Patient Progress Review  Outcome: Declining  D: Surgical patient with hypoxic respiratory failure and AMS- intubated 8/22    I/A: Patient intermittently following commands, otherwise sedated.  Fent @100, Prop @ 15-30 depending on how blood pressures tolerate.  Pupils sluggish, equal, round.  With stimulation, appeared to have uncontrolled \"shaking\" movements at 0400 assessment.  SICU Resident phoned immediately.  No interventions at this time. Remains on ventilator, small amount of thick brown secretions through ETT- awaiting large enough sample to send to lab.  Lungs coarse bilaterally and diminished in the bases. Bowel sounds absent- SICU resident notified.  Abdomen more firm and distended.  Abdominal incision with dehiscence towards the bottom.  Significant amount of packing into the wound- needs frequent changes due to saturation with malodorous serous/yellow drainage.  Resident phoned about this as well.  No interventions at this time.  Patient skin red and macerated in gretchen area, Interdry applied for wicking properties. Needs antifungal ordered. Continues to be in atrial fibrillation with right bundle branch block.  Tachy in the low 100s.  Blood pressures labile, largely " dependent on sedation.  Tmax 101, acetaminophen PO given. Trops began trending back up last night 2100, MD notified.  AM troponin level ordered.  Lactic acid level ordered this am when febrile and tachy. Lactic acid WNL.    P: Trend trops, monitor abdominal incision/dressing for signs and symptoms of infection.  Wound orders need to be placed to continue to pack abdominal wound. Will continue to closely monitor and assess.    Problem: Diabetes Comorbidity  Goal: Diabetes  Patient comorbidity will be monitored for signs and symptoms of hyperglycemia or hypoglycemia. Problems will be absent, minimized or managed by discharge/transition of care.   Outcome: No Change  Blood sugars remain high with TPN running.  High sliding scale insulin for coverage.

## 2018-08-23 NOTE — PROGRESS NOTES
Plastic Surgery Progress Note    Subjective/Interval History:  Respiratory decompensation and transfer to ICU last evening.     Objective:  Temp:  [98  F (36.7  C)-101  F (38.3  C)] 101  F (38.3  C)  Pulse:  [100] 100  Heart Rate:  [] 111  Resp:  [8-28] 17  BP: ()/() 96/52  FiO2 (%):  [40 %-70 %] 70 %  SpO2:  [90 %-100 %] 92 %    General appearance: Intubated and sedated.   Pulm: Mechanically ventilated.   CV: Hemodynamically stable  Abd: Soft, lower aspect of the incision opened and packed, serous drainage continues, mild surrounding erythema.    Subcutaneous drain 15fr: 25ml serous  Pelvic drain 19fr: 25ml serosanguinous    Assessment/Plan:   Sunitha Jacques is a 78 year old female with history of bladder cancer now s/p anterior pelvic exenteration, pelvic lymph node dissection, ileal conduit and inferiorly pedicled VRAM to fill pelvic defect on 8/8/2018. Continuing packing of lower abdominal wound. Please contact us with further questions/concerns.     Patient discussed with Dr. Calderón.     Trent Freed, PGY4  [phone]

## 2018-08-23 NOTE — PHARMACY-CONSULT NOTE
Pharmacy Tube Feeding Consult    Medication reviewed for administration by feeding tube and for potential food/drug interactions.    Recommendation: Recommend changing the following medications to a liquid dosage form: levothyroxine --> however pt did not previously tolerate TF well so will not change levothyroxine from IV to PO at this time. If tolerates TF can be changed tomorrow.    Pharmacy will continue to follow as new medications are ordered.    Zhanna Russ, PharmD  August 23, 2018

## 2018-08-23 NOTE — PLAN OF CARE
Problem: Patient Care Overview  Goal: Plan of Care/Patient Progress Review  Discharge Planner PT   Patient plan for discharge: not stated  Current status: PT completed re-eval. Pt intubated, sedated, not following commands. Opens eyes minimally to strong sternal rub but unable to focus gaze/track objects. PT completed PROM to BUEs/BLEs. Recommend use of rooke boots to avoid ankle contractures. Heel cord tightness noted today.   Barriers to return to prior living situation: medical status, prolonged hospital course, level of assist  Recommendations for discharge: TCU vs LTACH pending medical needs  Rationale for recommendations: improve strength and independence with mobility.        Entered by: Seema Kee 08/23/2018 1:18 PM

## 2018-08-23 NOTE — PROGRESS NOTES
08/23/18 1011   Quick Adds   Type of Visit PT Re-certification   Living Environment   Lives With alone   Living Arrangements house   Home Accessibility stairs to enter home   Number of Stairs to Enter Home 3   Number of Stairs Within Home 0   Transportation Available car   Living Environment Comment Subjective information obtained from initial PT evaluation on 8/12/18. Pt currently intubated/sedated, not following commands.    Self-Care   Dominant Hand left   Usual Activity Tolerance good   Current Activity Tolerance poor   Regular Exercise no   Equipment Currently Used at Home cane, straight   Activity/Exercise/Self-Care Comment Pt was independent with ADLs prior ot admission.    Functional Level Prior   Ambulation 1-->assistive equipment   Transferring 1-->assistive equipment   Toileting 0-->independent   Bathing 0-->independent   Dressing 0-->independent   Eating 0-->independent   Communication 0-->understands/communicates without difficulty   Swallowing 0-->swallows foods/liquids without difficulty   Cognition 0 - no cognition issues reported   Fall history within last six months yes   Number of times patient has fallen within last six months 1   Which of the above functional risks had a recent onset or change? ambulation;transferring;toileting;bathing;dressing;eating;swallowing;cognition;communication/speech   Prior Functional Level Comment At baseline, pt using SPC with ambulation. Per chart review, pt seen by PT last 8/19. Pt needed Ax2 for bed mobility and transfer to sit at EOB. Sat EOB x 13 minutes.    General Information   Onset of Illness/Injury or Date of Surgery - Date 08/08/18   Referring Physician Chaim Saldana MD   Patient/Family Goals Statement none stated   Pertinent History of Current Problem (include personal factors and/or comorbidities that impact the POC) Pt is a 78 year old female with a history of DMII, HTN, hypothyroidism, CVA (2017), obesity, CHF, CKD, AFib (on warfarin), and MIKKI who  underwent total cystectomy, pelvic exenteration (removal of uterus, vaginal cuff, part of vagina), ileal conduit and flap for invasive bladder cancer on 8/8/18. She developed altered mental status and hypoxia the night of 8/9 and was transferred to the SICU 8/10 for stabilization. After return of baseline mental status and stabilization she was transferred to the floor 8/12. During the same hospital admission, on 8/21 she experienced acute hypoxic respiratory failure due to a presumed aspiration event requiring BiPAP with escalating FiO2 needs. The following day on 8/22 she converted to afib with RVR and unstable pressures. She was transferred to the SICU for respiratory support and hemodynamic stabilization. She was intubated the evening of 8/22 for worsening respiratory distress.   Precautions/Limitations fall precautions;abdominal precautions;oxygen therapy device and L/min  (VC-AC 80% FiO2 via vent)   General Observations Pt intubated and sedated. On VC-AC, 80% FiO2, PEEP 9. Multiple PIVs, R IJ central line, napier, 2 JPs.    General Info Comments Activity: ambulate with assist. Per RN, ok for in bed exercises only today as pt has been desaturating with turning.    Cognitive Status Examination   Level of Consciousness lethargic/somnolent   Follows Commands and Answers Questions unable to follow commands   Cognitive Comment Pt opens eyes to sternal rub, unable to focus gaze on PT or complete tracking with verbal stimuli. Not following commands.    Integumentary/Edema   Integumentary/Edema Comments Minimal edema grossly in UEs/hands. B knees with healed scars. Per chart review, pt also has abdominal incision, urostomy stoma, coccyx wound.    Posture    Posture Comments not assessed   Range of Motion (ROM)   ROM Comment Tightness in B heelcords, decreased ankle eversion bilaterally, decreased toe flexion on all 10 digits. Hip/knee flexion limited to ~90 likely 2/2 body habitus. BUE: MCPs and PIPs with decreased  "flexion, decreased thumb opposition B R>L, Decreased wrist extension bilaterally. Otherwise UE/LE ROM WFLs   Strength   Strength Comments Unable to assess as pt not following commands.    Bed Mobility   Bed Mobility Comments No bed mobility/OOB activity per RN 2/2 respiratory status.    Transfer Skills   Transfer Comments No bed mobility/OOB activity per RN 2/2 respiratory status.    Gait   Gait Comments No bed mobility/OOB activity per RN 2/2 respiratory status.    Balance   Balance Comments No bed mobility/OOB activity per RN 2/2 respiratory status.    Sensory Examination   Sensory Perception Comments Unable to assess   Clinical Impression   Criteria for Skilled Therapeutic Intervention yes, treatment indicated   PT Diagnosis impaired functional mobility   Influenced by the following impairments decreased ROM, decreased alertness, ambdominal surgery/incision, decreased activity tolerance.    Functional limitations due to impairments difficulty with bed mobility, transfers, ambulation, stairs.    Clinical Presentation Evolving/Changing   Clinical Presentation Rationale medical status/respiratory needs, PLOF, prolonged hospitalization   Clinical Decision Making (Complexity) Moderate complexity   Therapy Frequency` 3 times/week   Predicted Duration of Therapy Intervention (days/wks) 9/6/18   Anticipated Discharge Disposition Transitional Care Facility   Risk & Benefits of therapy have been explained Yes   Patient, Family & other staff in agreement with plan of care Yes   [facility] [employer] TM \"6 Clicks\"   2016, Trustees of [facility], under license to [employer].  All rights reserved.   6 Clicks Short Forms Basic Mobility Inpatient Short Form   [facility] AM-PAC  \"6 Clicks\" V.2 Basic Mobility Inpatient Short Form   1. Turning from your back to your side while in a flat bed without using bedrails? 1 - Total   2. Moving from lying on your back to sitting on the side of a flat bed without using " bedrails? 1 - Total   3. Moving to and from a bed to a chair (including a wheelchair)? 1 - Total   4. Standing up from a chair using your arms (e.g., wheelchair, or bedside chair)? 1 - Total   5. To walk in hospital room? 1 - Total   6. Climbing 3-5 steps with a railing? 1 - Total   Basic Mobility Raw Score (Score out of 24.Lower scores equate to lower levels of function) 6   Total Evaluation Time   Total Evaluation Time (Minutes) 2

## 2018-08-23 NOTE — PROGRESS NOTES
WO Nurse Inpatient [facility]   WO Nurse Inpatient Adult       Follow up Assessment   Assessment of new Urostomy Stoma complication(s) none   Mucocutaneous junction; minimal separation circumferentially      Peristomal complication(s)- blanchable erythema extends approximately 2-3 cm  Pouch wear time: 24-48 hours  Following today's visit:Patient /  is  able to demonstrate;       1. How to empty their pouch? No- pt is intubated      2. How to change their pouch?  no      3. How to read and record intake and output correctly? No    Pt unable to participate at this time being intubated      Objective data:  Patient history according to medical record: Sunitha Jacques is a 78 year old female with a complicated past medical history including DM, HTN, hypothyroidism, CVA (2017), obesity, CHF, CKD, AFib (on warfarin), and MIKKI admitted on 8/8/18 after total cystectomy, pelvic exenteration (removal of uterus, vaginal cuff, part of vagina), ileal conduit and flap for invasive bladder cancer. Now with AMS and hypoxia. Developed AMS and hypoxia and transferred to SICU 8/10. Stable and transferred back to medical floor 8/12.   Current Diet/Nutrition:   Active Diet Order      NPO for Medical/Clinical Reasons Except for: No Exceptions   TPN no   I/O last 3 completed shifts:  In: 2873.52 [I.V.:987.94]  Out: 2000 [Urine:1960; Drains:40]    Labs:    Recent Labs   Lab Test  08/20/18   0449   08/10/18   0506   08/08/18   1900   ALBUMIN  1.6*   < >  2.1*   --    --    HGB  8.7*   < >  7.6*   < >  9.2*   INR  1.09   < >  1.32*   --    --    WBC  16.9*   < >  12.4*   < >  10.7   A1C   --    --    --    --   6.2*   CRP   --    --   300.0*   --    --     < > = values in this interval not displayed.           Physical Exam:  Current pouching system:Santiago 2 pc 57 mm convex urostomy  Reason for pouch change today: routine schedule  Stoma appearance: viable and healthy, lumen in center, stoma empties just at skin level,  "and draining from stents Slight puckered/divot areas at 2 and 8 O'clock   Stoma size; 1\" x 1 1/8\" ,   2 white stents remain intact  Peristomal skin: blanchable erythema extends approximately 2-3 cm  Stoma output : pale yellow urine in night draiange bag  Abdominal  Assessment  distended , NG still in place? No  Surgical Site: Covered with ABD and staples intact, lower end of wound staples removed and packed with gauze, Plastics team is managing.   Pain: pt unable to rate pain   Is patient still on a PCA No      Interventions:  Patient's chart evaluated.  Focus of today's visit: changed pouch   Participant of teaching session today- nurse  Orders: written  Change made with ostomy management today: yes switched to convex pouching system, peristomal skin treated with ostomy powder and no sting film barrier  Patient/family: Pt intuibated  Supplies:at bedside      Plan:  Learning needs: refitting of appliance, evaluate leakage issue, pouch change demonstration , pouch change return demonstration, verbal instruction , diet and hydration , pouch emptying, output measurement, odor/flatus management, lifestyle adjustments and discharge instructions  Preparation for discharge: No discharge preparations started  Recommend home care? by home WOC nurse if possible      Discussed plan of care with Nurse  Nursing to notify the Provider(s) and re-consult the WOC Nurse if new ostomy concerns or discharge planned before next planned WOC visit.                                           WOC Nurse will return: Monday          WOC Nurse Inpatient Wound Assessment   Reason for consultation: Evaluate and treat Coccyx wound       Assessment  Coccyx wound due to Moisture Associated Skin Damage (MASD) with friction/pressure component, possible Pilonidal cyst vs skin tags. Pt reports she has had cysts removed from area before.    8/16/18- Midline abdominal incision continues to drain and collect down in sacral region. Wound is currently being " "packed which may decrease leakage. Nurse reports patient is refusing to be turned or repositioned. Pt allowed repositioning today with lift and Zflow pillow placed under left side. Wound appears to have regressed today, it is larger and slightly deeper. Will add silver alginate to pack down into wound bed.   Status: follow up  assessment, stable      Treatment Plan  Coccyx wound: Every other day  1. Clean wound with MicroKlenz Spray, pat dry  2. Paint with No Sting Skin Prep (#[id number]) and allow to dry thoroughly  3. Gently place aquacel ag (#[id number]) into wound bed   4. Press a Mepilex  Sacral Dressing (PS#[id number])  to the area, making sure to conform nicely to skin curvatures (begin placing the Mepilex at the most distal aspect first, smooth into place in an upward direction, then smooth side to side)   5. Time and date dressing change and edison with a \"T\" for treatment of a wound  6. Reposition pt every 1 to 2 hours when in bed and hourly when up to the chair to relieve pressure and promote perfusion to tissue    PIP ACTIVITY MEASURES:  If pt is refusing to turn or reposition they must be educated on the  potential injury from not off loading pressure.  Then this \"educated refusal\" needs to be documented as an \"educated refusal to turn/ reposition\" and document if alert, etc.    CHAIR: Pt should sit on a chair cushion ([id number]) when up to the chair and not sit for more than one hour at a time before fully offloading backside (either stand for a couple of minutes and/or return to bed, positioning on a side) to relieve pressure and re-perfuse tissue.  Additionally, encourage pt to shift side to side every 15 minutes, too.    NOTE: the Z-Flow Pad is NOT a cushion, pt should NOT sit on the Z-Flow pads    BED:  Reposition side to side every 1-2 hours when awake.   ? Consider Z-Willy Pillows to help keep pt on side (#[id number]-medium or #[id number]- large). *Z-Flows are for positioning, DO NOT SIT ON!  ? Keep heels elevated  ? As " "able keep HOB below 30 degrees  ? Evaluate for specialty mattress  NOTE: If pt is refusing to turn or reposition they must be educated on the potential injury from not offloading pressure.  Then this \"educated refusal\" needs to be documented as an \"educated refusal to turn/ reposition\" and document if alert, etc.  Additionally please notify MD and charge nurse of refusal(s).    Orders Written    WOC Nurse follow-up plan:weekly  Nursing to notify the Provider(s) and re-consult the WOC Nurse if wound(s) deteriorates or new skin concern.      Physical Exam  Skin inspection: focused Coccyx,       Wound Location:  Coccyx  Date of last photo 8/20/18, unable to take pic on 8/23 as pt desats upon trying to assess the wound                     Wound History: Unknown, pt not alert for hx  Measurements (length x width x depth, in cm) 3 cm x 3 cm  x  0.2 cm with 0.6cm x 0.5cm opening at distal aspect (no significant change in size)   Wound Base: V shaped pink, moist, non granular pink tissue with erosion of epidermis from 3 to 5 O'clock that has tan fibrin tissue in wound bed. Entire area continues to be macerated with epidermal bumpy/firm cyst like growths, white appearing from moisture  Tunneling N/A  Undermining N/A  Palpation of the wound bed: normal   Periwound skin: improved denuded and macerated due to drainage from abdominal wound trapping in dressing  Color: slightly pink  Temperature: normal   Drainage:, small  Description of drainage: serosanguinous  Odor: none  Pain: unable to assess      Interventions  Current support surface: Standard low air loss   Current off-loading measures: Foam padding and Pillows under calves, prevalon boots, Zflow   Visual inspection of wound(s) completed  Wound Care: RN to complete as pt desats, thus unable to complete the wound care. Change Urostomy pouching system.  Supplies: Ordered Aquacel AG  Education provided: wound care/dressing change,  importance of repositioning, plan of care " and wound progress, keeping HOB less than 30.  Discussed plan of care with Nurse

## 2018-08-24 NOTE — PLAN OF CARE
Problem: Patient Care Overview  Goal: Plan of Care/Patient Progress Review  OT-4C: OT Holding at this time per discussion with PT. PT to follow for ROM and command following. Will re-initiate as appropriate.

## 2018-08-24 NOTE — PLAN OF CARE
Problem: Patient Care Overview  Goal: Plan of Care/Patient Progress Review  Outcome: No Change  SICU with hypoxic respiratory failure and AMS- intubated 8/22.    Pt drowsy at times. RASS -1. Nods yes or no to questions. On CMV settings throughout shift. PEEP increased to 12 and FiO2 ranged from 65%-100% throughout shift. See flowsheets. Scant to small amount of tan/blood-tinged, thick sputum noted. Pt hypotensive at times this shift. 1L LR bolus given. Levophed ordered if needed. Did not start at this time. BP currently stable. MAP goal >65. Pt in Afib throughout shift. Tube feeding advanced to 35 at 1200. Increase to goal of 45 at 2000 tonight. No BM this shift. Last BM 8/20. Stool softeners restarted. Hypoactive bowel sounds. Urostomy with minimal urine output. MDs notified multiple times throughout shift. Orders to monitor. Turned pt q2h. Abdominal wound dressing changed 4x this shift. Moderate serous drainage noted. Sacral mepilex dressing changed. 3 PIV in place.    Monitor respiratory status. Wean FiO2 as able. Monitor blood pressure/MAPs. Levophed order if needed. Monitor MDs if MAP <65. Increase tube feeds to 45 at 2000 and PPN off at 2000. Turn pt q2h. Change abdominal dressings PRN and TID. Notify SICU team if any changes.    Problem: Diabetes Comorbidity  Goal: Diabetes  Patient comorbidity will be monitored for signs and symptoms of hyperglycemia or hypoglycemia. Problems will be absent, minimized or managed by discharge/transition of care.   Outcome: No Change  Pt continues on sliding scale insulin. See MAR and results review.    Problem: Renal Insufficiency Comorbidity  Goal: Renal Insufficiency  Patient comorbidity will be monitored for signs and symptoms of Renal Insufficiency (Chronic) condition.  Problems will be absent, minimized or managed by discharge/transition of care.   Outcome: Declining  Pt decreased renal function w labs this AM. Decreased urine output. MDs notified multiple times  throughout shift. Will continue to monitor.    Problem: Cardiac Disease Comorbidity  Goal: Cardiac Disease  Patient comorbidity will be monitored for signs and symptoms of Cardiac Disease.  Problems will be absent, minimized or managed by discharge/transition of care.   Outcome: No Change  Pt hypotensive at times. BP monitored q15 mins.

## 2018-08-24 NOTE — PROGRESS NOTES
"Urology  Progress Note    Remains intubated  Blood culture growing yeast, started on micafungin  Tmax 101.3    Exam  /62  Pulse 100  Temp 100.5  F (38.1  C) (Axillary)  Resp 19  Ht 1.56 m (5' 1.42\")  Wt 112.3 kg (247 lb 9.2 oz)  SpO2 92%  BMI 46.15 kg/m2  No acute distress,, sitting up in bed   Mechanical ventilation  Abdomen soft, mildly distended. Inferior aspect of the wound re-packed. Fascia intact. Stoma pink and well perfused, 2 stents in place.   RIVKA x 2 serous  Urostomy bag with clear yellow urine in tubing    Urostomy 875/125  LUQ RIVKA 35/30  LLQ RIVKA 35/30    Labs  AM labs pending    Assessment/Plan  78 year old y/o female POD#17 s/p anterior pelvic exteneration, VRAM flap to pelvis (Plastics), BLPLND, creation of ileal conduit for MIBC. Medicine consulted for assistance managing multiple medical comorbidities. Ongoing issues include mental status and hypercarbia/hypoxia. Transferred to the SICU for worsening respiratory status, which improved enough to allow transfer to step down unit. Medicine co-managing. Again transferred to SICU on POD#15 for respiratory decompensation, currently intubated.    Neuro: Fentanyl and propofol for sedation while intubated  CV:  Afib with RVR. Sched IV metop.   Pulm: Mechanical ventilation. Weaning per SICU.  FEN/GI: NPO. TPN running @ 55cc/hr. TFs started 8/23. Diuresis per medicine team.   Endo: ISS. Continue home synthroid.   :  L PNT removed 8/16. Maddox in place. Monitor urine output  Heme: Hgb stable. Home warfarin held   ID: 8/22 BCx positive for yeast. Currently on ceftriaxone, flagyl, and micafungin (8/23-).   Activity: up ad olya, encourage ambulation   PPx: SCDs. SQH.   Dispo: SICU    Appreciate excellent SICU cares    Seen and examined with the chief resident who will discuss with Dr. Sharma and SICU team.    Jayne Plaza MD  Urology PGY-2         Contacting the Urology Team     Please use the following job codes to reach the Urology Team. Note that " you must use an in house phone and that job codes cannot receive text pages.     On weekdays, dial [phone] (or star-star-star [phone] on the new [employer] telephones) then [phone] to reach the Adult Urology resident or PA on call    On weekdays, dial [phone] (or star-star-star [phone] on the new [employer] telephones) then [phone] to reach the Pediatric Urology resident    On weeknights and weekends, dial [phone] (or star-star-star [phone] on the new [employer] telephones) then [phone] to reach the Urology resident on call (for both Adult and Pediatrics)

## 2018-08-24 NOTE — PLAN OF CARE
Problem: Patient Care Overview  Goal: Plan of Care/Patient Progress Review  Outcome: No Change  D: Surgical patient with hypoxic respiratory failure and AMS- intubated 8/22     I/A: Patient intermittently following commands, otherwise sedated.  Fent @100, Propofol turned down to 15, pt intermittently following commands, pressures stable. Pupils sluggish, equal, round.  The slight tremors were present intermittently throughout the day, no apparent correlation between cares and the tremors, resolved on their own. Remains on ventilator, small amount of thick red/brownish tinged secretions through ETT.  Unable to collect sputum sample. LS Coarse diminished in the bases. Bowel sounds absent- SICU team notified during rounds.  Decompression orders for low-intermittent suction, will follow-up with SICU.  Drainage from abdominal wound has decreased. WOC rounded, see note, coccyx wound improving, concern for macerated skin under the pouch. Patient skin red and macerated in gretchen area, Interdry applied for wicking properties. WOC said to use regular [employer]. Continues to be in atrial fibrillation with right bundle branch block.  Tachy in the low 100s. Tmax 98.9. Blood Cx's positive for yeast X3.  RT Internal Jugular pulled per new orders, tip sent for Cx.  Lab estephania blood cx's.  2nd x-ray confirmed OG placement, TF ordered to start, TPN dc'd.     P: Monitor abdominal incision/dressing for signs and symptoms of infection. Monitor for signs of infection.  Start TF. Continue POC and notify care team of any significant changes.

## 2018-08-24 NOTE — PROGRESS NOTES
SURGICAL ICU PROGRESS NOTE  08/24/2018      CO-MORBIDITIES:   Change in mole  (primary encounter diagnosis)  Malignant neoplasm of overlapping sites of bladder (H)    ASSESSMENT: Sunitha Jacques is a 78 year old female with a history of DMII, HTN, hypothyroidism, CVA (2017), obesity, CHF, CKD, AFib (on warfarin), and MIKKI who underwent total cystectomy, pelvic exenteration (removal of uterus, vaginal cuff, part of vagina), ileal conduit and flap for invasive bladder cancer on 8/8/18. She developed altered mental status and hypoxia the night of 8/9 and was transferred to the SICU 8/10 for stabilization. After return of baseline mental status and stabilization she was transferred to the floor 8/12. During the same hospital admission, on 8/21 she experienced acute hypoxic respiratory failure due to a presumed aspiration event requiring BiPAP with escalating FiO2 needs. The following day on 8/22 she converted to afib with RVR and unstable pressures. She was transferred to the SICU for respiratory support and hemodynamic stabilization. She was intubated the evening of 8/22 for worsening respiratory distress. Found to have Candidemia on 8/23, started treatment with micafungin.    TODAY'S PROGRESS/PLANS:   - Wean FiO2 as tolerated; increase PEEP to 12  - Advance TFs to goal  - Resume bowel regimen  - PPN for nutrition  - Possible A-line and pressors for low MAPs    PLAN:  Neuro/ pain/ sedation:  # H/o CVA  - Monitor neurological status. Notify the MD for any acute changes in exam.  - Propofol gtt for sedation. RASS goal -1 to -2  - Fentanyl gtt for pain  - Lidoderm patches   - Holding escitalopram 10 mg daily     Pulmonary care:   # H/o MIKKI   # Acute respiratory failure with hypoxia   - intubated 8/22 for progressive respiratory distress while on HFNC  - PEEP increased to 12, FiO2 as high as 100%, wean as able  - chest physiotherapy with RT  - Saline neb Q3H, racepinephrine neb PRN  - PTA Singulair  daily      Cardiovascular:    # H/o HTN  #H/o CAD  # H/o possible HFrEF  # A-fib with RVR  - Monitor hemodynamic status.   - metoprolol 50 mg BID for afib rate control, s/p amio gtt  - Mildly elevated troponin, possible leak from afib with RVR, stable  - PTA atorvastatin, hydralazine   - ECHO 8/9 EF 55-60%, normal LVF, R heart could not be assessed      GI care:   #H/o GERD  - NPO, OK for ice chips  - Advance tube feeds to goal  - changed to PPN this AM, will allow dose to finish and discontinue  - Bowel regimen: Miralax daily, senna-docusate BID  - Simethicone PRN      Fluids/ Electrolytes/ Nutrition:   - PPN, allow bag to finish and discontinue  - Advance TFs to goal    Renal/ Fluid Balance:    # H/o CKD stage III  #H/o bladder cancer, s/p total cystectomy, pelvic exenteration, ileal conduit and flap   #H/o L Hydronephrosis, s/p left nephrostomy tube  - Will continue to monitor intake and output  - Had increase of BUN and Cr, was aggressively diuresed prior to SICU admission, bolus as needed      Endocrine:    # H/o Type II DM  # H/o hypothyroidism  - ssi, endocrine following and has established insulin regimen  - PTA Levothyroxine      ID/ Antibiotics:  # Aspiration pneumonia  - Febrile overnight   - Ceftriaxone and Flagyl for 7 day course  - micafungin for fungemia      Heme:     - Hemoglobin stable      MSK:  - PT/OT      Prophylaxis:    - Mechanical and subcutaneous heparin prophylaxis for DVT.  - Protonix      Lines/ tubes/ drains:   - PIV, RIVKA x2, Port, OG      Disposition:  - Surgical ICU    ====================================    SUBJECTIVE:   Overnight, she had low blood pressure with MAPs in the 40's, was given a 500 ml fluid bolus and pressure responded. PEEP increased to 9 and FiO2 to 80% this AM.     OBJECTIVE:   1. VITAL SIGNS:   Temp:  [98.6  F (37  C)-101.3  F (38.5  C)] 100.5  F (38.1  C)  Heart Rate:  [] 74  Resp:  [9-30] 16  BP: ()/(24-98) 118/59  FiO2 (%):  [65 %-80 %] 70 %  SpO2:   [85 %-97 %] 96 %  Ventilation Mode: CMV/AC  (Continuous Mandatory Ventilation/ Assist Control)  FiO2 (%): 70 %  Rate Set (breaths/minute): 18 breaths/min  Tidal Volume Set (mL): 350 mL  PEEP (cm H2O): 9 cmH2O  Oxygen Concentration (%): 70 %  Resp: 16    2. INTAKE/ OUTPUT:   I/O last 3 completed shifts:  In: 3075.83 [I.V.:1063.83; NG/GT:180; IV Piggyback:500]  Out: 770 [Urine:650; Drains:120]    3. PHYSICAL EXAMINATION:   General: NAD, appears comfortable  Neuro: sedated with propofol, easily woken and answers questions  Resp: Intubated with high FiO2 needs  CV: Irregular rhythm, tachycardic  Gastrointestinal: soft, obese, non-tender to palpation. Midline incision closed with staples, inferior aspect open and packed with gauze. Open site clean with minimal drainage.   Extremities: moving all extremities  Skin: rash underneath pannus is covered with ABD    4. INVESTIGATIONS:   Arterial Blood Gases     Recent Labs  Lab 08/21/18  1613   PH 7.38   PCO2 46*   PO2 59*   HCO3 27     Complete Blood Count     Recent Labs  Lab 08/24/18  0418 08/23/18  0445 08/22/18  1450 08/22/18  0526   WBC 13.4* 12.9* 12.6* 14.8*   HGB 7.9* 7.7* 8.0* 8.0*    160 157 161     Basic Metabolic Panel    Recent Labs  Lab 08/24/18  0418 08/23/18  0445 08/22/18  1450 08/22/18  0526    136 133 133   POTASSIUM 4.6 4.4 4.5 4.5   CHLORIDE 102 101 100 100   CO2 20 26 23 27   BUN 92* 76* 69* 67*   CR 1.81* 1.42* 1.26* 1.22*   * 192* 228* 144*     Liver Function Tests    Recent Labs  Lab 08/22/18  0526 08/20/18  0449   AST 52* 59*   ALT 87* 79*   ALKPHOS 81 70   BILITOTAL 0.5 0.3   ALBUMIN 2.6* 1.6*   INR  --  1.09     Pancreatic Enzymes  No lab results found in last 7 days.  Coagulation Profile    Recent Labs  Lab 08/20/18  0449   INR 1.09     Lactate  Invalid input(s): LACTATE    5. RADIOLOGY:   Recent Results (from the past 24 hour(s))   CT Abdomen Pelvis w Contrast    Narrative    EXAMINATION: CT ABDOMEN PELVIS W CONTRAST, 8/22/2018  11:52 AM    TECHNIQUE:  Helical CT images from the lung bases through the  symphysis pubis were obtained  with contrast.    CONTRAST DOSE: 135 cc of Omnipaque 370    COMPARISON: CT  8/19/2018, PET 6/27/2018    HISTORY: 1) Acute respiratory failure - rule out PE , 2) sepsis - rule  out abdominal abscess, obstruction, other abd pathology;     FINDINGS:    Chest:   Partially seen bilateral small to moderate pleural effusions with  overlying atelectasis, right more than left. No superimposed  consolidative opacities. These findings are better characterized on  the CT chest.    Abdomen and pelvis:    Left lower quadrant pelvic drain. A second subcutaneous drain goes up  to the upper mid abdomen.    Postoperative changes of cystectomy with right lower quadrant conduit.  Bilateral ureteral stents. Unchanged bilateral renal cysts and  hypodensities. Interval resolution of the left hydronephrosis. Slight  increased size of the 4.2 x 2.9 cm fluid collection in the abdominal  wall adjacent to the ileal conduit series 5 image 270), compared to 4  x 2.6 cm on the CT 8/19/2018.    Continued loculated 3.5 x 3.5 cm right anterior hemipelvic collection  (series 5 image 308). Slightly decreased air within the 3.6 x 2.8 cm  left hemipelvic collection (series 5 image 285). Other scattered fluid  in the pelvis with some loculated components. No thick rims of  enhancement to clearly indicate abscess. Decrease punctate foci of  intraperitoneal air in the pelvis. Continued and not substantially  changed soft tissue stranding in the pelvis.    Again seen midline wound dehiscence with large rectal diastases and  anterior eventration containing loops of colon and small bowel,  without evidence of bowel obstruction. Node clear communicating  fistula. No well-defined abscess. Air and packing material is seen in  the skin incision. Mild prominent fluid-filled loops of small bowel,  likely reactive ileus. Small amount of fluid in the hernial  sac.    Unremarkable liver and gallbladder. The spleen is within normal  limits. Unchanged right adrenal nodule.     No portal venous gas or free air. No abdominopelvic lymphadenopathy.    Atherosclerotic calcifications of the coronary aorta and the major  arteries.    Bones and soft tissues:  No suspicious osseous findings. Mild multilevel degenerative changes  of thoracolumbar spine.      Impression    IMPRESSION:   Status post cystectomy for bladder cancer:  1. Right lower quadrant ileal conduit with unchanged small amount of  abdominal wall fluid adjacent to the stoma.  2. Bilateral ureteral stents in place. Near resolved left  hydronephrosis.  3. Multiple loculated fluid collections in the pelvis. Overall, sizes  the collections is similar. The scattered areas of air have decreased.  These may be postoperative although developing abscesses are also  possible.  4. Again seen midline incision dehiscence with packing material and  air in the region but without fluid containing abscess identified.  5. Large anterior abdominal wall eventration containing loops of small  and large bowel, without evidence of obstruction or fistulization.  Dilated loops of bowel within the abdomen are likely reactive ileus.  6. Increased bilateral pleural effusions and overlying  atelectasis/consolidation, better charecterized on the CT chest    I have personally reviewed the examination and initial interpretation  and I agree with the findings.    MELISSA LOPEZ MD   CT Chest Pulmonary Embolism w Contrast    Narrative    EXAMINATION: CT CHEST PULMONARY EMBOLISM W CONTRAST, 8/22/2018 12:03  PM     COMPARISON: PET 6/27/2018, radiograph 8/21/2018, 8/17/2018    HISTORY: Acute respiratory failure    TECHNIQUE: Volumetric helical acquisition of CT images of the chest  from the lung apices to the kidneys were acquired after the  administration of 135 mL of Isovue-370 IV contrast. Three-dimensional  (3D) post-processed angiographic images  were reconstructed, archived  to PACS and used in interpretation of this study.    Contrast: iopamidol (ISOVUE-370) solution 135 mL    FINDINGS:     Right chest wall Port-A-Cath tip near the superior anterior caval  junction.  Additional right IJ central venous catheter. Flow artifact around the  lines in the right innominate vein (series 9 image 55, series 6 image  42).    There is adequate opacification of the pulmonary vasculature. No  pulmonary embolism, Persistent mild cardiomegaly. Coronary  calcification. Aortic valve calcifications. No signs of right heart  strain. Chronic dilated main pulmonary artery measures up to 3.7 cm.  Scattered calcified plaque of the thoracic aorta. No pericardial  effusion. New  thoracic lymphadenopathy, for example a 2 cm subcarinal  lymph node (series 9 image 131). Is present.    The central tracheobronchial tree is patent. Bilateral small pleural  effusions, right more than left with overlying  atelectasis/consolidation. Adjacent consolidative opacity in the  bilateral upper and lower lobes, right more than left. No  pneumothorax.    Visualized upper abdomen: Partially seen bilateral ureteral stents.  Bones and soft tissues: No suspicious osseous findings.      Impression    IMPRESSION:   1. Right internal jugular approach chest wall Port-A-Cath and a second  intravenous catheter. Possible nonocclusive thrombus in the right  innominate vein around the catheters vs flow artifact. Recommend  duplex for confirmation.  2. No pulmonary embolism. Chronic dilated main pulmonary artery,  suggestive pulmonary hypertension.  3. Increased bilateral small pleural effusions, right more than left.  New consolidative opacities in the bilateral upper and lower lobes,  right more than left. Differential infection or aspiration.  4. Dilated main pulmonary artery possibly pulmonary hypertension.    [Result: Question of right IJ Intravenous thrombus]    Finding was identified on 8/22/2018 12:04 PM.      [facility]  was contacted by Jessica SOLORIO at 8/22/2018 12:37 PM and  verbalized understanding of the urgent finding.     I have personally reviewed the examination and initial interpretation  and I agree with the findings.    PATY SAM MD   XR Chest Port 1 View    Narrative    Exam: XR CHEST PORT 1 VW, 8/22/2018 6:50 PM    Indication: eval ET tube placement;     Comparison: Chest x-ray 8/21/2018    Findings:   Frontal chest x-ray. Endotracheal tube projects 3.2 cm above the  annabella. Cardiac silhouette is enlarged and unchanged. No pneumothorax.  Right IJ catheter and right chest wall ovi catheter are unchanged in  position. Persistent perihilar and basilar opacities with increased  right mid zone opacity. Bilateral pleural effusion.      Impression    Impression:   1. Endotracheal tube projects 3.0 cm above the annabella.  2. Persistent perihilar and basilar opacities, representing pulmonary  edema versus infection.  3. Small bilateral pleural effusions.    I have personally reviewed the examination and initial interpretation  and I agree with the findings.    CHAR CARL MD   XR Abdomen Port 1 View    Narrative    Exam: XR ABDOMEN PORT 1 VW, 8/22/2018 6:59 PM    Indication: OG placement;     Comparison: X-ray 8/7/2018    Findings:   Supine frontal view of the abdomen. Enteric tube projects over the GE  junction and tip projects over the proximal stomach. Postsurgical  changes of pelvic surgery. Pelvic drain projecting over the pelvis.  Presumed ureteric stents. Surgical staples projecting over the central  abdomen. Nonspecific bowel gas pattern with few gaseous distended  bowel loops.       Impression    Impression:   1. Enteric tube sidehole projects over the gastroesophageal junction  and tip projects over the proximal stomach.  2. Postsurgical changes of pelvic surgery.  3. Nonspecific bowel gas pattern with few gas distended bowel loops  projecting over the central abdomen.     I have personally  reviewed the examination and initial interpretation  and I agree with the findings.    CHAR CARL MD       =========================================  Patient seen and discussed with ICU staff, Dr. Phillips    - - - - - - - - - - - - - - - - - -  Farhat Horton  Medical Student, MS4  [employer]    I, Peter Ponce MD, agree with the above documentation by student doctor Farhat Horton and have made any necessary edits to the note.    Peter Ponce, PGY2  General Surgery  [phone]

## 2018-08-24 NOTE — PLAN OF CARE
Problem: Patient Care Overview  Goal: Plan of Care/Patient Progress Review  Outcome: No Change  D: Surgical patient with hypoxic respiratory failure and AMS- intubated 8/22     I/A: Patient alert, following commands, tracking and nodding appropriately. Fent turned down to 50, Propofol down to 5 due to hypotension early this shift.  Patient denied discomfort or pain all night so drips were left at these rates.  Pupils brisk equal and round. Remains on ventilator requiring 60-80% FiO2 depending on cares, does seem to desat more when turned to right side. Small to moderate amount of thick brown/keila secretions through ETT- sputum sent to lab, awaiting results. Lungs coarse bilaterally and diminished in the bases. Bowel sounds not audible on auscultation, this is known to the team.  Abdominal incision with dehiscence- now with less drainage than previous shift. Continues to be in atrial fibrillation with right bundle branch block. Mostly rate controlled with scheduled metoprolol. Blood pressures dropped around 9 pm, SICU resident called to bedside.  At this time, patient was alert and following all commands, making good urine- SICU verbal orders to allow MAPs >50.  When BPs dropped below, resident paged again and ordered fluid bolus 500 ml LR.  Blood pressures significantly improved with bolus, no further interventions required overnight.Tmax 101, acetaminophen PO given. Awaiting lab results this am.      P: Monitor abdominal incision/dressing for signs and symptoms of infection. Will continue to closely monitor and assess and notify team of any changes or concerns.       Problem: Diabetes Comorbidity  Goal: Diabetes  Patient comorbidity will be monitored for signs and symptoms of hyperglycemia or hypoglycemia. Problems will be absent, minimized or managed by discharge/transition of care.   Outcome: Improving  Well controlled with current insulin regimen.     Problem: Cardiac Disease Comorbidity  Goal: Cardiac  Disease  Patient comorbidity will be monitored for signs and symptoms of Cardiac Disease.  Problems will be absent, minimized or managed by discharge/transition of care.   Outcome: No Change  Patient with afib, rate controlled with scheduled metoprolol.  Became hemodynamically unstable for short period last night with low MAPs, systolic pressures 60s before fluid bolus given.

## 2018-08-24 NOTE — PROGRESS NOTES
NUTRITION SERVICES - BRIEF NOTE    Pt's TFs have been advancing to goal of 45 mL/hr, currently at 25 mL/hr and tolerating well. PPN is still running with plan to run current bag out to discontinue.    Implementations  Collaboration and Referral of Nutrition care - Discussed plan for FEN/GI on rounds with Providers - discussed with team and RN plan to wean PN off today as TFs advance towards goal.    Monitoring  Nutrition will continue to follow and monitor per POC (see 8/23 RD note)    Bhavana Recinos, RD, LD  SICU RD Pgr: [phone]

## 2018-08-24 NOTE — PROGRESS NOTES
Plastic Surgery Progress Note    Subjective/Interval History:  No acute overnight events. More alert this morning. Fungemia on blood cultures, started on antifungal.    Objective:  Temp:  [98.6  F (37  C)-101.3  F (38.5  C)] 100.5  F (38.1  C)  Heart Rate:  [] 98  Resp:  [9-30] 28  BP: ()/(24-98) 109/74  FiO2 (%):  [65 %-80 %] 70 %  SpO2:  [85 %-97 %] 93 %    General appearance: Intubated and sedated.   Pulm: Mechanically ventilated.   CV: Hemodynamically stable  Abd: Soft, lower aspect of the incision opened and packed, very mild serous drainage, no appreciable exposed bowel, just granulation tissue. A second open wound below pannus at very inferior margin present without packing. Skin is mildly erythematous, less maceration than prior days.  Subcutaneous drain 15fr: 35ml / 30 ml serous  Pelvic drain 19fr: 35ml / 30 ml serous    Assessment/Plan:   Sunitha Jacques is a 78 year old female with history of bladder cancer now s/p anterior pelvic exenteration, pelvic lymph node dissection, ileal conduit and inferiorly pedicled VRAM to fill pelvic defect on 8/8/2018. Continuing packing of lower abdominal wounds, including most inferior wound below pannus, as you have been. Please contact us with further questions/concerns.       - - - - - - - - - - - - - - - - - -  Mónica Nj MD  General Surgery PGY-3    See [facility] for on-call pager information.

## 2018-08-25 NOTE — PROGRESS NOTES
Per rcat protocol stop CPT not indicated per last CXR able to suction patient for moderate amount of secretions  Patient has good cough weaning FIO2 45% sao2 94-96% breath sounds clear.

## 2018-08-25 NOTE — PLAN OF CARE
Problem: Patient Care Overview  Goal: Plan of Care/Patient Progress Review  PT 4C: Up with use of mechanical lift.     Discharge Planner PT   Patient plan for discharge: Unable to participate in discussion today.   Current status: Facilitated rolling at max to total A x 2 for sling placement, dependent ceiling lift transfer supine > chair, and seated B LE strengthening/ROM exercises. Pt following ~75% of single step commands, intubated orally on VCAC at 45% FiO2, PEEP 12, HR 70s, MAP 76-92, RR 19-29.   Barriers to return to prior living situation: medical status, home set up, post op precautions  Recommendations for discharge: TCU  Rationale for recommendations: Pt is well below her baseline for mobility. Pt continues to benefit from IP PT for command following, strengthening, and balance training.        Entered by: Sofia Lehman 08/25/2018 9:46 AM

## 2018-08-25 NOTE — PROGRESS NOTES
"Urology  Progress Note    Remains intubated  Afebrile overnight  Hypotensive, levophed ordered but did not start  Sitting up in chair    Exam  /50  Pulse 100  Temp 99  F (37.2  C) (Axillary)  Resp 19  Ht 1.56 m (5' 1.42\")  Wt 117.7 kg (259 lb 7.7 oz)  SpO2 98%  BMI 48.37 kg/m2  No acute distress,, sitting up in bed   Mechanical ventilation  Abdomen soft, mildly distended. Inferior aspect of the wound packed. Fascia intact at area where wound is open. Stoma pink and well perfused, 2 stents in place.   RIVKA x 2 serous  Urostomy bag with clear yellow urine in tubing    Urostomy 675/175  LUQ RIVKA 70/10  LLQ RIVKA 55/10    Labs  WBC 14.9 (13.4)  Hgb 7.0 (7.9)  Crt 2.04 (1.81)    Assessment/Plan  78 year old y/o female POD#18 s/p anterior pelvic exteneration, VRAM flap to pelvis (Plastics), BLPLND, creation of ileal conduit for MIBC. Medicine consulted for assistance managing multiple medical comorbidities. Ongoing issues include mental status and hypercarbia/hypoxia. Transferred to the SICU for worsening respiratory status, which improved enough to allow transfer to step down unit. Medicine co-managing. Again transferred to SICU on POD#15 for respiratory decompensation, currently intubated.    Neuro: Fentanyl and propofol for sedation while intubated  CV:  Afib with RVR. Sched IV metop. BP support per SICU team.  Pulm: Mechanical ventilation. Weaning per SICU.  FEN/GI: NPO. TPN running @ 55cc/hr. TFs started 8/23, currently at goal. Diuresis per medicine team.   Endo: ISS. Continue home synthroid.   :  L PNT removed 8/16. Maddox in place. Monitor urine output  Heme: Hgb stable. Home warfarin held   ID: 8/22 BCx positive for yeast. Currently on ceftriaxone, flagyl, and micafungin (8/23-).   Activity: up ad olya, encourage ambulation   PPx: SCDs. SQH.   Dispo: SICU    Appreciate excellent SICU cares    Seen and examined with the chief resident who will discuss with Dr. Sharma and Dr Duran and the SICU " team.    Jayne Plaza MD  Urology PGY-2         Contacting the Urology Team     Please use the following job codes to reach the Urology Team. Note that you must use an in house phone and that job codes cannot receive text pages.     On weekdays, dial [phone] (or star-star-star [phone] on the new [employer] telephones) then [phone] to reach the Adult Urology resident or PA on call    On weekdays, dial [phone] (or star-star-star [phone] on the new [employer] telephones) then [phone] to reach the Pediatric Urology resident    On weeknights and weekends, dial [phone] (or star-star-star [phone] on the new [employer] telephones) then [phone] to reach the Urology resident on call (for both Adult and Pediatrics)

## 2018-08-25 NOTE — PLAN OF CARE
Problem: Patient Care Overview  Goal: Plan of Care/Patient Progress Review  OT: OT HOLDING, Per discussion w/ PT, pt following 50-75% of commands, max-total A x2 for movements, OT will reschedule for 8/28, as appropriate.

## 2018-08-25 NOTE — PROGRESS NOTES
SURGICAL ICU PROGRESS NOTE  08/25/2018      CO-MORBIDITIES:   Change in mole  (primary encounter diagnosis)  Malignant neoplasm of overlapping sites of bladder (H)    ASSESSMENT: Sunitha Jacques is a 78 year old female with a history of DMII, HTN, hypothyroidism, CVA (2017), obesity, CHF, CKD, AFib (on warfarin), and MIKKI who underwent total cystectomy, pelvic exenteration (removal of uterus, vaginal cuff, part of vagina), ileal conduit and flap for invasive bladder cancer on 8/8/18. She developed altered mental status and hypoxia the night of 8/9 and was transferred to the SICU 8/10 for stabilization. After return of baseline mental status and stabilization she was transferred to the floor 8/12. During the same hospital admission, on 8/21 she experienced acute hypoxic respiratory failure due to a presumed aspiration event requiring BiPAP with escalating FiO2 needs. The following day on 8/22 she converted to afib with RVR and unstable pressures. She was transferred to the SICU for respiratory support and hemodynamic stabilization. She was intubated the evening of 8/22 for worsening respiratory distress. Found to have Candidemia on 8/23, started treatment with micafungin.     TODAY'S PROGRESS/PLANS:   - Wean FiO2 as tolerated, vent settings stable to slightly improved  - Stop PPN now that TFs are at goal  - MIVF @ 50 ml/hr  - 1 unit of pRBCs this morning  - Fluid creatinine from wound drainage and intraperitoneal drain to evaluate for possible urine leak   - Remove port-a-cath at bedside today    PLAN:  Neuro/ pain/ sedation:  # H/o CVA  - Monitor neurological status. Notify the MD for any acute changes in exam.  - Propofol gtt for sedation. RASS goal -1 to -2  - Fentanyl gtt for pain  - Lidoderm patches   - Holding escitalopram 10 mg daily     Pulmonary care:   # H/o MIKKI   # Acute respiratory failure with hypoxia   - intubated 8/22 for progressive respiratory distress while on HFNC  - PEEP at 12, FiO2 weaned to  45 this morning, continue to wean PEEP and FiO2 as able  - chest physiotherapy with RT  - Saline neb Q3H, racepinephrine neb PRN  - PTA Singulair daily      Cardiovascular:    # H/o HTN  #H/o CAD  # H/o possible HFrEF  # A-fib with RVR  - Monitor hemodynamic status.   - metoprolol 50 mg BID for afib rate control, s/p amio gtt  - Mildly elevated troponin 8/23, possible leak from afib with RVR, stable   - PTA atorvastatin, hydralazine   - ECHO 8/9 EF 55-60%, normal LVF, R heart could not be assessed      GI care:   #H/o GERD  - NPO, OK for ice chips  - Tube feeds at goal  - Discontinue PPN  - Bowel regimen: Miralax daily, senna-docusate BID  - Simethicone PRN      Fluids/ Electrolytes/ Nutrition:   - Appears fluid down, 50 ml/hr MIVF    Renal/ Fluid Balance:    # H/o CKD stage III  #H/o bladder cancer, s/p total cystectomy, pelvic exenteration, ileal conduit and flap   #H/o L Hydronephrosis, s/p left nephrostomy tube  - Will continue to monitor intake and output  - Continues to have worsening BUN and Cr, was aggressively diuresed prior to SICU admission, bolus as needed  - Fluid creatinine from wound drainage and intraperitoneal drain to evaluate for possible urine leak       Endocrine:    # H/o Type II DM  # H/o hypothyroidism  - ssi, endocrine following and has established insulin regimen  - PTA Levothyroxine      ID/ Antibiotics:  # Aspiration pneumonia  - Afebrile overnight   - Ceftriaxone and Flagyl for 7 day course  - micafungin for fungemia      Heme:     - Hemoglobin stable      MSK:  - PT/OT      Prophylaxis:    - Mechanical and subcutaneous heparin prophylaxis for DVT.  - Protonix      Lines/ tubes/ drains:   - PIV, RIVKA x2, Port, OG      Disposition:  - Surgical ICU    ====================================    SUBJECTIVE:   No events overnight. Vent settings were stable overnight, improved FiO2 this morning when up to chair. Continues to have a large amount of serous drainage from midline wound.    OBJECTIVE:    1. VITAL SIGNS:   Temp:  [97.7  F (36.5  C)-99.3  F (37.4  C)] 97.7  F (36.5  C)  Heart Rate:  [] 75  Resp:  [14-22] 20  BP: ()/() 103/81  FiO2 (%):  [65 %-80 %] 70 %  SpO2:  [88 %-99 %] 94 %  Ventilation Mode: CMV/AC  (Continuous Mandatory Ventilation/ Assist Control)  FiO2 (%): 70 %  Rate Set (breaths/minute): 18 breaths/min  Tidal Volume Set (mL): 350 mL  PEEP (cm H2O): 12 cmH2O  Oxygen Concentration (%): 60 %  Resp: 20    2. INTAKE/ OUTPUT:   I/O last 3 completed shifts:  In: 4179.12 [I.V.:819.12; NG/GT:340; IV Piggyback:1000]  Out: 760 [Urine:675; Drains:85]    3. PHYSICAL EXAMINATION:   General: Sitting up in chair, appears comfortable   Neuro: sedated with propofol, answers questions  Resp: Intubated with high FiO2 needs  CV: Irregular rhythm, normal rate  Gastrointestinal: soft, obese, non-tender to palpation. Midline incision closed with staples, inferior aspect open and packed with gauze. Open site clean with increasing large amount of drainage  Extremities: moving all extremities  Skin: rash underneath pannus is covered with ABD    4. INVESTIGATIONS:   Arterial Blood Gases     Recent Labs  Lab 08/21/18  1613   PH 7.38   PCO2 46*   PO2 59*   HCO3 27     Complete Blood Count     Recent Labs  Lab 08/25/18  0548 08/24/18  0418 08/23/18  0445 08/22/18  1450   WBC 14.9* 13.4* 12.9* 12.6*   HGB 7.0* 7.9* 7.7* 8.0*    164 160 157     Basic Metabolic Panel    Recent Labs  Lab 08/25/18  0548 08/24/18  0418 08/23/18  0445 08/22/18  1450    134 136 133   POTASSIUM 4.3 4.6 4.4 4.5   CHLORIDE 102 102 101 100   CO2 23 20 26 23   * 92* 76* 69*   CR 2.04* 1.81* 1.42* 1.26*   * 127* 192* 228*     Liver Function Tests    Recent Labs  Lab 08/22/18  0526 08/20/18  0449   AST 52* 59*   ALT 87* 79*   ALKPHOS 81 70   BILITOTAL 0.5 0.3   ALBUMIN 2.6* 1.6*   INR  --  1.09     Pancreatic Enzymes  No lab results found in last 7 days.  Coagulation Profile    Recent Labs  Lab  08/20/18  0449   INR 1.09     Lactate  Invalid input(s): LACTATE    5. RADIOLOGY:   Recent Results (from the past 24 hour(s))   CT Abdomen Pelvis w Contrast    Narrative    EXAMINATION: CT ABDOMEN PELVIS W CONTRAST, 8/22/2018 11:52 AM    TECHNIQUE:  Helical CT images from the lung bases through the  symphysis pubis were obtained  with contrast.    CONTRAST DOSE: 135 cc of Omnipaque 370    COMPARISON: CT  8/19/2018, PET 6/27/2018    HISTORY: 1) Acute respiratory failure - rule out PE , 2) sepsis - rule  out abdominal abscess, obstruction, other abd pathology;     FINDINGS:    Chest:   Partially seen bilateral small to moderate pleural effusions with  overlying atelectasis, right more than left. No superimposed  consolidative opacities. These findings are better characterized on  the CT chest.    Abdomen and pelvis:    Left lower quadrant pelvic drain. A second subcutaneous drain goes up  to the upper mid abdomen.    Postoperative changes of cystectomy with right lower quadrant conduit.  Bilateral ureteral stents. Unchanged bilateral renal cysts and  hypodensities. Interval resolution of the left hydronephrosis. Slight  increased size of the 4.2 x 2.9 cm fluid collection in the abdominal  wall adjacent to the ileal conduit series 5 image 270), compared to 4  x 2.6 cm on the CT 8/19/2018.    Continued loculated 3.5 x 3.5 cm right anterior hemipelvic collection  (series 5 image 308). Slightly decreased air within the 3.6 x 2.8 cm  left hemipelvic collection (series 5 image 285). Other scattered fluid  in the pelvis with some loculated components. No thick rims of  enhancement to clearly indicate abscess. Decrease punctate foci of  intraperitoneal air in the pelvis. Continued and not substantially  changed soft tissue stranding in the pelvis.    Again seen midline wound dehiscence with large rectal diastases and  anterior eventration containing loops of colon and small bowel,  without evidence of bowel obstruction. Node  clear communicating  fistula. No well-defined abscess. Air and packing material is seen in  the skin incision. Mild prominent fluid-filled loops of small bowel,  likely reactive ileus. Small amount of fluid in the hernial sac.    Unremarkable liver and gallbladder. The spleen is within normal  limits. Unchanged right adrenal nodule.     No portal venous gas or free air. No abdominopelvic lymphadenopathy.    Atherosclerotic calcifications of the coronary aorta and the major  arteries.    Bones and soft tissues:  No suspicious osseous findings. Mild multilevel degenerative changes  of thoracolumbar spine.      Impression    IMPRESSION:   Status post cystectomy for bladder cancer:  1. Right lower quadrant ileal conduit with unchanged small amount of  abdominal wall fluid adjacent to the stoma.  2. Bilateral ureteral stents in place. Near resolved left  hydronephrosis.  3. Multiple loculated fluid collections in the pelvis. Overall, sizes  the collections is similar. The scattered areas of air have decreased.  These may be postoperative although developing abscesses are also  possible.  4. Again seen midline incision dehiscence with packing material and  air in the region but without fluid containing abscess identified.  5. Large anterior abdominal wall eventration containing loops of small  and large bowel, without evidence of obstruction or fistulization.  Dilated loops of bowel within the abdomen are likely reactive ileus.  6. Increased bilateral pleural effusions and overlying  atelectasis/consolidation, better charecterized on the CT chest    I have personally reviewed the examination and initial interpretation  and I agree with the findings.    MELISSA LOPEZ MD   CT Chest Pulmonary Embolism w Contrast    Narrative    EXAMINATION: CT CHEST PULMONARY EMBOLISM W CONTRAST, 8/22/2018 12:03  PM     COMPARISON: PET 6/27/2018, radiograph 8/21/2018, 8/17/2018    HISTORY: Acute respiratory failure    TECHNIQUE:  Volumetric helical acquisition of CT images of the chest  from the lung apices to the kidneys were acquired after the  administration of 135 mL of Isovue-370 IV contrast. Three-dimensional  (3D) post-processed angiographic images were reconstructed, archived  to PACS and used in interpretation of this study.    Contrast: iopamidol (ISOVUE-370) solution 135 mL    FINDINGS:     Right chest wall Port-A-Cath tip near the superior anterior caval  junction.  Additional right IJ central venous catheter. Flow artifact around the  lines in the right innominate vein (series 9 image 55, series 6 image  42).    There is adequate opacification of the pulmonary vasculature. No  pulmonary embolism, Persistent mild cardiomegaly. Coronary  calcification. Aortic valve calcifications. No signs of right heart  strain. Chronic dilated main pulmonary artery measures up to 3.7 cm.  Scattered calcified plaque of the thoracic aorta. No pericardial  effusion. New  thoracic lymphadenopathy, for example a 2 cm subcarinal  lymph node (series 9 image 131). Is present.    The central tracheobronchial tree is patent. Bilateral small pleural  effusions, right more than left with overlying  atelectasis/consolidation. Adjacent consolidative opacity in the  bilateral upper and lower lobes, right more than left. No  pneumothorax.    Visualized upper abdomen: Partially seen bilateral ureteral stents.  Bones and soft tissues: No suspicious osseous findings.      Impression    IMPRESSION:   1. Right internal jugular approach chest wall Port-A-Cath and a second  intravenous catheter. Possible nonocclusive thrombus in the right  innominate vein around the catheters vs flow artifact. Recommend  duplex for confirmation.  2. No pulmonary embolism. Chronic dilated main pulmonary artery,  suggestive pulmonary hypertension.  3. Increased bilateral small pleural effusions, right more than left.  New consolidative opacities in the bilateral upper and lower  lobes,  right more than left. Differential infection or aspiration.  4. Dilated main pulmonary artery possibly pulmonary hypertension.    [Result: Question of right IJ Intravenous thrombus]    Finding was identified on 8/22/2018 12:04 PM.     [facility]  was contacted by Jessica SOLORIO at 8/22/2018 12:37 PM and  verbalized understanding of the urgent finding.     I have personally reviewed the examination and initial interpretation  and I agree with the findings.    PATY SAM MD   XR Chest Port 1 View    Narrative    Exam: XR CHEST PORT 1 VW, 8/22/2018 6:50 PM    Indication: eval ET tube placement;     Comparison: Chest x-ray 8/21/2018    Findings:   Frontal chest x-ray. Endotracheal tube projects 3.2 cm above the  annabella. Cardiac silhouette is enlarged and unchanged. No pneumothorax.  Right IJ catheter and right chest wall oiv catheter are unchanged in  position. Persistent perihilar and basilar opacities with increased  right mid zone opacity. Bilateral pleural effusion.      Impression    Impression:   1. Endotracheal tube projects 3.0 cm above the annabella.  2. Persistent perihilar and basilar opacities, representing pulmonary  edema versus infection.  3. Small bilateral pleural effusions.    I have personally reviewed the examination and initial interpretation  and I agree with the findings.    CHAR CARL MD   XR Abdomen Port 1 View    Narrative    Exam: XR ABDOMEN PORT 1 VW, 8/22/2018 6:59 PM    Indication: OG placement;     Comparison: X-ray 8/7/2018    Findings:   Supine frontal view of the abdomen. Enteric tube projects over the GE  junction and tip projects over the proximal stomach. Postsurgical  changes of pelvic surgery. Pelvic drain projecting over the pelvis.  Presumed ureteric stents. Surgical staples projecting over the central  abdomen. Nonspecific bowel gas pattern with few gaseous distended  bowel loops.       Impression    Impression:   1. Enteric tube sidehole projects over the  gastroesophageal junction  and tip projects over the proximal stomach.  2. Postsurgical changes of pelvic surgery.  3. Nonspecific bowel gas pattern with few gas distended bowel loops  projecting over the central abdomen.     I have personally reviewed the examination and initial interpretation  and I agree with the findings.    CHAR CARL MD       =========================================  Patient seen and discussed with ICU staff, Dr. Moss    - - - - - - - - - - - - - - - - - -    Peter Ponce, PGY2  General Surgery  [phone]

## 2018-08-25 NOTE — PLAN OF CARE
Problem: Patient Care Overview  Goal: Plan of Care/Patient Progress Review  D: 77 y/o female s/p cystectomy, pelvic exenteration, ileal conduit and flap. Ventilated on fentanyl gtt and propofol gtt.  I/A: Patient remains alert throughout shift. Able to nod head to answer questions and follow commands appropriately. A fib, bp's stable. MAP > 65 throughout shift. Minimal ett secretions. On 70% fio2. Tube feeds at goal of 45mL/hr with 30 Q 4 water flushes. Patient denies pain, lido patches placed on abdomen. AM Hgb 7.0, team notified, awaiting response. Blood sugars remain high.   P: Continue to wean Fio2. Follow up on Hgb and sliding scale orders. Follow plan of care and report to SICU with changes.     Problem: Diabetes Comorbidity  Goal: Diabetes  Patient comorbidity will be monitored for signs and symptoms of hyperglycemia or hypoglycemia. Problems will be absent, minimized or managed by discharge/transition of care.   Patients blood glucose remains high despite insulin Q4.

## 2018-08-26 NOTE — PLAN OF CARE
"Problem: Infection, Risk/Actual (Adult)  Goal: Identify Related Risk Factors and Signs and Symptoms  Related risk factors and signs and symptoms are identified upon initiation of Human Response Clinical Practice Guideline (CPG).   Outcome: No Change  VSS. Tmax 100.8 decreased to 99.3. bgls maintained with insulin regimen. Pain maintained with fentanyl. Continue to assess and monitor.     Problem: Patient Care Overview  Goal: Plan of Care/Patient Progress Review  Outcome: No Change  D: Tmax 100.8, decreased to 99.3. VSS. IV D51/2NS at 50ml/hr and fentanyl at 50 with pain relief.   A/I: FiO2 decreased to 40% FiO2 and SpO2 >95%. CMV settings. Gave scheduled metoprolol per SICU MD, even though lower BPs. A couple low BPs, but once switched positioning of cuff still MAP goal >65 all night. No complaints of chest pain or pain with head shaked \"no\" to pain when asked. Fentanyl continues to at 50. Dressings changed per plan of care. Rectal tube ok output reported to oncoming RN to continue following. Bgls low at 65 start of shift with TF off awaiting verification from abdominal xray, 25mls D50 given per protocol, recheck bgls 120s. TF restarted. No rapid insulin given, last bgl 130s. 1 U blood finishing at start of shift hgb increased from 7 to 8. Propofol held d/t lower BPs and pt shakes head \"yes\" to comfortable and is breathing with vent. Called SICU MD to verify is patient needs D51/2NS at 50 to continue - awaiting response.   P: Continue to assess and monitor and plan of care.     Problem: Diabetes Comorbidity  Goal: Diabetes  Patient comorbidity will be monitored for signs and symptoms of hyperglycemia or hypoglycemia. Problems will be absent, minimized or managed by discharge/transition of care.   Outcome: No Change  Bgls low at 65 start of shift with TF off awaiting verification from abdominal xray, 25mls D50 given per protocol, recheck bgls 120s. TF restarted. No rapid insulin given only long acting 20 unites, last " bgl 130s. Reported to oncoming RN to follow up with team for possibly decreasing long acting insulin.

## 2018-08-26 NOTE — PLAN OF CARE
"D: Tmax 100.8, decreased to 99.3. VSS. IV D51/2NS at 50ml/hr and fentanyl at 50 with pain relief.   A/I: FiO2 decreased to 40% FiO2 and SpO2 >95%. CMV settings. Gave scheduled metoprolol per SICU MD, even though lower BPs. A couple low BPs, but once switched positioning of cuff still MAP goal >65 all night. No complaints of chest pain or pain with head shaked \"no\" to pain when asked. Fentanyl continues to at 50. Dressings changed per plan of care. Rectal tube ok output reported to oncoming RN to continue following. Bgls low at 65 start of shift with TF off awaiting verification from abdominal xray, 25mls D50 given per protocol, recheck bgls 120s. TF restarted. No rapid insulin given, last bgl 130s. 1 U blood finishing at start of shift hgb increased from 7 to 8. Propofol held d/t lower BPs and pt shakes head \"yes\" to comfortable and is breathing with vent. Called SICU MD to verify is patient needs D51/2NS at 50 to continue - awaiting response.   P: Continue to assess and monitor and plan of care.     "

## 2018-08-26 NOTE — PROCEDURES
Procedure/Surgery Information   [facility], [place]     Bedside Procedure Note  Date of Service (when I performed the procedure): 08/26/2018    PROCEDURE PERFORMED:  Central Venous Catheter removal- Right internal jugular tunneled catheter with port    PRIMARY INDICATION:  Fungemia with indwelling central line    PAUSE FOR THE CAUSE: Right patient: Yes      Right body part: Yes      Right procedure Yes    ULTRASOUND GUIDANCE:  Not Applicable    DIAGNOSTIC DATA REVIEW:  Non-Applicable    H&P STATUS: H&P was reviewed, the patient was examined and no change has occurred in patient's condition since H&P was completed.    BARRIER PRECAUTIONS & STERILE TECHNIQUE  As documented in the Pre-Procedure Check List.    LOCAL ANESTHESIA:  Lidocaine 1% 4ml with epinephrine    NUMBER OF KITS USED:  1    STERILE DRESSINGS:  Applied    Procedure:  After informed consent obtained from patient's son (patient intubated and sedated)- right chest was prepped and draped in sterile fashion.  Patient placed with head down. Local anesthetic injected and then a 4 cm incision made thru prior scar on the port site.  Capsule encountered and entered sharply.  Port grasped and extracted, pressure held at internal jugular vein insertion site.  Hemostasis obtained, line tract sutured closed with vicryl suture x1.  Skin closed in two layers with absorbable sutures- deep interrupted sutures and then running subcuticular sutures.  Dressings applied.  No complications    ESTIMATED BLOOD LOSS:  Minimal    SPECIMENS COLLECTED:  None    SPECIMENS SENT:  None    FOLLOW- UP CHEST X-RAY:  Not indicatedar  COMPLICATIONS:  none    PRIMARY PROCEDURALIST:   Dr. Damon    SUPERVISING PHYSICIAN:  Dr. Isa Damon

## 2018-08-26 NOTE — PROGRESS NOTES
"Urology  Progress Note    Remains intubated  Afebrile overnight  1U PRBCs, Hgb 7.0 --> 8.0  Attempted to wean off propofol, got agitated  TPN stopped    Exam  /73  Pulse 100  Temp 99.3  F (37.4  C) (Axillary)  Resp 20  Ht 1.56 m (5' 1.42\")  Wt 114 kg (251 lb 5.2 oz)  SpO2 94%  BMI 46.84 kg/m2  No acute distress,, sitting up in bed   Mechanical ventilation  Abdomen soft, mildly distended. Inferior aspect of the wound packed. Fascia intact at area where wound is open. Stoma pink and well perfused, 2 stents in place.   RIVKA x 2 serous  Urostomy bag with clear yellow urine in tubing    Urostomy 765/195  LUQ RIVKA 23/0  LLQ RIVKA 24/15    Labs  WBC 16.8 (14.9)   Hgb 8.0 (7.0)   Crt 2.2 (2.04)  Vaginal fluid Crt 2.1  RIVKA Crt pending     Assessment/Plan  78 year old y/o female POD#19 s/p anterior pelvic exteneration, VRAM flap to pelvis (Plastics), BLPLND, creation of ileal conduit for MIBC. Medicine consulted for assistance managing multiple medical comorbidities. Ongoing issues include mental status and hypercarbia/hypoxia. Transferred to the SICU for worsening respiratory status, which improved enough to allow transfer to step down unit. Medicine co-managing. Again transferred to SICU on POD#15 for respiratory decompensation, currently intubated.    Neuro: Fentanyl and propofol for sedation while intubated  CV:  Afib with RVR. Sched IV metop. BP support per SICU team.  Pulm: Mechanical ventilation. Weaning per SICU.  FEN/GI: NPO. TFs started 8/23, currently at goal. Diuresis per medicine team.   Endo: ISS. Continue home synthroid.   :  L PNT removed 8/16. Urostomy. Monitor urine output  Heme: Hgb stable. Home warfarin held   ID: 8/22 BCx positive for yeast. Currently on ceftriaxone, flagyl, and micafungin (8/23-). Recommend coverage of Enterococcus growing in urine and from wound.   Activity: up ad olya, encourage ambulation   PPx: SCDs. SQH.   Dispo: SICU    Appreciate excellent SICU cares    Seen and examined " with the chief resident who will discuss with Dr. Sharma and Dr Duran and the SICU team.    Jayne Plaza MD  Urology PGY-2         Contacting the Urology Team     Please use the following job codes to reach the Urology Team. Note that you must use an in house phone and that job codes cannot receive text pages.     On weekdays, dial [phone] (or star-star-star [phone] on the new [employer] telephones) then [phone] to reach the Adult Urology resident or PA on call    On weekdays, dial [phone] (or star-star-star [phone] on the new [place] telephones) then [phone] to reach the Pediatric Urology resident    On weeknights and weekends, dial [phone] (or star-star-star [phone] on the new [place] telephones) then [phone] to reach the Urology resident on call (for both Adult and Pediatrics)

## 2018-08-26 NOTE — PLAN OF CARE
Problem: Patient Care Overview  Goal: Plan of Care/Patient Progress Review  Outcome: No Change  SICU with hypoxic respiratory failure and AMS- intubated 8/22.     Pt drowsy at times. RASS 0 to -1. Nods yes or no to questions. On CMV settings throughout shift. PEEP decreased to 10 at 1230.  and FiO2 ranged from 40 50% throughout shift. See flowsheets. Scant to small amount of tan/blood-tinged, thick sputum noted. Afebrile. Pt hypotensive x1 after propofol increased to 10 due to agitation - see flowsheets. 500 ml LR bolus given to pt. Propofol at 5 currently with MAPs in high 70s. A fib. Edema noted throughout. Rectal tube in place with large amount of diarrhea. See flowsheets. MD contacted to discontinue bowel meds. Awaiting orders. Pt OG at 70 with tube feeds at goal of 45 ml/hr. 30 ml/hr water flush. Minimal urine output. MD aware. See flowsheets. Purwick in place. Awaiting sample for creatinine of fluid. Sacral mepilex changed. See flowsheets. Abdominal dressings changed 3x this shift. 3 PIV infusing. Portacath removed at bedside by MDs this shift. Pt tolerated well. Mepilex in place. See flowsheets. Son Galen, at bedside and updated on pt status. Fentanyl at 50 mcg. See MAR.    Plan to decrease PEEP to 8 tonight. Possible pressure support tomorrow AM. Monitor blood pressure. MAP goal > 65. Monitor blood glucose. Monitor urine output. Change dressings as ordered. Turn pt q2h. Notify team if any changes.    Problem: Diabetes Comorbidity  Goal: Diabetes  Patient comorbidity will be monitored for signs and symptoms of hyperglycemia or hypoglycemia. Problems will be absent, minimized or managed by discharge/transition of care.   Outcome: No Change  Pt with high blood glucose today. Sliding scale insulin given. Long acting insulin given    Problem: Renal Insufficiency Comorbidity  Goal: Renal Insufficiency  Patient comorbidity will be monitored for signs and symptoms of Renal Insufficiency (Chronic) condition.   Problems will be absent, minimized or managed by discharge/transition of care.   Outcome: No Change  Low urine output today. 20 mg PO lasix started daily.

## 2018-08-26 NOTE — PROGRESS NOTES
SURGICAL ICU PROGRESS NOTE  08/26/2018      CO-MORBIDITIES:   Change in mole  (primary encounter diagnosis)  Malignant neoplasm of overlapping sites of bladder (H)    ASSESSMENT: Sunitha Jacques is a 78 year old female with a history of DMII, HTN, hypothyroidism, CVA (2017), obesity, CHF, CKD, AFib (on warfarin), and MIKKI who underwent total cystectomy, pelvic exenteration (removal of uterus, vaginal cuff, part of vagina), ileal conduit and flap for invasive bladder cancer on 8/8/18. She developed altered mental status and hypoxia the night of 8/9 and was transferred to the SICU 8/10 for stabilization. After return of baseline mental status and stabilization she was transferred to the floor 8/12. During the same hospital admission, on 8/21 she experienced acute hypoxic respiratory failure due to a presumed aspiration event requiring BiPAP with escalating FiO2 needs. The following day on 8/22 she converted to afib with RVR and unstable pressures. She was transferred to the SICU for respiratory support and hemodynamic stabilization. She was intubated the evening of 8/22 for worsening respiratory distress. Found to have Candidemia on 8/23, started treatment with micafungin.     TODAY'S PROGRESS/PLANS:   - Wean FiO2 & PEEP as tolerated. If tolerating PEEP of 8, can try PST.  - Discontinued IVF  - Removed port-a-cath at bedside today  - Purewick and hygiene per nurse for light yellow watery drainage from wound  - Nutrition labs, replace FT Monday  - Consider removing staples & placing wound vac Monday  - Started lasix 20 daily  - Blood cultures x2    PLAN:  Neuro/ pain/ sedation:  # H/o CVA  - Monitor neurological status. Notify the MD for any acute changes in exam.  - Propofol gtt for sedation. RASS goal -1 to -2  - Fentanyl gtt for pain  - Lidoderm patches   - Holding escitalopram 10 mg daily     Pulmonary care:   # H/o MIKKI   # Acute respiratory failure with hypoxia   - intubated 8/22 for progressive respiratory  distress while on HFNC  - PEEP at 12, FiO2 weaned to 40, continue to wean PEEP and FiO2 as able. Will try PEEP 12 -> 10 -> 8 -> PST.  - chest physiotherapy with RT  - Saline neb Q3H, racepinephrine neb PRN  - PTA Singulair daily      Cardiovascular:    # H/o HTN  #H/o CAD  # H/o possible HFrEF  # A-fib with RVR  - Monitor hemodynamic status.   - metoprolol 50 mg BID for afib rate control, s/p amio gtt  - Mildly elevated troponin 8/23, possible leak from afib with RVR, stable   - PTA atorvastatin, hydralazine   - ECHO 8/9 EF 55-60%, normal LVF, R heart could not be assessed      GI care:   #H/o GERD  - NPO, OK for ice chips  - Tube feeds at goal. FT accidentally pulled out by patient. Replace FT Monday.  - Bowel regimen: Miralax daily, senna-docusate BID  - Simethicone PRN  - Nutrition labs Monday      Fluids/ Electrolytes/ Nutrition:   # Hypervolemic  - Net +6.2 L since admission.  - D/c'd IVFs  - Started Lasix 20 daily    Renal/ Fluid Balance:    # H/o CKD stage III  #H/o bladder cancer, s/p total cystectomy, pelvic exenteration, ileal conduit and flap   #H/o L Hydronephrosis, s/p left nephrostomy tube  - Will continue to monitor intake and output  - Continues to have worsening BUN and Cr, was aggressively diuresed prior to SICU admission, bolus as needed  - Fluid creatinine from light yellow watery wound drainage and intraperitoneal drain 2.1. Purewick and hygiene per nurse.      Endocrine:    # H/o Type II DM  # H/o hypothyroidism  - ssi, endocrine following and has established insulin regimen  - PTA Levothyroxine      ID/ Antibiotics:  # Aspiration pneumonia  # Fungemia  - Afebrile overnight   - Ceftriaxone and Flagyl for 7 day course (ending 8/29)  - micafungin for fungemia  - Blood cultures x2 8/26 pending      Heme:     # Anemia  - Hemoglobin 7 -> 8 s/p 1U pRBC 8/25  - Monitor      MSK:  - PT/OT  - Consider removing staples & placing wound vac Monday      Prophylaxis:    - Mechanical and subcutaneous heparin  prophylaxis for DVT.  - Protonix      Lines/ tubes/ drains:   - PIV, RIVKA x2, OG, ETT. Port removed.      Disposition:  - Surgical ICU    ====================================    SUBJECTIVE:   Propofol held for low BP. Light yellow watery drainage from wound. Otherwise, no acute events.    OBJECTIVE:   1. VITAL SIGNS:   Temp:  [98.5  F (36.9  C)-100.8  F (38.2  C)] 98.6  F (37  C)  Heart Rate:  [] 77  Resp:  [18-20] 20  BP: ()/(26-95) 117/49  FiO2 (%):  [35 %-50 %] 40 %  SpO2:  [89 %-99 %] 94 %  Ventilation Mode: CMV/AC  (Continuous Mandatory Ventilation/ Assist Control)  FiO2 (%): 40 %  Rate Set (breaths/minute): 18 breaths/min  Tidal Volume Set (mL): 350 mL  PEEP (cm H2O): 12 cmH2O  Oxygen Concentration (%): 40 %  Resp: 20    2. INTAKE/ OUTPUT:   I/O last 3 completed shifts:  In: 2543.83 [I.V.:1418.83; NG/GT:240]  Out: 1028 [Urine:935; Drains:43; Stool:50]    3. PHYSICAL EXAMINATION:   General: Lying in bed, comfortable  Neuro: sedated with propofol  Resp: Intubated, weaning FiO2 & PEEP  CV: Irregular rhythm, normal rate  Gastrointestinal: soft, obese, non-tender to palpation. Midline incision closed with staples, inferior aspect open and packed with gauze. Open site clean with increasing amount of light yellow watery drainage  Extremities: moving all extremities  Skin: rash underneath pannus is covered with ABD    4. INVESTIGATIONS:   Arterial Blood Gases     Recent Labs  Lab 08/21/18  1613   PH 7.38   PCO2 46*   PO2 59*   HCO3 27     Complete Blood Count     Recent Labs  Lab 08/26/18  0542 08/25/18  0548 08/24/18  0418 08/23/18  0445   WBC 16.8* 14.9* 13.4* 12.9*   HGB 8.0* 7.0* 7.9* 7.7*    206 164 160     Basic Metabolic Panel    Recent Labs  Lab 08/26/18  0542 08/25/18  0548 08/24/18  0418 08/23/18  0445    136 134 136   POTASSIUM 4.4 4.3 4.6 4.4   CHLORIDE 103 102 102 101   CO2 21 23 20 26   * 121* 92* 76*   CR 2.17* 2.04* 1.81* 1.42*   * 190* 127* 192*     Liver Function  "Tests    Recent Labs  Lab 08/22/18  0526 08/20/18  0449   AST 52* 59*   ALT 87* 79*   ALKPHOS 81 70   BILITOTAL 0.5 0.3   ALBUMIN 2.6* 1.6*   INR  --  1.09     Pancreatic Enzymes  No lab results found in last 7 days.  Coagulation Profile    Recent Labs  Lab 08/20/18  0449   INR 1.09     Lactate  Invalid input(s): LACTATE    - Blood cultures x2 8/26: pending    5. RADIOLOGY:   - XR abdomen 8/25:   IMPRESSION: The orogastric tube is coiled in the stomach, with tip and sidehole projecting over the stomach.     =========================================  Patient seen and discussed with ICU staff, Dr. Moss    - - - - - - - - - - - - - - - - - -    Amor Chin, PGY1  Surgery ResidentStaff Summary  8/26/2018     height is 1.56 m (5' 1.42\") and weight is 114 kg (251 lb 5.2 oz). Her axillary temperature is 98.6  F (37  C). Her blood pressure is 117/49 and her pulse is 100. Her respiration is 20 and oxygen saturation is 94%.     Intake/Output Summary (Last 24 hours) at 08/26/18 1532  Last data filed at 08/26/18 1300   Gross per 24 hour   Intake           2902.9 ml   Output             1171 ml   Net           1731.9 ml       Patient is critically ill by my examination on the date of service (DOS) listed above and requires continued ICU monitoring and cares.  The patient is being seen in the Surgical Intensive Care Unit.      I have discussed patient cares and treatment plan with the ICU team as reflected in the residents note by Dr. Chin dated 8/26/2018.  All pertinent labs, imaging studies, physical exam and medications have been reviewed by myself with the SICU team. A Maddox catheter, if left in place, is required to monitor resuscitative efforts, tightly manage ins and outs, necessary for patient care needs, as well as other ICU cares.    Evaluation and management time exclusive of procedures was 30 minutes critical care time in the management and care of this patient including:  examination with the SICU team, " discussion of the patient's condition with other physicians and members of the care team, reviewing all data related to the patient, and time utilizing the EMR for documentation of this patient's care.  All data, assessments and plans were discussed with Dr. Chin and the ICU team.    Patient has respiratory failure, VAP, malnutrition. CRF: I spent 30 minutes while the patient was in this condition, providing critical care. I reviewed the resident s documentation and I agree with his assessment and plan of care.    Arianna Moss MD    --------------------------------

## 2018-08-26 NOTE — PLAN OF CARE
Problem: Patient Care Overview  Goal: Plan of Care/Patient Progress Review  Outcome: No Change  D: Remains in the MICU with acute respiratory failure after abdominal surgery.  IA: Temp is up at 100.4 orally and B/P is labile. Alternately alert and interactive with somnolence. Propofol stopped for now due to hypotensive episode and lethargy. At present time the pt is more alert and B/P normal. She remains on fentanyl at 50 mcg/hr. She received one unit of packed RBCs for hgb of 7.0 and tolerated well. She was up in a chair for a few hours with the mechanical lift and tolerated well. She has had large amount of diarrhea stool and a dignashield rectal tube was placed. She also has had light yellow watery drainage from her vaginal area that looks like urine. Dr. Moss was informed and assessed the drainage. A specimen was sent to lab for creatinine check.   P: Continue with current level of care. Assess and monitor closely.

## 2018-08-27 NOTE — PROGRESS NOTES
WO Nurse Inpatient [facility]   WO Nurse Inpatient Adult       Follow up Assessment   Assessment of new Urostomy Stoma complication(s) none   Mucocutaneous junction; minimal separation circumferentially with further separation from 12 to 4 O'clock creating a oval opening. Erosion of epidermis superior stoma 0.2cm x 2.6cm x 0.1cm with pink/moist dermis.             Peristomal complication(s)- blanchable erythema extends approximately 2-3 cm  Pouch wear time: 24-48 hours  Following today's visit:Patient /  is  able to demonstrate;       1. How to empty their pouch? No- pt is intubated      2. How to change their pouch?  no      3. How to read and record intake and output correctly? No     Pt unable to participate at this time being intubated      Objective data:  Patient history according to medical record: Sunitha Jacques is a 78 year old female with a complicated past medical history including DM, HTN, hypothyroidism, CVA (2017), obesity, CHF, CKD, AFib (on warfarin), and MIKKI admitted on 8/8/18 after total cystectomy, pelvic exenteration (removal of uterus, vaginal cuff, part of vagina), ileal conduit and flap for invasive bladder cancer. Now with AMS and hypoxia. Developed AMS and hypoxia and transferred to SICU 8/10. Stable and transferred back to medical floor 8/12.   Current Diet/Nutrition:   Active Diet Order      NPO for Medical/Clinical Reasons Except for: No Exceptions   TPN no   I/O last 3 completed shifts:  In: 2873.52 [I.V.:987.94]  Out: 2000 [Urine:1960; Drains:40]     Recent Labs   Lab Test  08/27/18   0338  08/26/18   0542   08/10/18   0506   08/08/18   1900   ALBUMIN  1.5*   --    < >  2.1*   --    --    HGB   --   8.0*   < >  7.6*   < >  9.2*   INR  1.35*   --    < >  1.32*   --    --    WBC   --   16.8*   < >  12.4*   < >  10.7   A1C   --    --    --    --    --   6.2*   CRP   --    --    --   300.0*   --    --     < > = values in this interval not displayed.  "                Physical Exam:  Current pouching system:Jewett 2 pc 57 mm convex urostomy  Reason for pouch change today: routine schedule  Stoma appearance: viable and healthy, lumen in center, stoma empties just at skin level, and draining from stents Slight puckered/divot areas at 2 and 8 O'clock   Stoma size; 1 1/2\" x 1 1/4\" ,   2 white stents remain intact  Peristomal skin: blanchable erythema extends approximately 2-3 cm  Stoma output : pale yellow urine in night draiange bag  Abdominal  Assessment  distended , NG still in place? No  Surgical Site: Covered with ABD and staples intact, lower end of wound staples removed and packed with gauze, Plastics team is managing.   Pain: pt unable to rate pain   Is patient still on a PCA No      Interventions:  Patient's chart evaluated.  Focus of today's visit: changed pouch   Participant of teaching session today- nurse  Orders: written  Change made with ostomy management today: yes switched to flat pouching system and barrier ring, peristomal skin treated with ostomy powder and no sting film barrier  Patient/family: Pt intuibated  Supplies:at bedside      Plan:  Learning needs: refitting of appliance, evaluate leakage issue, pouch change demonstration , pouch change return demonstration, verbal instruction , diet and hydration , pouch emptying, output measurement, odor/flatus management, lifestyle adjustments and discharge instructions  Preparation for discharge: No discharge preparations started  Recommend home care? by home WOC nurse if possible      Discussed plan of care with Nurse  Nursing to notify the Provider(s) and re-consult the WOC Nurse if new ostomy concerns or discharge planned before next planned WOC visit.                                           WOC Nurse will return: Monday          WOC Nurse Inpatient Wound Assessment   Reason for consultation: Evaluate and treat Coccyx wound       Assessment  Coccyx wound due to Moisture Associated Skin Damage " "(MASD) with friction/pressure component, possible Pilonidal cyst vs skin tags. Pt reports she has had cysts removed from area before.     8/27/18- Midline abdominal incision managed by plasticks. Wound is currently being packed which may decrease leakage.     Status: follow up  assessment, stable      Treatment Plan  Coccyx wound: Every other day  1. Clean wound with MicroKlenz Spray, pat dry  2. Paint with No Sting Skin Prep (#470272) and allow to dry thoroughly  3. Gently place aquacel ag (#[id number]) into wound bed   4. Press a Mepilex  Sacral Dressing (PS#[id number])  to the area, making sure to conform nicely to skin curvatures (begin placing the Mepilex at the most distal aspect first, smooth into place in an upward direction, then smooth side to side)   5. Time and date dressing change and edison with a \"T\" for treatment of a wound  6. Reposition pt every 1 to 2 hours when in bed and hourly when up to the chair to relieve pressure and promote perfusion to tissue     PIP ACTIVITY MEASURES:  If pt is refusing to turn or reposition they must be educated on the  potential injury from not off loading pressure.  Then this \"educated refusal\" needs to be documented as an \"educated refusal to turn/ reposition\" and document if alert, etc.     CHAIR: Pt should sit on a chair cushion ([id number]) when up to the chair and not sit for more than one hour at a time before fully offloading backside (either stand for a couple of minutes and/or return to bed, positioning on a side) to relieve pressure and re-perfuse tissue.  Additionally, encourage pt to shift side to side every 15 minutes, too.    NOTE: the Z-Flow Pad is NOT a cushion, pt should NOT sit on the Z-Flow pads     BED:  Reposition side to side every 1-2 hours when awake.     Consider Z-Willy Pillows to help keep pt on side (#[id number]-medium or #[id number]- large). *Z-Flows are for positioning, DO NOT SIT ON!    Keep heels elevated    As able keep HOB below 30 degrees    Evaluate for " "specialty mattress  NOTE: If pt is refusing to turn or reposition they must be educated on the potential injury from not offloading pressure.  Then this \"educated refusal\" needs to be documented as an \"educated refusal to turn/ reposition\" and document if alert, etc.  Additionally please notify MD and charge nurse of refusal(s).     Orders Written    WOC Nurse follow-up plan:weekly  Nursing to notify the Provider(s) and re-consult the WOC Nurse if wound(s) deteriorates or new skin concern.      Physical Exam  Skin inspection: focused Coccyx,       Wound Location:  Coccyx  Date of last photo 8/20/18, unable to take pic on 8/27 as pt desats upon trying to assess the wound and is combative                       Wound History: Unknown, pt not alert for hx  Measurements (length x width x depth, in cm) 3 cm x 3 cm  x  0.2 cm with 0.6cm x 0.5cm opening at distal aspect (no significant change in size)   Wound Base: V shaped pink, moist, non granular pink/moist tissue with erosion of epidermis from 3 to 5 O'clock . Less maceration, epidermal bumpy/firm cyst vs skin tag like growths.  Tunneling N/A  Undermining N/A  Palpation of the wound bed: normal   Periwound skin: improved denuded and macerated due to drainage from abdominal wound trapping in dressing and with use of aquacel  Color: slightly pink  Temperature: normal   Drainage:, small  Description of drainage: serosanguinous  Odor: none  Pain: unable to assess      Interventions  Current support surface: Standard low air loss   Current off-loading measures: Foam padding and Pillows under calves, prevalon boots, Zflow   Visual inspection of wound(s) completed  Wound Care: RN to complete as pt desats, thus unable to complete the wound care. Discussed and education nurse on dressing change, WOC Changed Urostomy pouching system using Flat barrier and ostomy ring  Supplies: Ordered Aquacel AG  Education provided: wound care/dressing change,  importance of repositioning, plan " of care and wound progress, keeping HOB less than 30.  Discussed plan of care with Nurse

## 2018-08-27 NOTE — PROGRESS NOTES
SURGICAL ICU PROGRESS NOTE  08/27/2018      CO-MORBIDITIES:   Change in mole  (primary encounter diagnosis)  Malignant neoplasm of overlapping sites of bladder (H)    ASSESSMENT: Sunitha Jacques is a 78 year old female with a history of DMII, HTN, hypothyroidism, CVA (2017), obesity, CHF, CKD, AFib (on warfarin), and MIKKI who underwent total cystectomy, pelvic exenteration (removal of uterus, vaginal cuff, part of vagina), ileal conduit and flap for invasive bladder cancer on 8/8/18. She developed altered mental status and hypoxia the night of 8/9 and was transferred to the SICU 8/10 for stabilization. After return of baseline mental status and stabilization she was transferred to the floor 8/12. During the same hospital admission, on 8/21 she experienced acute hypoxic respiratory failure due to a presumed aspiration event requiring BiPAP with escalating FiO2 needs. The following day on 8/22 she converted to afib with RVR and unstable pressures. She was transferred to the SICU for respiratory support and hemodynamic stabilization. She was intubated the evening of 8/22 for worsening respiratory distress. Found to have Candidemia on 8/23, started treatment with micafungin.     TODAY'S PROGRESS/PLANS:   - continue feeding through OG, likely NJ at extubation  - stopped lasix, consider fluid bolus if hypotensive  - wean PEEP as tolerated  - f/u blood cultures  - ophtho & ID consult for candidemia  - nutrition labs    PLAN:  Neuro/ pain/ sedation:  # H/o CVA  - Monitor neurological status. Notify the MD for any acute changes in exam.  - Wean propofol gtt  - Fentanyl gtt for pain  - Lidoderm patches   - pta escitalopram     Pulmonary care:   # H/o MIKKI   # Acute respiratory failure with hypoxia   - intubated 8/22 for progressive respiratory distress while on HFNC  - Continue to wean PEEP and FiO2 as able. Will try PEEP 12 -> 10 -> 8 -> PST.  - chest physiotherapy with RT  - Saline neb Q3H, racepinephrine neb PRN  - PTA  Singulair daily      Cardiovascular:    # H/o HTN  #H/o CAD  # H/o possible HFrEF  # A-fib with RVR  - Monitor hemodynamic status.   - metoprolol 50 -> 25 mg BID for afib rate control, s/p amio gtt  - PTA atorvastatin, hydralazine       GI care:   #H/o GERD  - NPO, OK for ice chips  - Tube feeds at goal. Continue OG for now, consider NJ when extubated.  - Bowel regimen: Miralax daily, senna-docusate BID  - Simethicone PRN  - Nutrition labs      Fluids/ Electrolytes/ Nutrition:   # Hypervolemic  - Net +6.6 L since admission.  - D/c'd IVFs  - Stopped lasix    Renal/ Fluid Balance:    # H/o CKD stage III  #H/o bladder cancer, s/p total cystectomy, pelvic exenteration, ileal conduit and flap   #H/o L Hydronephrosis, s/p left nephrostomy tube  # RONNIE, likely pre-renal  - Will continue to monitor intake and output  - Continues to have worsening BUN and Cr, was aggressively diuresed prior to SICU admission, stopped lasix, bolus fluids as needed  - Fluid creatinine from light yellow watery wound drainage and intraperitoneal drain 2.1. Purewick and hygiene per nurse.      Endocrine:    # H/o Type II DM  # H/o hypothyroidism  - ssi, endocrine following and has established insulin regimen, increased NPH 20 -> 25 for BGs in 200s  - PTA Levothyroxine      ID/ Antibiotics:  # Aspiration pneumonia  # Fungemia  - Afebrile overnight   - Ceftriaxone and Flagyl for 7 day course (ending 8/29)  - micafungin for fungemia  - Blood cultures x2 8/26 pending  - Consulted ophtho & ID per fungemia protocol      Heme:     # Anemia  - Hemoglobin stable  - Monitor      MSK:  # Wound dehiscence  - PT/OT  - Monitor. Wound packed with gauze      Prophylaxis:    - Mechanical and subcutaneous heparin prophylaxis for DVT.  - Protonix      Lines/ tubes/ drains:   - PIV, RIVKA x2, OG, ETT      Disposition:  - Surgical ICU    ====================================    SUBJECTIVE:   Port-a-cath removed yesterday. D50 given for BG < 65. Tried to wean PEEP, had to  increase propofol for agitation, BP dropped, got 1/2 dose evening metoprolol, propofol turned off.     OBJECTIVE:   1. VITAL SIGNS:   Temp:  [97.7  F (36.5  C)-99.3  F (37.4  C)] 97.7  F (36.5  C)  Heart Rate:  [] 77  Resp:  [18-22] 21  BP: ()/(23-89) 108/80  FiO2 (%):  [50 %-80 %] 65 %  SpO2:  [88 %-98 %] 98 %  Ventilation Mode: CMV/AC  (Continuous Mandatory Ventilation/ Assist Control)  FiO2 (%): 65 %  Rate Set (breaths/minute): 18 breaths/min  Tidal Volume Set (mL): 350 mL  PEEP (cm H2O): 8 cmH2O  Oxygen Concentration (%): 80 %  Resp: 21    2. INTAKE/ OUTPUT:   I/O last 3 completed shifts:  In: 2456.19 [I.V.:1031.19; NG/GT:390]  Out: 1350 [Urine:800; Drains:50; Stool:500]    3. PHYSICAL EXAMINATION:   General: Lying in bed, comfortable  Neuro: propofol weaned off  Resp: Intubated, weaning FiO2 & PEEP  CV: Irregular rhythm, normal rate  Gastrointestinal: soft, obese, non-tender to palpation. Midline incision closed with staples, inferior aspect open and packed with gauze. Open site clean with light yellow watery drainage  Extremities: moving all extremities  Skin: rash underneath pannus    4. INVESTIGATIONS:   Arterial Blood Gases     Recent Labs  Lab 08/21/18  1613   PH 7.38   PCO2 46*   PO2 59*   HCO3 27     Complete Blood Count     Recent Labs  Lab 08/27/18 0338 08/26/18  0542 08/25/18  0548 08/24/18  0418   WBC 16.0* 16.8* 14.9* 13.4*   HGB 7.3* 8.0* 7.0* 7.9*    190 206 164     Basic Metabolic Panel    Recent Labs  Lab 08/27/18 0338 08/26/18  0542 08/25/18  0548 08/24/18  0418    136 136 134   POTASSIUM 4.3 4.4 4.3 4.6   CHLORIDE 102 103 102 102   CO2 23 21 23 20   * 128* 121* 92*   CR 2.34* 2.17* 2.04* 1.81*   * 132* 190* 127*     Liver Function Tests    Recent Labs  Lab 08/27/18  0338 08/22/18  0526   AST 53* 52*   * 87*   ALKPHOS 165* 81   BILITOTAL 0.2 0.5   ALBUMIN 1.5* 2.6*   INR 1.35*  --      Pancreatic Enzymes  No lab results found in last 7  days.  Coagulation Profile    Recent Labs  Lab 08/27/18  0338   INR 1.35*     Lactate  Invalid input(s): LACTATE    - Blood cultures x2 8/26: NGTD  - Prealbumin 8/27: pending    5. RADIOLOGY:   - CXR 8/27: pending    =========================================  Patient seen and discussed with ICU staff, Dr. Nava    - - - - - - - - - - - - - - - - - -    Amor Chin, PGY1  Surgery Resident

## 2018-08-27 NOTE — PLAN OF CARE
Problem: Infection, Risk/Actual (Adult)  Goal: Identify Related Risk Factors and Signs and Symptoms  Related risk factors and signs and symptoms are identified upon initiation of Human Response Clinical Practice Guideline (CPG).   Outcome: No Change  No new s/sx of weakness. VSS. Tmax 99.3.     Problem: Patient Care Overview  Goal: Plan of Care/Patient Progress Review  Outcome: No Change  D: Tmax 99.3. VSS. MAP >65.   A/I: Metoprolol decreased to 25 at start of shift per MD d/t lower BPs last night. Lower BPs with MAPs in 30s on both arms - reported to MD - refer to Provider notification note. Pt recovered without intervention MAP >65. Propofol turned off since 200 d/t low BPs. Pt still breaths with vent and no increased agitation. Still pink tinged secretions through ETT - provider aware. Pt does have agitation with turns, but then relaxes once no stimulation. Continue to monitor. Pt does desat with turns and HOB flat lowest SpO2 low at 87%, good waveform. Recovered with FiO2 bumps and increased FiO2 to 65% to keep SpO2 >92%, unable to wean down to keep SpO2 >92%, FiO2 at 65% rest of night. Rectal tube little output - reported to RN. Abdominal dressing change done 1x - reported to oncoming RN. Little output from RIVKA drains. Good output from urine drain. Not enough fluid from purwick to send urine culture.      P: Continue to assess and monitor and plan of care. Per charge possibly removing sutures today.         Problem: Diabetes Comorbidity  Goal: Diabetes  Patient comorbidity will be monitored for signs and symptoms of hyperglycemia or hypoglycemia. Problems will be absent, minimized or managed by discharge/transition of care.   Outcome: No Change  Bgls controlled on insulin regimen. Continue to monitor.

## 2018-08-27 NOTE — PLAN OF CARE
BP low, BP 70s/30s MAPs 30s in both arms, turned off propofol - SICU Sergio SOLORIO notified, recheck on R arm had MAPs 56 . Per Sergio SOLORIO ok with BP for now said to recheck in a half hour and if still low will intervene. Per MD does not want any labs at this time. Patient mental status unchanged still nodds head to questions.

## 2018-08-27 NOTE — PROGRESS NOTES
"Urology  Progress Note    Remains intubated  Afebrile overnight    Exam  /54  Pulse 100  Temp 99.3  F (37.4  C) (Axillary)  Resp 20  Ht 1.56 m (5' 1.42\")  Wt 112.5 kg (248 lb 0.3 oz)  SpO2 95%  BMI 46.23 kg/m2  No acute distress,, sitting up in bed   Mechanical ventilation  Abdomen soft, mildly distended. Inferior aspect of the wound packed. Fascia intact at area where wound is open. Stoma pink and well perfused, 2 stents in place.   RIVKA x 2 serous  Urostomy bag with clear yellow urine in tubing    Urostomy 645/200  Purwick (abd drainage) 300/0  LUQ RIVKA 25/0  LLQ RIVKA 40/0    Labs  AM labs pending    Assessment/Plan  78 year old y/o female POD#20 s/p anterior pelvic exteneration, VRAM flap to pelvis (Plastics), BLPLND, creation of ileal conduit for MIBC. Medicine consulted for assistance managing multiple medical comorbidities. Ongoing issues include mental status and hypercarbia/hypoxia. Transferred to the SICU for worsening respiratory status, which improved enough to allow transfer to step down unit. Medicine co-managing. Again transferred to SICU on POD#15 for respiratory decompensation, currently intubated.    Neuro: Fentanyl and propofol for sedation while intubated  CV:  Afib with RVR. Sched IV metop. Pressures occasionally soft to 80's, BP support per SICU team.  Pulm: Mechanical ventilation. Weaning per SICU.  FEN/GI: NPO. TFs started 8/23, currently at goal. Diuresis per medicine team.   Endo: ISS. Continue home synthroid.   :  L PNT removed 8/16. Urostomy. Monitor urine output  Heme: Hgb stable. Home warfarin held   ID: 8/22 BCx positive for yeast. Currently on ceftriaxone, flagyl, and micafungin (8/23-). Recommend coverage of Enterococcus growing in urine and from wound.   Activity: up ad olya, encourage ambulation   PPx: SCDs. SQH.   Dispo: SICU    Appreciate excellent SICU cares    Seen and examined with the chief resident who will discuss with Dr. Sharma and the SICU team.    Jayne OHARA" Bola SOLORIO  Urology PGY-2         Contacting the Urology Team     Please use the following job codes to reach the Urology Team. Note that you must use an in house phone and that job codes cannot receive text pages.     On weekdays, dial [phone] (or star-star-star [phone] on the new [employer] telephones) then [phone] to reach the Adult Urology resident or PA on call    On weekdays, dial [phone] (or star-star-star 777 on the new [place] telephones) then [phone] to reach the Pediatric Urology resident    On weeknights and weekends, dial [phone] (or star-star-star 777 on the new [place] telephones) then [phone] to reach the Urology resident on call (for both Adult and Pediatrics)

## 2018-08-27 NOTE — CONSULTS
OPHTHALMOLOGY CONSULT NOTE  08/27/18    Patient: Sunitha Jacques      HISTORY OF PRESENTING ILLNESS:     Sunitha Jacques is a 78 year old female who has a history diabetes mellitis type 2, hypertension, hypothyroidism, CVA (2017), obesity, CHF, CKD, Afib (on warfarin), and flap for invasive bladder cancer on 08/08/18. She subsequently developed altered mental status and hypoxia on 08/09/18 and then stabilized. There is also an episode of acute hypoxic respiratory failure on 08/21/18. Patient is also on mcafungin for candidemia. Blood glucose has been in the 200's.      10+ review of systems were otherwise negative except for that which has been stated above.      OCULAR/MEDICAL/SURGICAL HISTORIES:     Past Ocular History:  intubated    Past Medical History:   Diagnosis Date     Atrial fibrillation with rapid ventricular response (H) 2/11/2017     CAD (coronary artery disease)      Cerebrovascular accident (H) 3/21/2017     CHF (congestive heart failure) (H) 3/21/2017     CRD (chronic renal disease), stage III     Due to bladder cancer     Essential hypertension 4/9/2017     GERD (gastroesophageal reflux disease)      History of MRSA infection 2017    treated april 2017     Hypertension      Hypothyroidism, unspecified type 12/14/2017     Long term current use of anticoagulant 2/12/2017     Morbid obesity (H) 4/13/2017     MIKKI (obstructive sleep apnea)      T2DM (type 2 diabetes mellitus) (H)        Past Surgical History:   Procedure Laterality Date     APPENDECTOMY  1975     AS TOTAL KNEE ARTHROPLASTY Bilateral 2015     CHOLECYSTECTOMY  1975     COLONOSCOPY       EXENTERATION ANTERIOR PELVIC N/A 8/8/2018    Procedure: EXENTERATION ANTERIOR PELVIC;  Anterior Pelvic Exenteration With Bilateral Lymphandectomy, Ileal Conduit Diversion, Left Vertical Rectus Abdominis Musculocutaneous Flap To Pelvis;  Surgeon: Girma Sharma MD;  Location: [facility] OR     GRAFT FLAP PEDICLE TRANSVERSE RECTUS ABDOMINIS  MYOCUTANEOUS N/A 8/8/2018    Procedure: GRAFT FLAP PEDICLE TRANSVERSE RECTUS ABDOMINIS MYOCUTANEOUS;;  Surgeon: RAPHAEL Calderón MD;  Location: [facility] OR     LYMPHADENECTOMY ABDOMINAL Bilateral 8/8/2018    Procedure: LYMPHADENECTOMY ABDOMINAL;;  Surgeon: Girma Sharma MD;  Location: [facility] OR     SHOULDER SURGERY  2003     TONSILLECTOMY      as a child       EXAMINATION:     Visual Acuity (assessed with near card): unable intubated  Pupils: Equal and reactive to light and accomodation with no afferent pupillary defect.    Intraocular Pressure: RE  13 ; LE 16  mmHg by tonopen (<5% error).   Motility: unable  Confrontational Visual Field: [id number]    External/Slit Lamp Exam   RIGHT EYE   Lids/Lashes: No Abnormality   Conj/Sclera: White and Quiet   Cornea:  Clear   Ant Chamber:  Deep and Quiet   Iris: Round and Reactive   Lens: NSC  LEFT   Lids/lashes: No Abnormality   Conj/Sclera: White and Quiet   Cornea:  Clear   Ant Chamber:  Deep and Quiet   Iris: Round and Reactive   Lens:NSC    Dilated Fundus Exam  Eyes Dilated? yes   Time: 2:18PM With Phenylephrine 2.5% and Mydriacyl 1%    (Normally, dilation with the above lasts about 4-6 hours)  RIGHT:   Anterior Vitreous: no obvious vitritis, difficult to examine at bedside   Media: Clear   Optic Nerve: Normal Contours and Size   Cup to Disc Ratio: 0.1/0.1   Macula: Multiple (10-15) creamy, white satellite lesions (about 1/5-1/4 disc diameter in size) throughout the macula and a few around the arcades and disc. No associated hemorrhages and no associated obvious vitritis.   Vessels: Normal Caliber and Distribution   Retinal Periphery: 3-5 additional whitish lesions as described above in posterior pole  LEFT:   Anterior Vitreous: Clear   Media: Clear   Optic Nerve: Normal Contours and Size   Cup to Disc Ratio: 0.1/0.1   Macula: 5-10 creamy, white satellite lesions (about 1/5-1/4 disc diameter in size) throughout the macula and a few around the arcades and  disc. No associated hemorrhages and no associated obvious vitritis.   Vessels: Normal Caliber and Distribution   Retinal Periphery: 5-10 additional whitish lesions as described above in posterior pole    Labs/Studies/Imaging Performed  None     ASSESSMENT/PLAN:     1. Multiple white satellite lesions, both eyes.     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     Did not find vitreous cell, but trace cell would be difficult to identify at bedside    Treatment systemic anti-fungal    Recommend change of anti-fungal from fungin to an azole, which have better ocular penetration    Will re-check pt w/ dilated fundus exam in 3-4 days for preogression    Discussed case with Retina fellow    Relayed recs to ID team    2. Dry Eye Syndrome, both eyes.     Preservative free artificial tears q3hrs both eyes.     3. Cataract, both eyes.    Monitor. No acute intervention necessary.         It is our pleasure to participate in this patient's care and treatment. Please contact us with any further questions or concerns.    Godwin Clifford M.D.  PGY-2  Ophthalmology    Seen and discussed w/   Jose Armando Gonzalez MD - PGY 3  and  Peter Houston OD  Adjunct   Ophthalmology   Pager: [phone]    Discussed with Dr. Tony MD - Retina Fellow

## 2018-08-27 NOTE — CONSULTS
[facility] ID SERVICE: NEW CONSULTATION   Sunitha Jacques : 1940 Sex: female:   Medical record number [id number]  Date of Admission: 2018  Consult Requester:Girma Sharma*  Date of Service: 2018      REASON FOR CONSULTATION:  Candida fungemia     PROBLEM LIST:   1. Candida albicans fungemia   2. Possible aspiration pneumonia  3. RONNIE  4. Transaminitis  5. Bladder cancer s/p cystectomy, uterus and partial vagina removal, and ileal conduit    RECOMMENDATIONS:   1. Continue micafungin at this time, may consider switch to fluconazole pending forthcoming data  2. Recommend ophthalmology consult to evaluate for endopththalmitis  3. Recommend obtaining SALLY to rule out fungal endocarditis   - Would also recommend evaluating prior concern for thrombus of inominate vein during SALLY, if possible  4. Obtain EKG; trend LFTs  5. Check C.diff given recent loose stools  6. Recommend changing ceftriaxone and metronidazole to ampicillin/sulbactam (if penicillin allergy is not significant concern)  7. Consider head imaging with contrast if worsening neurological status to evaluate for CNS involvement  8. Follow up blood cultures    **ADDENDUM**   Received call from ophthalmologist, who reports evidence of candidal involvement of eye. EKG shows QTc of 440. Therefore, we will switch to fluconazole today with 800mg loading dose. Team called with new recommendations.    DISCUSSION:   79yo F with fungemia possibly related to a line infection. She initially had a temporary CVC, along with a port-a-cath, concerning for a line related infection. She has subsequently had both lines removed. However, given that she had multiple days with positive cultures, recommend a SALLY to rule out fungal endocarditis. Ophtho consult to check for candidal endophthalmitis. Currently on micafungin, but will consider transition to fluconazole. Need to check QTc and monitor liver function.  Regarding her possible aspiration  pneumonia, she is currently on ceftriaxone and metronidazole. She seems to have improved with these, though there is some concern that she has grown Enterococcus from other sites that is not currently covered by this regimen. Could consider changing to Unasyn, though patient has a penicillin allergy listed of unknown severity. Could try challenging her (especially since she is currently intubated), but it may not be necessary if she continues to improve from respiratory standpoint. Would also check C.diff given diarrhea.    ID will continue to follow  Eliu Boyd M.D.  [facility] ID Service Fellow  [phone]    Attestation:  This patient has been seen and evaluated by me.  I discussed this patient with ID fellow Dr Boyd and agree with the findings and plan in this note. I also personally edited this note to reflect my findings. I have reviewed today's vital signs, medications, labs and imaging.    Wesley Gupta MD,M.Med.Sc.  Attending, Infectious Diseases  Pager: [phone]            HPI:   Sunitha Jacques is a 78 year old female with a history of DMII, HTN, hypothyroidism, CVA (2017), obesity, CHF, CKD, AFib (on warfarin), and MIKKI who underwent total cystectomy, pelvic exenteration (removal of uterus, vaginal cuff, part of vagina), ileal conduit and flap for invasive bladder cancer on 8/8/18. She developed altered mental status and hypoxia the night of 8/9 and was transferred to the SICU 8/10 for stabilization. After return of baseline mental status and stabilization she was transferred to the floor 8/12. During the same hospital admission, on 8/21 she experienced acute hypoxic respiratory failure due to a presumed aspiration event requiring BiPAP with escalating FiO2 needs. The following day on 8/22 she converted to afib with RVR and unstable pressures. She was transferred to the SICU for respiratory support and hemodynamic stabilization. She was intubated the evening of 8/22 for worsening  respiratory distress.     She was found to have Candidemia on 8/23, started treatment with micafungin. Her blood culture the subsequent 2 days also grew Candida. She had her CVC removed, followed by Port removal on 8/26. Current blood cultures are no growth to date. She did have a TTE on 8/13 without any evidence of vegetations at that time.    All available records were reviewed and summarized above.    ANTI-INFECTIVES:   Current: Ceftriaxone, Metronidazole, Micafungin  Other current medications were reviewed with an eye on potential drug-drug interactions.    ROS:  ROS limited to due patient being intubated and still having some sedation effects.    PMH:   Past Medical History:   Diagnosis Date     Atrial fibrillation with rapid ventricular response (H) 2/11/2017     CAD (coronary artery disease)      Cerebrovascular accident (H) 3/21/2017     CHF (congestive heart failure) (H) 3/21/2017     CRD (chronic renal disease), stage III     Due to bladder cancer     Essential hypertension 4/9/2017     GERD (gastroesophageal reflux disease)      History of MRSA infection 2017    treated april 2017     Hypertension      Hypothyroidism, unspecified type 12/14/2017     Long term current use of anticoagulant 2/12/2017     Morbid obesity (H) 4/13/2017     MIKKI (obstructive sleep apnea)      T2DM (type 2 diabetes mellitus) (H)      Past Surgical History:   Procedure Laterality Date     APPENDECTOMY  1975     AS TOTAL KNEE ARTHROPLASTY Bilateral 2015     CHOLECYSTECTOMY  1975     COLONOSCOPY       EXENTERATION ANTERIOR PELVIC N/A 8/8/2018    Procedure: EXENTERATION ANTERIOR PELVIC;  Anterior Pelvic Exenteration With Bilateral Lymphandectomy, Ileal Conduit Diversion, Left Vertical Rectus Abdominis Musculocutaneous Flap To Pelvis;  Surgeon: Girma Sharma MD;  Location: [facility] OR     GRAFT FLAP PEDICLE TRANSVERSE RECTUS ABDOMINIS MYOCUTANEOUS N/A 8/8/2018    Procedure: GRAFT FLAP PEDICLE TRANSVERSE RECTUS ABDOMINIS  "MYOCUTANEOUS;;  Surgeon: RAPHAEL Calderón MD;  Location: [facility] OR     LYMPHADENECTOMY ABDOMINAL Bilateral 8/8/2018    Procedure: LYMPHADENECTOMY ABDOMINAL;;  Surgeon: Girma Sharma MD;  Location: [facility] OR     SHOULDER SURGERY  2003     TONSILLECTOMY      as a child     Medication allergies were reviewed. Notable allergies include: penicillin.    SOCIAL HISTORY AND RISK FACTORS   Social History   Substance Use Topics     Smoking status: Former Smoker     Packs/day: 0.50     Years: 25.00     Quit date: 7/5/1980     Smokeless tobacco: Never Used     Alcohol use No     History   Sexual Activity     Sexual activity: Not on file     FAMILY HISTORY:   Reviewed and non-contributory    EXAMINATION:   BP 96/54  Pulse 100  Temp 98.5  F (36.9  C) (Axillary)  Resp 21  Ht 1.56 m (5' 1.42\")  Wt 112.5 kg (248 lb 0.3 oz)  SpO2 92%  BMI 46.23 kg/m2  GENERAL:  well-developed, intubated but currently in chair  EYES:  Eyes have anicteric sclera, PERRL    ENT:  ET tube in place, along with NG  NECK:  Supple. No cervical lymphadenopathy.  LUNGS:  Clear to auscultation bilateral. Respiratory effort is normal.  CARDIOVASCULAR:  Irregularly irregular rhythm. Systolic murmur noted.  ABDOMEN:  Normal bowel sounds, soft, nontender. No appreciable hepatosplenomegaly.  SKIN:  No acute rashes. No stigmata of endocarditis.  NEUROLOGIC:  Grossly nonfocal. Active x4 extremities.  PSYCH: Unable to accurate assess orientation. She did make eye contact with me but did not consistently answer questions.    BASIC LABS   The following basic labs were personally reviewed:  Hematology Studies    Recent Labs   Lab Test  08/27/18   0338  08/26/18   0542  08/25/18   0548  08/24/18   0418  08/23/18   0445  08/22/18   1450   08/21/18   1711   07/05/18   1615  04/13/17   1544   WBC  16.0*  16.8*  14.9*  13.4*  12.9*  12.6*   < >  15.0*   < >  5.2  6.7   ANEU   --    --    --    --    --    --    --   13.0*   --   3.0  4.1   AEOS   --    --   "  --    --    --    --    --   0.1   --   0.3  0.2   HGB  7.3*  8.0*  7.0*  7.9*  7.7*  8.0*   < >  8.3*   < >  11.6*  14.7   MCV  86  86  87  89  86  86   < >  86   < >  90  90   PLT  192  190  206  164  160  157   < >  158   < >  205  292    < > = values in this interval not displayed.     Metabolic Studies     Recent Labs   Lab Test  08/27/18   0338  08/26/18   0542  08/25/18   0548  08/24/18   0418  08/23/18   0445   NA  136  136  136  134  136   POTASSIUM  4.3  4.4  4.3  4.6  4.4   CHLORIDE  102  103  102  102  101   CO2  23  21  23  20  26   BUN  135*  128*  121*  92*  76*   CR  2.34*  2.17*  2.04*  1.81*  1.42*   GFRESTIMATED  20*  22*  24*  27*  36*     Hepatic Studies    Recent Labs   Lab Test  08/27/18   0338  08/22/18   0526  08/20/18   0449  08/17/18   0608  08/15/18   0512  08/10/18   0506   BILITOTAL  0.2  0.5  0.3  0.4  0.2  0.5   ALKPHOS  165*  81  70  70  72  56   ALBUMIN  1.5*  2.6*  1.6*  1.8*  1.8*  2.1*   AST  53*  52*  59*  21  31  40   ALT  167*  87*  79*  37  50  26     MICROBIOLOGY LABS  The following microbiology studies were personally reviewed:  Culture Micro   Date Value Ref Range Status   08/26/2018 No growth after 18 hours  Preliminary   08/26/2018 No growth after 18 hours  Preliminary   08/23/2018 (A)  Final    Light growth  Candida albicans / dubliniensis  Candida albicans and Candida dubliniensis are not routinely speciated  Susceptibility testing not routinely done     08/23/2018 >100 colonies  Candida albicans   (A)  Preliminary   08/23/2018 Susceptibility testing in progress  Preliminary   08/23/2018 (A)  Final    Cultured on the 2nd day of incubation:  Candida albicans     08/23/2018   Final    Critical Value/Significant Value, preliminary result only, called to and read back by  Kelly Gonzáles RN [facility] @ 0746 8.25.18      08/23/2018 Susceptibility testing done on previous specimen  Final   08/23/2018 (A)  Final    Cultured on the 2nd day of incubation:  Candida albicans      08/23/2018   Final    Critical Value/Significant Value, preliminary result only, called to and read back by  Kelly Eliecer [facility] @ 0843 8.25.18      08/23/2018 Susceptibility testing done on previous specimen  Final   08/22/2018 (A)  Final    Cultured on the 1st day of incubation:  Candida albicans     08/22/2018   Final    Critical Value/Significant Value, preliminary result only, called to and read back by   Alethea Varner RN (8.23.18 @ 143)     08/22/2018 Susceptibility testing done on previous specimen  Final   08/21/2018 (A)  Final    50,000 to 100,000 colonies/mL  Coagulase negative Staphylococcus     08/21/2018 (A)  Final    10,000 to 50,000 colonies/mL  Enterococcus faecalis     08/21/2018 Heavy growth  Staphylococcus aureus   (A)  Final   08/21/2018 (A)  Final    Heavy growth  Coagulase negative Staphylococcus  Susceptibility testing not routinely done     08/21/2018 Moderate growth  Enterococcus faecalis   (A)  Final   08/21/2018   Final    Critical Value/Significant Value, preliminary result only, called to and read back by  Alethea Cadena RN [facility]  on 8.23.18 at 1515. [facility]/.     08/21/2018 (A)  Final    Cultured on the 2nd day of incubation:  Candida albicans     08/21/2018 Susceptibility testing done on previous specimen  Final   08/21/2018 (A)  Final    Cultured on the 1st day of incubation:  Candida albicans     08/21/2018   Final    Critical Value/Significant Value, preliminary result only, called to and read back by  Davis INGRAM) on 8.23.18 @ 1213 ,.     08/17/2018 (A)  Final    10,000 to 50,000 colonies/mL  Candida albicans / dubliniensis  Candida albicans and Candida dubliniensis are not routinely speciated  Susceptibility testing not routinely done     08/13/2018 No growth  Final   08/13/2018 No growth  Final   08/10/2018 No growth  Final   08/10/2018 No growth  Final   08/09/2018 >100,000 colonies/mL  Escherichia coli   (A)  Final   07/05/2018   Final    50,000 to 100,000  colonies/mL  mixed urogenital janina  Susceptibility testing not routinely done     07/05/2018   Final    Susceptibility testing requested by  Girma Rosas per Rachana Jhaveri RN. Please do ID and Sens.7/9/18 at 1030.TV.     07/05/2018 50,000 to 100,000 colonies/mL  Escherichia coli   (A)  Final   07/05/2018 (A)  Final    50,000 to 100,000 colonies/mL  Strain 2  Escherichia coli     07/05/2018 (A)  Final    10,000 to 50,000 colonies/mL  Corynebacterium species     07/05/2018 10,000 to 50,000 colonies/mL  Aerococcus species   (A)  Final   04/13/2017 No growth after 6 days  Final       IMAGING RESULTS  The following imaging studies were independently reviewed:    CXR (8/27):  IMPRESSION:   1. Unchanged cardiomegaly  2. Increased perihilar interstitial opacities and diffuse bilateral  mixed patchy/nodular opacities. Suspect some degree of pulmonary edema  with superimposed infectious process.      Eliu Boyd MD on 8/27/2018 at 5:34 PM

## 2018-08-27 NOTE — PLAN OF CARE
Problem: Diabetes Comorbidity  Goal: Diabetes  Patient comorbidity will be monitored for signs and symptoms of hyperglycemia or hypoglycemia. Problems will be absent, minimized or managed by discharge/transition of care.   Outcome: Declining  Yesterday bgl <65. Went to get D50 and pyxis stated ordered out of stock, reported to charge nurse Thad. Took a while to get D50 with charge nurse. Once retrieved D50, scanned at 2050, assessed lines to ensure patency d/t irritant medication, pushed med over 5 min, and rechecked bgls 15 minutes after medication fully administered which came back with bgl at 121 at 2125.

## 2018-08-27 NOTE — PLAN OF CARE
Problem: Patient Care Overview  Goal: Plan of Care/Patient Progress Review  Outcome: No Change  Sedation holiday done, patient was awake enough to open her eyes and track, but did no follow any commands.  All dressing changed, RIVKA drains are red with slough tissue, coccyx is bleeding, perianal area appears to have a fungal rash.  Attempted to wean FIO2, currently at 65%.  Patient up to chair with lift from 1200 to 1600.  The chair was not a good fit for the patient, bariatric chair ordered, bariatric bed ordered as well.      Problem: Diabetes Comorbidity  Goal: Diabetes  Patient comorbidity will be monitored for signs and symptoms of hyperglycemia or hypoglycemia. Problems will be absent, minimized or managed by discharge/transition of care.   Subcutaneous insulin given.  Patient continues to have blood sugars >200.

## 2018-08-28 NOTE — PLAN OF CARE
Problem: Patient Care Overview  Goal: Plan of Care/Patient Progress Review  OT-4C: OT will continue to hold per discussion with PT. Will initiate as appropriate.

## 2018-08-28 NOTE — PROGRESS NOTES
"Urology  Progress Note    Remains intubated  Afebrile overnight  Candida involvement of eye - switched to fluconazole      Exam  /41  Pulse 100  Temp 98.5  F (36.9  C) (Oral)  Resp 20  Ht 1.56 m (5' 1.42\")  Wt 112.5 kg (248 lb 0.3 oz)  SpO2 95%  BMI 46.23 kg/m2  No acute distress,, sitting up in bed   Mechanical ventilation  Abdomen soft, mildly distended. Inferior aspect of the wound packed. Fascia intact at area where wound is open. Stoma pink and well perfused, 2 stents in place.   RIVKA x 2 serous  Urostomy bag with clear yellow urine in tubing    Urostomy 405/130  LUQ RIVKA 15/NR  LLQ RIVKA 15/NR    Labs  AM labs pending    Assessment/Plan  78 year old y/o female POD#21 s/p anterior pelvic exteneration, VRAM flap to pelvis (Plastics), BLPLND, creation of ileal conduit for MIBC. Medicine consulted for assistance managing multiple medical comorbidities. Ongoing issues include mental status and hypercarbia/hypoxia. Transferred to the SICU for worsening respiratory status, which improved enough to allow transfer to step down unit. Medicine co-managing. Again transferred to SICU on POD#15 for respiratory decompensation, currently intubated.    Neuro: Fentanyl and propofol for sedation while intubated  CV:  Afib with RVR. Sched IV metop. Pressures occasionally soft to 80's, BP support per SICU team.  Pulm: Mechanical ventilation. Weaning per SICU.  FEN/GI: NPO. TFs started 8/23, currently at goal. Diuresis per medicine team.   Endo: ISS. Continue home synthroid.   :  L PNT removed 8/16. Urostomy. Monitor urine output  Heme: Hgb stable. Home warfarin held   ID: 8/22 BCx positive for yeast. Currently on ceftriaxone, flagyl, and fluconazole. Recommend coverage of Enterococcus growing in urine and from wound.   Activity: up ad olya, encourage ambulation   PPx: SCDs. SQH.   Dispo: SICU    Appreciate excellent SICU cares    Seen and examined with the chief resident who will discuss with Dr. Sharma and the SICU " team.    Girma Hall MD  Urology PGY-3         Contacting the Urology Team     Please use the following job codes to reach the Urology Team. Note that you must use an in house phone and that job codes cannot receive text pages.     On weekdays, dial [phone] (or star-star-star 777 on the new [employer] telephones) then 0817 to reach the Adult Urology resident or PA on call    On weekdays, dial [phone] (or star-star-star 777 on the new [employer] telephones) then 0818 to reach the Pediatric Urology resident    On weeknights and weekends, dial [phone] (or star-star-star 777 on the new [employer] telephones) then 0039 to reach the Urology resident on call (for both Adult and Pediatrics)

## 2018-08-28 NOTE — PLAN OF CARE
Problem: Patient Care Overview  Goal: Plan of Care/Patient Progress Review  Outcome: Declining  D: Pt s/p pelvic surgery 8/8.  Reintubated 8/22    I/A:   Neuro: Opens eyes to vigorous stimulation, tracks, localizes pain.  Not following commands.  RASS -3, CPOT 1-2.   Resp: CMV settings rate 18/ vol 350/ peep 10/ 80% O2.  Desats to low 80s with turns.  Better vent compliance when more sedated.  Lung sounds clear.  Thick, blood streaked secretions.   Cardio: Afebrile today.  Afib 80s-100s.  MAPs 60s-90s, lower with increased sedation.   GI: Dignishield in place, 500 liquid stool out overnight.  Tube feeds at 45/hr until 0400, when turned off for planned SALLY today.  D5 1/2 NS started at 100 mL/hr.     :  Rt ileal conduit.  Oliguric.   Skin: Reddened groin; antifungal applied.  Coccyx pressure injury dressing changed, 2x L RIVKA drains with scant serous output.  Abdominal incision gauze changed.   Notes: Precedex running at 0.2, fentanyl at 50.  Propofol d/c'd for soft BPs.    P: SALLY today, continue plan of care.

## 2018-08-28 NOTE — PHARMACY-VANCOMYCIN DOSING SERVICE
Pharmacy Vancomycin Initial Note  Date of Service 2018  Patient's  1940  78 year old, female    Indication: Sepsis    Current estimated CrCl = Estimated Creatinine Clearance: 21.9 mL/min (based on Cr of 2.56).    Creatinine for last 3 days  2018:  5:42 AM Creatinine 2.17 mg/dL  2018:  3:38 AM Creatinine 2.34 mg/dL  2018:  4:45 AM Creatinine 2.56 mg/dL    Recent Vancomycin Level(s) for last 3 days  No results found for requested labs within last 72 hours.      Vancomycin IV Administrations (past 72 hours)      No vancomycin orders with administrations in past 72 hours.                Nephrotoxins and other renal medications (Future)    Start     Dose/Rate Route Frequency Ordered Stop    18 1700  vancomycin (VANCOCIN) 2,000 mg in sodium chloride 0.9 % 500 mL intermittent infusion      2,000 mg  over 2 Hours Intravenous ONCE 18 1658      18 1700  furosemide (LASIX) injection 20 mg      20 mg  over 1-2 Minutes Intravenous ONCE 18 1700      18 1659  vancomycin place can - receiving intermittent dosing      1 each Does not apply SEE ADMIN INSTRUCTIONS 18 1659      18 1615  norepinephrine (LEVOPHED) 16 mg in D5W 250 mL infusion      0.03-0.4 mc3g/kg/min × 115 kg (Dosing Weight)  3.2-43.1 mL/hr  Intravenous CONTINUOUS 18 1604            Contrast Orders - past 72 hours     None                Plan:  1.  Start intermittent vancomycin. Give vancomycin 2000mg IV x1.  Dosing 26mg/kg based on AdjBW=75.3 kg given BMI=47.26 kg/m2.  Subsequent doses will be based upon levels.  2.  Goal Trough Level: 15-20 mg/L   3.  Pharmacy will check trough levels as appropriate in 1-3 Days.    4. Serum creatinine levels will be ordered daily for the first week of therapy and at least twice weekly for subsequent weeks.    5. Buena method utilized to dose vancomycin therapy: Method 2    Talisha Dougherty, [facility]

## 2018-08-28 NOTE — PLAN OF CARE
Problem: Patient Care Overview  Goal: Plan of Care/Patient Progress Review  4C  Discharge Planner PT   Patient plan for discharge: not discussed due to medical status  Current status: Pt with increased sedation, not following commands but resisting LE and UE large motor movements. Intubated orally on CPAP/PS, FiO2 80%, Peep 12, SpO2 89-91%, 86% with hip flexion so discontinued, pulse oximetry monitor changed out on hand with SpO2 84-86% and good wave form, RN in room and aware, contacting MD on phone while PT in room, increased > 90% with FiO2 increased 100%, further ROM deferred due to poor tolerance.  Barriers to return to prior living situation: medical status, respiratory status, anticipated impaired functional mobility due to significant length of time on bedrest and extended LOS.  Recommendations for discharge: deferred  Rationale for recommendations: medical status       Entered by: Brea Donovan 08/28/2018 3:29 PM

## 2018-08-28 NOTE — PROGRESS NOTES
[facility] ID SERVICE: ONGOING CONSULTATION   Sunitha Jacques : 1940 Sex: female:   Medical record number [id number] Attending Physician: Girma Sharma*  Date of Service: 2018    PROBLEM LIST:   1. Candida albicans fungemia with candida endophthalmitis  2. Possible aspiration pneumonia  3. RONNIE  4. Transaminitis  5. Bladder cancer s/p cystectomy, uterus and partial vagina removal, and ileal conduit     RECOMMENDATIONS:   1. Continue fluconazole at this time, recommend 800mg daily [but adjusted for renal function]; patient will need treated until all active eye lesions resolve - typically a minimum of 4 weeks  2. Consider addition of vancomycin for treatment of MRSE and Enterococcus from ileal conduit and abdominal drain (per urology's concern)  3. Consider head imaging with contrast if worsening neurological status to evaluate for CNS involvement  4. Follow up blood cultures; repeat blood cultures in AM to have 2nd set in case  turns positive  5. Continue to monitor LFTs intermittently     DISCUSSION:   77yo F with fungemia possibly related to a line infection. She initially had a temporary CVC, along with a port-a-cath, concerning for a line related infection. She has subsequently had both lines removed. SALLY was negative for any evidence of vegetations. Ophtho exam however was concerning for candidal involvement. Patient will need treated for a minimum of 4 weeks and until all active eye lesions resolve  Regarding her possible aspiration pneumonia, she is currently on ceftriaxone and metronidazole. She seems to have improved with these, though there is some concern that she has grown Enterococcus from other sites that is not currently covered by this regimen. Could consider changing to Unasyn, though patient has a penicillin allergy listed of unknown severity. Vancomycin should be tolerated and would also treat the MRSE. While ID does not currently feel the patient is sick  "from these, urology is recommending treatment.     ID will continue to follow  Eliu Boyd M.D.  [facility] ID Service Fellow  [phone]    Attestation:  This patient has been seen and evaluated by me.  I discussed this patient with ID fellow Dr Boyd and agree with the findings and plan in this note. I also personally edited this note to reflect my findings. I have reviewed today's vital signs, medications, labs and imaging.    - Primary team started meropenem. monitor for seizure activities  - continue high dose fluconazole - adjust per CrCl. monitor QTc   - continue Vanco IV     Wesley Gupta MD,M.Med.Sc.  Attending, Infectious Diseases  Pager: [phone]              INTERVAL HISTORY  Clinically stable overnight. Afebrile. WBC stable. SALLY being performed today. Vent requirements remain relatively high. C.diff PCR returned negative.    ROS:   Allergies   Allergen Reactions     Keflex [Cephalexin] Other (See Comments)     Tolerated therapy on 7/30/18     Metaproterenol      Penicillins      Prednisone      Sulfa Drugs      Allergies were reviewed.    CURRENT ANTI-INFECTIVES:   Ceftriaxone, Metronidazole  Fluconazole    EXAMINATION:   Vital Signs: /82  Pulse 100  Temp 99.7  F (37.6  C) (Oral)  Resp 14  Ht 1.56 m (5' 1.42\")  Wt 112.5 kg (248 lb 0.3 oz)  SpO2 91%  BMI 46.23 kg/m2   GENERAL:  well-developed, intubated, lying in bed  EYES:  Eyes have anicteric sclera, PERRL    ENT:  ET tube in place, along with NG  NECK:  Supple. No cervical lymphadenopathy.  LUNGS:  Clear to auscultation bilateral. Respiratory effort is normal.  CARDIOVASCULAR:  Irregularly irregular rhythm. Systolic murmur noted.  ABDOMEN:  Normal bowel sounds, soft, nontender. Bowel sounds active. Ileal conduit noted without extending erythema. 2 LLQ drains.  SKIN:  No acute rashes. No stigmata of endocarditis.  NEUROLOGIC:  Grossly nonfocal. Active x4 extremities.  PSYCH: Unable to accurate assess orientation. She " did make eye contact with me but did not consistently answer questions.    NEW DATA/RESULTS:   Culture Micro   Date Value Ref Range Status   08/26/2018 No growth after 2 days  Preliminary   08/26/2018 No growth after 2 days  Preliminary   08/23/2018 (A)  Final    Light growth  Candida albicans / dubliniensis  Candida albicans and Candida dubliniensis are not routinely speciated  Susceptibility testing not routinely done     08/23/2018 >100 colonies  Candida albicans   (A)  Preliminary   08/23/2018 Susceptibility testing in progress  Preliminary     Recent Labs   Lab Test  08/10/18   0506  04/13/17   1544  04/07/17   0644  03/31/17   0704  03/24/17   0701   CRP  300.0*  3.7  <2.9  5.2  6.0     Recent Labs   Lab Test  08/28/18   0445  08/27/18   0338  08/26/18   0542  08/25/18   0548  08/24/18   0418  08/23/18   0445   WBC  14.6*  16.0*  16.8*  14.9*  13.4*  12.9*     Recent Labs   Lab Test  08/28/18   0445  08/27/18   0338  08/26/18   0542  08/25/18   0548   CR  2.56*  2.34*  2.17*  2.04*   GFRESTIMATED  18*  20*  22*  24*     Hematology Studies  Recent Labs   Lab Test  08/28/18   0445  08/27/18   0338  08/26/18   0542  08/25/18   0548  08/24/18   0418  08/23/18   0445   08/21/18   1711   07/05/18   1615  04/13/17   1544   WBC  14.6*  16.0*  16.8*  14.9*  13.4*  12.9*   < >  15.0*   < >  5.2  6.7   ANEU   --    --    --    --    --    --    --   13.0*   --   3.0  4.1   AEOS   --    --    --    --    --    --    --   0.1   --   0.3  0.2   HCT  23.3*  23.0*  25.1*  22.0*  25.1*  24.2*   < >  25.9*   < >  36.0  44.7   PLT  207  192  190  206  164  160   < >  158   < >  205  292    < > = values in this interval not displayed.     Metabolic  Recent Labs   Lab Test  08/28/18   0445  08/27/18   0338  08/26/18   0542   NA  135  136  136   BUN  149*  135*  128*   CO2  21  23  21   CR  2.56*  2.34*  2.17*   GFRESTIMATED  18*  20*  22*     Hepatic Studies  Recent Labs   Lab Test  08/28/18 0445 08/27/18   0338  08/22/18    0526   BILITOTAL  0.3  0.2  0.5   ALKPHOS  159*  165*  81   ALBUMIN  1.4*  1.5*  2.6*   AST  36  53*  52*   ALT  117*  167*  87*       Eliu Boyd MD on 8/28/2018 at 5:29 PM

## 2018-08-28 NOTE — PROGRESS NOTES
SURGICAL ICU PROGRESS NOTE  08/28/2018      CO-MORBIDITIES:   Change in mole  (primary encounter diagnosis)  Malignant neoplasm of overlapping sites of bladder (H)    ASSESSMENT: Sunitha Jacques is a 78 year old female with a history of DMII, HTN, hypothyroidism, CVA (2017), obesity, CHF, CKD, AFib (on warfarin), and MIKKI who underwent total cystectomy, pelvic exenteration (removal of uterus, vaginal cuff, part of vagina), ileal conduit and flap for invasive bladder cancer on 8/8/18. She developed altered mental status and hypoxia the night of 8/9 and was transferred to the SICU 8/10 for stabilization. After return of baseline mental status and stabilization she was transferred to the floor 8/12. During the same hospital admission, on 8/21 she experienced acute hypoxic respiratory failure due to a presumed aspiration event requiring BiPAP with escalating FiO2 needs. The following day on 8/22 she converted to afib with RVR and unstable pressures. She was transferred to the SICU for respiratory support and hemodynamic stabilization. She was intubated the evening of 8/22 for worsening respiratory distress. Found to have Candidemia on 8/23, started treatment with micafungin, converted to fluconazole 8/27 after optho noted eye involvement.     TODAY'S PROGRESS/PLANS:   - wean PEEP as tolerated, PST  - Sat goals decreased to 90% given smoking history  - continue mIVF running after TF has been resumed  - LR fluid bolus (500mL)  - Head CT  - PICC line placement  - consider consult renal service if not responsive to fluid bolus  - f/u on SALLY  - consider Palliative Care consult following discussion with pt's family  - Penicillin allergy testing  - FeNa  - Phenylephrine started for low MAPs    PLAN:  Neuro/ pain/ sedation:  # H/o CVA  - Monitor neurological status. Notify the MD for any acute changes in exam.  - Weaned propofol gtt. Precedex started.  - Fentanyl gtt for pain  - Lidoderm patches   - pta  escitalopram     Pulmonary care:   # H/o MIKKI   # Acute respiratory failure with hypoxia   - intubated 8/22 for progressive respiratory distress while on HFNC  - Continue to wean PEEP and FiO2 as able, PST  - Decreased sat goals 92% -> 90%  - chest physiotherapy with RT  - Saline neb Q3H, racepinephrine neb PRN  - PTA Singulair daily      Cardiovascular:    # H/o HTN  #H/o CAD  # H/o possible HFrEF  # A-fib with RVR  - Monitor hemodynamic status.   - metoprolol 50 -> 25 mg BID for afib rate control, s/p amio gtt  - PTA atorvastatin, hydralazine   - Phenylephrine started      GI care:   #H/o GERD  - NPO, OK for ice chips  - Tube feeds at goal. Continue OG for now, consider NJ when extubated.  - Bowel regimen: Miralax daily, senna-docusate BID  - Simethicone PRN  - Nutrition labs      Fluids/ Electrolytes/ Nutrition:   # Hypervolemic  - Net +8.8 L since admission. Difficult balance between potential need for fluids due to rising BUN, Cr vs. Need to diurese for pulmonary edema & large net positive fluid balance since admission.  - Continue mIVF running after TF has been resumed  - Try bolus 500 mL LR  - Stopped lasix    Renal/ Fluid Balance:    # H/o CKD stage III  #H/o bladder cancer, s/p total cystectomy, pelvic exenteration, ileal conduit and flap   #H/o L Hydronephrosis, s/p left nephrostomy tube  # RONNIE, likely pre-renal  - Will continue to monitor intake and output  - Continues to have worsening BUN and Cr, was aggressively diuresed prior to SICU admission, stopped lasix, bolus fluids as needed  - Fluid creatinine from light yellow watery wound drainage and intraperitoneal drain 2.1. Purewick and hygiene per nurse.  - Check FeNa.  - Consider consult renal if fluid balance still not improved.      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 Artificial tears for dry eyes.  - ID: SALLY to r/o endocarditis, EKG, LFT, C. Diff (neg), CT head, f/u blood cx, remain on ceftriaxone, flagyl given penicillin allergy concerns (hives) & low concern regarding enterococcus. Switched micafungin to fluconazole.  - Penicillin allergy testing      Heme:     # Anemia  - Hemoglobin stable  - Monitor      MSK:  # Wound dehiscence  - PT/OT  - Monitor. Wound packed with gauze. WOC managing urostomy stoma & coccyx wound.  - Miconazole for perianal fungal rash      Prophylaxis:    - Mechanical and subcutaneous heparin prophylaxis for DVT.  - Protonix      Lines/ tubes/ drains:   - PIV, RIVKA x2, OG, ETT, place PICC      Disposition:  - Surgical ICU. Discussion with family, consider palliative care consult.    ====================================    SUBJECTIVE: Sedation holiday, patient opened eyes & tracked, but did not follow commands. RIVKA drains red with slough tissue, coccyx bleeding, perianal fungal rash. Attempted to wean FiO2, currently 65%. Tube feeds turned off at 4am & switched to D5 1/2 NS for SALLY. Switched propofol to precedex for soft BPs.      OBJECTIVE:   1. VITAL SIGNS:   Temp:  [97.7  F (36.5  C)-99.7  F (37.6  C)] 99.7  F (37.6  C)  Heart Rate:  [] 63  Resp:  [20-21] 20  BP: ()/(41-85) 92/63  FiO2 (%):  [60 %-80 %] 70 %  SpO2:  [88 %-98 %] 95 %  Ventilation Mode: CMV/AC  (Continuous Mandatory Ventilation/ Assist Control)  FiO2 (%): 70 %  Rate Set (breaths/minute): 18 breaths/min  Tidal Volume Set (mL): 350 mL  PEEP (cm H2O): 12 cmH2O  Oxygen Concentration (%): 70 %  Resp: 20    2. INTAKE/ OUTPUT:   I/O last 3 completed  shifts:  In: 2686.28 [I.V.:1281.28; NG/GT:370]  Out: 765 [Urine:335; Drains:30; Stool:400]    3. PHYSICAL EXAMINATION:   General: Lying in bed, comfortable  Neuro: sedated on precedex  Resp: Intubated, weaning FiO2 & PEEP  CV: Irregular rhythm, normal rate  Gastrointestinal: soft, obese, non-tender to palpation. Midline incision closed with staples, inferior aspect open and packed with gauze. Open site clean with light yellow watery drainage  Extremities: warm, well perfused  Skin: rash underneath pannus    4. INVESTIGATIONS:   Arterial Blood Gases     Recent Labs  Lab 08/21/18  1613   PH 7.38   PCO2 46*   PO2 59*   HCO3 27     Complete Blood Count     Recent Labs  Lab 08/28/18 0445 08/27/18  0338 08/26/18  0542 08/25/18  0548   WBC 14.6* 16.0* 16.8* 14.9*   HGB 7.3* 7.3* 8.0* 7.0*    192 190 206     Basic Metabolic Panel    Recent Labs  Lab 08/28/18 0445 08/27/18  0338 08/26/18  0542 08/25/18  0548    136 136 136   POTASSIUM 4.4 4.3 4.4 4.3   CHLORIDE 104 102 103 102   CO2 21 23 21 23   * 135* 128* 121*   CR 2.56* 2.34* 2.17* 2.04*   * 202* 132* 190*     Liver Function Tests    Recent Labs  Lab 08/28/18 0445 08/27/18 0338 08/22/18  0526   AST 36 53* 52*   * 167* 87*   ALKPHOS 159* 165* 81   BILITOTAL 0.3 0.2 0.5   ALBUMIN 1.4* 1.5* 2.6*   INR  --  1.35*  --      Pancreatic Enzymes  No lab results found in last 7 days.  Coagulation Profile    Recent Labs  Lab 08/27/18 0338   INR 1.35*     Lactate  Invalid input(s): LACTATE    - Blood cultures x2 8/26: NGTD  - C. diff PCR 8/27: negative  - Prealbumin 8/27: 7  - FeNa 8/28: pending    5. RADIOLOGY:   - SALLY 8/28: pending    - CT head 8/28: No acute intracranial pathology. No significant change since 8/10/2018.    - CXR 8/28:   Impression:   1. Unchanged cardiomegaly with bilateral mixed interstitial and patchy airspace opacities, which may represent edema or infection.  2. Small bilateral pleural effusions.     - EKG 8/27: QTc 441,  Afib with competing junctional pacemaker, LBBB    =========================================  Patient seen and discussed with ICU staff, Dr. Nava    - - - - - - - - - - - - - - - - - -    Amor hCin, PGY1  Surgery Resident

## 2018-08-29 NOTE — PROGRESS NOTES
Death Note    Patient : Sunitha Jacques; 78 year old    MRN# [id number]  Unit: [id number]  Admit Date: 8/8/2018  Primary Service: Urology     Attending: Girma Sharma    SUBJECTIVE: After discussion with family and son Galen, who is medical decision maker, patient was extubated for comfort care. I was called to the bedside by the patient's nurse after the patient was found without pulse and not breathing.    OBJECTIVE:   On exam, patient is not awake or alert. Pupils are fixed and dilated, no corneal reflexes. There is no response to stimulation. Patient is apneic and without pulse. There are no heart sounds after listening for sixty seconds.    ASSESSMENT:  Ms. Sunitha Jacques is declared dead at 1:39PM on 8/29/18.  The family was supported and  services were offered.  Autopsy will be discussed between RN & family.  Organ donation was considered but not possible due to underlying illness.    Amor Chin MD  Surgery Resident, PGY1  8/29/2018  1:50 PM

## 2018-08-29 NOTE — PROGRESS NOTES
Spoke with family- son, niece, daughter-in-law.  Son is decision maker for patient.      Reviewed the events of the past 36 hours- increasing ventilator needs (paralysis, proning, high PEEP and FiO2 needs), need for multiple pressors and anuria.  Next steps in her care would include insertion of a dialysis catheter and proceeding to CRRT.  The family and I have had several prior discussions about her wishes regarding goals of care.  They feel that the patient would not want to proceed to dialysis, and that she would not want prolonged intubation/venitlator support, would not want a tracheostomy, would not want prolonged vasopressor infusions.  They feel that she would want to proceed to comfort/palliative care at this point- and I agree that this is keeping with her wishes as written in her advanced directive.    Plan at this time: stop cisatracurium and oral meds, place Ms. Jacques back supine and then plan to compassionately extubate.  Family is ok with proceeding this way this afternoon.    Primary team notified.

## 2018-08-29 NOTE — PLAN OF CARE
Problem: Surgery Nonspecified (Adult)  Goal: Signs and Symptoms of Listed Potential Problems Will be Absent, Minimized or Managed (Surgery Nonspecified)  Signs and symptoms of listed potential problems will be absent, minimized or managed by discharge/transition of care (reference Surgery Nonspecified (Adult) CPG).   Outcome: Declining  D: Pt with cancer who had urostomy weeks ago remains on ICU with significant ventilatory support as well as pressor support.  Pt unresponsive to sternal rub on initial assessment, but overbreathing vent.  Pt using accessory muscles and not synchronous, ABG done showing respiratory acidosis, MDs aware.  Vec bolus given and cis gtt started when synchrony demonstrated after paralytic administration.  Vent settings changed many times overnight, to try and enable Oxygen and PEEP weaning, and later to correct acidosis and hypercapnea.  See flowsheets and labs.  Pt proned at 0215 and that enabled writer to wean O2 somewhat.  Head micro turned hourly and turned q 2 hrs, pt does not turn to left well, had face almost directly in pillow.   BP softer and requiring more pressor support all night, see flowsheets.  Levophed increase and vaso and epi gtts started.  Pt went into RVR with Epi gtt and Epi switched to Garrison.  MAP goal decreased to 60, now able to stop Garrison, but remains on Levo and Vaso for pressor support.  Pt not making urine.  Lasix bolus and gtt started as well as multiple albumin boluses, UOP extremely low and MDs aware (planning on renal consult today).  Pt's incisional wound drains copious serous drainage, W to D drsg changed and multiple burn pads placed over dressing and in pannus to prevent further maceration.  Glucoses elevated and MDs started steroids, so insulin gtt started.  Family called in due to concerns with overall rapid decline in condition, and later went home after pt was safely proned.  A: Pt steadily declining, not tolerating prone position.  Pt too unstable to  "substantially wean pressor support or vent and is not making urine.  P: Continue to monitor and update MD with changes and concerns. Pt's son was leery of adding more support but agreed to have his mother proned. He repeatedly stated that his mother \"wouldn't want all this.\"  Pt to have renal consult today and MDs to update son and assess for changes to plan and goals of care.       "

## 2018-08-29 NOTE — PROGRESS NOTES
SURGICAL ICU PROGRESS NOTE  08/29/2018      CO-MORBIDITIES:   Change in mole  (primary encounter diagnosis)  Malignant neoplasm of overlapping sites of bladder (H)    ASSESSMENT: Sunitha Jacques is a 78 year old female with a history of DMII, HTN, hypothyroidism, CVA (2017), obesity, CHF, CKD, AFib (on warfarin), and MIKKI who underwent total cystectomy, pelvic exenteration (removal of uterus, vaginal cuff, part of vagina), ileal conduit and flap for invasive bladder cancer on 8/8/18. She developed altered mental status and hypoxia the night of 8/9 and was transferred to the SICU 8/10 for stabilization. After return of baseline mental status and stabilization she was transferred to the floor 8/12. During the same hospital admission, on 8/21 she experienced acute hypoxic respiratory failure due to a presumed aspiration event requiring BiPAP with escalating FiO2 needs. The following day on 8/22 she converted to afib with RVR and unstable pressures. She was transferred to the SICU for respiratory support and hemodynamic stabilization. She was intubated the evening of 8/22 for worsening respiratory distress. Found to have Candidemia on 8/23, started treatment with micafungin, converted to fluconazole 8/27 after optho noted eye involvement. Renal function worsening, plan to speak with family 8/29 regarding goals of care. This afternoon, family decided best option for patient would be comfort cares. Patient extubated, patient made comfortable,  services offered, passed away, death exam performed, time of death 1:39PM 8/29/18.    TODAY'S PROGRESS/PLANS:   - NOTE: plans from morning rounds below. Patient passed at 1:39PM  8/29/18 after extubation and comfort cares per family discussion. See details in above assessment.  - repeat ABG and VBG with lactate this AM  - discuss goals of care with family  - consult to renal and palliative  - change TKO line to NS  - renal ultrasound  - flip supine today at  6pm    PLAN:  Neuro/ pain/ sedation:  # H/o CVA  - Monitor neurological status. Notify the MD for any acute changes in exam.  - Back on low-dose propofol ggt  - Fentanyl gtt for pain  - Lidoderm patches   - pta escitalopram  - Cisatracurium to help with synchrony with vent     Pulmonary care:   # H/o MIKKI   # Acute respiratory failure with hypoxia likely d/t ARDS  - intubated 8/22 for progressive respiratory distress while on HFNC  - Continue to wean PEEP and FiO2 as able, PST  - Pt is now prone, paralyzed with cisatracurium  - Decreased sat goals 92% -> 90%  - chest physiotherapy with RT  - Saline neb Q3H, racepinephrine neb PRN  - PTA Singulair daily  - Steroids stress dose, flolan      Cardiovascular:    # H/o HTN  #H/o CAD  # H/o possible HFrEF  # A-fib with RVR  - Monitor hemodynamic status.   - Dc metoprolol  - PTA atorvastatin, hydralazine   - Phenylephrine started overnight but now off.  - Norepi & vasopressin      GI care:   #H/o GERD  - NPO  - Tube feeds are being held as pt is prone  - Bowel regimen: Miralax daily, senna-docusate BID  - Simethicone PRN  - Nutrition labs      Fluids/ Electrolytes/ Nutrition:   # Hypervolemic  - Net +13.7 L since admission. Difficult balance between potential need for fluids due to rising BUN, Cr vs. Need to diurese for pulmonary edema & large net positive fluid balance since admission.  - Continue mIVF running after TF has been resumed  - Stopped lasix  - Bicarb given for ABG 7.2 / 48 / 69 / 19    Renal/ Fluid Balance:    # H/o CKD stage III  #H/o bladder cancer, s/p total cystectomy, pelvic exenteration, ileal conduit and flap   #H/o L Hydronephrosis, s/p left nephrostomy tube  # RONNIE, likely pre-renal  - Will continue to monitor intake and output  - Continues to have worsening BUN and Cr, was aggressively diuresed prior to SICU admission, stopped lasix, bolus fluids as needed  - Fluid creatinine from light yellow watery wound drainage and intraperitoneal drain 2.1.  Purewick and hygiene per nurse.  - FeNa 0.2%.  - Consult renal regarding hemodialysis, pending discussion with family regarding goals of care.  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 Artificial tears for dry eyes.   - ID: SALLY unremarkable, EKG, LFT, C. Diff (neg), CT head (neg), f/u blood cx, on vanc, meropenem. Fluconazole.      Heme:     # Anemia  - Hemoglobin 7.3 -> 7.3 -> 7.0  - Monitor. Consider transfusion.      MSK:  # Wound dehiscence  - PT/OT  - Monitor. Wound packed with gauze. WOC managing urostomy stoma & coccyx wound.  - Miconazole for perianal fungal rash      Prophylaxis:    - Mechanical and subcutaneous heparin prophylaxis for DVT.  - Protonix      Lines/ tubes/ drains:   - PIV, RIVKA x2, OG, ETT, place PICC, art line      Disposition:  - Surgical ICU. Per discussion with family, comfort cares.    ====================================    SUBJECTIVE:   Art line placed. On Flolan & cisatracurium overnight. Did not respond to lasix bolus, now on drip. Low UOP. Vasopressin added to norepi, phenylephrine overnight, now turned off. Patient proned. On stress steroids, insulin gtt, bicarb. Conversation with family overnight regarding patient status.    OBJECTIVE:   1. VITAL SIGNS:   Temp:  [97.2  F (36.2  C)-99.3  F (37.4  C)] 97.2  F (36.2  C)  Heart Rate:  [] 108  Resp:  [18-22] 20  BP: ()/(36-82) 122/51  MAP:  [58 mmHg-76 mmHg] 65 mmHg  Arterial Line BP: ()/(37-53) 119/43  FiO2 (%):   [70 %-100 %] 70 %  SpO2:  [84 %-100 %] 94 %  Ventilation Mode: CMV/AC  (Continuous Mandatory Ventilation/ Assist Control)  FiO2 (%): 70 %  Rate Set (breaths/minute): 20 breaths/min  Tidal Volume Set (mL): 430 mL  PEEP (cm H2O): 15 cmH2O  Pressure Support (cm H2O): 7 cmH2O  Oxygen Concentration (%): 70 %  Resp: 20    2. INTAKE/ OUTPUT:   I/O last 3 completed shifts:  In: 5577.76 [I.V.:3767.76; NG/GT:255; IV Piggyback:500]  Out: 727 [Urine:104; Drains:23; Stool:600]    3. PHYSICAL EXAMINATION:   General: Lying in bed, proned  Neuro: sedated on propofol  Resp: Intubated  CV: Irregular rhythm, tachycardic rate  Gastrointestinal: proned  Extremities: warm, well perfused    4. INVESTIGATIONS:   Arterial Blood Gases     Recent Labs  Lab 08/29/18  1000 08/29/18  0600 08/29/18  0330 08/28/18  2300   PH 7.26* 7.20* 7.18* 7.19*   PCO2 42 48* 50* 50*   PO2 69* 69* 76* 74*   HCO3 19* 19* 19* 19*     Complete Blood Count     Recent Labs  Lab 08/29/18  0330 08/28/18  0445 08/27/18  0338 08/26/18  0542   WBC 22.1* 14.6* 16.0* 16.8*   HGB 7.0* 7.3* 7.3* 8.0*    207 192 190     Basic Metabolic Panel    Recent Labs  Lab 08/29/18  0330 08/28/18  1639 08/28/18  0445 08/27/18  0338 08/26/18  0542    131* 135 136 136   POTASSIUM 4.5  --  4.4 4.3 4.4   CHLORIDE 103  --  104 102 103   CO2 19*  --  21 23 21   *  --  149* 135* 128*   CR 2.81* 2.33* 2.56* 2.34* 2.17*   *  --  161* 202* 132*     Liver Function Tests    Recent Labs  Lab 08/28/18  0445 08/27/18  0338   AST 36 53*   * 167*   ALKPHOS 159* 165*   BILITOTAL 0.3 0.2   ALBUMIN 1.4* 1.5*   INR  --  1.35*     Pancreatic Enzymes  No lab results found in last 7 days.  Coagulation Profile    Recent Labs  Lab 08/27/18  0338   INR 1.35*     Lactate  Invalid input(s): LACTATE    - Blood cultures x2 8/26: NGTD  - Sputum culture 8/28: pending  - Urine culture 8/28: pending  - FeNa 8/28: 0.2%  - Cortisol 32    5. RADIOLOGY:   - CXR 8/29:  1.  Endotracheal tube  tip 5.9 cm above the annabella.  2.  Decreased patchy pulmonary opacities likely improved pulmonary  edema or infection.  3.  No significant pleural effusions.    =========================================  Patient seen and discussed with ICU staff, Dr. Nava    - - - - - - - - - - - - - - - - - -    Amor Chin, PGY1  Surgery Resident

## 2018-08-29 NOTE — PROCEDURES
[facility] Procedure Note         Medicine Bedside Procedure:     PROCEDURE PERFORMED:  Arterial Catheter Site:  Right Radial Artery    PAUSE FOR THE CAUSE: Right patient: Yes      Right body part: Yes      Right procedure Yes    ULTRASOUND GUIDANCE:  Yes    PRIMARY INDICATION:  monitoring of blood pressure    DIAGNOSTIC DATA REVIEW:  Non-Applicable    H&P STATUS: H&P was reviewed, the patient was examined and no change has occurred in patient's condition since H&P was completed.    BARRIER PRECAUTIONS & STERILE TECHNIQUE  As documented in the Pre-Procedure Check List.    LOCAL ANESTHESIA:  None    NUMBER OF KITS USED:  1    STERILE DRESSINGS:  Applied      Procedure:  Consent obtained from patient's son.  Right arm prepped and draped in sterile fashion.  Ultrasound used to identify right radial artery and under US guidance the artery was accessed and a wire threaded thru the finder needle.  Arterial catheter inserted over this and then connected to arterial pressure monitor with good waveform seen.  Sutured to the wrist.  Sterile dressings applied.    ESTIMATED BLOOD LOSS:  Minimal    SPECIMENS COLLECTED:  None    SPECIMENS SENT:  None    FOLLOW- UP CHEST X-RAY:  Not indicatedar  COMPLICATIONS:  none    PRIMARY PROCEDURALIST:   Dr. Damon    SUPERVISING PHYSICIAN:  Dr. Nava

## 2018-08-29 NOTE — PROGRESS NOTES
Manually Proned in Bed  Time Proned: 215  Expected duration or plan for prone position: 18 hours  Non-essential peripheral IVs removed or validated hub not in a dependent position if line needed: Yes  Oral secretions removed/suctioned:  yes  Ensured no joint overextension and proper body alignment : Yes, head to R with pillows under chest and R side, R arm up  Upper extremities in swimmer's position, head facing raised arm:  yes  Ensured no tubings positioned beneath patient:  tried to pad as much as possible, donut under urostomy, padding under RIVKA drains  Ensured ETAD below bony prominence of cheek yes  Pressure injury prevention implemented: yes  {Pressure Injury Prevention Implemented:Padded lines and drains, z flow pillows in use, mepilex to knees,   Tolerated: well, sats much better and able to wean O2

## 2018-08-29 NOTE — PLAN OF CARE
Problem: Patient Care Overview  Goal: Plan of Care/Patient Progress Review  Outcome: Declining  D/I/A:  Neuro sedated on propofol and fentanyl, pupils 2mm/reactive. Going to paralyze tonight.  Pulm: respiratory status declining, see ABG. Vent settings adjusted CMV 18/12/350/100% and flolan added. O2 sats now mid 90s. Lungs coarse/diminished. Need sputum sulture sent. Bloody secretions with suction.  CV: Levophed to maintain MAP>65, 500 LR bolus, 500 5% albumin and 100 25% albumin with no improvement in pressures or urine output.   : Urostomy intact with marginal to no urine output, Need UA sent.  GI: Rectal tube patent, 400ml output. TF 45ml/hr  Skin: pressure ulcer on coccyx, abd incision stapled with open packing- moderate serous drainage from incision. x2 abd RIVKA with scant output.   Gtt's: levo 0.15, propofol 30, D5 0.45 100, fentanyl 50    ID:. Low grade temps, IV abx as scheduled    P: COntinue to monitor and treat as ordered, notify sicu with changes/concerns.

## 2018-08-29 NOTE — PROGRESS NOTES
"Urology  Progress Note    Remains intubated  Afebrile overnight  Increased pressor requirements - now on multiple pressors  Decreasing urine output    Exam  /63  Pulse 100  Temp 99.1  F (37.3  C) (Axillary)  Resp 18  Ht 1.56 m (5' 1.42\")  Wt 112.5 kg (248 lb 0.3 oz)  SpO2 95%  BMI 46.23 kg/m2  No acute distress,, sitting up in bed   Mechanical ventilation  Abdomen soft, mildly distended. Inferior aspect of the wound packed. Fascia intact at area where wound is open. Stoma pink and well perfused, 2 stents in place.   RIVKA x 2 serous  Urostomy bag with clear yellow urine in tubing    Urostomy 214/14  LUQ RIVKA 7/NR  LLQ RIVKA 7/NR    Labs  AM labs pending    Assessment/Plan  78 year old y/o female POD#22 s/p anterior pelvic exteneration, VRAM flap to pelvis (Plastics), BLPLND, creation of ileal conduit for MIBC. Medicine consulted for assistance managing multiple medical comorbidities. Ongoing issues include mental status and hypercarbia/hypoxia. Transferred to the SICU for worsening respiratory status, which improved enough to allow transfer to step down unit. Medicine co-managing. Again transferred to SICU on POD#15 for respiratory decompensation, currently intubated.    Neuro: Fentanyl and dex for sedation while intubated  CV:  Afib with RVR. Sched IV metop. Pressors per SICU team.   Pulm: Mechanical ventilation. Weaning per SICU.  FEN/GI: NPO. TFs started 8/23, currently at goal. Diuresis per medicine team.   Endo: ISS. Continue home synthroid.   Renal/:  L PNT removed 8/16. Urostomy. Monitor urine output. Agree with renal consult.   Heme: Hgb stable. Home warfarin held   ID: 8/22 BCx positive for yeast. Currently on ceftriaxone, flagyl, and fluconazole. Recommend coverage of Enterococcus growing in urine and from wound. Now with candida endophthalmitis.  Activity: up ad olya, encourage ambulation   PPx: SCDs. SQH.   Dispo: SICU    Appreciate excellent SICU cares    Seen and examined with the chief resident " who will discuss with Dr. Sharma and the SICU team.    Girma Hall MD  Urology PGY-3         Contacting the Urology Team     Please use the following job codes to reach the Urology Team. Note that you must use an in house phone and that job codes cannot receive text pages.     On weekdays, dial [phone] (or star-star-star [phone] on the new [employer] telephones) then [phone] to reach the Adult Urology resident or PA on call    On weekdays, dial [phone] (or star-star-star 777 on the new [place] telephones) then [phone] to reach the Pediatric Urology resident    On weeknights and weekends, dial [phone] (or star-star-star 777 on the new [place] telephones) then [phone] to reach the Urology resident on call (for both Adult and Pediatrics)

## 2018-08-29 NOTE — PROGRESS NOTES
"SPIRITUAL HEALTH SERVICES  SPIRITUAL ASSESSMENT Progress Note  [facility] ([place] 4C     REFERRAL SOURCE: end-of-life ritual provided for pt and family per pt request.    Family shared that they felt \"what Sunitha would want at this point is to let go.\" Pt is [employer], per family, her lucila is very important to her. Prayers, scripture reading and hymn were shared as a commendation service. Family very supportive of each other.    PLAN: chaplaincy available for further support as appropriate.    Trey Ponce M.Div. ([place]), [facility]  Staff   Pager [phone]      "

## 2018-08-29 NOTE — PLAN OF CARE
Problem: Patient Care Overview  Goal: Plan of Care/Patient Progress Review  Outcome: Declining  D: Bladder cancer s/p surgery   I/A: Pt passed away at 1324 with family at bedside after deciding with [facility] team to pursue comfort care. Pt was supinated, taken off of paralytic. Propofol turned off with extubation after train of 4 was 4/4. Pt passed away soon after extubation.   P: Support family.

## 2018-08-30 NOTE — DISCHARGE SUMMARY
Discharge Summary     Sunitha Jacques MRN# [id number]   YOB: 1940 Age: 78 year old     Date of Admission:  8/8/2018  Date of Death::   8/29/2018  Admitting Physician:  Girma Sharma MD  Discharge Physician:  Jayne Plaza MD  Primary Care Physician:         Pradeep Blanton          Admission Diagnoses:   Primary malignant neoplasm of overlapping sites of bladder (H) [C67.8]  Acute respiratory failure (H) [J96.00]  Past Medical History:   Diagnosis Date     Atrial fibrillation with rapid ventricular response (H) 2/11/2017     CAD (coronary artery disease)      Cerebrovascular accident (H) 3/21/2017     CHF (congestive heart failure) (H) 3/21/2017     CRD (chronic renal disease), stage III     Due to bladder cancer     Essential hypertension 4/9/2017     GERD (gastroesophageal reflux disease)      History of MRSA infection 2017    treated april 2017     Hypertension      Hypothyroidism, unspecified type 12/14/2017     Long term current use of anticoagulant 2/12/2017     Morbid obesity (H) 4/13/2017     MIKKI (obstructive sleep apnea)      T2DM (type 2 diabetes mellitus) (H)    Acute respiratory failure with hypoxia  Atrial fibrillation with rapid ventricular response  Aspiration pneumonia  Candida fungemia  Candida chorioretinitis  Anemia         Discharge Diagnosis:   Same as above         Procedures:   8/8/2018: Procedure(s):  Anterior Pelvic Exenteration With Bilateral Lymphandectomy, Ileal Conduit Diversion, Left Vertical Rectus Abdominis Musculocutaneous Flap To Pelvis - Wound Class: II-Clean Contaminated   - Wound Class: I-Clean   - Wound Class: I-Clean        Non-operative procedures:   None performed          Consultations:   INTERNAL MEDICINE ADULT IP CONSULT FOR [employer]  PHARMACY IP CONSULT  SOCIAL WORK IP CONSULT  MEDICATION HISTORY IP PHARMACY CONSULT  MEDICATION HISTORY IP PHARMACY CONSULT  RESPIRATORY CARE IP CONSULT  NEUROLOGY GENERAL ADULT  IP CONSULT  PHARMACY IP CONSULT  SPIRITUAL HEALTH SERVICES IP CONSULT  ENDOCRINE DIABETES ADULT IP CONSULT  PHARMACY IP CONSULT  PHARMACY IP CONSULT  VASCULAR ACCESS CARE ADULT IP CONSULT  SURGERY GENERAL ADULT IP CONSULT  VASCULAR ACCESS CARE ADULT IP CONSULT  RESPIRATORY CARE IP CONSULT  PHARMACY/NUTRITION TO START AND MANAGE TPN  VASCULAR ACCESS CARE ADULT IP CONSULT  VASCULAR ACCESS CARE ADULT IP CONSULT  OPHTHALMOLOGY IP CONSULT  INFECTIOUS DISEASE GENERAL ADULT IP CONSULT  VASCULAR ACCESS ADULT IP CONSULT  SPIRITUAL HEALTH SERVICES IP CONSULT         Imaging Studies:     Results for orders placed or performed during the hospital encounter of 08/08/18   XR Abdomen Port 1 View    Narrative    Abdominal radiograph one view  8/8/2018 7:06 PM      HISTORY: Status post cystectomy with ileal conduit, please evaluate  stent position    COMPARISON: PET scan 6/27/2018    FINDINGS: Postsurgical changes of cystectomy with ileal conduit.  Pelvic drains in place. Bilateral ureteral stents are in place.  Possible kinking of the superior aspect of the stents, though likely  projectional. Nonobstructive bowel gas pattern.      Impression    IMPRESSION: Bilateral ureteral stents are in good positioning.  Possible kinking of the superior aspect of the stents, though likely  projectional.     I have personally reviewed the examination and initial interpretation  and I agree with the findings.    CHAR CARL MD   XR Chest Port 1 View    Narrative    Chest radiograph one view  8/8/2018 7:12 PM      HISTORY: Status post central line placement    COMPARISON: PET 6/27/2018    FINDINGS: Right-sided Port-A-Cath tip projects over the atriocaval  junction. Right IJ central venous line tip projects over the upper  SVC. Left basilar opacity and patchy perihilar opacities. Trace left  pleural effusion. No pneumothorax.      Impression    IMPRESSION:   1. Right IJ central venous line tip projects over the upper SVC.   2. Pulmonary  vascular congestion with left basilar atelectasis or  infection.    I have personally reviewed the examination and initial interpretation  and I agree with the findings.    CHAR CARL MD   CT Head w/o Contrast    Narrative    CT HEAD W/O CONTRAST 8/10/2018 3:10 AM    Provided History: AMS, hx CVA     Comparison: Outside head CT 5/7/2018.    Technique: Using multidetector thin collimation helical acquisition  technique, axial, coronal and sagittal CT images from the skull base  to the vertex were obtained without intravenous contrast.     Findings:    No intracranial hemorrhage, mass effect, midline shift, or abnormal  extra-axial fluid collection. Ventricles are proportionate to the  cerebral sulci. Basal cisterns are patent. Gray-white matter  differentiation of the cerebral hemispheres is preserved.    Calvarium and visualized facial bones are intact. Minimal paranasal  sinus mucosal thickening.      Impression    Impression:   No acute intracranial pathology.     I have personally reviewed the examination and initial interpretation  and I agree with the findings.    VIRIDIANA GREENBERG MD   XR Chest Port 1 View    Narrative    Exam: XR CHEST PORT 1 VW, 8/10/2018 8:35 AM    Indication: Rule out pulmonary edema;     Comparison: 8/20/2018    Findings:   Single AP view the chest. Right IJ CVC with tip at the high SVC and  right chest wall Port-A-Cath with tip at the superior cavoatrial  junction. Stable borderline low lung volumes. Improving mixed  perihilar opacities. Small left-sided pleural effusion is stable. Left  basilar opacity. No pneumothorax. Calcifications of the aortic arch.  Partially visualized ureteral stent in the right upper quadrant.      Impression    Impression:   1. Borderline low lung volumes with improving pulmonary vascular  congestion.  2. Stable small left-sided pleural effusion with overlying  atelectasis/infection.  3. Support devices are stable.    I have personally reviewed the  examination and initial interpretation  and I agree with the findings.    PATY SAM MD   US Lower Extremity Venous Duplex Bilateral    Narrative    EXAMINATION: DOPPLER VENOUS ULTRASOUND OF BILATERAL LOWER EXTREMITIES,  8/10/2018 11:16 AM     COMPARISON: 7/5/2018.    HISTORY: Evaluate for DVT.    TECHNIQUE:  Gray-scale evaluation with compression, spectral flow and  color Doppler assessment of the deep venous system of both legs from  groin to knee, and then at the ankles.    FINDINGS:  In both lower extremities, the common femoral, femoral, popliteal and  posterior tibial veins demonstrate normal compressibility and blood  flow.        Impression    IMPRESSION:  1.  No evidence of deep venous thrombosis in either lower extremity.    RAUDEL BAKER MD   XR Chest Port 1 View    Narrative    Exam: XR CHEST PORT 1 VW, 8/14/2018 10:00 AM    Indication: 6 days post-op, increasing 02 requirements.   Crackles on  left, some on right;     Comparison: Chest x-ray 8/10/2018    Findings:   Portable AP radiograph of the chest at 40 degrees. Right chest wall  Port-A-Cath tip projecting over right atrium. Right IJ central venous  catheter with tip projecting over the high SVC. Stable enlargement of  the cardiac silhouette. The pulmonary vasculature is indistinct. Lung  volumes are low. Small bilateral pleural effusions, slightly  increased. No pneumothorax. Increased perihilar and bibasilar  opacities. The visualized upper abdomen appears unremarkable.      Impression    Impression:   Stable enlargement of the cardiac silhouette with increased perihilar  and bibasilar opacities, which may represent worsening pulmonary edema  and/or atelectasis/consolidation in the setting of increased small  bilateral pleural effusions.    I have personally reviewed the examination and initial interpretation  and I agree with the findings.    TARAN NORIEGA,    XR Abdomen Port 1 View    Narrative    XR ABDOMEN PORT 1 VW  8/14/2018  7:14 PM      HISTORY: Nausea and vomiting, POD 6 cystectomy. Evaluate for ileus;     COMPARISON: 8/8/2018 and chest x-ray of the same date    FINDINGS: Supine radiographs of the abdomen were obtained.  Postsurgical changes of cystectomy with ileal conduit again  demonstrated. Bilateral ureteral stents and surgical drains noted.    New dilated small bowel loops are seen in the central abdomen with  scattered gas projecting over the colon. No pneumatosis or portal  venous gas appreciated. Vascular calcifications.    Small bilateral pleural effusions and basilar opacities partially  visualized.      Impression    IMPRESSION:  Postsurgical changes with multiple new mildly dilated loops of small  bowel. Findings may be related to adynamic ileus and/or developing  small bowel obstruction.    KENNETH COX MD   XR Abdomen Port 1 View    Narrative    Exam: XR ABDOMEN PORT 1 VW, 8/14/2018 10:13 PM    Indication: To be performed to confirm NGT placement;     Comparison: X-ray Abdomen 8/14/2018    Findings:     Supine radiograph of the abdomen. Enteric tube with tip and sidehole  projecting over the stomach. Ureteric stents are partially visualized.  Postsurgical changes of abdominal surgery as evidenced by multiple  staples. Central venous catheter tip projecting over the right atrium.  Left abdominal pigtail catheter present, unchanged from previous  study.    Persistent dilated central abdominal bowel loops. No pneumatosis or  portal venous gas.      Impression    Impression:     1. Enteric tube with tip and sidehole projecting over the stomach  body.  2. Postsurgical changes with persistent mildly dilated loops of small  bowel in the central abdomen, findings may represent adynamic ileus  and/or developing small bowel obstruction    I have personally reviewed the examination and initial interpretation  and I agree with the findings.    HARRY HERNANDEZ MD   XR Abdomen Port 1 View    Narrative    Examination:  XR ABDOMEN  PORT 1 VW 8/17/2018 11:04 AM     Comparison: 8/14/2018.    History: monitor progress of previously developing SBO;     Findings: Supine view of the abdomen.  Nasogastric tube tip and  sidehole projects over the body of the stomach. Left and right  abdominal pigtail catheter is present, unchanged from prior. Ureteral  stent is partially visualized. There are numerous surgical clips in  the pelvis. Superficial skin staples are also seen along the abdomen.  Air-filled loops of small and large bowel with decreased distention  compared to prior evaluation. Nonspecific bowel gas pattern. Air is  seen in the rectum making obstruction unlikely.       Impression    Impression: Decreased distention of bowel loops with air seen in the  rectum, making obstruction unlikely. May have degree of adynamic  ileus.    I have personally reviewed the examination and initial interpretation  and I agree with the findings.    DA OSPINA MD   XR Chest 2 Views    Narrative    XR CHEST 2 VW  8/18/2018 3:30 PM      HISTORY: evaluate for pulmonary edema and/pr poss consolidation that  may be contributing to elevated WBC;     COMPARISON: 8/14/2018    FINDINGS: AP and lateral views of the chest. There is suboptimal and  limited evaluation of the lateral projection given overlapping  materials external to the patient. Stable right IJ Port-A-Cath tip at  cavoatrial junction. Right IJ central venous catheter tip at mid SVC,  unchanged. Unchanged cardiomegaly. Mild pulmonary vascular  engorgement. No significant change in bibasilar and perihilar  opacities. No substantial pleural effusion. No pneumothorax.      Impression    IMPRESSION:   1. No significant change in bilateral perihilar and bibasilar  opacities, likely pulmonary edema and/or consolidation.  2. Stable lines and tubes as above.    I have personally reviewed the examination and initial interpretation  and I agree with the findings.    MELISSA LOPEZ MD   CT Abdomen Pelvis w  Contrast    Narrative    EXAMINATION: CT ABDOMEN PELVIS W CONTRAST, 8/19/2018 7:49 PM    TECHNIQUE:  Helical CT images from the lung bases through the  symphysis pubis were obtained with IV contrast. Contrast dose: 135cc  of Isovue 370    COMPARISON: PET CT 6/27/2018, CT abdomen pelvis 5/23/2018    HISTORY: Please evaluat for intrabdominal process, POD #11 cystectomy  with rising leukocytosis;     FINDINGS:    Abdomen and pelvis: Postoperative changes of cystectomy with ileal  conduit. Bilateral nephroureteral stents with the pigtails in the  renal collecting systems. Right upper quadrant surgical drain with the  tip in the central pelvis, without a surrounding fluid collection. In  the left hemipelvis there is a fluid collection with multiple foci of  air measuring up to 4.7 x 2.4 x 4.0 cm (series 3 image 322).    There is a linear tract of attenuation extending from the distal  sigmoid colon to the area of the ileal conduit where there is a small  amount of fluid (series 3 image 308) but without peripheral rim  enhancement of the fluid. There is a midline ventral hernia associated  with the patient's abdominal incision which contains loops of small  bowel and mesenteric fat, but without evidence of obstruction.The  large and small bowel are normal caliber.      The liver, spleen, pancreas, and left adrenal gland are normal. There  is a 1.0 x 0.9 cm right adrenal nodule (series 3 image 82). Left-sided  hydronephrosis. Unchanged right simple renal cysts, with additional  bilateral smaller renal cortical lesions which are too small to  definitively characterize.    No enlarged inguinal, pelvic, or retroperitoneal lymph nodes by CT  short axis size criteria. Trace free fluid. No free air.    Lung bases: Small bilateral pleural effusions with overlying  atelectasis/consolidations.    Bones and soft tissues: Anasarca. Multilevel degenerative changes of  the spine. Small amount of fluid adjacent to the patient's  ileal  conduit stoma without significant peripheral enhancement      Impression    IMPRESSION:   1. Postoperative changes of cystectomy with ileal conduit formation  and bilateral nephroureteral stents. Small simple fluid attenuation  collection adjacent to the right lower quadrant ileal conduit stoma.  2. Collection in the left hemipelvis measuring up to 4 cm with  air-fluid, raising concern for developing abscess, though a  well-defined drainable collection is not present.  3. Linear tract from the distal sigmoid colon to just inferior to the  ileal conduit, which appears represent postsurgical changes.  4. New/increased left hydronephrosis, despite an apparently well  positioned nephroureteral stent.  5. Midline periincisional ventral hernia containing loops of small  bowel, but without evidence of obstruction.  6. Small bilateral pleural effusions with overlying  atelectasis/consolidations.  7. Small right adrenal nodule measuring up to 1 cm, which has  attenuation on the prior CT compatible with a benign adenoma.      I have personally reviewed the examination and initial interpretation  and I agree with the findings.    MELISSA LOPEZ MD   XR Chest Port 1 View    Narrative    EXAMINATION: XR CHEST PORT 1 VW, 8/21/2018 5:23 PM    COMPARISON: 8/18/2018    HISTORY: Acute hypoxic respiratory failure, flash pulmonary edema vs  evolving PNA;     FINDINGS: Right IJ Port-A-Cath tip in the low SVC. Cardiac silhouette  is enlarged and unchanged. Increased hazy basilar predominant  opacities increased perihilar opacities. Indistinct pulmonary  vasculature.      Impression    IMPRESSION:   1. Worsening perihilar and basilar opacities with probable small  pleural effusions. Appearance favors pulmonary edema although  infection could also be considered.    MELISSA LOPEZ MD   CT Chest Pulmonary Embolism w Contrast    Narrative    EXAMINATION: CT CHEST PULMONARY EMBOLISM W CONTRAST, 8/22/2018 12:03  PM      COMPARISON: PET 6/27/2018, radiograph 8/21/2018, 8/17/2018    HISTORY: Acute respiratory failure    TECHNIQUE: Volumetric helical acquisition of CT images of the chest  from the lung apices to the kidneys were acquired after the  administration of 135 mL of Isovue-370 IV contrast. Three-dimensional  (3D) post-processed angiographic images were reconstructed, archived  to PACS and used in interpretation of this study.    Contrast: iopamidol (ISOVUE-370) solution 135 mL    FINDINGS:     Right chest wall Port-A-Cath tip near the superior anterior caval  junction.  Additional right IJ central venous catheter. Flow artifact around the  lines in the right innominate vein (series 9 image 55, series 6 image  42).    There is adequate opacification of the pulmonary vasculature. No  pulmonary embolism, Persistent mild cardiomegaly. Coronary  calcification. Aortic valve calcifications. No signs of right heart  strain. Chronic dilated main pulmonary artery measures up to 3.7 cm.  Scattered calcified plaque of the thoracic aorta. No pericardial  effusion. New  thoracic lymphadenopathy, for example a 2 cm subcarinal  lymph node (series 9 image 131). Is present.    The central tracheobronchial tree is patent. Bilateral small pleural  effusions, right more than left with overlying  atelectasis/consolidation. Adjacent consolidative opacity in the  bilateral upper and lower lobes, right more than left. No  pneumothorax.    Visualized upper abdomen: Partially seen bilateral ureteral stents.  Bones and soft tissues: No suspicious osseous findings.      Impression    IMPRESSION:   1. Right internal jugular approach chest wall Port-A-Cath and a second  intravenous catheter. Possible nonocclusive thrombus in the right  innominate vein around the catheters vs flow artifact. Recommend  duplex for confirmation.  2. No pulmonary embolism. Chronic dilated main pulmonary artery,  suggestive pulmonary hypertension.  3. Increased bilateral  small pleural effusions, right more than left.  New consolidative opacities in the bilateral upper and lower lobes,  right more than left. Differential infection or aspiration.  4. Dilated main pulmonary artery possibly pulmonary hypertension.    [Result: Question of right IJ Intravenous thrombus]    Finding was identified on 8/22/2018 12:04 PM.     [facility]  was contacted by Jessica SOLORIO at 8/22/2018 12:37 PM and  verbalized understanding of the urgent finding.     I have personally reviewed the examination and initial interpretation  and I agree with the findings.    PATY SAM MD   CT Abdomen Pelvis w Contrast    Narrative    EXAMINATION: CT ABDOMEN PELVIS W CONTRAST, 8/22/2018 11:52 AM    TECHNIQUE:  Helical CT images from the lung bases through the  symphysis pubis were obtained  with contrast.    CONTRAST DOSE: 135 cc of Omnipaque 370    COMPARISON: CT  8/19/2018, PET 6/27/2018    HISTORY: 1) Acute respiratory failure - rule out PE , 2) sepsis - rule  out abdominal abscess, obstruction, other abd pathology;     FINDINGS:    Chest:   Partially seen bilateral small to moderate pleural effusions with  overlying atelectasis, right more than left. No superimposed  consolidative opacities. These findings are better characterized on  the CT chest.    Abdomen and pelvis:    Left lower quadrant pelvic drain. A second subcutaneous drain goes up  to the upper mid abdomen.    Postoperative changes of cystectomy with right lower quadrant conduit.  Bilateral ureteral stents. Unchanged bilateral renal cysts and  hypodensities. Interval resolution of the left hydronephrosis. Slight  increased size of the 4.2 x 2.9 cm fluid collection in the abdominal  wall adjacent to the ileal conduit series 5 image 270), compared to 4  x 2.6 cm on the CT 8/19/2018.    Continued loculated 3.5 x 3.5 cm right anterior hemipelvic collection  (series 5 image 308). Slightly decreased air within the 3.6 x 2.8 cm  left hemipelvic collection  (series 5 image 285). Other scattered fluid  in the pelvis with some loculated components. No thick rims of  enhancement to clearly indicate abscess. Decrease punctate foci of  intraperitoneal air in the pelvis. Continued and not substantially  changed soft tissue stranding in the pelvis.    Again seen midline wound dehiscence with large rectal diastases and  anterior eventration containing loops of colon and small bowel,  without evidence of bowel obstruction. Node clear communicating  fistula. No well-defined abscess. Air and packing material is seen in  the skin incision. Mild prominent fluid-filled loops of small bowel,  likely reactive ileus. Small amount of fluid in the hernial sac.    Unremarkable liver and gallbladder. The spleen is within normal  limits. Unchanged right adrenal nodule.     No portal venous gas or free air. No abdominopelvic lymphadenopathy.    Atherosclerotic calcifications of the coronary aorta and the major  arteries.    Bones and soft tissues:  No suspicious osseous findings. Mild multilevel degenerative changes  of thoracolumbar spine.      Impression    IMPRESSION:   Status post cystectomy for bladder cancer:  1. Right lower quadrant ileal conduit with unchanged small amount of  abdominal wall fluid adjacent to the stoma.  2. Bilateral ureteral stents in place. Near resolved left  hydronephrosis.  3. Multiple loculated fluid collections in the pelvis. Overall, sizes  the collections is similar. The scattered areas of air have decreased.  These may be postoperative although developing abscesses are also  possible.  4. Again seen midline incision dehiscence with packing material and  air in the region but without fluid containing abscess identified.  5. Large anterior abdominal wall eventration containing loops of small  and large bowel, without evidence of obstruction or fistulization.  Dilated loops of bowel within the abdomen are likely reactive ileus.  6. Increased bilateral pleural  effusions and overlying  atelectasis/consolidation, better charecterized on the CT chest    I have personally reviewed the examination and initial interpretation  and I agree with the findings.    MELISSA LOPEZ MD   XR Chest Port 1 View    Narrative    Exam: XR CHEST PORT 1 VW, 8/22/2018 6:50 PM    Indication: eval ET tube placement;     Comparison: Chest x-ray 8/21/2018    Findings:   Frontal chest x-ray. Endotracheal tube projects 3.2 cm above the  annabella. Cardiac silhouette is enlarged and unchanged. No pneumothorax.  Right IJ catheter and right chest wall ovi catheter are unchanged in  position. Persistent perihilar and basilar opacities with increased  right mid zone opacity. Bilateral pleural effusion.      Impression    Impression:   1. Endotracheal tube projects 3.0 cm above the annabella.  2. Persistent perihilar and basilar opacities, representing pulmonary  edema versus infection.  3. Small bilateral pleural effusions.    I have personally reviewed the examination and initial interpretation  and I agree with the findings.    CHAR CARL MD   XR Abdomen Port 1 View    Narrative    Exam: XR ABDOMEN PORT 1 VW, 8/22/2018 6:59 PM    Indication: OG placement;     Comparison: X-ray 8/7/2018    Findings:   Supine frontal view of the abdomen. Enteric tube projects over the GE  junction and tip projects over the proximal stomach. Postsurgical  changes of pelvic surgery. Pelvic drain projecting over the pelvis.  Presumed ureteric stents. Surgical staples projecting over the central  abdomen. Nonspecific bowel gas pattern with few gaseous distended  bowel loops.       Impression    Impression:   1. Enteric tube sidehole projects over the gastroesophageal junction  and tip projects over the proximal stomach.  2. Postsurgical changes of pelvic surgery.  3. Nonspecific bowel gas pattern with few gas distended bowel loops  projecting over the central abdomen.     I have personally reviewed the examination  and initial interpretation  and I agree with the findings.    CHAR CARL MD   XR Chest Port 1 View    Narrative    XR CHEST PORT 1 VW  8/23/2018 8:19 AM      HISTORY: Evaluate s/p intubation;     COMPARISON: 8/22/2018    FINDINGS: Portable AP view of the chest. Endotracheal tube tip in the  mid thoracic trachea. Right IJ Port-A-Cath with tip in the right  atrium. Gastric tube tip coursing below the field-of-view of this  study. Right IJ central venous catheter with tip in the high SVC.    Unchanged cardiomegaly with perihilar interstitial opacities. Small to  moderate layering pleural effusions.      Impression    IMPRESSION:   1. Unchanged cardiomegaly with perihilar interstitial opacities  consistent with pulmonary edema versus infection.  2. Unchanged small to moderate layering pleural effusions with  associated atelectasis, bilaterally.    I have personally reviewed the examination and initial interpretation  and I agree with the findings.    ARMANI CRISTOBAL MD   XR Abdomen Port 1 View    Narrative    Exam: Abdominal x-ray, 1 view, 8/23/2018.    COMPARISON: 8/20/2018.    HISTORY: OG tube placement.    FINDINGS: Supine view of the abdomen was obtained. The most inferior  portions of the abdomen were collimated out of the field-of-view.  NG/OG tube with its tip and sidehole projecting over the stomach.  Postsurgical changes with skin staples, surgical clips and  nephroureteral stents as well as partially visualized pelvic drain.  Nonobstructive bowel gas pattern. No pneumatosis. No portal venous  gas. Pelvic phleboliths. Degenerative changes of the spine. Bilateral  pleural effusions we have overlying atelectasis an/or consolidation.      Impression    IMPRESSION: NG/OG tube with its tip and sidehole projecting over the  stomach.    RAUDEL BAKER MD   XR Abdomen Port 1 View    Narrative    XR ABDOMEN PORT 1 VW  8/25/2018 6:41 PM      HISTORY: assess OG position;     COMPARISON:  8/23/2018    FINDINGS: Bilateral ureteral stents are in place. Possible kinking  bilaterally, the likely projectional. The orogastric tube tip and  sidehole projects over the stomach. Nonobstructive bowel gas pattern  with no pneumatosis or portal venous gas.      Impression    IMPRESSION: The orogastric tube is coiled in the stomach, with tip and  sidehole projecting over the stomach.     I have personally reviewed the examination and initial interpretation  and I agree with the findings.    DA OSPINA MD   XR Chest Port 1 View    Narrative    EXAM: XR CHEST PORT 1 VW  8/27/2018 12:05 PM     HISTORY:  eval for PNA;        COMPARISON:  8/23/2018    FINDINGS: 60 degree AP radiograph of the chest. Endotracheal tube  projects in the upper thoracic trachea. Enteric tube courses inferior  to the left diaphragm and out of field of view. Unchanged cardiomegaly  with perihilar interstitial opacities. Bilateral mixed patchy and  nodular pulmonary opacities. Bilateral small to moderate pleural  effusions. No pneumothorax.       Impression    IMPRESSION:   1. Unchanged cardiomegaly  2. Increased perihilar interstitial opacities and diffuse bilateral  mixed patchy/nodular opacities. Suspect some degree of pulmonary edema  with superimposed infectious process.    I have personally reviewed the examination and initial interpretation  and I agree with the findings.    ARMANI CRISTOBAL MD   XR Chest Port 1 View    Narrative    Exam: XR CHEST PORT 1 VW, 8/28/2018 9:17 AM    Indication: eval for PNA;     Comparison: Chest x-ray 8/27/2018, CT chest 8/22/2018    Findings:   Single portable AP radiograph of the chest at 30 degrees. Endotracheal  tube tip projects 5.2 cm above the annabella. Enteric tube courses below  the diaphragm, its tip and sidehole project beyond the field-of-view.  The cardiomediastinal silhouette is enlarged, stable. The pulmonary  vasculature is indistinct. No pneumothorax. Small bilateral  pleural  effusions. Bilateral interstitial and airspace opacities, similar to  the prior radiograph on 8/27/2018. No acute bony abnormalities. The  visualized upper abdomen appears unremarkable.      Impression    Impression:   1. Unchanged cardiomegaly with bilateral mixed interstitial and patchy  airspace opacities, which may represent edema or infection.  2. Small bilateral pleural effusions.     I have personally reviewed the examination and initial interpretation  and I agree with the findings.    TARAN NORIEGA, DO   CT Head w/o Contrast    Narrative    CT HEAD W/O CONTRAST 8/28/2018 9:34 AM    Provided History: Evaluate for abscess/mass;     Comparison: Head CT 8/10/2018.    Technique: Using multidetector thin collimation helical acquisition  technique, axial, coronal and sagittal CT images from the skull base  to the vertex were obtained without intravenous contrast.     Findings:    No intracranial hemorrhage, mass effect, midline shift or abnormal  extra axial fluid collection. The gray-white matter differentiation is  intact. Stable mild leukoaraiosis and generalized parenchymal volume  loss. Ventricles are not enlarged out of proportion to the cerebral  sulci. Bilateral mastoid effusions, nonspecific in the setting of  intubation. Paranasal sinuses are clear.      Impression    Impression: No acute intracranial pathology. No significant change  since 8/10/2018.    I have personally reviewed the examination and initial interpretation  and I agree with the findings.    ADDIS DAN MD   XR Chest Port 1 View    Narrative    Exam: XR CHEST PORT 1 VW, 8/28/2018 2:32 PM    Indication: RN placed PICC - verify tip placement;     Comparison: None    Findings:   AP radiograph of the chest at 20 degrees. Interval placement of right  PICC tip projecting in the high SVC. The endotracheal tube tip  projects 4 centimeters above the annabella. Enteric tube projects  inferior to the left hemidiaphragm with tip beyond  field of view. The  cardiomediastinal silhouette is unchanged. Pulmonary vasculature is  indistinct. Bilateral pleural effusions. No pneumothorax. Bilateral  mixed interstitial and airspace patchy infiltrates are slightly  increased.      Impression    Impression:   1. Right PICC tip projects over the high SVC.  2. Unchanged cardiomegaly with slightly increased bilateral mixed  interstitial and patchy airspace opacities, may represent pulmonary  edema versus infection in the appropriate clinical context.  3. Stable small bilateral pleural effusions.     I have personally reviewed the examination and initial interpretation  and I agree with the findings.    TARAN NORIEGA, DO   XR Chest Port 1 View    Narrative    EXAM: XR CHEST PORT 1 VW  8/29/2018 1:10 AM      HISTORY: EVAL ET TUBE placement;     COMPARISON: Chest radiograph 8/28/2018, 8/27/2018    FINDINGS:     Single AP view of the chest. Endotracheal tube tip 5.9 cm above the  annabella. Stable right PICC. Enteric tube courses below the diaphragm.    The cardiomediastinal silhouette is stably enlarged. Decreased patchy  pulmonary opacities.    No significant pleural effusions. No pneumothorax.       Impression    IMPRESSION:   1.  Endotracheal tube tip 5.9 cm above the annabella.  2.  Decreased patchy pulmonary opacities likely improved pulmonary  edema or infection.  3.  No significant pleural effusions.    I have personally reviewed the examination and initial interpretation  and I agree with the findings.    ARMANI CRISTOBAL MD            Medications Prior to Admission:     No prescriptions prior to admission.            Discharge Medications:   None           Brief History of Illness:   Reason for admission requiring a surgical or invasive procedure:   Bladder Cancer   The patient underwent the following procedure(s):   See above   There were no immediate complications during this procedure.    Please refer to the full operative summary for details.            Hospital Course:   The patient's hospital course was remarkable for the following issues:  1. Respiratory distress. Transferred to SICU twice during hospital stay for respiratory decompensation. On POD#15 transferred again to SICU and was intubated. She was treated empirically for aspiration pneumonia. Concern for ARDS and was proned. She was unable to be successfully weaned off the ventilator.   2. Hypotension. Increasing pressor requirements.  3. Anuria. Worsening creatinine with eventual cessation of urine output. Decision made by family not to start dialysis.   4. Wound dehiscence. Decision made jointly by Urology, Plastics, and Gen Surg to manage non-operatively with wet to dry dressing changes.   5. Fungemia. Treated with fluconazole given eye involvement.   6. Afib with RVR. Initially treated on floor with diltiazem, eventually on amlodipine.  7. Hyperglycemia. Insulin gtt.      On POD#22 the decision was made by the patient's family to terminally extubate her and to stop pressor support. She passed away soon after cares were ceased.          Final Pathology Result:   FINAL DIAGNOSIS:   A: Ureter, right, distal, excision (A1FS):   - Benign segment of ureter     B: Ureter, left, distal, excision (B1FS):   - Benign segment of ureter     C: Bladder, urethra, bilateral ureters, uterus, cervix, bilateral ovaries   and fallopian tubes, anterior pelvic   exenteration:   - BLADDER INVASIVE HIGH GRADE UROTHELIAL CARCINOMA, size 3.1 cm, involving    left lateral, anterior, and   posterior bladder, including left ureteral orifice   - Carcinoma is residual after neoadjuvant chemotherapy   - Carcinoma invades through muscularis propria to perivesical adipose   - Lymphovascular invasion present   - Perineural invasion present   - Margins (perivesical, urethral, bilateral ureteral) are uninvolved by   carcinoma   - Atrophic endometrium with 6 mm endometrial polyp, uninvolved by   carcinoma   - Myometrium uninvolved by  carcinoma   - Uterine serosa uninvolved by carcinoma   - Cervix with atrophic mucosa, uninvolved by carcinoma   - Calcified arteries (calcific medial sclerosis)   - Bilateral ovaries with cortical inclusion cysts, uninvolved by carcinoma   - Bilateral fallopian tubes uninvolved by carcinoma   - See tumor synoptic below     D: Lymph nodes right pelvic, dissection:   - Four benign lymph nodes (0/4)     E: Lymph nodes, left pelvic, dissection:   - Four benign lymph nodes (0/4)     F: Ureter, left, final margin, excision:   - Benign smooth muscle and fibroadipose tissue     G. Ureter, right, final margin, excision:   - Benign segment of ureter         Discharge Instructions and Follow-Up:   None    Jayne Plaza MD  Urology Resident    8:55 PM, 8/29/2018

## 2018-08-31 LAB — GLUCOSE BLDC GLUCOMTR-MCNC: 162 MG/DL (ref 70–99)

## 2018-09-01 LAB
BACTERIA SPEC CULT: NO GROWTH
Lab: NORMAL
SPECIMEN SOURCE: NORMAL

## 2018-09-03 LAB
BACTERIA SPEC CULT: ABNORMAL
Lab: ABNORMAL
SPECIMEN SOURCE: ABNORMAL

## 2019-06-17 NOTE — PROGRESS NOTES
08/12/18 1000   Quick Adds   Type of Visit Initial PT Evaluation  (Ana Delgadillo, PT, DPT )       Present no   Language english    Living Environment   Lives With alone   Living Arrangements house   Home Accessibility stairs to enter home   Number of Stairs to Enter Home 3   Number of Stairs Within Home 0   Transportation Available car   Living Environment Comment Information obtained from prior OT eval, pt    Self-Care   Dominant Hand right   Usual Activity Tolerance good   Current Activity Tolerance poor   Regular Exercise no   Equipment Currently Used at Home cane, straight   Activity/Exercise/Self-Care Comment indep [facility] ADLs prior to admit    Functional Level Prior   Ambulation 1-->assistive equipment   Transferring 1-->assistive equipment   Toileting 0-->independent   Bathing 0-->independent   Dressing 0-->independent   Eating 0-->independent   Communication 0-->understands/communicates without difficulty   Swallowing 0-->swallows foods/liquids without difficulty   Cognition 0 - no cognition issues reported   Fall history within last six months yes   Number of times patient has fallen within last six months 1   Which of the above functional risks had a recent onset or change? ambulation;transferring   Prior Functional Level Comment Utilizes SPC for ambulation. Otherwise mod indpe for mbility    General Information   Onset of Illness/Injury or Date of Surgery - Date 08/08/18   Referring Physician Chaim Saldana MD   Patient/Family Goals Statement none stated   Pertinent History of Current Problem (include personal factors and/or comorbidities that impact the POC) 78 year old female who was admitted on 8/8/2018 for malignant neoplasm of overlapping sites of the bladder.  She underwent a anterior pelvic exenteration, bilateral lymphadenectomy, ileal conduit diversion, left vertical rectus abdominis musculocutaneous flap to pelvis.  She was on the general care floor post operatively.  On  Awaiting MD approval in separate encounter.    8/10 she was transferred to the SICU for altered mental status and hypoxia.   Precautions/Limitations fall precautions;abdominal precautions   General Info Comments activity: ambulate with A    Cognitive Status Examination   Level of Consciousness lethargic/somnolent   Follows Commands and Answers Questions 75% of the time   Personal Safety and Judgment intact   Memory intact   Pain Assessment   Patient Currently in Pain Yes, see Vital Sign flowsheet   Integumentary/Edema   Integumentary/Edema Comments some scrapes on nech, otherwise no deficits    Posture    Posture Comments not assessed   Range of Motion (ROM)   ROM Comment From observations WFL    Strength   Strength Comments unable to formally assess, at least 3/5 UE and LE   Bed Mobility   Bed Mobility Comments Rolling in bed with mod - max A for palcement of OH lift    Transfer Skills   Transfer Comments dependent via OH lift    Gait   Gait Comments pt unable today/not assessed   Balance   Balance Comments not assessed today   Sensory Examination   Sensory Perception no deficits were identified   Coordination   Coordination no deficits were identified   Muscle Tone   Muscle Tone no deficits were identified   General Therapy Interventions   Planned Therapy Interventions ADL retraining;balance training;bed mobility training;gait training;strengthening;transfer training;progressive activity/exercise;home program guidelines   Clinical Impression   Criteria for Skilled Therapeutic Intervention yes, treatment indicated   PT Diagnosis dec functional mobility    Influenced by the following impairments fatigue, emesis, pain, deconditioning    Functional limitations due to impairments bed mobility, transfers, gait, stairs, endurance, balance    Clinical Presentation Stable/Uncomplicated   Clinical Presentation Rationale PMH, clinical judgement, current mobility    Clinical Decision Making (Complexity) Low complexity   Therapy Frequency` 5 times/week   Predicted Duration  "of Therapy Intervention (days/wks) 8/19/18   Anticipated Equipment Needs at Discharge (TBD)   Anticipated Discharge Disposition Transitional Care Facility   Risk & Benefits of therapy have been explained Yes   Patient, Family & other staff in agreement with plan of care Yes   Clinical Impression Comments PT silva completed, tx indicated    [facility] AM-PAC TM \"6 Clicks\"   2016, Trustees of [facility], under license to [employer].  All rights reserved.   6 Clicks Short Forms Basic Mobility Inpatient Short Form   [facility] AM-PAC  \"6 Clicks\" V.2 Basic Mobility Inpatient Short Form   1. Turning from your back to your side while in a flat bed without using bedrails? 2 - A Lot   2. Moving from lying on your back to sitting on the side of a flat bed without using bedrails? 2 - A Lot   3. Moving to and from a bed to a chair (including a wheelchair)? 1 - Total   4. Standing up from a chair using your arms (e.g., wheelchair, or bedside chair)? 1 - Total   5. To walk in hospital room? 1 - Total   6. Climbing 3-5 steps with a railing? 1 - Total   Basic Mobility Raw Score (Score out of 24.Lower scores equate to lower levels of function) 8   Total Evaluation Time   Total Evaluation Time (Minutes) 6     "

## 2024-12-03 NOTE — PROGRESS NOTES
Diabetes Consult Daily  Progress Note          Assessment/Plan:   Sunitha Jacques is a 78 year old woman with diet-controlled type 2 diabetes complicated by peripheral neuropathy who underwent total cystectomy, anterior pelvic exenteration with bilateral lymphadectomy, ileal conduit diversion, left vertical rectus abdominis musculocutaneous flap to pelvis on 8/8/2018 for invasive bladder cancer, experiencing post-operative and TPN-associated hyperglycemia.       Glucose control improved after starting TPN w/ insulin added, but continues significantly above target.     Plan  -NPH 22 units x one now AM.  Expect to need basal insulin on ongoing basis, but should be lower dose once the TPN insulin is optimized.  -increase regular insulin to 38 units in TPN containing 170 grams dextrose (goal dex)  -increase aspart correction from high (1 per 25 mg/dL glucose) q4h, to 2 per 30 mg/dL  -recommend change diluent from D5W to NS for Kphos  -will need aspart carbohydrate coverage once eating again (estimate 1 unit per 6 grams carb to start)     .         Outpatient diabetes follow up: TBD  Plan discussed with patient, bedside RN, medicine team and R.Ph.           Interval History:     The last 24 hours progress and nursing notes reviewed.  Yesterday, BG 300s in the afternoon despite adding NPH and increasing aspart.  Note she received phosphorus replacement in D5W starting 1300, concurrent w/ TPN.    Remains NPO, but NGT clamped and taking high volume ice chips, seems to be causing some abd discomfort.  Complains of fullness/distension.  Though has abd pain constantly, her greatest desire is to have something to eat/drink besides ice chips.  +flatus, no stool yet.  Transferred using ceiling lift with good tolerance.    Note increased WBC today.  Afebrile.      PTA Regimen: diet controlled last year or so (previous to that glipizide 7.5 mg daily)        Recent Labs  Lab 08/16/18  0821  Addended by: FRANCIS LEE on: 12/3/2024 08:43 AM     Modules accepted: Orders     "08/16/18  0512 08/16/18  0341 08/15/18  2337 08/15/18  1940 08/15/18  1535 08/15/18  1128  08/15/18  0512  08/14/18  0452  08/13/18  0812  08/13/18  0405  08/12/18  0444   GLC  --  253*  --   --   --   --   --   --  252*  --  169*  --  154*  --  137*  --  187*   *  --  243* 275* 306* 342* 311*  < >  --   < >  --   < >  --   < >  --   < >  --    < > = values in this interval not displayed.            Review of Systems:   See interval hx          Medications:       Active Diet Order      NPO for Medical/Clinical Reasons Except for: Ice Chips  Supplements: had Boost Breeze previously  Physical Exam:  Gen: Alert, resting in recliner, in NAD   HEENT: NC/AT, mucous membranes are moist  Resp: Unlabored  Neuro:oriented x3, communicating clearly  /82  Pulse 102  Temp 97.6  F (36.4  C) (Oral)  Resp 16  Ht 1.56 m (5' 1.42\")  Wt 111.1 kg (244 lb 14.9 oz)  SpO2 95%  BMI 45.65 kg/m2           Data:     Lab Results   Component Value Date    A1C 6.2 08/08/2018    A1C 6.2 07/05/2018    A1C 5.8 04/13/2017              CBC RESULTS:   Recent Labs   Lab Test  08/16/18 0512   WBC  13.6*   RBC  3.26*   HGB  9.2*   HCT  29.1*   MCV  89   MCH  28.2   MCHC  31.6   RDW  15.6*   PLT  292     Recent Labs   Lab Test  08/16/18   0512  08/15/18   0512   NA  144  144   POTASSIUM  3.4  3.7   CHLORIDE  110*  112*   CO2  26  26   ANIONGAP  7  6   GLC  253*  252*   BUN  52*  50*   CR  1.15*  1.30*   ANNE  8.4*  8.4*     Liver Function Studies -   Recent Labs   Lab Test  08/15/18   0512   PROTTOTAL  5.8*   ALBUMIN  1.8*   BILITOTAL  0.2   ALKPHOS  72   AST  31   ALT  50     Lab Results   Component Value Date    INR 1.50 08/15/2018    INR 1.32 08/10/2018    INR 2.1 05/12/2017    INR 2.4 05/08/2017    INR 1.8 05/03/2017    INR 4.6 04/28/2017       I spent a total of 35 minutes bedside and on the inpatient unit managing the glycemic care of Sunitha Jacques. Over 50% of my time on the unit was spent counseling the patient and/or " coordinating care regarding acute hyperglycemia management requiring escalation of dosing.  See note for details.    Rachana Juan APRN -[id number]  Diabetes Management job code [id number]

## (undated) DEVICE — PAD CHUX UNDERPAD 23X24" 7136

## (undated) DEVICE — DRAPE IOBAN INCISE 23X17" 6650EZ

## (undated) DEVICE — Device

## (undated) DEVICE — SU SILK 0 TIE 6X18" A186H

## (undated) DEVICE — VESSEL LOOPS YELLOW MAXI 31145694

## (undated) DEVICE — STPL SKIN 35W APPOSE 8886803712

## (undated) DEVICE — SU SILK 3-0 TIE 12X30" A304H

## (undated) DEVICE — SU VICRYL 3-0 SH 27" J316H

## (undated) DEVICE — SU SILK 4-0 RB-1 CR 8X18" C054D

## (undated) DEVICE — SU ETHIBOND 0 CT-2 30" X412H

## (undated) DEVICE — SU PDS II 0 CTX 36" Z370T

## (undated) DEVICE — SU SILK 3-0 SH CR 8X18" C013D

## (undated) DEVICE — CLIP HORIZON SM RED WIDE SLOT 001201

## (undated) DEVICE — DRAIN JACKSON PRATT ROUND SIL 19FR W/TROCAR LF JP-2232

## (undated) DEVICE — PITCHER STERILE 1000ML  SSK9004A

## (undated) DEVICE — BAG URINARY DRAIN LUBRISIL IC 4000ML LF 253509A

## (undated) DEVICE — SOL NACL 0.9% IRRIG 1000ML BOTTLE 2F7124

## (undated) DEVICE — VESSEL LOOPS RED MINI 31145710

## (undated) DEVICE — SPONGE LAP 18X18" X8435

## (undated) DEVICE — STPL 80 X 3.8MM GIA8038S

## (undated) DEVICE — SYR PISTON URETHRAL 60ML 68000

## (undated) DEVICE — SU PDS II 4-0 RB-1 27" Z304H

## (undated) DEVICE — SU ETHILON 2-0 FS 18" 664H

## (undated) DEVICE — SU PROLENE 5-0 RB-1DA 36"  8556H

## (undated) DEVICE — SU VICRYL 2-0 UR-6 27" J602H

## (undated) DEVICE — SU VICRYL 3-0 SH 27" UND J416H

## (undated) DEVICE — STPL LINEAR 60 X 3.5MM TA6035S

## (undated) DEVICE — DRAIN JACKSON PRATT RESERVOIR 100ML SU130-1305

## (undated) DEVICE — KIT UROSTOMY POUCH 2 3/4" 19254

## (undated) DEVICE — SYR BULB IRRIG 50ML LATEX FREE 0035280

## (undated) DEVICE — PACK GOWN 3/PK DISP XL SBA32GPFCB

## (undated) DEVICE — LINEN TOWEL PACK X6 WHITE 5487

## (undated) DEVICE — DRAPE ISOLATION BAG 1003

## (undated) DEVICE — DRAPE SHEET REV FOLD 3/4 9349

## (undated) DEVICE — SU VICRYL 0 CT-1 27" UND J260H

## (undated) DEVICE — DRAPE LEGGINGS CLEAR 8430

## (undated) DEVICE — JELLY LUBRICATING SURGILUBE 2OZ TUBE

## (undated) DEVICE — DRSG TEGADERM 4X10" 1627

## (undated) DEVICE — SU STEEL 6 CCS 4X18" M654G

## (undated) DEVICE — CLIP HORIZON MED BLUE 002200

## (undated) DEVICE — ESU LIGASURE IMPACT OPEN SEALER/DVDR CVD LG JAW LF4418

## (undated) DEVICE — DRAIN JACKSON PRATT 15FR ROUND SU130-1323

## (undated) DEVICE — SUTURE BOOTS 051003PBX

## (undated) DEVICE — LINEN TOWEL PACK X30 5481

## (undated) DEVICE — SU VICRYL 2-0 CT-2 27" J333H

## (undated) DEVICE — SU PROLENE 4-0 RB-1DA 36" 8557H

## (undated) DEVICE — SU SILK 4-0 TIE 12X30" A303H

## (undated) DEVICE — SOL WATER IRRIG 1000ML BOTTLE 2F7114

## (undated) DEVICE — CLIP HORIZON LG ORANGE 004200

## (undated) DEVICE — PACK AB HYST II

## (undated) DEVICE — SU SILK 2-0 TIE 12X30" A305H

## (undated) DEVICE — SU MONOCRYL 2-0 SH 27" UND Y417H

## (undated) DEVICE — PREP CHLORAPREP 26ML TINTED ORANGE  260815

## (undated) DEVICE — SU CHROMIC 4-0 SH 27" G121H

## (undated) DEVICE — PREP POVIDONE IODINE SOLUTION 10% 4OZ

## (undated) DEVICE — NDL COUNTER 20CT 31142493

## (undated) DEVICE — STPL RELOAD 80 X 3.8MM GIA8038L

## (undated) DEVICE — LINEN TOWEL PACK X5 5464

## (undated) DEVICE — SU PDS II 1 TP-1 48" Z880G

## (undated) DEVICE — SU PDS II 0 TP-1 60" Z991G

## (undated) DEVICE — VESSEL LOOPS BLUE SUPERMAXI 011022PBX

## (undated) DEVICE — SPONGE KITTNER 30-101

## (undated) DEVICE — SU VICRYL 4-0 RB-1 27" UND J214H

## (undated) RX ORDER — GABAPENTIN 300 MG/1
CAPSULE ORAL
Status: DISPENSED
Start: 2018-01-01

## (undated) RX ORDER — FENTANYL CITRATE 50 UG/ML
INJECTION, SOLUTION INTRAMUSCULAR; INTRAVENOUS
Status: DISPENSED
Start: 2018-01-01

## (undated) RX ORDER — LIDOCAINE HYDROCHLORIDE 10 MG/ML
INJECTION, SOLUTION EPIDURAL; INFILTRATION; INTRACAUDAL; PERINEURAL
Status: DISPENSED
Start: 2018-01-01

## (undated) RX ORDER — HYDROMORPHONE HCL/0.9% NACL/PF 0.2MG/0.2
SYRINGE (ML) INTRAVENOUS
Status: DISPENSED
Start: 2018-01-01

## (undated) RX ORDER — HEPARIN SODIUM 5000 [USP'U]/.5ML
INJECTION, SOLUTION INTRAVENOUS; SUBCUTANEOUS
Status: DISPENSED
Start: 2018-01-01

## (undated) RX ORDER — CLINDAMYCIN PHOSPHATE 900 MG/50ML
INJECTION, SOLUTION INTRAVENOUS
Status: DISPENSED
Start: 2018-01-01

## (undated) RX ORDER — ACETAMINOPHEN 325 MG/1
TABLET ORAL
Status: DISPENSED
Start: 2018-01-01

## (undated) RX ORDER — ALBUMIN, HUMAN INJ 5% 5 %
SOLUTION INTRAVENOUS
Status: DISPENSED
Start: 2018-01-01

## (undated) RX ORDER — EPHEDRINE SULFATE 50 MG/ML
INJECTION, SOLUTION INTRAMUSCULAR; INTRAVENOUS; SUBCUTANEOUS
Status: DISPENSED
Start: 2018-01-01

## (undated) RX ORDER — SODIUM CHLORIDE 9 MG/ML
INJECTION, SOLUTION INTRAVENOUS
Status: DISPENSED
Start: 2018-01-01

## (undated) RX ORDER — CIPROFLOXACIN 2 MG/ML
INJECTION, SOLUTION INTRAVENOUS
Status: DISPENSED
Start: 2018-01-01

## (undated) RX ORDER — REGADENOSON 0.08 MG/ML
INJECTION, SOLUTION INTRAVENOUS
Status: DISPENSED
Start: 2018-01-01